# Patient Record
Sex: FEMALE | Race: WHITE
[De-identification: names, ages, dates, MRNs, and addresses within clinical notes are randomized per-mention and may not be internally consistent; named-entity substitution may affect disease eponyms.]

---

## 2021-11-08 ENCOUNTER — HOSPITAL ENCOUNTER (INPATIENT)
Dept: HOSPITAL 97 - ER | Age: 66
LOS: 2 days | Discharge: HOME | DRG: 419 | End: 2021-11-10
Attending: SURGERY | Admitting: SURGERY
Payer: COMMERCIAL

## 2021-11-08 VITALS — BODY MASS INDEX: 33 KG/M2

## 2021-11-08 DIAGNOSIS — M06.9: ICD-10-CM

## 2021-11-08 DIAGNOSIS — Z20.822: ICD-10-CM

## 2021-11-08 DIAGNOSIS — K80.12: Primary | ICD-10-CM

## 2021-11-08 DIAGNOSIS — E11.9: ICD-10-CM

## 2021-11-08 LAB
ALBUMIN SERPL BCP-MCNC: 4.2 G/DL (ref 3.4–5)
ALP SERPL-CCNC: 93 U/L (ref 45–117)
ALT SERPL W P-5'-P-CCNC: 27 U/L (ref 12–78)
AST SERPL W P-5'-P-CCNC: 20 U/L (ref 15–37)
BUN BLD-MCNC: 13 MG/DL (ref 7–18)
GLUCOSE SERPLBLD-MCNC: 168 MG/DL (ref 74–106)
HCT VFR BLD CALC: 36.4 % (ref 36–45)
LIPASE SERPL-CCNC: 159 U/L (ref 73–393)
LYMPHOCYTES # SPEC AUTO: 1.4 K/UL (ref 0.7–4.9)
PMV BLD: 7.6 FL (ref 7.6–11.3)
POTASSIUM SERPL-SCNC: 3.9 MMOL/L (ref 3.5–5.1)
RBC # BLD: 3.93 M/UL (ref 3.86–4.86)

## 2021-11-08 PROCEDURE — 96375 TX/PRO/DX INJ NEW DRUG ADDON: CPT

## 2021-11-08 PROCEDURE — 96365 THER/PROPH/DIAG IV INF INIT: CPT

## 2021-11-08 PROCEDURE — 99285 EMERGENCY DEPT VISIT HI MDM: CPT

## 2021-11-08 PROCEDURE — 74300 X-RAY BILE DUCTS/PANCREAS: CPT

## 2021-11-08 PROCEDURE — 76705 ECHO EXAM OF ABDOMEN: CPT

## 2021-11-08 PROCEDURE — 80076 HEPATIC FUNCTION PANEL: CPT

## 2021-11-08 PROCEDURE — 74177 CT ABD & PELVIS W/CONTRAST: CPT

## 2021-11-08 PROCEDURE — 36415 COLL VENOUS BLD VENIPUNCTURE: CPT

## 2021-11-08 PROCEDURE — 88304 TISSUE EXAM BY PATHOLOGIST: CPT

## 2021-11-08 PROCEDURE — 83690 ASSAY OF LIPASE: CPT

## 2021-11-08 PROCEDURE — 85025 COMPLETE CBC W/AUTO DIFF WBC: CPT

## 2021-11-08 PROCEDURE — 80048 BASIC METABOLIC PNL TOTAL CA: CPT

## 2021-11-08 PROCEDURE — 82947 ASSAY GLUCOSE BLOOD QUANT: CPT

## 2021-11-08 RX ADMIN — CEFOXITIN SODIUM SCH MLS: 1 INJECTION, SOLUTION INTRAVENOUS at 23:56

## 2021-11-08 RX ADMIN — DEXTROSE AND SODIUM CHLORIDE SCH MLS: 5; .45 INJECTION, SOLUTION INTRAVENOUS at 23:00

## 2021-11-08 RX ADMIN — HYDROMORPHONE HYDROCHLORIDE PRN MG: 1 INJECTION, SOLUTION INTRAMUSCULAR; INTRAVENOUS; SUBCUTANEOUS at 23:47

## 2021-11-08 NOTE — RAD REPORT
EXAM DESCRIPTION:  US - Abdomen Exam Limited - 11/8/2021 4:45 pm

 

CLINICAL HISTORY:  ABD PAIN

 

COMPARISON:  No comparisons

 

FINDINGS:  The gallbladder demonstrates distention with sludge and stones present. Trace pericholecys
tic fluid is seen. Gallbladder wall thickening is not noted. The common bile duct is mildly prominent
 measuring 7 mm.

 

The liver demonstrates no findings of intrahepatic biliary dilatation.

 

IMPRESSION:  Gallbladder distention with sludge and stones noted.

 

Trace pericholecystic fluid could indicate early acute cholecystitis in the proper clinical setting.

## 2021-11-08 NOTE — ER
Nurse's Notes                                                                                     

 [facility]                                                                 

Name: Samara Hawley                                                                               

Age: 66 yrs                                                                                       

Sex: Female                                                                                       

: 1955                                                                                   

MRN: [id number]                                                                                   

Arrival Date: 2021                                                                          

Time: 15:14                                                                                       

Account#: [id number]                                                                            

Bed 15                                                                                            

Private MD:                                                                                       

Diagnosis: Acute cholecystitis                                                                    

                                                                                                  

Presentation:                                                                                     

                                                                                             

15:18 Chief complaint: Patient states: pain to RUQ that began today around 1430. Pt c/o       aa5 

      nausea and vomiting, denies diarrhea. Coronavirus screen: nausea. Ebola Screen: No          

      symptoms or risks identified at this time. Initial Sepsis Screen: Does the patient meet     

      any 2 criteria? HR > 90 bpm. Does the patient have a suspected source of infection? No.     

      Patient's initial sepsis screen is negative. Risk Assessment: Do you want to hurt           

      yourself or someone else? Patient reports no desire to harm self or others. Onset of        

      symptoms was 2021.                                                             

15:18 Method Of Arrival: Wheelchair                                                           aa5 

15:18 Acuity: LANDON 3                                                                           aa5 

                                                                                                  

Historical:                                                                                       

- Allergies:                                                                                      

15:19 No Known Allergies;                                                                     aa5 

- PMHx:                                                                                           

15:19 Diabetes mellitus; Back problem; RA;                                                    aa5 

- PSHx:                                                                                           

15:19 Back Stimulator;                                                                        aa5 

                                                                                                  

- Immunization history:: Client reports receiving the 2nd dose of the Covid vaccine.              

- Social history:: Smoking status: Patient denies any tobacco usage or history of.                

                                                                                                  

                                                                                                  

Screening:                                                                                        

15:43 Abuse screen: Denies threats or abuse. Denies injuries from another. Nutritional        jt3 

      screening: No deficits noted. Tuberculosis screening: No symptoms or risk factors           

      identified. Fall Risk None identified.                                                      

                                                                                                  

Assessment:                                                                                       

15:43 General: Appears distressed, Behavior is restless. Pain: Complains of pain in abdomen   jt3 

      Pain radiates to abdomen Pain currently is 10 out of 10 on a pain scale. Quality of         

      pain is described as throbbing. GI: Abdomen is tender to palpation in right upper           

      quadrant Abdomen has rebound tenderness Reports upper abdominal pain, nausea, Pt.           

      reports sudden, sharp, right sided abdominal pain today after eating. Pain started an       

      hour before arriving to the ER.                                                             

20:24 Reassessment: MARGARETTE Castañeda to room several times to discuss admission. At this time, pt and dc2 

       agrees that she will stay the night for sx in the am. Informed pt and        

      NPO after midnight, informed that we do not know what time and that I will inform them      

      when I get any information regarding room placement and or surgery time. Call lght          

      within reach, continue to monitor.                                                          

                                                                                                  

Vital Signs:                                                                                      

15:18  / 94; Pulse 94; Resp 18 S; Temp 98.0(TE); Pulse Ox 100% on R/A; Weight 98.88 kg  aa5 

      (R); Height 5 ft. 8 in. (172.72 cm) (R);                                                    

16:04  / 86; Pulse 79; Resp 17; Pulse Ox 98% ;                                          jt3 

18:50  / 63; Pulse 76; Resp 16; Pulse Ox 95% on R/A;                                    jt3 

19:27  / 78; Pulse 78; Resp 18; Temp 98.0; Pulse Ox 98% on R/A; Pain 4/10;              dc2 

22:00  / 69; Pulse 72; Resp 17; Pulse Ox 99% on R/A; Pain 3/10;                         dc2 

15:18 Body Mass Index 33.15 (98.88 kg, 172.72 cm)                                             aa 

                                                                                                  

ED Course:                                                                                        

15:14 Patient arrived in ED.                                                                  am2 

15:18 Arm band placed on.                                                                     aa5 

15:19 Triage completed.                                                                       aa5 

15:22 Bentley Sandoval, RN is Primary Nurse.                                                   jt3 

15:26 Garry Welch PA is PHCP.                                                              [facility] 

15:26 Diana Galeano MD is Attending Physician.                                                [facility] 

15:43 Patient has correct armband on for positive identification. Bed in low position. Call   jt3 

      light in reach. Side rails up X2.                                                           

15:43 No provider procedures requiring assistance completed. Inserted saline lock: 20 gauge   jt3 

      in right antecubital area, using aseptic technique. Blood collected.                        

16:32 CT Abd/Pelvis - IV Contrast Only In Process Unspecified.                                EDMS

16:45 US Abdomen Limited In Process Unspecified.                                              EDMS

19:52 Nurse Practitioner and/or Physician Assistant to see patient.                           dc2 

19:53 Jerrod Flores MD is Hospitalizing Provider.                                           jmm 

20:08 Nurse Practitioner and/or Physician Assistant to see patient.                           dc2 

20:26 IV Flushed right antecubital with 5 ml normal saline                                    dc2 

20:30 Cardiac monitor on. Pulse ox on. NIBP on. Door closed. Lights dimmed.                   dc2 

21:15 No apparent distress. Resting quietly. Awaiting bed assignment.                         dc2 

                                                                                                  

Administered Medications:                                                                         

15:42 Drug: morphine 4 mg Route: IVP; Site: right antecubital;                                jt3 

17:47 Follow up: Response: No adverse reaction; Pain is decreased                             jt3 

15:42 Drug: Zofran (Ondansetron) 4 mg Route: IVP; Site: right antecubital;                    jt3 

17:47 Follow up: Response: No adverse reaction; Pain is decreased                             jt3 

17:10 Drug: Dilaudid (HYDROmorphone) 0.5 mg Route: IVP; Site: right antecubital;              jt3 

17:48 Follow up: Response: No adverse reaction; Pain is decreased                             jt3 

17:10 Drug: Pepcid (famotidine) 10 mg Route: IVP; Site: right antecubital;                    jt3 

17:48 Follow up: Response: No adverse reaction; Pain is decreased                             jt3 

19:50 Drug: cefOXitin 2 grams Route: IVPB; Rate: 100 ml/hr; Infused Over: 30 mins; Site:      dc2 

      right antecubital; Delivery: Primary tubing;                                                

20:30 Follow up: IV Status: Completed infusion; IV Intake: 100ml                              dc2 

20:05 Drug: Dilaudid (HYDROmorphone) 0.5 mg Route: IVP; Site: right antecubital;              dc2 

20:45 Follow up: Response: Pain is decreased                                                  dc2 

                                                                                                  

                                                                                                  

Intake:                                                                                           

20:30 IV: 100ml; Total: 100ml.                                                                dc2 

                                                                                                  

Outcome:                                                                                          

19:54 Decision to Hospitalize by Provider.                                                    anthony 

22:09 Admitted to Tele accompanied by tech, via stretcher, room 230, with chart, Report       dc2 

      called to  PANCHO Peacock                                                                       

22:09 Condition: stable                                                                           

22:17 Patient left the ED.                                                                    dc2 

                                                                                                  

Signatures:                                                                                       

Dispatcher [employer]                           EDMS                                                 

Garry Welch PA PA jmm Calderon, Audri, RN                     RN   [email or web address]                                                  

Peggy Flores am2                                                  

Maida Yeager RN RN   dc2                                                  

Bentley Sandoval RN                     RN   jt3                                                  

                                                                                                  

**************************************************************************************************

## 2021-11-08 NOTE — RAD REPORT
EXAM DESCRIPTION:  CTAbdomen   Pelvis W Contrast - 11/8/2021 4:32 pm

 

CLINICAL HISTORY:  Abdominal pain.

abdominal pain

 

COMPARISON:  No comparisons

 

TECHNIQUE:  Biphasic CT imaging of the abdomen and pelvis was performed with 100 ml non-ionic IV cont
rast.

 

All CT scans are performed using dose optimization technique as appropriate and may include automated
 exposure control or mA/KV adjustment according to patient size.

 

FINDINGS:  The lung bases are clear.The gallbladder is quite distended. Postsurgical changes of a gas
tric sleeve.

 

The liver, spleen, pancreas, adrenal glands and kidneys are within normal limits.

 

No bowel obstruction, free air, free fluid or abscess.  The appendix is not identified as a discrete 
structure, however, no secondary findings of appendicitis are identified.   No evidence of significan
t lymphadenopathy.

 

Lumbosacral hardware is present.

 

IMPRESSION:  Prominent gallbladder distension noted.

## 2021-11-09 VITALS — OXYGEN SATURATION: 96 %

## 2021-11-09 PROCEDURE — 0FT44ZZ RESECTION OF GALLBLADDER, PERCUTANEOUS ENDOSCOPIC APPROACH: ICD-10-PCS

## 2021-11-09 PROCEDURE — BF121ZZ FLUOROSCOPY OF GALLBLADDER USING LOW OSMOLAR CONTRAST: ICD-10-PCS

## 2021-11-09 RX ADMIN — HYDROMORPHONE HYDROCHLORIDE PRN MG: 1 INJECTION, SOLUTION INTRAMUSCULAR; INTRAVENOUS; SUBCUTANEOUS at 08:02

## 2021-11-09 RX ADMIN — CEFOXITIN SODIUM SCH MLS: 1 INJECTION, SOLUTION INTRAVENOUS at 06:20

## 2021-11-09 RX ADMIN — FENTANYL CITRATE ONE MCG: 50 INJECTION, SOLUTION INTRAMUSCULAR; INTRAVENOUS at 19:57

## 2021-11-09 RX ADMIN — FENTANYL CITRATE ONE MCG: 50 INJECTION, SOLUTION INTRAMUSCULAR; INTRAVENOUS at 19:47

## 2021-11-09 RX ADMIN — HYDROMORPHONE HYDROCHLORIDE PRN MG: 1 INJECTION, SOLUTION INTRAMUSCULAR; INTRAVENOUS; SUBCUTANEOUS at 03:04

## 2021-11-09 RX ADMIN — CEFOXITIN SODIUM SCH: 1 INJECTION, SOLUTION INTRAVENOUS at 18:00

## 2021-11-09 RX ADMIN — DEXTROSE AND SODIUM CHLORIDE SCH MLS: 5; .45 INJECTION, SOLUTION INTRAVENOUS at 06:20

## 2021-11-09 RX ADMIN — DEXTROSE AND SODIUM CHLORIDE SCH: 5; .45 INJECTION, SOLUTION INTRAVENOUS at 13:00

## 2021-11-09 RX ADMIN — HYDROMORPHONE HYDROCHLORIDE PRN MG: 1 INJECTION, SOLUTION INTRAMUSCULAR; INTRAVENOUS; SUBCUTANEOUS at 11:29

## 2021-11-09 RX ADMIN — CEFOXITIN SODIUM SCH MLS: 1 INJECTION, SOLUTION INTRAVENOUS at 12:16

## 2021-11-09 NOTE — P.HP
Date of Service: 11/09/21





PC: This 66-year-old female presented to the emergency room with severe right 

upper quadrant abdominal pain radiating to her back for diagnosis and treatment.





HPC: Patient states is the first time she has had this pain.  Devises describes 

it as very severe.  Radiating into her back.  Seem to come on after eating some 

food.





PSHx: Back surgery [has numerous rods] also gastric bypass surgery in the past





PMHx: Rheumatoid arthritis





Social Hx: No known allergies, see nurses notes for her rheumatoid meds





Sys R: No cough, wheeze, shortness of breath.  No chest pain or palpitations.  

Denies any urinary complaints











O/E: Awake alert uncomfortable





HEENT: Not jaundiced





Chest: Air entry equal bilaterally





Abd: Tender in the right upper quadrant





Loco: Intact





Data: Has acute on chronic cholecystitis





Impression: Acute on chronic cholecystitis with cholelithiasis





Plan: I will taken the operating for laparoscopic possible open cholecystectomy.

 We will also do a cholangiogram.  The risks of this procedure have been 

discussed.  The possibility of bleeding, infection, injury to bile ducts blood 

vessels and intestines were described.  The possible need for an open and/or 

further surgeries and procedures was discussed.  She understands and wants to 

proceed.

## 2021-11-09 NOTE — P.OP
Preoperative diagnosis: Acute on chronic cholecystitis with cholelithiasis


Postoperative diagnosis: The same


Primary procedure: Laparoscopic cholecystectomy


Secondary procedure: Cholangiogram


Anesthesia: General


Estimated blood loss: Less than 10 cc


Specimen: Gallbladder and contents


Operative Technique: 





She was brought to the operating room and placed supine on the table.  After the

induction of adequate general endotracheal anesthesia, the area of the abdomen 

was prepped with a DuraPrep solution, and she was draped in usual aseptic 

manner.





Attention was turned towards the umbilicus.  After injecting 1.25% Marcaine a 

skin incision was made.  The Visiport was then used to enter the peritoneal 

cavity and created pneumoperitoneum to approximately 12 mmHg.  Under direct 

vision a 5 mm trocar was placed in the upper midline, and 2 other 5 mm trochars 

on the right lateral side of the abdomen.  The patient was now placed in marked 

reverse Trendelenburg.  The table was turned towards the left.  We were now able

to visualize the right upper quadrant.  We could see quite a distended and 

acutely inflamed gallbladder.  It was necessary to aspirate the contents so as 

we could place the grasper on the fundus of the gallbladder.  This having been 

done another grasper was now able to be applied to the Lo's pouch.  

Applying lateral traction we were able to dissect out and expose both the cystic

duct and artery.  It should be noted the cystic duct was quite thickened.  There

was a large amount of dense inflammatory chronic tissue in that area.  Having 

obtained the critical view, a clip was placed between the gallbladder and the 

cystic duct.  An opening made is made into the cystic duct through which we 

attempted a cholangiogram.  Due to the amount of hardware that the patient had 

that we did not get good validation of the extrahepatic biliary tree.  We could 

however see that the there was contrast in the common bile duct, and there was 

flow into the duodenum.  I did not see any filling defects.  The films are 

obviously suboptimal but I wanted to make sure it was at least grossly clear to 

avoid the possibility of having to do an MRCP which would not be possible due to

the patient hardware in her back.  The catheter was now removed.  Clips were 

placed on the distal portion of the cystic duct.  The cystic duct was fully 

transected.  The gallbladder was now dissected free from the liver bed, placed 

into an Endo Catch, and brought out through the umbilical trocar site.  At this 

point the abdomen is inspected to ensure adequate hemostasis.  Tube absorbable 

sutures were placed at the umbilicus to approximate the umbilical fascia.  The 

pneumoperitoneum was now collapsed, the suture tied, and the trochars removed.





At the end of the procedure the patient was in a stable condition was sent to 

the recovery room.  Needle sponge and instrument count were correct.  No drains 

were placed.  Staples were applied to the skin.

## 2021-11-10 VITALS — DIASTOLIC BLOOD PRESSURE: 56 MMHG | TEMPERATURE: 98.2 F | SYSTOLIC BLOOD PRESSURE: 116 MMHG

## 2021-11-10 RX ADMIN — CEFOXITIN SODIUM SCH MLS: 1 INJECTION, SOLUTION INTRAVENOUS at 00:00

## 2021-11-10 RX ADMIN — CEFOXITIN SODIUM SCH MLS: 1 INJECTION, SOLUTION INTRAVENOUS at 06:00

## 2021-11-10 NOTE — RAD REPORT
EXAM DESCRIPTION:  RAD - Cholangiogram Oper-Xray Or - 11/10/2021 7:52 am

 

CLINICAL HISTORY:  ORTEGA WITH IOC

 

COMPARISON:  Abdomen   Pelvis W Contrast dated 11/8/2021

 

FINDINGS:  Six intraoperative fluoroscopic images were submitted showing opacification of the common 
bile duct and gallbladder. Much of the duct is obscured by the patient's spinal hardware. There is a 
filling defect distally which could be an air bubble or stone.

 

IMPRESSION:  Intraoperative fluoroscopic images from intraoperative cholangiogram. Though limited due
 to the overlying spinal hardware, there is a filling defect in the distal common bile duct that coul
d represent a stone.

## 2023-08-13 ENCOUNTER — HOSPITAL ENCOUNTER (EMERGENCY)
Dept: HOSPITAL 97 - ER | Age: 68
Discharge: HOME | End: 2023-08-13
Payer: COMMERCIAL

## 2023-08-13 VITALS — SYSTOLIC BLOOD PRESSURE: 132 MMHG | OXYGEN SATURATION: 99 % | DIASTOLIC BLOOD PRESSURE: 82 MMHG

## 2023-08-13 DIAGNOSIS — R11.2: ICD-10-CM

## 2023-08-13 DIAGNOSIS — Z20.822: ICD-10-CM

## 2023-08-13 DIAGNOSIS — J01.90: Primary | ICD-10-CM

## 2023-08-13 DIAGNOSIS — E11.9: ICD-10-CM

## 2023-08-13 LAB
BUN BLD-MCNC: 12 MG/DL (ref 7–18)
GLUCOSE SERPLBLD-MCNC: 316 MG/DL (ref 74–106)
HCT VFR BLD CALC: 38.4 % (ref 36–45)
LYMPHOCYTES # SPEC AUTO: 1.2 K/UL (ref 0.7–4.9)
MCV RBC: 93.6 FL (ref 80–100)
PMV BLD: 6.6 FL (ref 7.6–11.3)
POTASSIUM SERPL-SCNC: 4.2 MEQ/L (ref 3.5–5.1)
RBC # BLD: 4.1 M/UL (ref 3.86–4.86)
WBC # BLD AUTO: 9.6 THOU/UL (ref 4.3–10.9)

## 2023-08-13 PROCEDURE — 36415 COLL VENOUS BLD VENIPUNCTURE: CPT

## 2023-08-13 PROCEDURE — 96374 THER/PROPH/DIAG INJ IV PUSH: CPT

## 2023-08-13 PROCEDURE — 80048 BASIC METABOLIC PNL TOTAL CA: CPT

## 2023-08-13 PROCEDURE — 87081 CULTURE SCREEN ONLY: CPT

## 2023-08-13 PROCEDURE — 99284 EMERGENCY DEPT VISIT MOD MDM: CPT

## 2023-08-13 PROCEDURE — 87804 INFLUENZA ASSAY W/OPTIC: CPT

## 2023-08-13 PROCEDURE — 87635 SARS-COV-2 COVID-19 AMP PRB: CPT

## 2023-08-13 PROCEDURE — 96375 TX/PRO/DX INJ NEW DRUG ADDON: CPT

## 2023-08-13 PROCEDURE — 85025 COMPLETE CBC W/AUTO DIFF WBC: CPT

## 2023-08-13 PROCEDURE — 96361 HYDRATE IV INFUSION ADD-ON: CPT

## 2023-08-13 PROCEDURE — 87070 CULTURE OTHR SPECIMN AEROBIC: CPT

## 2023-08-13 NOTE — XMS REPORT
Continuity of Care Document

                           Created on:2023



Patient:ESSENCE MERCHANT

Sex:Female

:1955

External Reference #:[id number]





Demographics







                          Address                   [address] [place] [address]

 

                          Home Phone                [phone] CE[place]

 

                          Work Phone                [phone]

 

                          Mobile Phone              [phone]

 

                          Email Address             [email or web address]

 

                          Preferred Language        English

 

                          Marital Status            Unknown

 

                          Hindu Affiliation     Unknown

 

                          Race                      Unknown

 

                          Additional Race(s)        Unavailable



                                                    Other Race



                                                    White

 

                          Ethnic Group              Unknown









Author







                          Organization              [facility]

t

 

                          Address                   [address] [place] [address]

 

                          Phone                     [phone]









Support







                Name            Relationship    Address         Phone

 

                MANUEL MERCHANT              [address]  [phone]



                                                [place] [address] 

 

                MANUEL MERCHANT              [address]  [phone]



                                                [place] [address] 

 

                Mayur Essence  Unavailable     [address] [facility] [phone]



                                                [place] [address] 

 

                Manuel Merchant     Unavailable     [address] [facility] [phone]



                                                [place] [address] 

 

                MAYURHERBER BOONE  Spouse          [address] [facility] Unavailable



                                                [place] [phone] 

 

                Herber Merchant  Spouse          [address] [facility] [phone]

[phone]



                                                [place] [id number] 

 

                MANUEL MERCHANT     Spouse          [address] [facility] Unavailable



                                                [place] [address]

66 

 

                Mayur, Manuel     Spouse          [address] [facility] [phone]

[phone]



                                                [place] [address]

66 









Care Team Providers







                    Name                Role                Phone

 

                    POLINA GUADALUPE            Primary Care Physician Unavailable

 

                    Polina Guadalupe          Attending Clinician Unavailable

 

                    MICHAEL GOMEZ     Attending Clinician Unavailable

 

                    JOVANA GOODE Attending Clinician Unavailable

 

                    GWENDOLYN CRISTINA       Attending Clinician Unavailable

 

                    JOVANA BARNES     Attending Clinician Unavailable

 

                    Aleksander Canseco    Attending Clinician [phone]

 

                    Jovana Goode MD Attending Clinician [phone]

 

                    JOVANA BARNES Attending Clinician Unavailable

 

                    EDUARDO GARSIA     Attending Clinician Unavailable

 

                    TARAS MENG        Attending Clinician Unavailable

 

                    Jerrica Ardon  Attending Clinician [phone]

 

                    WILLIAMS THORNTON  Attending Clinician Unavailable

 

                    Williams Thornton MD    Attending Clinician Unavailable

 

                    Pam Boone MD   Attending Clinician [phone]

 

                    KATEY CALDERA Attending Clinician Unavailable

 

                    CHELLY MCARTHUR Attending Clinician Unavailable

 

                    Armando Fair MD Attending Clinician [phone]

 

                    Ashley Cai MD   Attending Clinician [phone]

 

                    ASHLEY CAI      Attending Clinician Unavailable

 

                    ARMANDO FAIR Attending Clinician Unavailable

 

                    ARMANDO FAIR Attending Clinician Unavailable

 

                    Doctor Unassigned, No Name Attending Clinician Unavailable









Payers







           Payer Name Policy Type Policy Number Effective Date Expiration Date S

mode

 

           [place] P[place] AND            [id number] 2009 



           OUT OF STATE                       00:00:00   00:00:00   

 

           [facility]            [id number] 2023            



           HEALTH [facility]                       00:00:00              



           PPO                                                    

 

           [facility] MEDICARE            [id number]  2022            



           ADVANTAGE                        00:00:00              

 

           [facility] HealthSelect 1          [id number]  2022            Common



           TRS/ERS MCR PPO                       00:00:00              [facility]

 

           [employer] Blue 6          [id number] 2021 Common



           [place]                       00:00:00   00:00:00   [facility]

 

           SRC AN [employer]ET[employer]            [id number] 2011            



           COMPANY                          00:00:00              







Problems







       Condition Condition Condition Status Onset  Resolution Last   Treating Co

mments 

Source



       Name   Details Category        Date   Date   Treatment Clinician        



                                                 Date                 

 

       Leg    Leg    Disease Active                              [facility]



       erythema erythema               5-10                               Health



                                   00:00:                             



                                   00                                 

 

       Status Status Disease Active                              [facility]



       post total post total               3-14                               He

alth



       knee   knee                 00:00:                             



       replacemen replacemen               00                                 



       t, left t, left                                                  

 

       Ganglion Ganglion Disease Active                              [facility]



       cyst of cyst of               5-02                               Health



       volar  volar                00:00:                             



       aspect of aspect of               00                                 



       left wrist left wrist                                                  

 

       Cervical Cervical Disease Active                              [facility]



       radiculopa radiculopa               4-18                               He

alth



       thy    thy                  00:00:                             



                                   00                                 

 

       Carpal Carpal Disease Active                              [facility]



       tunnel tunnel               4-12                               Health



       syndrome, syndrome,               00:00:                             



       right  right                00                                 

 

       Primary Primary Disease Active 2022-0                             [facility]



       osteoarthr osteoarthr               4-12                               He

alth



       itis of itis of               00:00:                             



       left wrist left wrist               00                                 

 

       TIA    TIA    Disease Active                              [facility]



       (transient (transient               3-09                               He

alth



       ischemic ischemic               00:00:                             



       attack) attack)               00                                 

 

       Sacroiliac Sacroiliac Disease Active -                             U

T



       joint pain joint pain               3-30                               He

alth



                                   00:00:                             



                                   00                                 

 

       Hypothyroi Hypothyroi Disease Active 2015                             U

T



       dism due dism due                                              Health



       to     to                   00:00:                             



       acquired acquired               00                                 



       atrophy of atrophy of                                                  



       thyroid thyroid                                                  

 

       [id number] Status Problem                                           Common



              post                                                    [facility]



              laparoscop                                                  - [facility]



              ic                                                      [facility]

 

       [id number] Leukopenia Problem                                           Com

mon



              ,                                                       [facility]



              unspecifie                                                  - [facility]



              d type                                                  [facility]

 

       [id number] Primary Problem                                           Comm

on



         osteoarthr                                                  [facility]



              itis of                                                  - [facility]



              left knee                                                  [facility]

 

       [id number] Controlled Problem                                           Co

mmon



              type 2                                                  Spirit



              diabetes                                                  - [facility]



              mellitus                                                  [facility]



              complicati                                                  Medica

l



              on,                                                     Center



              without                                                  



              long-term                                                  



              current                                                  



              use of                                                  



              insulin                                                  

 

       [id number] Primary Problem                                           Common



              insomnia                                                  Spirit



                                                                      - [facility]



                                                                      [facility]

 

       [id number] Neuropathy Problem                                           C

ommon



         due to                                                  Spirit



              type 2                                                  - [facility]



              diabetes                                                  [facility]

 

       Diabetes Diabetes Problem                                           Commo

n



       mellitus DMII                                                    Spirit



       without without                                                  - [facility]



       complicati complicati                                                  St



       on     [facility]

 

       [id number] Malabsorpt Problem                                           Com

mon



              ion due to                                                  Spirit



              intoleranc                                                  - [facility]



              e, not                                                  St



              elsewhere                                                  [facility]



              classified                                                  Medica

l



                                                                      Center

 

       [id number] Iron   Problem                                           Common



              deficiency                                                  Spirit



              anemia,                                                  - [facility]



              unspecifie                                                  St



              d iron                                                  [facility]



              deficiency                                                  Medica

l



              anemia                                                  Center



              type                                                    

 

       Shingles Shingles Problem                                           Commo

n



                                                                      Spirit



                                                                      - [facility]



                                                                      [facility]

 

       Rheumatoid Rheumatoid Problem                                           C

ommon



       arthritis arthritis                                                  Spir

it



              involving                                                  - [facility]



              multiple                                                  St



              sites with                                                  [facility]



              positive                                                  Medical



              rheumatoid                                                  Center



              factor                                                  

 

       [id number] Itching Problem                                           Commo

n



                                                                      Spirit



                                                                      - [facility]



                                                                      [facility]

 

       [id number] Acquired Problem                                           Comm

on



              hypothyroi                                                  Spirit



              dism                                                    - [facility]



                                                                      [facility]

 

       [id number] Encounter Problem                                           Com

mon



              for                                                     Spirit



              screening                                                  - [facility]



              colonoscop                                                  [facility]

 

       [id number] Pure   Problem                                           Common



              hyperchole                                                  Spirit



              sterolemia                                                  - [facility]



                                                                      [facility]

 

       [id number] Fever, Problem                                           Common



              unspecifie                                                  Spirit



              d fever                                                  - [facility]



              cause                                                   [facility]

 

       [id number] Nausea Problem                                           Common



              alone                                                   Spirit



                                                                      - [facility]

 

       [id number] Axillary Problem                                           Comm

on



              adenopathy                                                  [facility]



                                                                      - [facility]

 

       [id number] DDD    Problem                                           Common



              (degenerat                                                  Spirit



              young disc                                                  - [facility]



              disease),                                                  [facility]







Allergies, Adverse Reactions, Alerts







       Allergy Allergy Status Severity Reaction(s) Onset  Inactive Treating Comm

ents 

Source



       Name   Type                        Date   Date   Clinician        

 

       NO KNOWN Drug   Active                                           Univers



       ALLERGIE Class                                                   ity of



       S                                                              [place]



                                                                      [facility]



                                                                      Branch







Social History







           Social Habit Start Date Stop Date  Quantity   Comments   Source

 

           History of Tobacco                                             Common

 Spirit -



           Use                                                    [facility]

 

           Sex Assigned At                                             Common Sp

claude -



           Birth                                                  [facility]

 

           History SDOH Social                                             Unive

rsity of



           Connections Phone                                             [facility]

edical



                                                                  Branch

 

           History SDOH Social                                             Unive

rsity of



           Connections Get                                             [place] [facility]

ical



           Together                                               Branch

 

           History SDOH Social                                             Unive

rsity of



           Connections [facility]



                                                                  Branch

 

           History SDOH                                             University o

f



           Housing Places                                             [facility]

 

           Gender identity                                             Universit

y of



                                                                  [facility]

 

           Sexual orientation                                             Univer

sity of



                                                                  [place] [facility]



                                                                  Branch

 

           History of Social 2023                       Univers

ity of



           function   00:00:00   00:00:00                         [place] [facility]



                                                                  Branch

 

           Exposure to 2023 Not sure              University of



           SARS-CoV-2 (event) 00:00:00   13:40:00                         [place] 

[facility]



                                                                  Branch

 

           History SDOH 2023 1                     University o

f



           Alcohol Frequency 00:00:00   00:00:00                         [facility]

edical



                                                                  Branch

 

           History SDOH 2023 0                     University o

f



           Alcohol Std Drinks 00:00:00   00:00:00                         [place] 

[facility]



                                                                  Branch

 

           History SDOH 2023 1                     University o

f



           Alcohol Binge 00:00:00   00:00:00                         [place] [facility]

al



                                                                  Branch

 

           History SDOH Social 2023 2                     Unive

rsity of



           Connections 00:00:00   00:00:00                         [facility]



           Membership                                             Branch

 

           History SDOH Social 2023 1                     Unive

rsity of



           Connections 00:00:00   00:00:00                         [place] [facility]



           Meetings                                               Branch

 

           History SDOH Social 2023 3                     Unive

rsity of



           Connections Living 00:00:00   00:00:00                         [place] 

[facility]



                                                                  Branch

 

           History SDOH 2023 0                     University o

f



           Physical Activity 00:00:00   00:00:00                         [place] [facility]

edical



           DPW                                                    Branch

 

           History SDOH 2023 0                     University o

f



           Physical Activity 00:00:00   00:00:00                         [place] [facility]

edical



           MPS                                                    Branch

 

           History SDOH Stress 2023 5                     Unive

rsity of



                      00:00:00   00:00:00                         [place] [facility]



                                                                  Branch

 

           History SDOH Food 2023 1                     Univers

ity of



           Worry      00:00:00   00:00:00                         [place] [facility]



                                                                  Branch

 

           History SDOH Food 2023 1                     Univers

ity of



           Scarcity   00:00:00   00:00:00                         [place] [facility]



                                                                  Branch

 

           History SDOH 2023 2                     University o

f



           Transport Med 00:00:00   00:00:00                         [place] [facility]

al



                                                                  Branch

 

           History SDOH 2023 2                     University o

f



           Transport Non-Med 00:00:00   00:00:00                         [place] [facility]

edical



                                                                  Branch

 

           History SDOH 2023 2                     University o

f



           Housing Unable to 00:00:00   00:00:00                         [place] [facility]

edical



           Pay                                                    Branch

 

           History SDOH 2023 2                     University o

f



           Housing Homeless 00:00:00   00:00:00                         [place] [facility]

dical



           Last Year                                              Branch

 

           Alcohol intake 2023 Lifetime              [facility]



                      00:00:00   00:00:00   non-drinker            



                                            (finding)             

 

           Tobacco use and 2022 Smokeless             [facility]



           exposure   00:00:00   00:00:00   tobacco non-user            

 

           Cigarette  2022                       [facility]



           pack-years 00:00:00   00:00:00                         

 

           Tobacco Comment 2022 Smoking History            [facility]

[facility]



                      00:00:00   00:00:00   Packs/day: N.            



                                            Recorded:            



                                            021                   









                Smoking Status  Start Date      Stop Date       Source

 

                Never smoked tobacco                                 [facility]







Medications







       Ordered Filled Start  Stop   Current Ordering Indication Dosage Frequency

 Signature

                    Comments            Components          Source



     Medication Medication Date Date Medication? Clinician                (SIG) 

          



     Name Name                                                   

 

     HYDROcodone      2023- Yes       [id number] 1{tbl}      Take 1     

      [facility]



     -acetaminop      8-04 08-12           [id number]           tablet by           

Health



     hen ([facility])      00:00: 04:59                          mouth           



      MG      00   :00                           every 4           



     tablet                                         (four)           



                                                  hours if           



                                                  needed for           



                                                  severe           



                                                  pain for           



                                                  up to 7           



                                                  days.           

 

     temazepam      -      Yes       [id number]           1 capsule          

 [facility]



     15 mg      7-24                               at bedtime           [facility] of



     capsule      00:00:                               as needed           [place]



               00                                 Orally           Medical



                                                  Once a day           [facility]



                                                  for 30           



                                                  days           

 

     predniSONE      -0      Yes       [id number]           Take one       

    [facility]



     (Deltasone)      7-16                [id number]           tab PO BID           

Health



     5 MG tablet      00:00:                               x 10 days           



               00                                 and one           



                                                  tab PO           



                                                  daily           



                                                  therafeter           

 

     predniSONE      -      Yes       [id number]           Take one       

    [facility]



     (Deltasone)      7-16                [id number]           tab PO BID           

Health



     5 MG tablet      00:00:                               x 10 days           



               00                                 and one           



                                                  tab PO           



                                                  daily           



                                                  therafeter           

 

     hydrOXYzine      -      Yes       [id number] 10mg Q6H  Take 1          

 [facility]



     HCl       7-16                               tablet (10           Health



     (Atarax) 10      00:00:                               mg total)           



     MG tablet      00                                 by mouth           



                                                  every 6           



                                                  (six)           



                                                  hours if           



                                                  needed for           



                                                  itching           



                                                  for up to           



                                                  10 days.           

 

     hydrOXYzine      -2023- Yes       [id number] 10mg Q6H  Take 1         

  [facility]



     HCl       7-16 07-27                          tablet (10           Health



     (Atarax) 10      00:00: 04:59                          mg total)           



     MG tablet      00   :00                           by mouth           



                                                  every 6           



                                                  (six)           



                                                  hours if           



                                                  needed for           



                                                  itching           



                                                  for up to           



                                                  10 days.           

 

     levothyroxi            Yes       [id number]           TAKE 1          

 [facility]



     ne 175 mcg      7-13                               TABLET BY           ity 

of



     tablet      00:00:                               MOUTH           [place]



               00                                 EVERY DAY           Medical



                                                  IN THE           [facility]anch



                                                  MORNING ON           



                                                  EMPTY           



                                                  STOMACH           

 

     levothyroxi      -      Yes       [id number]           TAKE 1          

 [facility]



     ne 175 mcg      7-13                               TABLET BY           ity 

of



     tablet      00:00:                               MOUTH           [place]



               00                                 EVERY DAY           Medical



                                                  IN THE           Branch



                                                  MORNING ON           



                                                  EMPTY           



                                                  STOMACH           

 

     predniSONE      -0      Yes       [id number]           Take two       

    [facility]



     (Deltasone)      6-30                05             tablets           Healt

h



     5 MG tablet      00:00:                               together           



               00                                 PO BID           

 

     predniSONE      -0      Yes       [id number]           Take two       

    [facility]



     (Deltasone)      6-30                05             tablets           [facility]

h



     5 MG tablet      00:00:                               together           



               00                                 PO BID           

 

     predniSONE      0      Yes       [id number]           Take two       

    [facility]



     (Deltasone)      6-30                05             tablets           Healt

h



     5 MG tablet      00:00:                               together           



               00                                 PO BID           

 

     MULTIVITAMI            Yes                      Take by           Uni

vers



     NS WITH      6-26                               mouth.           ity of



     FLUORIDE      13:10:                                              [place]



     (MULTI-RAJESH      53                                                Medical



     MIN ORAL)                                                        [facility]

 

     azaTHIOprin            Yes                      See            Univer

s



     e 50 mg      6-26                               administra           ity of



     tablet      13:10:                               tion           [address]            Branch

 

     metFORMIN            Yes                                     [facility]



     1,000 mg      6-26                                              ity of



     tablet      13:10:                                              [address]

 

     gabapentin            Yes                                     [facility]



     800 mg      6-26                                              ity of



     tablet      13:10:                                              [address]

 

     sulfaSALAzi            Yes                                     Un[facility]

s



     ne 500 mg      6-26                                              ity of



     tablet      13:10:                                              [address]

 

     MULTIVITAMI            Yes                      Take by           Uni

vers



     NS WITH      6-26                               mouth.           ity of



     FLUORIDE      13:10:                                              [place]



     (MULTI-RAJESH      53                                                Medical



     MIN ORAL)                                                        [facility]

 

     azaTHIOprin            Yes                      See            Univer

s



     e 50 mg      6-26                               administra           ity of



     tablet      13:10:                               tion           [address]            [facility]anch

 

     metFORMIN            Yes                                     [facility]



     1,000 mg      6-26                                              ity of



     tablet      13:10:                                              [address]

 

     gabapentin            Yes                                     [facility]



     800 mg      6-26                                              ity of



     tablet      13:10:                                              [address]

 

     sulfaSALAzi            Yes                                     Un[facility]

s



     ne 500 mg      6-26                                              ity of



     tablet      13:10:                                              [address]

 

     MULTIVITAMI            Yes                      Take by           Uni

vers



     NS WITH      6-26                               mouth.           ity of



     FLUORIDE      13:10:                                              [place]



     (MULTI-RAJESH      53                                                Medical



     MIN ORAL)                                                        [facility]

 

     azaTHIOprin            Yes                      See            Univer

s



     e 50 mg      6-26                               administra           ity of



     tablet      13:10:                               tion           [address]            Branch

 

     metFORMIN            Yes                                     [facility]



     1,000 mg      6-26                                              ity of



     tablet      13:10:                                              [address]

 

     gabapentin            Yes                                     [facility]



     800 mg      6-26                                              ity of



     tablet      13:10:                                              [address]

 

     sulfaSALAzi            Yes                                     Un[facility]

s



     ne 500 mg      -26                                              ity of



     tablet      13:10:                                              [address]

 

     MULTIVITAMI            Yes                      Take by           Uni

vers



     NS WITH      6-                               mouth.           ity of



     FLUORIDE      13:10:                                              [place]



     (MULTI-RAJESH      53                                                Medical



     MIN ORAL)                                                        [facility]anch

 

     azaTHIOprin            Yes                      See            Univer

s



     e 50 mg                                     administra           ity of



     tablet      13:10:                               tion           [place]



               53                                 instructio           Medical



                                                  ns.            Branch

 

     metFORMIN            Yes                                     [facility]



     1,000 mg      6-                                              ity of



     tablet      13:10:                                              [address]

 

     gabapentin            Yes                                     [facility]



     800 mg      6-26                                              ity of



     tablet      13:10:                                              [address]

 

     sulfaSALAzi            Yes                                     Un[facility]

s



     ne 500 mg      6-26                                              ity of



     tablet      13:10:                                              [address]

 

     inFLIXimab      0 - No                       Inject           Univ

ers



     (REMICADE)                                intravenou           it

y of



     100 mg      13:10: 00:00                          sly once           [place]



     injection      26   :00                           now. Every           Medi

maryan



                                                  6 weeks           [facility]anch

 

     inFLIXimab      0 - No                       Inject           Univ

ers



     (REMICADE)      -                          intravenou           it

y of



     100 mg      13:10: 00:00                          sly once           [place]



     injection      26   :00                           now. Every           Medi

maryan



                                                  6 weeks           Branch

 

     aspirin 81      -0 - No             81mg      Take 81 mg           

[facility]



     mg EC                                by mouth           [facility] of



     tablet      13:10: 00:00                          daily.           [place]



               04   :00                                          [facility]

 

     aspirin 81      -0 - No             81mg      Take 81 mg           

[facility]



     mg EC                                by mouth           ity of



     tablet      13:10: 00:00                          daily.           [place]



               04   :00                                          Medical



                                                                 [facility]

 

     Colestipol      -0      Yes        1g        Take 1           

[facility]



     HCl 1 gram      6-26                               tablet by           ity 

of



     tablet      00:00:                               mouth in           [place]



               00                                 the            Medical



                                                  morning           Branch



                                                  and 1           



                                                  tablet in           



                                                  the            



                                                  evening.           

 

     Colestipol      -0      Yes        1g        Take 1           

[facility]



     HCl 1 gram      6-26                               tablet by           ity 

of



     tablet      00:00:                               mouth in           [place]



               00                                 the            Medical



                                                  morning           [facility]anch



                                                  and 1           



                                                  tablet in           



                                                  the            



                                                  evening.           

 

     Colestipol      -0      Yes        1g        Take 1           

[facility]



     HCl 1 gram      6-26                               tablet by           ity 

of



     tablet      00:00:                               mouth in           [place]



               00                                 the            Medical



                                                  morning           [facility]



                                                  and 1           



                                                  tablet in           



                                                  the            



                                                  evening.           

 

     Colestipol      0      Yes       [id number] 1g        Take 1           

[facility]



     HCl 1 gram      6-26                               tablet by           ity 

of



     tablet      00:00:                               mouth in           [place]



               00                                 the            Medical



                                                  morning           [facility]



                                                  and 1           



                                                  tablet in           



                                                  the            



                                                  evening.           

 

     methylPREDN      0      Yes                      TAKE 6           Univ

ers



     ISolone 4      6-09                               TABLETS ON           ity 

of



     mg tablets      00:00:                               DAY 1 AS           [facility]

as



               00                                 DIRECTED           Medical



                                                  ON PACKAGE           [facility]anch



                                                  AND            



                                                  DECREASE           



                                                  BY 1 TAB           



                                                  EACH DAY           



                                                  FOR A           



                                                  TOTAL OF 6           



                                                  DAYS           

 

     methylPREDN      -0      Yes                      TAKE 6           Univ

ers



     ISolone 4      6-09                               TABLETS ON           ity 

of



     mg tablets      00:00:                               DAY 1 AS           [facility]

as



               00                                 DIRECTED           Medical



                                                  ON PACKAGE           [facility]anch



                                                  AND            



                                                  DECREASE           



                                                  BY 1 TAB           



                                                  EACH DAY           



                                                  FOR A           



                                                  TOTAL OF 6           



                                                  DAYS           

 

     methylPREDN      -0      Yes                      TAKE 6           Univ

ers



     ISolone 4      6-09                               TABLETS ON           ity 

of



     mg tablets      00:00:                               DAY 1 AS           [facility]

as



               00                                 DIRECTED           Medical



                                                  ON PACKAGE           [facility]anch



                                                  AND            



                                                  DECREASE           



                                                  BY 1 TAB           



                                                  EACH DAY           



                                                  FOR A           



                                                  TOTAL OF 6           



                                                  DAYS           

 

     methylPREDN      -0      Yes                      TAKE 6           Univ

ers



     ISolone 4      6-09                               TABLETS ON           ity 

of



     mg tablets      00:00:                               DAY 1 AS           [facility]

as



               00                                 DIRECTED           Medical



                                                  ON PACKAGE           Branch



                                                  AND            



                                                  DECREASE           



                                                  BY 1 TAB           



                                                  EACH DAY           



                                                  FOR A           



                                                  TOTAL OF 6           



                                                  DAYS           

 

     predniSONE      -0 - Yes       [id number] 5mg  Q.5D Take 1        

   [facility]



     (Deltasone)      -10           [id number]           tablet (5           

Health



     5 MG tablet      00:00: 04:59                          mg total)           



               00   :00                           by mouth           



                                                  in the           



                                                  morning           



                                                  and 1           



                                                  tablet (5           



                                                  mg total)           



                                                  in the           



                                                  evening.           



                                                  Start this           



                                                  prescripti           



                                                  on after           



                                                  completing           



                                                  Medrol.           

 

     predniSONE      -0 - Yes       [id number] 5mg  Q.5D Take 1        

   [facility]



     (Deltasone)      -10           [id number]           tablet (5           

Health



     5 MG tablet      00:00: 04:59                          mg total)           



               00   :00                           by mouth           



                                                  in the           



                                                  morning           



                                                  and 1           



                                                  tablet (5           



                                                  mg total)           



                                                  in the           



                                                  evening.           



                                                  Start this           



                                                  prescripti           



                                                  on after           



                                                  completing           



                                                  Medrol.           

 

     HYDROcodone      2023- No        [id number] 1{tbl}      Take 1     

      [facility]



     -acetaminop                 05             tablet by           He

alth



     hen ([facility])      00:00: 04:59                          mouth           



      MG      00   :00                           every 4           



     tablet                                         (four)           



                                                  hours if           



                                                  needed for           



                                                  severe           



                                                  pain for           



                                                  up to 7           



                                                  days.           

 

     methylPREDN      -0 - No        [id number] 4mg       Take 1       

    [facility]



     ISolone      6-09 06-10           [id number]           tablet (4           [facility]

[facility]



     (Medrol      00:00: 04:59                          mg total)           



     Dospak) 4      00   :00                           by mouth 1           



     MG tablets                                         (one) time           



                                                  for 1           



                                                  dose.           



                                                  Follow           



                                                  schedule           



                                                  on package           



                                                  [place]             

 

     celecoxib      -0      Yes       [id number]           TAKE 1          

 [facility]



     (CeleBREX)                      9105           CAPSULE           Health



     200 MG      00:00:                               (200 MG           



     capsule      00                                 TOTAL) BY           



                                                  MOUTH IN           



                                                  THE            



                                                  MORNING           



                                                  AND IN THE           



                                                  EVENING           

 

     celecoxib      -0      Yes       [id number]           TAKE 1          

 [facility]



     (CeleBREX)                      9105           CAPSULE           Health



     200 MG      00:00:                               (200 MG           



     capsule      00                                 TOTAL) BY           



                                                  MOUTH IN           



                                                  THE            



                                                  MORNING           



                                                  AND IN THE           



                                                  EVENING           

 

     celecoxib      -0      Yes       [id number]           TAKE 1          

 [facility]



     (CeleBREX)                      9105           CAPSULE           Health



     200 MG      00:00:                               (200 MG           



     capsule      00                                 TOTAL) BY           



                                                  MOUTH IN           



                                                  THE            



                                                  MORNING           



                                                  AND IN THE           



                                                  EVENING           

 

     celecoxib      -0      Yes       [id number]           TAKE 1          

 [facility]



     (CeleBREX)                      9105           CAPSULE           Health



     200 MG      00:00:                               (200 MG           



     capsule      00                                 TOTAL) BY           



                                                  MOUTH IN           



                                                  THE            



                                                  MORNING           



                                                  AND IN THE           



                                                  EVENING           

 

     rosuvastati      2023-0      Yes            5mg       Take 1           [facility]

[facility]



     n 5 mg      6-05                               tablet by           [facility] of



     tablet      00:00:                               mouth in           [place]



               00                                 the            Medical



                                                  morning.           [facility]

 

     rosuvastati      2023-0      Yes            5mg       Take 1           [facility]

ers



     n 5 mg      6-05                               tablet by           [facility] of



     tablet      00:00:                               mouth in           [place]



                                                the            Medical



                                                  morning.           [facility]anch

 

     rosuvastati      2023-0      Yes            5mg       Take 1           [facility]

ers



     n 5 mg      6-05                               tablet by           ity of



     tablet      00:00:                               mouth in           [place]



               00                                 the            Medical



                                                  morning.           [facility]anch

 

     rosuvastati      2023-0      Yes            5mg       Take 1           [facility]

ers



     n 5 mg      6-05                               tablet by           [facility] of



     tablet      00:00:                               mouth in           [place]



               00                                 the            Medical



                                                  morning.           Branch

 

     temazepam      3-0      Yes                      1 capsule           [facility]

[facility]



     15 mg      5-24                               at bedtime           [facility] of



     capsule      00:00:                               as needed           [place]



               00                                 Orally           Medical



                                                  Once a day           [facility]



                                                  for 30           



                                                  days           

 

     temazepam      2023-0      Yes                      1 capsule           [facility]

[facility]



     15 mg      5-24                               at bedtime           [facility] of



     capsule      00:00:                               as needed           [place]



               00                                 Orally           Medical



                                                  Once a day           [facility]



                                                  for 30           



                                                  days           

 

     temazepam      -0      Yes                      1 capsule           [facility]

[facility]



     15 mg      5-24                               at bedtime           ity of



     capsule      00:00:                               as needed           [place]



               00                                 Orally           Medical



                                                  Once a day           [facility]



                                                  for 30           



                                                  days           

 

     Temazepam Temazepam -0      No             1{capsu QD   Temazepam      

     



     15 MG 15 MG 5-24                     le_at_b      15 MG           



               00:00:                     edtime_                     



               00                       as_need                     



                                        ed}                      

 

     temazepam      -0 - No                       1 capsule           [facility]

[facility]



     15 mg      5-24 07-24                          at bedtime           [facility]



     capsule      00:00: 00:00                          as needed           [place]

[facility]



               00   :00                           Orally           Medical



                                                  Once a day           [facility]



                                                  for 30           



                                                  days           

 

     predniSONE      -0 - Yes       [id number] 5mg  QD   Take 1        

   [facility]



     (Deltasone)      --           05             tablet (5           [facility]

alth



     5 MG tablet      00:00: 04:59                          mg total)           



               00   :00                           by mouth 1           



                                                  (one) time           



                                                  each day.           

 

     hydrOXYchlo      -0      Yes                      TAKE 1           [facility]

ers



     roQUINE 200      5-23                               TABLET BY           ity

 of



     mg tablet      00:00:                               MOUTH           [place]



               00                                 TWICE A           Medical



                                                  DAY WITH           Branch



                                                  FOOD OR           



                                                  MILK           

 

     hydrOXYchlo      -0      Yes                      TAKE 1           [facility]

ers



     roQUINE 200      5-23                               TABLET BY           ity

 of



     mg tablet      00:00:                               MOUTH           [place]



               00                                 TWICE A           Medical



                                                  DAY WITH           [facility]anch



                                                  FOOD OR           



                                                  MILK           

 

     hydrOXYchlo      -0      Yes                      TAKE 1           [facility]

ers



     roQUINE 200      5-23                               TABLET BY           ity

 of



     mg tablet      00:00:                               MOUTH           [place]



               00                                 TWICE A           Medical



                                                  DAY WITH           [facility]anch



                                                  FOOD OR           



                                                  MILK           

 

     hydrOXYchlo      -0      Yes                      TAKE 1           [facility]

ers



     roQUINE 200      5-23                               TABLET BY           ity

 of



     mg tablet      00:00:                               MOUTH           [place]



               00                                 TWICE A           Medical



                                                  DAY WITH           [facility]anch



                                                  FOOD OR           



                                                  MILK           

 

     Colestipol      -0 - No             1g        Take 1           [facility]

ers



     HCl 1 gram      -26                          tablet by           ity

 of



     tablet      00:00: 00:00                          mouth in           [place]



               00   :00                           the            Medical



                                                  morning.           [facility]anch

 

     Colestipol      -0 - No             1g        Take 1           [facility]

ers



     HCl 1 gram      --26                          tablet by           [facility]

 of



     tablet      00:00: 00:00                          mouth in           [place]



               00   :00                           the            Medical



                                                  morning.           [facility]anch

 

     HYDROcodone      -0 - No        [id number] 1{tbl}      Take 1     

      [facility]



     -acetaminop      5-09 05-17           05             tablet by           [facility]

alth



     hen ([facility])      00:00: 04:59                          mouth           



      MG      00   :00                           every 4           



     tablet                                         (four)           



                                                  hours if           



                                                  needed for           



                                                  severe           



                                                  pain for           



                                                  up to 7           



                                                  days.           

 

     meloxicam      3-0      Yes       [id number]           TAKE 1          

 [facility]



     (Mobic) 15      4-30                9109           TABLET (15           [facility]

l[facility]



     MG tablet      00:00:                               MG TOTAL)           



               00                                 BY MOUTH           



                                                  ONE TIME           



                                                  EACH DAY.           

 

     meloxicam      2023-0      Yes       [id number]           TAKE 1          

 [facility]



     (Mobic) 15      4-30                9109           TABLET (15           [facility]

[facility]



     MG tablet      00:00:                               MG TOTAL)           



               00                                 BY MOUTH           



                                                  ONE TIME           



                                                  EACH DAY.           

 

     meloxicam      2023-0      Yes       [id number]           TAKE 1          

 [facility]



     (Mobic) 15      4-30                9109           TABLET (15           [facility]

l[facility]



     MG tablet      00:00:                               MG TOTAL)           



               00                                 BY MOUTH           



                                                  ONE TIME           



                                                  EACH DAY.           

 

     meloxicam      2023-0      Yes       [id number]           TAKE 1          

 [facility]



     (Mobic) 15      4-30                9109           TABLET (15           [facility]

l[facility]



     MG tablet      00:00:                               MG TOTAL)           



               00                                 BY MOUTH           



                                                  ONE TIME           



                                                  EACH DAY.           

 

     meloxicam      2023-0      Yes       [id number]           TAKE 1          

 [facility]



     (Mobic) 15      4-30                9109           TABLET (15           [facility]

l[facility]



     MG tablet      00:00:                               MG TOTAL)           



               00                                 BY MOUTH           



                                                  ONE TIME           



                                                  EACH DAY.           

 

     foLIC acid      -0      Yes                      TAKE 2           [facility]

rs



     1 mg tablet      4-27                               TABLETS BY           it

y of



               00:00:                               MOUTH           [place]



               00                                 EVERY DAY           Medical



                                                  FOR 90           Branch



                                                  DAYS           

 

     foLIC acid      -0      Yes                      TAKE 2           [facility]

rs



     1 mg tablet      4-27                               TABLETS BY           it

y of



               00:00:                               MOUTH           [place]



               00                                 EVERY DAY           Medical



                                                  FOR 90           Branch



                                                  DAYS           

 

     foLIC acid      -0      Yes                      TAKE 2           [facility]

rs



     1 mg tablet      4-27                               TABLETS BY           it

y of



               00:00:                               MOUTH           [place]



               00                                 EVERY DAY           Medical



                                                  FOR 90           Branch



                                                  DAYS           

 

     foLIC acid      -0      Yes                      TAKE 2           [facility]

rs



     1 mg tablet      4-27                               TABLETS BY           it

y of



               00:00:                               MOUTH           [place]



               00                                 EVERY DAY           Medical



                                                  FOR 90           Branch



                                                  DAYS           

 

     gabapentin      3-0      Yes       [id number] 100mg Q.[id number] Take 1  

         [facility]



     (Neurontin)      4-18                9109      [id number] capsule          

 Health



     100 MG      00:00:                          3D   (100 mg           



     capsule      00                                 total) by           



                                                  mouth in           



                                                  the            



                                                  morning           



                                                  and 1           



                                                  capsule           



                                                  (100 mg           



                                                  total) at           



                                                  noon and 1           



                                                  capsule           



                                                  (100 mg           



                                                  total) in           



                                                  the            



                                                  evening.           



                                                  To be           



                                                  added to           



                                                  current           



                                                  dose of           



                                                  600mg TID.           

 

     gabapentin      -0      Yes       [id number] 100mg Q.[id number] Take 1  

         [facility]



     (Neurontin)      4-18                9109      [id number] capsule          

 Health



     100 MG      00:00:                          3D   (100 mg           



     capsule      00                                 total) by           



                                                  mouth in           



                                                  the            



                                                  morning           



                                                  and 1           



                                                  capsule           



                                                  (100 mg           



                                                  total) at           



                                                  noon and 1           



                                                  capsule           



                                                  (100 mg           



                                                  total) in           



                                                  the            



                                                  evening.           



                                                  To be           



                                                  added to           



                                                  current           



                                                  dose of           



                                                  600mg TID.           

 

     gabapentin      -0      Yes       [id number] 100mg Q.[id number] Take 1  

         [facility]



     (Neurontin)      4-18                9109      [id number] capsule          

 Health



     100 MG      00:00:                          3D   (100 mg           



     capsule      00                                 total) by           



                                                  mouth in           



                                                  the            



                                                  morning           



                                                  and 1           



                                                  capsule           



                                                  (100 mg           



                                                  total) at           



                                                  noon and 1           



                                                  capsule           



                                                  (100 mg           



                                                  total) in           



                                                  the            



                                                  evening.           



                                                  To be           



                                                  added to           



                                                  current           



                                                  dose of           



                                                  600mg TID.           

 

     gabapentin      -0      Yes       [id number] 100mg Q.[id number] Take 1  

         [facility]



     (Neurontin)      418                9109      [id number] capsule          

 Health



     100 MG      00:00:                          3D   (100 mg           



     capsule      00                                 total) by           



                                                  mouth in           



                                                  the            



                                                  morning           



                                                  and 1           



                                                  capsule           



                                                  (100 mg           



                                                  total) at           



                                                  noon and 1           



                                                  capsule           



                                                  (100 mg           



                                                  total) in           



                                                  the            



                                                  evening.           



                                                  To be           



                                                  added to           



                                                  current           



                                                  dose of           



                                                  600mg TID.           

 

     gabapentin      -0 - No        [id number] 100mg Q.[id number] Take 1 

          [facility]



     (Neurontin)                 9109      [id number] capsule         

  Health



     100 MG      00:00: 04:59                     3D   (100 mg           



     capsule      00   :00                           total) by           



                                                  mouth in           



                                                  the            



                                                  morning           



                                                  and 1           



                                                  capsule           



                                                  (100 mg           



                                                  total) at           



                                                  noon and 1           



                                                  capsule           



                                                  (100 mg           



                                                  total) in           



                                                  the            



                                                  evening.           



                                                  To be           



                                                  added to           



                                                  current           



                                                  dose of           



                                                  600mg TID.           

 

     gabapentin      -0 - No        [id number] 100mg Q.[id number] Take 1 

          [facility]



     (Neurontin)      -           9109      [id number] capsule         

  Health



     100 MG      00:00: 04:59                     3D   (100 mg           



     capsule      00   :00                           total) by           



                                                  mouth in           



                                                  the            



                                                  morning           



                                                  and 1           



                                                  capsule           



                                                  (100 mg           



                                                  total) at           



                                                  noon and 1           



                                                  capsule           



                                                  (100 mg           



                                                  total) in           



                                                  the            



                                                  evening.           



                                                  To be           



                                                  added to           



                                                  current           



                                                  dose of           



                                                  600mg TID.           

 

     DULoxetine      3-0      Yes            30mg      Take 1           [facility]

[facility]



     30 mg      4-04                               capsule by           ity of



     capsule      00:00:                               mouth in           [place]



               00                                 the            Medical



                                                  morning.           [facility]

 

     DULoxetine      3-0      Yes            30mg      Take 1           [facility]

rs



     30 mg      4-04                               capsule by           ity of



     capsule      00:00:                               mouth in           [place]



               00                                 the            Medical



                                                  morning.           Branch

 

     DULoxetine      3-0      Yes            30mg      Take 1           [facility]

rs



     30 mg      4-04                               capsule by           ity of



     capsule      00:00:                               mouth in           [place]



               00                                 the            Medical



                                                  morning.           Branch

 

     DULoxetine      3-0      Yes            30mg      Take 1           [facility]

rs



     30 mg      4-04                               capsule by           ity of



     capsule      00:00:                               mouth in           [place]



                                                the            Medical



                                                  morning.           Branch

 

     DULoxetine      -0      Yes       [id number]           TAKE 1           U

T



     (Cymbalta)      4-04                               CAPSULE BY           [facility]

[facility]



     30 MG DR      00:00:                               MOUTH           



     capsule      00                                 EVERY DAY           

 

     DULoxetine      -0      Yes       [id number]           TAKE 1           U

T



     (Cymbalta)      4-04                               CAPSULE BY           [facility]

[facility]



     30 MG DR      00:00:                               MOUTH           



     capsule      00                                 EVERY DAY           

 

     DULoxetine      -0      Yes       [id number]           TAKE 1           U

T



     (Cymbalta)      4-04                               CAPSULE BY           [facility]

[facility]



     30 MG DR      00:00:                               MOUTH           



     capsule      00                                 EVERY DAY           

 

     DULoxetine      -0      Yes       [id number]           TAKE 1           U

T



     (Cymbalta)      4-04                               CAPSULE BY           [facility]

[facility]



     30 MG DR      00:00:                               MOUTH           



     capsule      00                                 EVERY DAY           

 

     DULoxetine      -0      Yes       [id number]           TAKE 1           U

T



     (Cymbalta)      4-04                               CAPSULE BY           [facility]

[facility]



     30 MG DR      00:00:                               MOUTH           



     capsule      00                                 EVERY DAY           

 

     DULoxetine      3-0      Yes       [id number]           TAKE 1           U

T



     (Cymbalta)      4-04                               CAPSULE BY           [facility]

[facility]



     30 MG DR      00:00:                               MOUTH           



     capsule      00                                 EVERY DAY           

 

     meloxicam      -0 - No        1068 15mg QD   Take 1           [facility]



     (Mobic) 15      3- 05-                          tablet (15           [facility]

[facility]



     MG tablet      00:00: 04:59                          mg total)           



               00   :00                           by mouth 1           



                                                  (one) time           



                                                  each day.           

 

     acetaminoph      -0 - No        1617 1{tbl} Q6H  Take 1           U

T



     en-codeine      3-28 04-08                          tablet by           a

[facility]



     (Tylenol      00:00: 04:59                          mouth           



     #3) 300-30      00   :00                           every 6           



     MG tablet                                         (six)           



                                                  hours if           



                                                  needed for           



                                                  moderate           



                                                  pain or           



                                                  severe           



                                                  pain for           



                                                  up to 10           



                                                  days.           

 

     acetaminoph      2023- No        1617 1{tbl} Q6H  Take 1           U

T



     en-codeine      3-28 04-08                          tablet by           a

[facility]



     (Tylenol      00:00: 04:59                          mouth           



     #3) 300-30      00   :00                           every 6           



     MG tablet                                         (six)           



                                                  hours if           



                                                  needed for           



                                                  moderate           



                                                  pain or           



                                                  severe           



                                                  pain for           



                                                  up to 10           



                                                  days.           

 

     gabapentin      2023- No        [id number] 100mg Q.[id number] Take 1 

          [facility]



     (Neurontin)      3-21 04-21           9109      [id number] capsule         

  Health



     100 MG      00:00: 04:59                     3D   (100 mg           



     capsule      00   :00                           total) by           



                                                  mouth in           



                                                  the            



                                                  morning           



                                                  and 1           



                                                  capsule           



                                                  (100 mg           



                                                  total) at           



                                                  noon and 1           



                                                  capsule           



                                                  (100 mg           



                                                  total) in           



                                                  the            



                                                  evening.           



                                                  To be           



                                                  added to           



                                                  current           



                                                  dose of           



                                                  600mg TID.           

 

     gabapentin      2023- No        [id number] 100mg Q.[id number] Take 1 

          [facility]



     (Neurontin)      3-21 04-21           9109      [id number] capsule         

  Health



     100 MG      00:00: 04:59                     3D   (100 mg           



     capsule      00   :00                           total) by           



                                                  mouth in           



                                                  the            



                                                  morning           



                                                  and 1           



                                                  capsule           



                                                  (100 mg           



                                                  total) at           



                                                  noon and 1           



                                                  capsule           



                                                  (100 mg           



                                                  total) in           



                                                  the            



                                                  evening.           



                                                  To be           



                                                  added to           



                                                  current           



                                                  dose of           



                                                  600mg TID.           

 

     gabapentin      2023- No        [id number] 100mg Q.[id number] Take 1 

          [facility]



     (Neurontin)      3-21 04-21           9109      [id number] capsule         

  Health



     100 MG      00:00: 04:59                     3D   (100 mg           



     capsule      00   :00                           total) by           



                                                  mouth in           



                                                  the            



                                                  morning           



                                                  and 1           



                                                  capsule           



                                                  (100 mg           



                                                  total) at           



                                                  noon and 1           



                                                  capsule           



                                                  (100 mg           



                                                  total) in           



                                                  the            



                                                  evening.           



                                                  To be           



                                                  added to           



                                                  current           



                                                  dose of           



                                                  600mg TID.           

 

     gabapentin      2023- No        [id number] 100mg Q.[id number] Take 1 

          [facility]



     (Neurontin)      3-21 04-18           9109      [id number] capsule         

  Health



     100 MG      00:00: 00:00                     3D   (100 mg           



     capsule      00   :00                           total) by           



                                                  mouth in           



                                                  the            



                                                  morning           



                                                  and 1           



                                                  capsule           



                                                  (100 mg           



                                                  total) at           



                                                  noon and 1           



                                                  capsule           



                                                  (100 mg           



                                                  total) in           



                                                  the            



                                                  evening.           



                                                  To be           



                                                  added to           



                                                  current           



                                                  dose of           



                                                  600mg TID.           

 

     acetaminoph      0 - No        1617 1{tbl} Q6H  Take 1           U

T



     en-codeine      3--                          tablet by           [facility]



     (Tylenol      00:00: 04:59                          mouth           



     #3) 300-30      00   :00                           every 6           



     MG tablet                                         (six)           



                                                  hours if           



                                                  needed for           



                                                  moderate           



                                                  pain or           



                                                  severe           



                                                  pain for           



                                                  up to 10           



                                                  days.           

 

     acetaminoph      2023- No        1617 1{tbl} Q6H  Take 1           U

T



     en-codeine      3--                          tablet by           [facility]



     (Tylenol      00:00: 04:59                          mouth           



     #3) 300-30      00   :00                           every 6           



     MG tablet                                         (six)           



                                                  hours if           



                                                  needed for           



                                                  moderate           



                                                  pain or           



                                                  severe           



                                                  pain for           



                                                  up to 10           



                                                  days.           

 

     acetaminoph       No        1617 1{tbl} Q6H  Take 1           U

T



     en-codeine      3--                          tablet by           [facility]



     (Tylenol      00:00: 04:59                          mouth           



     #3) 300-30      00   :00                           every 6           



     MG tablet                                         (six)           



                                                  hours if           



                                                  needed for           



                                                  moderate           



                                                  pain or           



                                                  severe           



                                                  pain for           



                                                  up to 10           



                                                  days.           

 

     acetaminoph      2023- No        1617 1{tbl} Q6H  Take 1           U

T



     en-codeine      3--                          tablet by           [facility]



     (Tylenol      00:00: 04:59                          mouth           



     #3) 300-30      00   :00                           every 6           



     MG tablet                                         (six)           



                                                  hours if           



                                                  needed for           



                                                  moderate           



                                                  pain or           



                                                  severe           



                                                  pain for           



                                                  up to 10           



                                                  days.           

 

     acetaminoph      0 - No        1617 1{tbl} Q6H  Take 1           U

T



     en-codeine      3-21 -                          tablet by           [facility]



     (Tylenol      00:00: 04:59                          mouth           



     #3) 300-30      00   :00                           every 6           



     MG tablet                                         (six)           



                                                  hours if           



                                                  needed for           



                                                  moderate           



                                                  pain or           



                                                  severe           



                                                  pain for           



                                                  up to 10           



                                                  days.           

 

     hydrOXYzine      3-0      Yes       [id number] 25mg Q6H  Take 1        

   [facility]



     HCl       3-14                9109           tablet (25           Health



     (Atarax) 25      00:00:                               mg total)           



     MG tablet      00                                 by mouth           



                                                  every 6           



                                                  (six)           



                                                  hours if           



                                                  needed for           



                                                  itching           



                                                  for up to           



                                                  7 days.           

 

     hydrOXYzine      2023-0      Yes       [id number] 25mg Q6H  Take 1        

   [facility]



     HCl       3-14                9109           tablet (25           Health



     (Atarax) 25      00:00:                               mg total)           



     MG tablet      00                                 by mouth           



                                                  every 6           



                                                  (six)           



                                                  hours if           



                                                  needed for           



                                                  itching           



                                                  for up to           



                                                  7 days.           

 

     hydrOXYzine      2023-0      Yes       [id number] 25mg Q6H  Take 1        

   [facility]



     HCl       3-14                9109           tablet (25           Health



     (Atarax) 25      00:00:                               mg total)           



     MG tablet      00                                 by mouth           



                                                  every 6           



                                                  (six)           



                                                  hours if           



                                                  needed for           



                                                  itching           



                                                  for up to           



                                                  7 days.           

 

     hydrOXYzine      2023-0      Yes       [id number] 25mg Q6H  Take 1        

   [facility]



     HCl       3-14                9109           tablet (25           Health



     (Atarax) 25      00:00:                               mg total)           



     MG tablet      00                                 by mouth           



                                                  every 6           



                                                  (six)           



                                                  hours if           



                                                  needed for           



                                                  itching           



                                                  for up to           



                                                  7 days.           

 

     hydrOXYzine      2023-0      Yes       [id number] 25mg Q6H  Take 1        

   [facility]



     HCl       3-14                9109           tablet (25           Health



     (Atarax) 25      00:00:                               mg total)           



     MG tablet      00                                 by mouth           



                                                  every 6           



                                                  (six)           



                                                  hours if           



                                                  needed for           



                                                  itching           



                                                  for up to           



                                                  7 days.           

 

     hydrOXYzine      2023-0      Yes       [id number] 25mg Q6H  Take 1        

   [facility]



     HCl       3-14                9109           tablet (25           Health



     (Atarax) 25      00:00:                               mg total)           



     MG tablet      00                                 by mouth           



                                                  every 6           



                                                  (six)           



                                                  hours if           



                                                  needed for           



                                                  itching           



                                                  for up to           



                                                  7 days.           

 

     hydrOXYzine      2023-0      Yes       [id number] 25mg Q6H  Take 1        

   [facility]



     HCl       3-14                9109           tablet (25           Health



     (Atarax) 25      00:00:                               mg total)           



     MG tablet      00                                 by mouth           



                                                  every 6           



                                                  (six)           



                                                  hours if           



                                                  needed for           



                                                  itching           



                                                  for up to           



                                                  7 days.           

 

     hydrOXYzine      2023-0      Yes       [id number] 25mg Q6H  Take 1        

   [facility]



     HCl       3-14                9109           tablet (25           [facility]



     (Atarax) 25      00:00:                               mg total)           



     MG tablet      00                                 by mouth           



                                                  every 6           



                                                  (six)           



                                                  hours if           



                                                  needed for           



                                                  itching           



                                                  for up to           



                                                  7 days.           

 

     hydrOXYzine      2023-0      Yes       [id number] 25mg Q6H  Take 1        

   [facility]



     HCl       3-14                9109           tablet (25           [facility]



     (Atarax) 25      00:00:                               mg total)           



     MG tablet      00                                 by mouth           



                                                  every 6           



                                                  (six)           



                                                  hours if           



                                                  needed for           



                                                  itching           



                                                  for up to           



                                                  7 days.           

 

     hydrOXYzine      2023- No        [id number] 25mg Q6H  Take 1       

    [facility]



     HCl       3-14 -           9109           tablet (25           [facility]



     (Atarax) 25      00:00: 04:59                          mg total)           



     MG tablet      00   :00                           by mouth           



                                                  every 6           



                                                  (six)           



                                                  hours if           



                                                  needed for           



                                                  itching           



                                                  for up to           



                                                  7 days.           

 

     HYDROcodone      2023- No        [id number] 1{tbl}      Take 1     

      [facility]



     -acetaminop      3-           05             tablet by           [facility]

[facility]



     hen ([facility])      00:00: 04:59                          mouth           



      MG      00   :00                           every 4           



     tablet                                         (four)           



                                                  hours if           



                                                  needed for           



                                                  severe           



                                                  pain for           



                                                  up to 7           



                                                  days.           

 

     predniSONE            Yes       [id number]           Take one         

  [facility]



     (Deltasone)      3-12                               tab PO BID           [facility]

[facility]



     5 MG tablet      00:00:                               x 5 days           



               00                                 and then           



                                                  one tab PO           



                                                  Q day           



                                                  thereafter           

 

     predniSONE      0      Yes       [id number]           Take one         

  [facility]



     (Deltasone)      3-12                               tab PO BID           [facility]

[facility]



     5 MG tablet      00:00:                               x 5 days           



               00                                 and then           



                                                  one tab PO           



                                                  Q day           



                                                  thereafter           

 

     predniSONE      0      Yes       [id number]           Take one         

  [facility]



     (Deltasone)      3-12                               tab PO BID           [facility]

[facility]



     5 MG tablet      00:00:                               x 5 days           



               00                                 and then           



                                                  one tab PO           



                                                  Q day           



                                                  thereafter           

 

     predniSONE      -0      Yes       [id number]           Take one         

  [facility]



     (Deltasone)      3-12                               tab PO BID           [facility]

[facility]



     5 MG tablet      00:00:                               x 5 days           



               00                                 and then           



                                                  one tab PO           



                                                  Q day           



                                                  thereafter           

 

     predniSONE      0      Yes       [id number]           Take one         

  [facility]



     (Deltasone)      3-12                               tab PO BID           [facility]

[facility]



     5 MG tablet      00:00:                               x 5 days           



               00                                 and then           



                                                  one tab PO           



                                                  Q day           



                                                  thereafter           

 

     predniSONE      -0      Yes       [id number]           Take one         

  [facility]



     (Deltasone)      3-12                               tab PO BID           [facility]

[facility]



     5 MG tablet      00:00:                               x 5 days           



               00                                 and then           



                                                  one tab PO           



                                                  Q day           



                                                  thereafter           

 

     predniSONE      -0      Yes       [id number]           Take one         

  [facility]



     (Deltasone)      3-12                               tab PO BID           [facility]

[facility]



     5 MG tablet      00:00:                               x 5 days           



               00                                 and then           



                                                  one tab PO           



                                                  Q day           



                                                  thereafter           

 

     predniSONE      -0      Yes       [id number]           Take one         

  [facility]



     (Deltasone)      3-12                               tab PO BID           [facility]

[facility]



     5 MG tablet      00:00:                               x 5 days           



               00                                 and then           



                                                  one tab PO           



                                                  Q day           



                                                  thereafter           

 

     predniSONE      -0      Yes       [id number]           Take one         

  [facility]



     (Deltasone)      3-12                               tab PO BID           [facility]

[facility]



     5 MG tablet      00:00:                               x 5 days           



               00                                 and then           



                                                  one tab PO           



                                                  Q day           



                                                  thereafter           

 

     predniSONE      0      Yes       [id number]           Take one         

  [facility]



     (Deltasone)      3-12                               tab PO BID           [facility]

[facility]



     5 MG tablet      00:00:                               x 5 days           



               00                                 and then           



                                                  one tab PO           



                                                  Q day           



                                                  thereafter           

 

     rivaroxaban            Yes       [id number] 10mg QD   Take 1        

   [facility]



     (Xarelto)      2-17                05             tablet (10           [facility]

[facility]



     10 MG      00:00:                               mg total)           



     tablet      00                                 by mouth 1           



                                                  (one) time           



                                                  each day.           



                                                  DVT            



                                                  prophylaxi           



                                                  s s/p           



                                                  joint           



                                                  replacemen           



                                                  t              

 

     rivaroxaban            Yes       [id number] 10mg QD   Take 1        

   [facility]



     (Xarelto)      2-17                05             tablet (10           [facility]

[facility]



     10 MG      00:00:                               mg total)           



     tablet      00                                 by mouth 1           



                                                  (one) time           



                                                  each day.           



                                                  DVT            



                                                  prophylaxi           



                                                  s s/p           



                                                  joint           



                                                  replacemen           



                                                  t              

 

     rivaroxaban            Yes       [id number] 10mg QD   Take 1        

   [facility]



     (Xarelto)      2-17                05             tablet (10           [facility]

[facility]



     10 MG      00:00:                               mg total)           



     tablet      00                                 by mouth 1           



                                                  (one) time           



                                                  each day.           



                                                  DVT            



                                                  prophylaxi           



                                                  s s/p           



                                                  joint           



                                                  replacemen           



                                                  t              

 

     rivaroxaban            Yes       [id number] 10mg QD   Take 1        

   [facility]



     (Xarelto)      2-17                05             tablet (10           [facility]

[facility]



     10 MG      00:00:                               mg total)           



     tablet      00                                 by mouth 1           



                                                  (one) time           



                                                  each day.           



                                                  DVT            



                                                  prophylaxi           



                                                  s s/p           



                                                  joint           



                                                  replacemen           



                                                  t              

 

     rivaroxaban            Yes       [id number] 10mg QD   Take 1        

   [facility]



     (Xarelto)      2-17                05             tablet (10           [facility]

[facility]



     10 MG      00:00:                               mg total)           



     tablet      00                                 by mouth 1           



                                                  (one) time           



                                                  each day.           



                                                  DVT            



                                                  prophylaxi           



                                                  s s/p           



                                                  joint           



                                                  replacemen           



                                                  t              

 

     rivaroxaban            Yes       [id number] 10mg QD   Take 1        

   [facility]



     (Xarelto)      2-17                05             tablet (10           [facility]

[facility]



     10 MG      00:00:                               mg total)           



     tablet      00                                 by mouth 1           



                                                  (one) time           



                                                  each day.           



                                                  DVT            



                                                  prophylaxi           



                                                  s s/p           



                                                  joint           



                                                  replacemen           



                                                  t              

 

     rivaroxaban            Yes       [id number] 10mg QD   Take 1        

   [facility]



     (Xarelto)      2-17                05             tablet (10           [facility]

[facility]



     10 MG      00:00:                               mg total)           



     tablet      00                                 by mouth 1           



                                                  (one) time           



                                                  each day.           



                                                  DVT            



                                                  prophylaxi           



                                                  s s/p           



                                                  joint           



                                                  replacemen           



                                                  t              

 

     rivaroxaban            Yes       [id number] 10mg QD   Take 1        

   [facility]



     (Xarelto)      2-17                05             tablet (10           [facility]

[facility]



     10 MG      00:00:                               mg total)           



     tablet      00                                 by mouth 1           



                                                  (one) time           



                                                  each day.           



                                                  DVT            



                                                  prophylaxi           



                                                  s s/p           



                                                  joint           



                                                  replacemen           



                                                  t              

 

     rivaroxaban            Yes       [id number] 10mg QD   Take 1        

   [facility]



     (Xarelto)      2-17                05             tablet (10           [facility]

[facility]



     10 MG      00:00:                               mg total)           



     tablet      00                                 by mouth 1           



                                                  (one) time           



                                                  each day.           



                                                  DVT            



                                                  prophylaxi           



                                                  s s/p           



                                                  joint           



                                                  replacemen           



                                                  t              

 

     rivaroxaban      2023- No        [id number] 10mg QD   Take 1       

    [facility]



     (Xarelto)      2-17 03-20           05             tablet (10           [facility]

[facility]



     10 MG      00:00: 04:59                          mg total)           



     tablet      00   :00                           by mouth 1           



                                                  (one) time           



                                                  each day.           



                                                  DVT            



                                                  prophylaxi           



                                                  s s/p           



                                                  joint           



                                                  replacemen           



                                                  t              

 

     rivaroxaban      2023- No        [id number] 10mg QD   Take 1       

    [facility]



     (Xarelto)      2-17 03-20           05             tablet (10           [facility]

[facility]



     10 MG      00:00: 04:59                          mg total)           



     tablet      00   :00                           by mouth 1           



                                                  (one) time           



                                                  each day.           



                                                  DVT            



                                                  prophylaxi           



                                                  s s/p           



                                                  joint           



                                                  replacemen           



                                                  t              

 

     aspirin 81      2023- No             81mg      Take 81 mg           

[facility]



     MG EC                                by mouth.           [facility]



     tablet      10:33: 00:00                                         



               40   :00                                          

 

     Golimumab      -0      Yes                                     [facility]



     (SIMPONI      2-08                                              Health



     ARIA IV)      10:07:                                              



               28                                                

 

     Golimumab      -0      Yes                                     [facility]



     (SIMPONI      2-08                                              Health



     ARIA IV)      10:07:                                              



               28                                                

 

     Golimumab      -0      Yes                                     [facility]



     (SIMPONI      2-08                                              Health



     ARIA IV)      10:07:                                              



               28                                                

 

     Golimumab      2023-0      Yes                                     [facility]



     (SIMPONI      2-08                                              Health



     ARIA IV)      10:07:                                              



               28                                                

 

     Golimumab      2023-0      Yes                                     [facility]



     (SIMPONI      2-08                                              Health



     ARIA IV)      10:07:                                              



               28                                                

 

     Golimumab      2023-0      Yes                                     [facility]



     (SIMPONI      2-08                                              Health



     ARIA IV)      10:07:                                              



               28                                                

 

     Golimumab      2023-0      Yes                                     [facility]



     (SIMPONI      2-08                                              Health



     ARIA IV)      10:07:                                              



                                                               

 

     Golimumab      2023-0      Yes                                     [facility]



     (SIMPONI      2-08                                              Health



     ARIA IV)      10:07:                                              



                                                               

 

     Golimumab      2023-0      Yes                                     [facility]



     (SIMPONI      2-08                                              Health



     ARIA IV)      10:07:                                              



                                                               

 

     Golimumab      2023-0      Yes                                     [facility]



     (SIMPONI      2-08                                              Health



     ARIA IV)      10:07:                                              



                                                               

 

     Golimumab      2023-0      Yes                                     [facility]



     (SIMPONI      2-08                                              Health



     ARIA IV)      10:07:                                              



               28                                                

 

     Golimumab      2023-0      Yes                                     [facility]



     (SIMPONI      2-08                                              Health



     ARIA IV)      10:07:                                              



               28                                                

 

     acetaminoph      -0      Yes       [id number] 1{tbl}      Take 1        

   [facility]



     en-codeine      2-08                               tablet by           [facility]



     (TYLENOL/CO      00:00:                               mouth 1           



     DEINE #3)      00                                 (one) time           



     300-30 MG                                         each day           



     tablet                                         if needed           



                                                  for severe           



                                                  pain for           



                                                  up to 15           



                                                  doses.           

 

     acetaminoph            Yes       [id number] 1{tbl}      Take 1        

   [facility]



     en-codeine      2-08                               tablet by           [facility]



     (TYLENOL/CO      00:00:                               mouth 1           



     DEINE #3)      00                                 (one) time           



     300-30 MG                                         each day           



     tablet                                         if needed           



                                                  for severe           



                                                  pain for           



                                                  up to 15           



                                                  doses.           

 

     acetaminoph            Yes       [id number] 1{tbl}      Take 1        

   [facility]



     en-codeine      2-08                               tablet by           [facility]



     (TYLENOL/CO      00:00:                               mouth 1           



     DEINE #3)      00                                 (one) time           



     300-30 MG                                         each day           



     tablet                                         if needed           



                                                  for severe           



                                                  pain for           



                                                  up to 15           



                                                  doses.           

 

     acetaminoph      2023- No        [id number] 1{tbl}      Take 1       

    [facility]



     en-codeine      2-08 03-21                          tablet by           [facility]



     (TYLENOL/CO      00:00: 00:00                          mouth 1           



     DEINE #3)      00   :00                           (one) time           



     300-30 MG                                         each day           



     tablet                                         if needed           



                                                  for severe           



                                                  pain for           



                                                  up to 15           



                                                  doses.           

 

     Diclofenac      -0 - No        [id number] 1{patch      Apply 1      

     [facility]



     Epolamine                      }         patch           Health



     (Licart)      00:00: 05:59                          topically           



     1.3 % patch      00   :00                           1 (one)           



     24 hour                                         time each           



                                                  day if           



                                                  needed           



                                                  (pain).           

 

     Diclofenac      -0 - No        [id number] 1{patch      Apply 1      

     [facility]



     Epolamine                      }         patch           Health



     (Licart)      00:00: 05:59                          topically           



     1.3 % patch      00   :00                           1 (one)           



     24 hour                                         time each           



                                                  day if           



                                                  needed           



                                                  (pain).           

 

     COLESTIPOL      -0      Yes                                     UT



     HCL PO                                                    Health



               00:00:                                              



               00                                                

 

     COLESTIPOL      -0      Yes                                     [facility]



     HCL PO                                                    Health



               00:00:                                              



               00                                                

 

     COLESTIPOL      -0      Yes                                     UT



     HCL PO                                                    Health



               00:00:                                              



               00                                                

 

     COLESTIPOL      -0      Yes                                     UT



     HCL PO                                                    Health



               00:00:                                              



               00                                                

 

     COLESTIPOL      3-0      Yes                                     UT



     HCL PO                                                    Health



               00:00:                                              



               00                                                

 

     COLESTIPOL      3-0      Yes                                     UT



     HCL PO                                                    Health



               00:00:                                              



               00                                                

 

     COLESTIPOL      3-0      Yes                                     UT



     HCL PO                                                    Health



               00:00:                                              



               00                                                

 

     COLESTIPOL      3-0      Yes                                     UT



     HCL PO                                                    Health



               00:00:                                              



               00                                                

 

     COLESTIPOL      3-0      Yes                                     UT



     HCL PO                                                    Health



               00:00:                                              



               00                                                

 

     COLESTIPOL      3-0      Yes                                     UT



     HCL PO                                                    Health



               00:00:                                              



               00                                                

 

     COLESTIPOL      3-0      Yes                                     UT



     HCL PO                                                    Health



               00:00:                                              



               00                                                

 

     COLESTIPOL      3-0      Yes                                     UT



     HCL PO                                                    Health



               00:00:                                              



               00                                                

 

     Temazepam Temazepam 2022      No                       Temazepam         

  



     15 MG 15 MG 1-14                               15 MG           



               00:00:                                              



               00                                                

 

     Temazepam Temazepam 2022      No                       Temazepam         

  



     15 MG 15 MG 1-14                               15 MG           



               00:00:                                              



               00                                                

 

     Temazepam Temazepam 2022      No                       Temazepam         

  



     15 MG 15 MG 1-14                               15 MG           



               00:00:                                              



               00                                                

 

     Temazepam Temazepam 2022      No                       Temazepam         

  



     15 MG 15 MG 1-14                               15 MG           



               00:00:                                              



               00                                                

 

     Temazepam Temazepam -1      No                       Temazepam         

  



     15 MG 15 MG 1-14                               15 MG           



               00:00:                                              



               00                                                

 

     Temazepam Temazepam -1      No                       Temazepam         

  



     15 MG 15 MG 1-14                               15 MG           



               00:00:                                              



               00                                                

 

     Temazepam Temazepam -0      No                       Temazepam         

  



     15 MG 15 MG 8-17                               15 MG           



               00:00:                                              



               00                                                

 

     levothyroxi      2022-0      Yes                                     [facility]



     ne        7-26                                              Health



     (Synthroid,      00:00:                                              



     Levoxyl)      00                                                



     175 MCG                                                        



     tablet                                                        

 

     levothyroxi      -0      Yes                                     [facility]



     ne        7-26                                              Health



     (Synthroid,      00:00:                                              



     Levoxyl)      00                                                



     175 MCG                                                        



     tablet                                                        

 

     levothyroxi      -0      Yes                                     [facility]



     ne        7-26                                              Health



     (Synthroid,      00:00:                                              



     Levoxyl)      00                                                



     175 MCG                                                        



     tablet                                                        

 

     levothyroxi      -0      Yes                                     [facility]



     ne        7-26                                              Health



     (Synthroid,      00:00:                                              



     Levoxyl)      00                                                



     175 MCG                                                        



     tablet                                                        

 

     levothyroxi      -0      Yes                                     [facility]



     ne        7-26                                              Health



     (Synthroid,      00:00:                                              



     Levoxyl)      00                                                



     175 MCG                                                        



     tablet                                                        

 

     levothyroxi      -0      Yes                                     [facility]



     ne        7-26                                              Health



     (Synthroid,      00:00:                                              



     Levoxyl)      00                                                



     175 MCG                                                        



     tablet                                                        

 

     levothyroxi      -0      Yes                                     [facility]



     ne        7-26                                              Health



     (Synthroid,      00:00:                                              



     Levoxyl)      00                                                



     175 MCG                                                        



     tablet                                                        

 

     levothyroxi      -0      Yes                                     [facility]



     ne        7-26                                              Health



     (Synthroid,      00:00:                                              



     Levoxyl)      00                                                



     175 MCG                                                        



     tablet                                                        

 

     levothyroxi      -0      Yes                                     [facility]



     ne        7-26                                              Health



     (Synthroid,      00:00:                                              



     Levoxyl)      00                                                



     175 MCG                                                        



     tablet                                                        

 

     levothyroxi      -0      Yes                                     [facility]



     ne        7-26                                              Health



     (Synthroid,      00:00:                                              



     Levoxyl)      00                                                



     175 MCG                                                        



     tablet                                                        

 

     levothyroxi      2-0      Yes                                     [facility]



     ne        7-26                                              Health



     (Synthroid,      00:00:                                              



     Levoxyl)      00                                                



     175 MCG                                                        



     tablet                                                        

 

     levothyroxi      2-0      Yes                                     [facility]



     ne        7-26                                              Health



     (Synthroid,      00:00:                                              



     Levoxyl)      00                                                



     175 MCG                                                        



     tablet                                                        

 

     Temazepam Temazepam -0      No                       Temazepam         

  



     15 MG 15 MG 5-11                               15 MG           



               00:00:                                              



               00                                                

 

     Temazepam Temazepam -0      No                       Temazepam         

  



     15 MG 15 MG 5-11                               15 MG           



               00:00:                                              



               00                                                

 

     Temazepam Temazepam 0      No                       Temazepam         

  



     15 MG 15 MG 5-11                               15 MG           



               00:00:                                              



               00                                                

 

     Temazepam Temazepam 0      No                       Temazepam         

  



     15 MG 15 MG 5-11                               15 MG           



               00:00:                                              



               00                                                

 

     Temazepam Temazepam -0      No                       Temazepam         

  



     15 MG 15 MG 5-11                               15 MG           



               00:00:                                              



               00                                                

 

     ergocalcife      2022- No             400U QD   Take 400           U

T



     rol        04-12                          Units by           Health



     (Vitamin      11:34: 00:00                          mouth 1           



     D-2) 10 MCG      31   :00                           (one) time           



     (400 UNIT)                                         each day.           



     tablet                                                        

 

     ergocalcife      2022- No             400U QD   Take 400           U

T



     rol       12 04-12                          Units by           Health



     (Vitamin      11:34: 00:00                          mouth 1           



     D-2) 10 MCG      31   :00                           (one) time           



     (400 UNIT)                                         each day.           



     tablet                                                        

 

     DULoxetine            Yes       [id number]           TAKE 1           U

T



     (Cymbalta)      4-12                               CAPSULE BY           [facility]

[facility]



     30 MG DR      00:00:                               MOUTH           



     capsule      00                                 EVERY DAY           

 

     DULoxetine      0      Yes       [id number]           TAKE 1           U

T



     (Cymbalta)      4-12                               CAPSULE BY           [facility]



     30 MG DR      00:00:                               MOUTH           



     capsule      00                                 EVERY DAY           

 

     DULoxetine      0      Yes       [id number]           TAKE 1           U

T



     (Cymbalta)      4-12                               CAPSULE BY           [facility]



     30 MG DR      00:00:                               MOUTH           



     capsule      00                                 EVERY DAY           

 

     DULoxetine      0      Yes       [id number]           TAKE 1           U

T



     (Cymbalta)      4-12                               CAPSULE BY           [facility]



     30 MG DR      00:00:                               MOUTH           



     capsule      00                                 EVERY DAY           

 

     DULoxetine      -0      Yes       [id number]           TAKE 1           U

T



     (Cymbalta)      4-12                               CAPSULE BY           [facility]



     30 MG DR      00:00:                               MOUTH           



     capsule      00                                 EVERY DAY           

 

     DULoxetine      -0      Yes       [id number]           TAKE 1           U

T



     (Cymbalta)      4-12                               CAPSULE BY           [facility]



     30 MG DR      00:00:                               MOUTH           



     capsule      00                                 EVERY DAY           

 

     DULoxetine      -0      Yes       [id number]           TAKE 1           U

T



     (Cymbalta)      4-12                               CAPSULE BY           [facility]



     30 MG DR      00:00:                               MOUTH           



     capsule      00                                 EVERY DAY           

 

     DULoxetine      2022-0      Yes       [id number]           TAKE 1           U

T



     (Cymbalta)      4-12                               CAPSULE BY           [facility]



     30 MG DR      00:00:                               MOUTH           



     capsule      00                                 EVERY DAY           

 

     DULoxetine      -0      Yes       [id number]           TAKE 1           U

T



     (Cymbalta)      4-12                               CAPSULE BY           [facility]



     30 MG DR      00:00:                               MOUTH           



     capsule      00                                 EVERY DAY           

 

     gabapentin      -0      Yes                      gabapentin           U

T



     (Neurontin)      3-09                               600 mg           Health



     600 MG      09:24:                               tablet           



     tablet      05                                                

 

     sulfaSALAzi            Yes                      sulfasalaz           

UT



     ne        3-09                               ine 500 mg           Health



     (Azulfidine      09:24:                               tablet           



     ) 500 MG      05                                 TAKE 4           



     tablet                                         TABLETS BY           



                                                  MOUTH           



                                                  EVERY DAY           

 

     azaTHIOprin      0      Yes                      See            UT



     e (Imuran)      3-09                               administra           [facility]

[facility]



     50 MG      09:24:                               tion           



     tablet      05                                 instructio           



                                                  ns.            

 

     Golimumab      0      Yes                                     [facility]



     (SIMPONI      3-09                                              Health



     ARIA IV)      09:24:                                              



               05                                                

 

     aspirin 81      -0      Yes            81mg      Take 81 mg           U

T



     MG EC      309                               by mouth.           Health



     tablet      09:24:                                              



               05                                                

 

     gabapentin      -0      Yes                      gabapentin           U

T



     (Neurontin)      3-                               600 mg           Health



     600 MG      09:24:                               tablet           



     tablet      05                                                

 

     sulfaSALAzi            Yes                      sulfasalaz           

UT



     ne        3-09                               ine 500 mg           Health



     (Azulfidine      09:24:                               tablet           



     ) 500 MG      05                                 TAKE 4           



     tablet                                         TABLETS BY           



                                                  MOUTH           



                                                  EVERY DAY           

 

     azaTHIOprin      0      Yes                      See            UT



     e (Imuran)      3-09                               administra           [facility]

[facility]



     50 MG      09:24:                               tion           



     tablet      05                                 instructio           



                                                  ns.            

 

     Golimumab      -0      Yes                                     [facility]



     (SIMPONI      3-09                                              Health



     ARIA IV)      09:24:                                              



               05                                                

 

     aspirin 81      -0      Yes            81mg      Take 81 mg           U

T



     MG EC      309                               by mouth.           Health



     tablet      09:24:                                              



               05                                                

 

     gabapentin      -0      Yes                      gabapentin           U

T



     (Neurontin)      3-                               600 mg           Health



     600 MG      09:24:                               tablet           



     tablet      05                                                

 

     sulfaSALAzi      0      Yes                      sulfasalaz           

UT



     ne        3-09                               ine 500 mg           Health



     (Azulfidine      09:24:                               tablet           



     ) 500 MG      05                                 TAKE 4           



     tablet                                         TABLETS BY           



                                                  MOUTH           



                                                  EVERY DAY           

 

     azaTHIOprin      0      Yes                      See            UT



     e (Imuran)      3-09                               administra           [facility]

[facility]



     50 MG      09:24:                               tion           



     tablet      05                                 instructio           



                                                  ns.            

 

     Golimumab      0      Yes                                     [facility]



     (SIMPONI      3-09                                              Health



     ARIA IV)      09:24:                                              



               05                                                

 

     aspirin 81      -0      Yes            81mg      Take 81 mg           U

T



     MG EC      3-09                               by mouth.           Health



     tablet      09:24:                                              



               05                                                

 

     gabapentin      -0      Yes                      gabapentin           U

T



     (Neurontin)      3-09                               600 mg           Health



     600 MG      09:24:                               tablet           



     tablet      05                                                

 

     sulfaSALAzi            Yes                      sulfasalaz           

[facility]



     ne        3-09                               ine 500 mg           [facility]



     (Azulfidine      09:24:                               tablet           



     ) 500 MG      05                                 TAKE 4           



     tablet                                         TABLETS BY           



                                                  MOUTH           



                                                  EVERY DAY           

 

     azaTHIOprin      0      Yes                      See            [facility]



     e (Imuran)      3-09                               administra           [facility]

[facility]



     50 MG      09:24:                               tion           



     tablet      05                                 instructio           



                                                  ns.            

 

     Golimumab      0      Yes                                     [facility]



     (SIMPONI      3-09                                              Health



     ARIA IV)      09:24:                                              



               05                                                

 

     aspirin 81      -0      Yes            81mg      Take 81 mg           U

T



     MG EC      3-09                               by mouth.           [facility]



     tablet      09:24:                                              



               05                                                

 

     gabapentin      -0      Yes                      gabapentin           U

T



     (Neurontin)      3-09                               600 mg           [facility]



     600 MG      09:24:                               tablet           



     tablet      05                                                

 

     sulfaSALAzi            Yes                      sulfasalaz           

[facility]



     ne        3-09                               ine 500 mg           [facility]



     (Azulfidine      09:24:                               tablet           



     ) 500 MG      05                                 TAKE 4           



     tablet                                         TABLETS BY           



                                                  MOUTH           



                                                  EVERY DAY           

 

     azaTHIOprin      0      Yes                      See            [facility]



     e (Imuran)      3-09                               administra           [facility]

[facility]



     50 MG      09:24:                               tion           



     tablet      05                                 instructio           



                                                  ns.            

 

     Golimumab      -0      Yes                                     [facility]



     (SIMPONI      3-09                                              [facility]



     ARIA IV)      09:24:                                              



               05                                                

 

     aspirin 81      -0      Yes            81mg      Take 81 mg           U

T



     MG EC      3-09                               by mouth.           [facility]



     tablet      09:24:                                              



               05                                                

 

     gabapentin      -0      Yes                      gabapentin           U

T



     (Neurontin)      3-09                               600 mg           [facility]



     600 MG      09:24:                               tablet           



     tablet      05                                                

 

     sulfaSALAzi      0      Yes                      sulfasalaz           

UT



     ne        3-09                               ine 500 mg           [facility]



     (Azulfidine      09:24:                               tablet           



     ) 500 MG      05                                 TAKE 4           



     tablet                                         TABLETS BY           



                                                  MOUTH           



                                                  EVERY DAY           

 

     azaTHIOprin      0      Yes                      See            [facility]



     e (Imuran)      3-09                               administra           [facility]

[facility]



     50 MG      09:24:                               tion           



     tablet      05                                 instructio           



                                                  ns.            

 

     aspirin 81      -0      Yes            81mg      Take 81 mg           U

T



     MG EC      3-09                               by mouth.           [facility]



     tablet      09:24:                                              



               05                                                

 

     gabapentin            Yes                      gabapentin           U

T



     (Neurontin)      3-09                               600 mg           [facility]



     600 MG      09:24:                               tablet           



     tablet      05                                                

 

     sulfaSALAzi            Yes                      sulfasalaz           

[facility]



     ne        3-09                               ine 500 mg           [facility]



     (Azulfidine      09:24:                               tablet           



     ) 500 MG      05                                 TAKE 4           



     tablet                                         TABLETS BY           



                                                  MOUTH           



                                                  EVERY DAY           

 

     azaTHIOprin            Yes                      See            [facility]



     e (Imuran)      3-09                               administra           [facility]

[facility]



     50 MG      09:24:                               tion           



     tablet      05                                 instructio           



                                                  ns.            

 

     gabapentin            Yes                      gabapentin           U

T



     (Neurontin)      3-09                               600 mg           [facility]



     600 MG      09:24:                               tablet           



     tablet      05                                                

 

     sulfaSALAzi            Yes                      sulfasalaz           

UT



     ne        3-09                               ine 500 mg           [facility]



     (Azulfidine      09:24:                               tablet           



     ) 500 MG      05                                 TAKE 4           



     tablet                                         TABLETS BY           



                                                  MOUTH           



                                                  EVERY DAY           

 

     azaTHIOprin            Yes                      See            UT



     e (Imuran)      3-09                               administra           [facility]

[facility]



     50 MG      09:24:                               tion           



     tablet      05                                 instructio           



                                                  ns.            

 

     gabapentin            Yes                      gabapentin           U

T



     (Neurontin)      3-09                               600 mg           [facility]



     600 MG      09:24:                               tablet           



     tablet      05                                                

 

     sulfaSALAzi            Yes                      sulfasalaz           

UT



     ne        3-09                               ine 500 mg           [facility]



     (Azulfidine      09:24:                               tablet           



     ) 500 MG      05                                 TAKE 4           



     tablet                                         TABLETS BY           



                                                  MOUTH           



                                                  EVERY DAY           

 

     azaTHIOprin            Yes                      See            [facility]



     e (Imuran)      3-09                               administra           [facility]

[facility]



     50 MG      09:24:                               tion           



     tablet      05                                 instructio           



                                                  ns.            

 

     gabapentin            Yes                      gabapentin           U

T



     (Neurontin)      3-09                               600 mg           [facility]



     600 MG      09:24:                               tablet           



     tablet      05                                                

 

     sulfaSALAzi            Yes                      sulfasalaz           

UT



     ne        3-09                               ine 500 mg           [facility]



     (Azulfidine      09:24:                               tablet           



     ) 500 MG      05                                 TAKE 4           



     tablet                                         TABLETS BY           



                                                  MOUTH           



                                                  EVERY DAY           

 

     azaTHIOprin            Yes                      See            [facility]



     e (Imuran)      3-09                               administra           [facility]

[facility]



     50 MG      09:24:                               tion           



     tablet      05                                 instructio           



                                                  ns.            

 

     gabapentin            Yes                      gabapentin           U

T



     (Neurontin)      3-09                               600 mg           [facility]



     600 MG      09:24:                               tablet           



     tablet      05                                                

 

     sulfaSALAzi            Yes                      sulfasalaz           

UT



     ne        3-09                               ine 500 mg           [facility]



     (Azulfidine      09:24:                               tablet           



     ) 500 MG      05                                 TAKE 4           



     tablet                                         TABLETS BY           



                                                  MOUTH           



                                                  EVERY DAY           

 

     azaTHIOprin            Yes                      See            UT



     e (Imuran)      3-09                               administra           [facility]

[facility]



     50 MG      09:24:                               tion           



     tablet      05                                 instructio           



                                                  ns.            

 

     gabapentin            Yes                      gabapentin           U

T



     (Neurontin)      3                               600 mg           [facility]



     600 MG      09:24:                               tablet           



     tablet      05                                                

 

     sulfaSALAzi            Yes                      sulfasalaz           

UT



     ne        3-09                               ine 500 mg           [facility]



     (Azulfidine      09:24:                               tablet           



     ) 500 MG      05                                 TAKE 4           



     tablet                                         TABLETS BY           



                                                  MOUTH           



                                                  EVERY DAY           

 

     azaTHIOprin            Yes                      See            UT



     e (Imuran)      3-09                               administra           [facility]

[facility]



     50 MG      09:24:                               tion           



     tablet      05                                 instructio           



                                                  ns.            

 

     gabapentin            Yes                      gabapentin           U

T



     (Neurontin)      3-09                               600 mg           [facility]



     600 MG      09:24:                               tablet           



     tablet      05                                                

 

     sulfaSALAzi            Yes                      sulfasalaz           

UT



     ne        3-09                               ine 500 mg           [facility]



     (Azulfidine      09:24:                               tablet           



     ) 500 MG      05                                 TAKE 4           



     tablet                                         TABLETS BY           



                                                  MOUTH           



                                                  EVERY DAY           

 

     azaTHIOprin            Yes                      See            UT



     e (Imuran)      3-09                               administra           [facility]

[facility]



     50 MG      09:24:                               tion           



     tablet      05                                 instructio           



                                                  ns.            

 

     gabapentin            Yes                      gabapentin           U

T



     (Neurontin)      3-09                               600 mg           [facility]



     600 MG      09:24:                               tablet           



     tablet      05                                                

 

     sulfaSALAzi            Yes                      sulfasalaz           

UT



     ne        3-09                               ine 500 mg           [facility]



     (Azulfidine      09:24:                               tablet           



     ) 500 MG      05                                 TAKE 4           



     tablet                                         TABLETS BY           



                                                  MOUTH           



                                                  EVERY DAY           

 

     azaTHIOprin            Yes                      See            UT



     e (Imuran)      3-09                               administra           [facility]

[facility]



     50 MG      09:24:                               tion           



     tablet      05                                 instructio           



                                                  ns.            

 

     gabapentin            Yes                      gabapentin           U

T



     (Neurontin)      3-09                               600 mg           [facility]



     600 MG      09:24:                               tablet           



     tablet      05                                                

 

     sulfaSALAzi            Yes                      sulfasalaz           

UT



     ne        3-09                               ine 500 mg           [facility]



     (Azulfidine      09:24:                               tablet           



     ) 500 MG      05                                 TAKE 4           



     tablet                                         TABLETS BY           



                                                  MOUTH           



                                                  EVERY DAY           

 

     azaTHIOprin            Yes                      See            UT



     e (Imuran)      3-09                               administra           [facility]

[facility]



     50 MG      09:24:                               tion           



     tablet      05                                 instructio           



                                                  ns.            

 

     gabapentin            Yes                      gabapentin           U

T



     (Neurontin)      3-09                               600 mg           [facility]



     600 MG      09:24:                               tablet           



     tablet      05                                                

 

     sulfaSALAzi            Yes                      sulfasalaz           

UT



     ne        3                               ine 500 mg           [facility]



     (Azulfidine      09:24:                               tablet           



     ) 500 MG      05                                 TAKE 4           



     tablet                                         TABLETS BY           



                                                  MOUTH           



                                                  EVERY DAY           

 

     azaTHIOprin            Yes                      See            UT



     e (Imuran)      3-09                               administra           [facility]

[facility]



     50 MG      09:24:                               tion           



     tablet      05                                 instructio           



                                                  ns.            

 

     gabapentin            Yes                      gabapentin           U

T



     (Neurontin)      3-09                               600 mg           [facility]



     600 MG      09:24:                               tablet           



     tablet      05                                                

 

     sulfaSALAzi            Yes                      sulfasalaz           

UT



     ne        3                               ine 500 mg           [facility]



     (Azulfidine      09:24:                               tablet           



     ) 500 MG      05                                 TAKE 4           



     tablet                                         TABLETS BY           



                                                  MOUTH           



                                                  EVERY DAY           

 

     azaTHIOprin            Yes                      See            UT



     e (Imuran)      3-09                               administra           [facility]

[facility]



     50 MG      09:24:                               tion           



     tablet      05                                 instructio           



                                                  ns.            

 

     acetaminoph            Yes       [id number] 1{tbl} Q.5D Take 1         

  [facility]



     en-codeine      3-09                               tablet by           [facility]

th



     (Tylenol w/      00:00:                               mouth 2           



     Codeine #3)      00                                 (two)           



     300-30 MG                                         times a           



     tablet                                         day if           



                                                  needed for           



                                                  moderate           



                                                  pain or           



                                                  severe           



                                                  pain.           

 

     acetaminoph      2022- No        [id number] 1{tbl} Q.5D Take 1        

   [facility]



     en-codeine      3-09 04-20                          tablet by           [facility]



     (Tylenol w/      00:00: 00:00                          mouth 2           



     Codeine #3)      00   :00                           (two)           



     300-30 MG                                         times a           



     tablet                                         day if           



                                                  needed for           



                                                  moderate           



                                                  pain or           



                                                  severe           



                                                  pain.           

 

     Restoril 15 Restoril 15       No             1{capsu QD   Restoril   

        



     MG   MG   1-25                     le_at_b      15 MG           



               00:00:                     edtime_                     



               00                       as_need                     



                                        ed}                      

 

     Restoril 15 Restoril 15       No             1{capsu QD   Restoril   

        



     MG   MG   1-25                     le_at_b      15 MG           



               00:00:                     edtime_                     



               00                       as_need                     



                                        ed}                      

 

     Macrobid Macrobid 2022- No             1{capsu BID  Macrobid        

   



     100  MG                 le_with      100 MG           



               00:00: 00:00                _food}                     



               00   :00                                          

 

     temazepam      2021      Yes                                     [facility]



     (Restoril)      2-06                                              [facility]



     15 MG      00:00:                                              



     capsule      00                                                

 

     temazepam      2021      Yes                                     [facility]



     (Restoril)      2-06                                              [facility]



     15 MG      00:00:                                              



     capsule      00                                                

 

     temazepam      2021      Yes                                     [facility]



     (Restoril)      2-06                                              [facility]



     15 MG      00:00:                                              



     capsule      00                                                

 

     temazepam      2021      Yes                                     [facility]



     (Restoril)      2-06                                              [facility]



     15 MG      00:00:                                              



     capsule      00                                                

 

     temazepam      2021      Yes                                     [facility]



     (Restoril)      2-06                                              [facility]



     15 MG      00:00:                                              



     capsule      00                                                

 

     temazepam      2021      Yes                                     [facility]



     (Restoril)      2-06                                              [facility]



     15 MG      00:00:                                              



     capsule      00                                                

 

     temazepam      2021      Yes                                     [facility]



     (Restoril)      2-06                                              [facility]



     15 MG      00:00:                                              



     capsule      00                                                

 

     temazepam      2021      Yes                                     [facility]



     (Restoril)      2-06                                              Health



     15 MG      00:00:                                              



     capsule      00                                                

 

     temazepam      2021      Yes                                     [facility]



     (Restoril)      2-06                                              Health



     15 MG      00:00:                                              



     capsule      00                                                

 

     temazepam      2021      Yes                                     [facility]



     ([facility]il)      2-06                                              [facility]



     15 MG      00:00:                                              



     capsule      00                                                

 

     temazepam      2021      Yes                                     [facility]



     ([facility]il)      2-06                                              [facility]



     15 MG      00:00:                                              



     capsule      00                                                

 

     temazepam      2021      Yes                                     [facility]



     ([facility]il)      2-06                                              [facility]



     15 MG      00:00:                                              



     capsule      00                                                

 

     temazepam      2021      Yes                                     [facility]



     ([facility]il)      2-06                                              [facility]



     15 MG      00:00:                                              



     capsule      00                                                

 

     temazepam      2021      Yes                                     [facility]



     ([facility]il)      2-06                                              [facility]



     15 MG      00:00:                                              



     capsule      00                                                

 

     temazepam      2021      Yes                                     [facility]



     ([facility]il)      2-06                                              [facility]



     15 MG      00:00:                                              



     capsule      00                                                

 

     temazepam      2021      Yes                                     [facility]



     ([facility]il)      2-06                                              [facility]



     15 MG      00:00:                                              



     capsule      00                                                

 

     temazepam      2021      Yes                                     [facility]



     ([facility]oril)      2-06                                              [facility]



     15 MG      00:00:                                              



     capsule      00                                                

 

     hydroxychlo      2021      Yes                                     [facility]



     roquine      2-03                                              [facility]



     (Plaquenil)      00:00:                                              



     200 MG      00                                                



     tablet                                                        

 

     hydroxychlo      2021      Yes                                     [facility]



     roquine      2-03                                              [facility]



     (Plaquenil)      00:00:                                              



     200 MG      00                                                



     tablet                                                        

 

     hydroxychlo      2021      Yes                                     [facility]



     roquine      2-03                                              [facility]



     (Plaquenil)      00:00:                                              



     200 MG      00                                                



     tablet                                                        

 

     hydroxychlo      2021      Yes                                     [facility]



     roquine      2-03                                              [facility]



     (Plaquenil)      00:00:                                              



     200 MG      00                                                



     tablet                                                        

 

     hydroxychlo      -1      Yes                                     [facility]



     roquine      2-03                                              [facility]



     (Plaquenil)      00:00:                                              



     200 MG      00                                                



     tablet                                                        

 

     hydroxychlo      -      Yes                                     [facility]



     roquine      2-03                                              [facility]



     (Plaquenil)      00:00:                                              



     200 MG      00                                                



     tablet                                                        

 

     hydroxychlo      -1      Yes                                     [facility]



     roquine      2-03                                              [facility]



     (Plaquenil)      00:00:                                              



     200 MG      00                                                



     tablet                                                        

 

     hydroxychlo      -1      Yes                                     [facility]



     roquine      2-03                                              [facility]



     (Plaquenil)      00:00:                                              



     200 MG      00                                                



     tablet                                                        

 

     hydroxychlo      -      Yes                                     [facility]



     roquine      2-03                                              [facility]



     (Plaquenil)      00:00:                                              



     200 MG      00                                                



     tablet                                                        

 

     hydroxychlo      -      Yes                                     [facility]



     roquine      2-03                                              [facility]



     (Plaquenil)      00:00:                                              



     200 MG      00                                                



     tablet                                                        

 

     hydroxychlo      -      Yes                                     [facility]



     roquine      2-03                                              [facility]



     (Plaquenil)      00:00:                                              



     200 MG      00                                                



     tablet                                                        

 

     hydroxychlo      -1      Yes                                     [facility]



     roquine      2-03                                              [facility]



     (Plaquenil)      00:00:                                              



     200 MG      00                                                



     tablet                                                        

 

     hydroxychlo      -      Yes                                     [facility]



     roquine      2-03                                              [facility]



     (Plaquenil)      00:00:                                              



     200 MG      00                                                



     tablet                                                        

 

     hydroxychlo      -1      Yes                                     [facility]



     roquine      2-03                                              [facility]



     (Plaquenil)      00:00:                                              



     200 MG      00                                                



     tablet                                                        

 

     hydroxychlo      -1      Yes                                     [facility]



     roquine      2-03                                              [facility]



     (Plaquenil)      00:00:                                              



     200 MG      00                                                



     tablet                                                        

 

     hydroxychlo      -1      Yes                                     [facility]



     roquine      2-03                                              [facility]



     (Plaquenil)      00:00:                                              



     200 MG      00                                                



     tablet                                                        

 

     hydroxychlo      -      Yes                                     [facility]



     roquine      2-03                                              [facility]



     (Plaquenil)      00:00:                                              



     200 MG      00                                                



     tablet                                                        

 

     rosuvastati      -      Yes                                     [facility]



     n (C[facility]or)      1-04                                              [facility]



     5 MG tablet      00:00:                                              



               00                                                

 

     rosuvastati      -      Yes                                     [facility]



     n ([facility])      1-04                                              [facility]



     5 MG tablet      00:00:                                              



               00                                                

 

     rosuvastati      -      Yes                                     [facility]



     n ([facility])      1-04                                              [facility]



     5 MG tablet      00:00:                                              



               00                                                

 

     rosuvastati      -      Yes                                     [facility]



     n ([facility])      1-04                                              [facility]



     5 MG tablet      00:00:                                              



               00                                                

 

     rosuvastati      -      Yes                                     [facility]



     n ([facility])      1-04                                              [facility]



     5 MG tablet      00:00:                                              



               00                                                

 

     rosuvastati      2021      Yes                                     [facility]



     n ([facility])      1-04                                              [facility]



     5 MG tablet      00:00:                                              



               00                                                

 

     rosuvastati      2021      Yes                                     [facility]



     n ([facility])      1-04                                              [facility]



     5 MG tablet      00:00:                                              



               00                                                

 

     rosuvastati      2021      Yes                                     [facility]



     n ([facility])      1-04                                              [facility]



     5 MG tablet      00:00:                                              



               00                                                

 

     rosuvastati      2021      Yes                                     [facility]



     n ([facility])      1-04                                              [facility]



     5 MG tablet      00:00:                                              



               00                                                

 

     rosuvastati      2021      Yes                                     [facility]



     n ([facility])      1-04                                              [facility]



     5 MG tablet      00:00:                                              



               00                                                

 

     rosuvastati      2021      Yes                                     [facility]



     n ([facility])      1-04                                              [facility]



     5 MG tablet      00:00:                                              



               00                                                

 

     rosuvastati      2021      Yes                                     [facility]



     n ([facility])      1-04                                              [facility]



     5 MG tablet      00:00:                                              



               00                                                

 

     rosuvastati      2021      Yes                                     [facility]



     n ([facility])      1-04                                              [facility]



     5 MG tablet      00:00:                                              



               00                                                

 

     rosuvastati      2021      Yes                                     [facility]



     n ([facility])      1-04                                              [facility]



     5 MG tablet      00:00:                                              



               00                                                

 

     rosuvastati      2021      Yes                                     [facility]



     n ([facility])      1-04                                              [facility]



     5 MG tablet      00:00:                                              



               00                                                

 

     rosuvastati      2021      Yes                                     [facility]



     n ([facility])      1-04                                              [facility]



     5 MG tablet      00:00:                                              



               00                                                

 

     rosuvastati      2021      Yes                                     [facility]



     n ([facility])      1-04                                              [facility]



     5 MG tablet      00:00:                                              



               00                                                

 

     levothyroxi      2021      Yes                                     [facility]



     ne        0-21                                              [facility]



     (Synthroid,      00:00:                                              



     Levoxyl)      00                                                



     150 MCG                                                        



     tablet                                                        

 

     levothyroxi      2021      Yes                                     [facility]



     ne        0-21                                              [facility]



     (Synthroid,      00:00:                                              



     Levoxyl)      00                                                



     150 MCG                                                        



     tablet                                                        

 

     levothyroxi      2021      Yes                                     [facility]



     ne        0-21                                              [facility]



     (Synthroid,      00:00:                                              



     Levoxyl)      00                                                



     150 MCG                                                        



     tablet                                                        

 

     levothyroxi      2021      Yes                                     [facility]



     ne        0-21                                              [facility]



     (Synthroid,      00:00:                                              



     Levoxyl)      00                                                



     150 MCG                                                        



     tablet                                                        

 

     levothyroxi      2021      Yes                                     [facility]



     ne        0-21                                              [facility]



     (Synthroid,      00:00:                                              



     Levoxyl)      00                                                



     150 MCG                                                        



     tablet                                                        

 

     levothyroxi      2021      Yes                                     [facility]



     ne        0-21                                              [facility]



     (Synthroid,      00:00:                                              



     Levoxyl)      00                                                



     150 MCG                                                        



     tablet                                                        

 

     levothyroxi      2021      Yes                                     [facility]



     ne        0-21                                              [facility]



     (Synthroid,      00:00:                                              



     Levoxyl)      00                                                



     150 MCG                                                        



     tablet                                                        

 

     levothyroxi      2021      Yes                                     [facility]



     ne        0-21                                              [facility]



     (Synthroid,      00:00:                                              



     Levoxyl)      00                                                



     150 MCG                                                        



     tablet                                                        

 

     levothyroxi      2021      Yes                                     [facility]



     ne        0-21                                              [facility]



     (Synthroid,      00:00:                                              



     Levoxyl)      00                                                



     150 MCG                                                        



     tablet                                                        

 

     levothyroxi      2021      Yes                                     [facility]



     ne        0-21                                              [facility]



     (Synthroid,      00:00:                                              



     Levoxyl)      00                                                



     150 MCG                                                        



     tablet                                                        

 

     levothyroxi      2021      Yes                                     [facility]



     ne        0-21                                              [facility]



     (Synthroid,      00:00:                                              



     Levoxyl)      00                                                



     150 MCG                                                        



     tablet                                                        

 

     levothyroxi      2021      Yes                                     [facility]



     ne        0-21                                              [facility]



     (Synthroid,      00:00:                                              



     Levoxyl)      00                                                



     150 MCG                                                        



     tablet                                                        

 

     levothyroxi      2021      Yes                                     [facility]



     ne        0-21                                              [facility]



     (Synthroid,      00:00:                                              



     Levoxyl)      00                                                



     150 MCG                                                        



     tablet                                                        

 

     levothyroxi      2021      Yes                                     [facility]



     ne        0-21                                              [facility]



     (Synthroid,      00:00:                                              



     Levoxyl)      00                                                



     150 MCG                                                        



     tablet                                                        

 

     levothyroxi      2021      Yes                                     [facility]



     ne        0-21                                              [facility]



     (Synthroid,      00:00:                                              



     Levoxyl)      00                                                



     150 MCG                                                        



     tablet                                                        

 

     levothyroxi      2021      Yes                                     [facility]



     ne        0-21                                              [facility]



     (Synthroid,      00:00:                                              



     Levoxyl)      00                                                



     150 MCG                                                        



     tablet                                                        

 

     levothyroxi      2021      Yes                                     [facility]



     ne        0-21                                              [facility]



     (Synthroid,      00:00:                                              



     Levoxyl)      00                                                



     150 MCG                                                        



     tablet                                                        

 

     folic acid      2021      Yes                                     [facility]



     (Folvite) 1      0-17                                              Health



     MG tablet      00:00:                                              



               00                                                

 

     folic acid      2021      Yes                                     [facility]



     (Folvite) 1      0-17                                              [facility]



     MG tablet      00:00:                                              



               00                                                

 

     folic acid      2021      Yes                                     [facility]



     (Folvite) 1      0-17                                              [facility]



     MG tablet      00:00:                                              



               00                                                

 

     folic acid      2021      Yes                                     [facility]



     (Folvite) 1      0-17                                              [facility]



     MG tablet      00:00:                                              



               00                                                

 

     folic acid      2021      Yes                                     [facility]



     (Folvite) 1      0-17                                              [facility]



     MG tablet      00:00:                                              



               00                                                

 

     folic acid      2021      Yes                                     [facility]



     (Folvite) 1      0-17                                              [facility]



     MG tablet      00:00:                                              



               00                                                

 

     folic acid      2021      Yes                                     [facility]



     (Folvite) 1      0-17                                              Health



     MG tablet      00:00:                                              



               00                                                

 

     folic acid      2021      Yes                                     [facility]



     (Folvite) 1      0-17                                              [facility]



     MG tablet      00:00:                                              



               00                                                

 

     folic acid      2021      Yes                                     [facility]



     (Folvite) 1      0-17                                              Health



     MG tablet      00:00:                                              



               00                                                

 

     folic acid      2021      Yes                                     [facility]



     (Folvite) 1      0-17                                              [facility]



     MG tablet      00:00:                                              



               00                                                

 

     folic acid      2021      Yes                                     [facility]



     (Folvite) 1      0-17                                              Health



     MG tablet      00:00:                                              



               00                                                

 

     folic acid      2021      Yes                                     [facility]



     (Folvite) 1      0-17                                              [facility]



     MG tablet      00:00:                                              



               00                                                

 

     folic acid      2021      Yes                                     [facility]



     (Folvite) 1      0-17                                              [facility]



     MG tablet      00:00:                                              



               00                                                

 

     folic acid      2021      Yes                                     [facility]



     (Folvite) 1      0-17                                              [facility]



     MG tablet      00:00:                                              



               00                                                

 

     folic acid      2021      Yes                                     [facility]



     (Folvite) 1      0-17                                              [facility]



     MG tablet      00:00:                                              



               00                                                

 

     folic acid      2021      Yes                                     [facility]



     (Folvite) 1      0-17                                              [facility]



     MG tablet      00:00:                                              



               00                                                

 

     folic acid      2021      Yes                                     [facility]



     (Folvite) 1      0-17                                              [facility]



     MG tablet      00:00:                                              



               00                                                

 

     DULoxetine      2021- No                                      [facility]



     (Cymbalta)      0-12 04-12                                         [facility]



     30 MG DR      00:00: 00:00                                         



     capsule      00   :00                                          

 

     DULoxetine      2021- No                                      [facility]



     (Cymbalta)      0-12 04-12                                         [facility]



     30 MG DR      00:00: 00:00                                         



     capsule      00   :00                                          

 

     methocarbam      1-0      Yes                                     [facility]



     ol        9-28                                              [facility]



     (Robaxin)      00:00:                                              



     750 MG      00                                                



     tablet                                                        

 

     methocarbam      -0      Yes                                     [facility]



     ol        9-28                                              [facility]



     (Robaxin)      00:00:                                              



     750 MG      00                                                



     tablet                                                        

 

     methocarbam      -0      Yes                                     [facility]



     ol        9-28                                              [facility]



     (Robaxin)      00:00:                                              



     750 MG      00                                                



     tablet                                                        

 

     methocarbam      -0      Yes                                     [facility]



     ol        9-28                                              [facility]



     (Robaxin)      00:00:                                              



     750 MG      00                                                



     tablet                                                        

 

     methocarbam      1-0      Yes                                     [facility]



     ol        9-28                                              [facility]



     (Robaxin)      00:00:                                              



     750 MG      00                                                



     tablet                                                        

 

     methocarbam      -0      Yes                                     [facility]



     ol        9-28                                              [facility]



     (Robaxin)      00:00:                                              



     750 MG      00                                                



     tablet                                                        

 

     methocarbam      1-0      Yes                                     [facility]



     ol        9-28                                              [facility]



     (Robaxin)      00:00:                                              



     750 MG      00                                                



     tablet                                                        

 

     methocarbam      1-0      Yes                                     [facility]



     ol        9-28                                              [facility]



     (Robaxin)      00:00:                                              



     750 MG      00                                                



     tablet                                                        

 

     methocarbam      2021-0      Yes                                     [facility]



     ol        9-28                                              [facility]



     (Robaxin)      00:00:                                              



     750 MG      00                                                



     tablet                                                        

 

     methocarbam      1-0      Yes                                     [facility]



     ol        9-28                                              [facility]



     (Robaxin)      00:00:                                              



     750 MG      00                                                



     tablet                                                        

 

     methocarbam      1-0      Yes                                     [facility]



     ol        9-28                                              [facility]



     (Robaxin)      00:00:                                              



     750 MG      00                                                



     tablet                                                        

 

     methocarbam      1-0      Yes                                     [facility]



     ol        9-28                                              [facility]



     (Robaxin)      00:00:                                              



     750 MG      00                                                



     tablet                                                        

 

     methocarbam      1-0      Yes                                     [facility]



     ol        9-28                                              [facility]



     (Robaxin)      00:00:                                              



     750 MG      00                                                



     tablet                                                        

 

     methocarbam      1-0      Yes                                     [facility]



     ol        9-28                                              [facility]



     (Robaxin)      00:00:                                              



     750 MG      00                                                



     tablet                                                        

 

     methocarbam      1-0      Yes                                     [facility]



     ol        9-28                                              [facility]



     (Robaxin)      00:00:                                              



     750 MG      00                                                



     tablet                                                        

 

     methocarbam      1-0      Yes                                     [facility]



     ol        9-28                                              [facility]



     (Robaxin)      00:00:                                              



     750 MG      00                                                



     tablet                                                        

 

     methocarbam      1-0      Yes                                     [facility]



     ol        9-28                                              [facility]



     (Robaxin)      00:00:                                              



     750 MG      00                                                



     tablet                                                        

 

     metFORMIN      1-0      Yes                                     [facility]



     (Glucophage      9-19                                              Health



     ) 1000 MG      00:00:                                              



     tablet      00                                                

 

     metFORMIN      1-0      Yes                                     [facility]



     (Glucophage      9-19                                              [facility]



     ) 1000 MG      00:00:                                              



     tablet      00                                                

 

     metFORMIN      1-0      Yes                                     [facility]



     (Glucophage      9-19                                              [facility]



     ) 1000 MG      00:00:                                              



     tablet      00                                                

 

     metFORMIN      1-0      Yes                                     [facility]



     (Glucophage      9-19                                              [facility]



     ) 1000 MG      00:00:                                              



     tablet      00                                                

 

     metFORMIN      1-0      Yes                                     [facility]



     (Glucophage      9-19                                              [facility]



     ) 1000 MG      00:00:                                              



     tablet      00                                                

 

     metFORMIN      1-0      Yes                                     [facility]



     (Glucophage      9-19                                              [facility]



     ) 1000 MG      00:00:                                              



     tablet      00                                                

 

     metFORMIN      1-0      Yes                                     [facility]



     (Glucophage      9-19                                              [facility]



     ) 1000 MG      00:00:                                              



     tablet      00                                                

 

     metFORMIN      1-0      Yes                                     [facility]



     (Glucophage      9-19                                              [facility]



     ) 1000 MG      00:00:                                              



     tablet      00                                                

 

     metFORMIN      1-0      Yes                                     [facility]



     (Glucophage      9-19                                              [facility]



     ) 1000 MG      00:00:                                              



     tablet      00                                                

 

     metFORMIN      0      Yes                                     [facility]



     (Glucophage      9-19                                              [facility]



     ) 1000 MG      00:00:                                              



     tablet      00                                                

 

     metFORMIN      -0      Yes                                     [facility]



     (Glucophage      9-19                                              [facility]



     ) 1000 MG      00:00:                                              



     tablet      00                                                

 

     metFORMIN      -0      Yes                                     [facility]



     (Glucophage      9-19                                              [facility]



     ) 1000 MG      00:00:                                              



     tablet      00                                                

 

     metFORMIN      -0      Yes                                     [facility]



     (Glucophage      9-19                                              [facility]



     ) 1000 MG      00:00:                                              



     tablet      00                                                

 

     metFORMIN      -0      Yes                                     [facility]



     (Glucophage      9-19                                              [facility]



     ) 1000 MG      00:00:                                              



     tablet      00                                                

 

     metFORMIN      -0      Yes                                     [facility]



     (Glucophage      9-19                                              [facility]



     ) 1000 MG      00:00:                                              



     tablet      00                                                

 

     metFORMIN      -0      Yes                                     [facility]



     (Glucophage      9-19                                              [facility]



     ) 1000 MG      00:00:                                              



     tablet      00                                                

 

     metFORMIN      -0      Yes                                     [facility]



     (Glucophage      9-19                                              [facility]



     ) 1000 MG      00:00:                                              



     tablet      00                                                

 

     glimepiride      0      Yes                      glimepirid           

[facility]



     (Amaryl) 2      1-20                               e 2 mg           [facility]



     MG tablet      00:00:                               tablet           



               00                                                

 

     glimepiride      0      Yes                      glimepirid           

[facility]



     (Amaryl) 2      1-20                               e 2 mg           [facility]



     MG tablet      00:00:                               tablet           



               00                                                

 

     glimepiride      0      Yes                      glimepirid           

[facility]



     (Amaryl) 2      1-20                               e 2 mg           [facility]



     MG tablet      00:00:                               tablet           



               00                                                

 

     glimepiride      0      Yes                      glimepirid           

[facility]



     (Amaryl) 2      1-20                               e 2 mg           [facility]



     MG tablet      00:00:                               tablet           



               00                                                

 

     glimepiride      0      Yes                      glimepirid           

[facility]



     (Amaryl) 2      1-20                               e 2 mg           [facility]



     MG tablet      00:00:                               tablet           



               00                                                

 

     glimepiride      0      Yes                      glimepirid           

[facility]



     (Amaryl) 2      1-20                               e 2 mg           [facility]



     MG tablet      00:00:                               tablet           



               00                                                

 

     glimepiride      0 - No                       glimepirid          

 [facility]



     (Amaryl) 2      1-20 02-16                          e 2 mg           [facility]



     MG tablet      00:00: 00:00                          tablet           



               00   :00                                          

 

     One Touch One Touch 2020- No                  QD   One Touch        

   



     Verio n/s Verio n/s 2-31 12-26                          Verio n/s          

 



               00:00: 00:00                                         



               00   :00                                          

 

     One Touch One Touch 2020- No                  QD                  



     Verio n/s Verio n/s                                          



               00:00: 00:00                                         



               00   :00                                          

 

     One Touch One Touch 2020- No                  QD   One Touch        

   



     Verio n/s Verio n/s                           Verio n/s          

 



               00:00: 00:00                                         



               00   :00                                          

 

     One Touch One Touch 2020- No                  QD   One Touch        

   



     Verio n/s Verio n/s                           Verio n/s          

 



               00:00: 00:00                                         



               00   :00                                          

 

     One Touch One Touch 2020- No                  QD   One Touch        

   



     Verio n/s Verio n/s                           Verio n/s          

 



               00:00: 00:00                                         



               00   :00                                          

 

     One Touch One Touch 2020- No                  QD                  



     Verio n/s Verio n/s                                          



               00:00: 00:00                                         



               00   :00                                          

 

     Celebrex Celebrex  2020- No   [place]                1 capsule       

    Common



                                         with food           [facility]



               00:00: 00:00                                         - [facility]



               00   :00                                          [facility]

 

     Alpha Alpha 2018      Yes  [place]                as             Common



     Lipoic Acid Lipoic Acid 2-14                               directed        

   [facility]



               00:00:                                              - [facility]



               00                                                [facility]

 

     Aspirin Aspirin 2018      Yes  [place]                1 tablet           

Common



     Adult Low Adult Low 2-14                                              [facility]

t



     Dose Dose 00:00:                                              - [facility]



               00                                                [facility]

 

     Zofran Zofran 2018      Yes  [place]                1 tablet           Co

mmon



               2-14                                              [facility]



               00:00:                                              - [facility]



               00                                                [facility]

 

     Aspirin Aspirin 2018      No             1{table QD   Aspirin           



     Adult Low Adult Low 2-14                     t}        Adult Low           



     Dose 81 MG Dose 81 MG 00:00:                               Dose 81 MG      

     



               00                                                

 

     Zofran 8 MG Zofran 8 MG 2018      No             1{table                 

    



               2-14                     t}                       



               00:00:                                              



               00                                                

 

     Aspirin Aspirin 2018      No             1{table QD                  



     Adult Low Adult Low 2-14                     t}                       



     Dose 81 MG Dose 81 MG 00:00:                                              



               00                                                

 

     Zofran 8 MG Zofran 8 MG 2018      No             1{table      Zofran 8   

        



               2-14                     t}        MG             



               00:00:                                              



               00                                                

 

     Aspirin Aspirin 2018      No             1{table QD   Aspirin           



     Adult Low Adult Low 2-14                     t}        Adult Low           



     Dose 81 MG Dose 81 MG 00:00:                               Dose 81 MG      

     



               00                                                

 

     Zofran 8 MG Zofran 8 MG 2018      No             1{table      Zofran 8   

        



               2-14                     t}        MG             



               00:00:                                              



               00                                                

 

     Aspirin Aspirin 2018      No             1{table QD   Aspirin           



     Adult Low Adult Low 2-14                     t}        Adult Low           



     Dose 81 MG Dose 81 MG 00:00:                               Dose 81 MG      

     



               00                                                

 

     Zofran 8 MG Zofran 8 MG 2018      No             1{table      Zofran 8   

        



               2-14                     t}        MG             



               00:00:                                              



               00                                                

 

     Aspirin Aspirin 2018      No             1{table QD   Aspirin           



     Adult Low Adult Low 2-14                     t}        Adult Low           



     Dose 81 MG Dose 81 MG 00:00:                               Dose 81 MG      

     



               00                                                

 

     Zofran 8 MG Zofran 8 MG 2018      No             1{table                 

    



               2-14                     t}                       



               00:00:                                              



               00                                                

 

     Aspirin Aspirin 2018      No             1{table QD                  



     Adult Low Adult Low 2-14                     t}                       



     Dose 81 MG Dose 81 MG 00:00:                                              



               00                                                

 

     Aspirin Aspirin 2018      No             1{table QD   Aspirin           



     Adult Low Adult Low 2-14                     t}        Adult Low           



     Dose 81 MG Dose 81 MG 00:00:                               Dose 81 MG      

     



               00                                                

 

     Zofran 8 MG Zofran 8 MG 2018      No             1{table      Zofran 8   

        



               2-14                     t}        MG             



               00:00:                                              



               00                                                

 

     Aspirin Aspirin 2018      No             1{table QD   Aspirin           



     Adult Low Adult Low 2-14                     t}        Adult Low           



     Dose 81 MG Dose 81 MG 00:00:                               Dose 81 MG      

     



               00                                                

 

     Zofran 8 MG Zofran 8 MG 2018      No             1{table      Zofran 8   

        



               2-14                     t}        MG             



               00:00:                                              



               00                                                

 

     Aspirin Aspirin 2018      No             1{table QD   Aspirin           



     Adult Low Adult Low 2-14                     t}        Adult Low           



     Dose 81 MG Dose 81 MG 00:00:                               Dose 81 MG      

     



               00                                                

 

     Zofran 8 MG Zofran 8 MG 2018      No             1{table      Zofran 8   

        



               2-14                     t}        MG             



               00:00:                                              



               00                                                

 

     Aspirin Aspirin 2018      No             1{table QD   Aspirin           



     Adult Low Adult Low 2-14                     t}        Adult Low           



     Dose 81 MG Dose 81 MG 00:00:                               Dose 81 MG      

     



               00                                                

 

     Zofran 8 MG Zofran 8 MG 2018      No             1{table      Zofran 8   

        



               2-14                     t}        MG             



               00:00:                                              



               00                                                

 

     Zofran 8 MG Zofran 8 MG 2018      No             1{table      Zofran 8   

        



               2-14                     t}        MG             



               00:00:                                              



               00                                                

 

     Aspirin Aspirin 2018      No             1{table QD   Aspirin           



     Adult Low Adult Low 2-14                     t}        Adult Low           



     Dose 81 MG Dose 81 MG 00:00:                               Dose 81 MG      

     



               00                                                

 

     Aspirin Aspirin 2018      No             1{table QD   Aspirin           



     Adult Low Adult Low 2-14                     t}        Adult Low           



     Dose 81 MG Dose 81 MG 00:00:                               Dose 81 MG      

     



               00                                                

 

     Zofran 8 MG Zofran 8 MG 2018      No             1{table      Zofran 8   

        



               2-14                     t}        MG             



               00:00:                                              



               00                                                

 

     Zofran 8 MG Zofran 8 MG 2018      No             1{table      Zofran 8   

        



               2-14                     t}        MG             



               00:00:                                              



               00                                                

 

     Aspirin Aspirin 2018      No             1{table QD   Aspirin           



     Adult Low Adult Low 2-14                     t}        Adult Low           



     Dose 81 MG Dose 81 MG 00:00:                               Dose 81 MG      

     



               00                                                

 

     Zofran 8 MG Zofran 8 MG 2018      No             1{table      Zofran 8   

        



               2-14                     t}        MG             



               00:00:                                              



               00                                                

 

     Aspirin Aspirin 2018      No             1{table QD   Aspirin           



     Adult Low Adult Low 2-14                     t}        Adult Low           



     Dose 81 MG Dose 81 MG 00:00:                               Dose 81 MG      

     



               00                                                

 

     Zofran 8 MG Zofran 8 MG 2018      No             1{table      Zofran 8   

        



               2-14                     t}        MG             



               00:00:                                              



               00                                                

 

     Aspirin Aspirin 2018      No             1{table QD   Aspirin           



     Adult Low Adult Low 2-14                     t}        Adult Low           



     Dose 81 MG Dose 81 MG 00:00:                               Dose 81 MG      

     



               00                                                

 

     Zofran 8 MG Zofran 8 MG 2018      No             1{table      Zofran 8   

        



               2-14                     t}        MG             



               00:00:                                              



               00                                                

 

     Aspirin Aspirin 2018      No             1{table QD   Aspirin           



     Adult Low Adult Low 2-14                     t}        Adult Low           



     Dose 81 MG Dose 81 MG 00:00:                               Dose 81 MG      

     



               00                                                

 

     Zofran 8 MG Zofran 8 MG 2018      No             1{table      Zofran 8   

        



               2-14                     t}        MG             



               00:00:                                              



               00                                                

 

     Aspirin Aspirin 2018      No             1{table QD   Aspirin           



     Adult Low Adult Low 2-14                     t}        Adult Low           



     Dose 81 MG Dose 81 MG 00:00:                               Dose 81 MG      

     



               00                                                

 

     Zofran 8 MG Zofran 8 MG 2018      No             1{table      Zofran 8   

        



               2-14                     t}        MG             



               00:00:                                              



               00                                                

 

     Aspirin Aspirin 2018      No             1{table QD   Aspirin           



     Adult Low Adult Low 2-14                     t}        Adult Low           



     Dose 81 MG Dose 81 MG 00:00:                               Dose 81 MG      

     



               00                                                

 

     Zofran 8 MG Zofran 8 MG 2018      No             1{table      Zofran 8   

        



               2-14                     t}        MG             



               00:00:                                              



               00                                                

 

     Aspirin Aspirin 2018      No             1{table QD   Aspirin           



     Adult Low Adult Low 2-14                     t}        Adult Low           



     Dose 81 MG Dose 81 MG 00:00:                               Dose 81 MG      

     



               00                                                

 

     Zofran 8 MG Zofran 8 MG 2018      No             1{table      Zofran 8   

        



               2-14                     t}        MG             



               00:00:                                              



               00                                                

 

     Aspirin Aspirin 2018      No             1{table QD   Aspirin           



     Adult Low Adult Low 2-14                     t}        Adult Low           



     Dose 81 MG Dose 81 MG 00:00:                               Dose 81 MG      

     



               00                                                

 

     Zofran 8 MG Zofran 8 MG 2018      No             1{table      Zofran 8   

        



               2-14                     t}        MG             



               00:00:                                              



               00                                                

 

     Aspirin Aspirin 2018      No             1{table QD   Aspirin           



     Adult Low Adult Low 2-14                     t}        Adult Low           



     Dose 81 MG Dose 81 MG 00:00:                               Dose 81 MG      

     



               00                                                

 

     Zofran 8 MG Zofran 8 MG 2018      No             1{table      Zofran 8   

        



               2-14                     t}        MG             



               00:00:                                              



               00                                                

 

     Aspirin Aspirin 2018      No             1{table QD   Aspirin           



     Adult Low Adult Low 2-14                     t}        Adult Low           



     Dose 81 MG Dose 81 MG 00:00:                               Dose 81 MG      

     



               00                                                

 

     Aspirin Aspirin 2018      No             1{table QD   Aspirin           



     Adult Low Adult Low 2-14                     t}        Adult Low           



     Dose 81 MG Dose 81 MG 00:00:                               Dose 81 MG      

     



               00                                                

 

     Zofran 8 MG Zofran 8 MG 2018      No             1{table      Zofran 8   

        



               2-14                     t}        MG             



               00:00:                                              



               00                                                

 

     Zofran 8 MG Zofran 8 MG 2018      No             1{table      Zofran 8   

        



               2-14                     t}        MG             



               00:00:                                              



               00                                                

 

     Vitamin B12 Vitamin B12 2018      No             1000ug                  

   Common



     (Cyanocobal (Cyanocobal 0-18                                              S

pirit



     amin) amin) 00:00:                                              - [facility]



               00                                                [facility]

 

     Vitamin B12 Vitamin B12 2018      No             1000ug                  

   Common



     (Cyanocobal (Cyanocobal 0-18                                              S

pirit



     amin) amin) 00:00:                                              - [facility]



               00                                                [facility]

 

     Vitamin B12 Vitamin B12 2018      No             1000ug                  

   Common



     (Cyanocobal (Cyanocobal 0-18                                              S

pirit



     amin) amin) 00:00:                                              - [facility]



               00                                                [facility]

 

     Vitamin B12 Vitamin B12 2018-      No             1000ug                  

   Common



     (Cyanocobal (Cyanocobal 0-18                                              S

pirit



     amin) amin) 00:00:                                              - [facility]



               00                                                [facility]

 

     Vitamin B12 Vitamin B12 2018-1      No             1000ug                  

   Common



     (Cyanocobal (Cyanocobal 0-18                                              S

pirit



     amin) amin) 00:00:                                              - [facility]



               00                                                [facility]

 

     Vitamin B12 Vitamin B12 2018-      No             1000ug                  

   Common



     (Cyanocobal (Cyanocobal 0-18                                              S

pirit



     amin) amin) 00:00:                                              - [facility]



               00                                                [facility]

 

     Vitamin B12 Vitamin B12 2018-      No             1000ug                  

   Common



     (Cyanocobal (Cyanocobal 0-18                                              S

pirit



     amin) amin) 00:00:                                              - [facility]



               00                                                [facility]

 

     Vitamin B12 Vitamin B12 2018-      No             1000ug                  

   Common



     (Cyanocobal (Cyanocobal 0-18                                              S

pirit



     amin) amin) 00:00:                                              - [facility]



               00                                                [facility]

 

     Vitamin B12 Vitamin B12 2018-      No             1000ug                  

   Common



     (Cyanocobal (Cyanocobal 0-18                                              S

pirit



     amin) amin) 00:00:                                              - [facility]



               00                                                [facility]

 

     Vitamin B12 Vitamin B12 2018-      No             1000ug                  

   Common



     (Cyanocobal (Cyanocobal 0-18                                              S

pirit



     amin) amin) 00:00:                                              - [facility]



               00                                                [facility]

 

     Vitamin B12 Vitamin B12 2018-      No             1000ug                  

   Common



     (Cyanocobal (Cyanocobal 0-18                                              S

pirit



     amin) amin) 00:00:                                              - [facility]



               00                                                [facility]

 

     Vitamin B12 Vitamin B12 2018-1      No             1000ug                  

   Common



     (Cyanocobal (Cyanocobal 0-18                                              S

pirit



     amin) amin) 00:00:                                              - [facility]



               00                                                [facility]

 

     Vitamin B12 Vitamin B12 2018-1      No             1000ug                  

   Common



     (Cyanocobal (Cyanocobal 0-18                                              S

pirit



     amin) amin) 00:00:                                              - [facility]



               00                                                [facility]

 

     Vitamin B12 Vitamin B12 2018-1      No             1000ug                  

   Common



     (Cyanocobal (Cyanocobal 0-18                                              S

pirit



     amin) amin) 00:00:                                              - [facility]



               00                                                [facility]

 

     Vitamin B12 Vitamin B12 2018-1      No             1000ug                  

   Common



     (Cyanocobal (Cyanocobal 0-18                                              S

pirit



     amin) amin) 00:00:                                              - [facility]



               00                                                [facility]

 

     JANUVIA 2017      Yes                      TAKE 1           Un[facility]

ers



     mg tablet      1-27                               TABLET           ity of



               00:00:                               DAILY           [place]



               00                                                [facility]

 

     LEVOTHYROXI      2017      Yes       [id number]           TAKE 1          

 [facility]



      mcg      1-27                               TABLET           ity of



     tablet      00:00:                               DAILY           [place]



                                                               [place] [address]      2017      Yes                      TAKE 1           Univ

ers



     mg tablet      1-27                               TABLET           ity of



               00:00:                               DAILY           [place]



                                                               [facility]

 

     LEVOTHYROXI      2017      Yes       [id number]           TAKE 1          

 [facility]



      mcg      1-27                               TABLET           ity of



     tablet      00:00:                               DAILY           [place]



                                                               [place] [address]      2017      Yes                      TAKE 1           Univ

ers



     mg tablet      1-27                               TABLET           ity of



               00:00:                               DAILY           [place]



                                                               [facility]

 

     LEVOTHYROXI      2017      Yes       [id number]           TAKE 1          

 [facility]



      mcg      1-27                               TABLET           ity of



     tablet      00:00:                               DAILY           [place]



                                                               [place] [address]      2017      Yes                      TAKE 1           Univ

ers



     mg tablet      1-27                               TABLET           ity of



               00:00:                               DAILY           [place]



                                                               [facility]

 

     LEVOTHYROXI      2017      Yes       [id number]           TAKE 1          

 [facility]



      mcg      1-27                               TABLET           ity of



     tablet      00:00:                               DAILY           [place]



                                                               [place] [address]      2017      Yes                      TAKE 1           Univ

ers



     mg tablet      1-27                               TABLET           ity of



               00:00:                               DAILY           [place]



                                                               [facility]

 

     LEVOTHYROXI      2017      Yes       [id number]           TAKE 1          

 [facility]



      mcg      1-27                               TABLET           ity of



     tablet      00:00:                               DAILY           [place]



                                                               [place] [address]      2017      Yes                      TAKE 1           Univ

ers



     mg tablet      1-27                               TABLET           ity of



               00:00:                               DAILY           [place]



                                                               [facility]

 

     LEVOTHYROXI      2017      Yes       [id number]           TAKE 1          

 [facility]



      mcg      1-27                               TABLET           ity of



     tablet      00:00:                               DAILY           [place]



                                                               [facility]

 

     LEVOTHYROXI      2017      Yes       [id number]           TAKE 1          

 [facility]



      mcg      1-27                               TABLET           ity of



     tablet      00:00:                               DAILY           [place]



                                                               [facility]

 

     LEVOTHYROXI      2017      Yes       [id number]           TAKE 1          

 [facility]



      mcg      1-27                               TABLET           ity of



     tablet      00:00:                               DAILY           [place]



                                                               [place] [address]      2017      Yes                      TAKE 1           Univ

ers



     mg tablet      1-27                               TABLET           ity of



               00:00:                               DAILY           [place]



                                                               [facility]

 

     LEVOTHYROXI      2017      Yes       [id number]           TAKE 1          

 [facility]



      mcg      1-27                               TABLET           ity of



     tablet      00:00:                               DAILY           [place]



               00                                                [place] [address]      2017      Yes                      TAKE 1           Univ

ers



     mg tablet      1-27                               TABLET           ity of



               00:00:                               DAILY           [place]



               00                                                [facility]

 

     LEVOTHYROXI      2017      Yes       [id number]           TAKE 1          

 [facility]



      mcg      1-27                               TABLET           ity of



     tablet      00:00:                               DAILY           [place]



               00                                                [place] [address]      2017      Yes                      TAKE 1           Univ

ers



     mg tablet      -27                               TABLET           ity of



               00:00:                               DAILY           [place]



               00                                                [facility]

 

     LEVOTHYROXI      2017      Yes       [id number]           TAKE 1          

 [facility]



      mcg      1-27                               TABLET           ity of



     tablet      00:00:                               DAILY           [place]



               00                                                [facility]

 

     LEVOTHYROXI      2017- No        [id number]           TAKE 1         

  [facility]



      mcg      1-27 07-13                          TABLET           ity of



     tablet      00:00: 00:00                          DAILY           [place]



               00   :00                                          [place] [address]      2017- No                       TAKE 1           Uni

vers



     mg tablet      -                          TABLET           ity of



               00:00: 00:00                          DAILY           [place]



               00   :00                                          [place] [address]      2017- No                       TAKE 1           Uni

vers



     mg tablet      -26                          TABLET           ity of



               00:00: 00:00                          DAILY           [place]



               00   :00                                          [facility]

 

     acetaminoph      2016      Yes            1{tbl}      Take 1           Un

[facility]



     en-codeine      0-19                               tablet by           ity 

of



     (TYLENOL-CO      00:00:                               mouth           [place]



     DEINE #3)      00                                 every 4           Medical



     300-30 mg                                         (four)           [facility]



     tablet                                         hours as           



                                                  needed for           



                                                  Pain           



                                                  (scale           



                                                  4-6) or           



                                                  Pain           



                                                  (scale           



                                                  7-10).           

 

     acetaminoph      2016      Yes            1{tbl}      Take 1           Un

[facility]



     en-codeine      0-19                               tablet by           ity 

of



     (TYLENOL-CO      00:00:                               mouth           [place]



     DEINE #3)      00                                 every 4           Medical



     300-30 mg                                         (four)           [facility]



     tablet                                         hours as           



                                                  needed for           



                                                  Pain           



                                                  (scale           



                                                  4-6) or           



                                                  Pain           



                                                  (scale           



                                                  7-10).           

 

     acetaminoph      2016      Yes            1{tbl}      Take 1           Un

[facility]



     en-codeine      0-19                               tablet by           ity 

of



     (TYLENOL-CO      00:00:                               mouth           [place]



     DEINE #3)      00                                 every 4           Medical



     300-30 mg                                         (four)           [facility]



     tablet                                         hours as           



                                                  needed for           



                                                  Pain           



                                                  (scale           



                                                  4-6) or           



                                                  Pain           



                                                  (scale           



                                                  7-10).           

 

     acetaminoph      2016      Yes            1{tbl}      Take 1           Un

[facility]



     en-codeine      0-19                               tablet by           ity 

of



     (TYLENOL-CO      00:00:                               mouth           [place]



     DEINE #3)      00                                 every 4           Medical



     300-30 mg                                         (four)           [facility]



     tablet                                         hours as           



                                                  needed for           



                                                  Pain           



                                                  (scale           



                                                  4-6) or           



                                                  Pain           



                                                  (scale           



                                                  7-10).           

 

     acetaminoph      2016      Yes            1{tbl}      Take 1           Un

[facility]



     en-codeine      0-19                               tablet by           [facility] 

of



     (TYLENOL-CO      00:00:                               mouth           [place]



     DEINE #3)      00                                 every 4           Medical



     300-30 mg                                         (four)           [facility]



     tablet                                         hours as           



                                                  needed for           



                                                  Pain           



                                                  (scale           



                                                  4-6) or           



                                                  Pain           



                                                  (scale           



                                                  7-10).           

 

     acetaminoph      2016      Yes            1{tbl}      Take 1           Un

[facility]



     en-codeine      0-19                               tablet by           ity 

of



     (TYLENOL-CO      00:00:                               mouth           [place]



     DEINE #3)      00                                 every 4           Medical



     300-30 mg                                         (four)           [facility]



     tablet                                         hours as           



                                                  needed for           



                                                  Pain           



                                                  (scale           



                                                  4-6) or           



                                                  Pain           



                                                  (scale           



                                                  7-10).           

 

     acetaminoph      2016      Yes            1{tbl}      Take 1           Un

[facility]



     en-codeine      0-19                               tablet by           ity 

of



     (TYLENOL-CO      00:00:                               mouth           [place]



     DEINE #3)      00                                 every 4           Medical



     300-30 mg                                         (four)           [facility]



     tablet                                         hours as           



                                                  needed for           



                                                  Pain           



                                                  (scale           



                                                  4-6) or           



                                                  Pain           



                                                  (scale           



                                                  7-10).           

 

     acetaminoph      2016      Yes            1{tbl}      Take 1           Un

[facility]



     en-codeine      0-19                               tablet by           ity 

of



     (TYLENOL-CO      00:00:                               mouth           [place]



     DEINE #3)      00                                 every 4           Medical



     300-30 mg                                         (four)           [facility]



     tablet                                         hours as           



                                                  needed for           



                                                  Pain           



                                                  (scale           



                                                  4-6) or           



                                                  Pain           



                                                  (scale           



                                                  7-10).           

 

     acetaminoph      2016      Yes            1{tbl}      Take 1           Un

[facility]



     en-codeine      0-19                               tablet by           ity 

of



     (TYLENOL-CO      00:00:                               mouth           [place]



     DEINE #3)      00                                 every 4           Medical



     300-30 mg                                         (four)           [facility]



     tablet                                         hours as           



                                                  needed for           



                                                  Pain           



                                                  (scale           



                                                  4-6) or           



                                                  Pain           



                                                  (scale           



                                                  7-10).           

 

     acetaminoph      2016-1 2023- No             1{tbl}      Take 1           U

nivers



     en-codeine      0--                          tablet by           [facility]

 of



     (TYLENOL-CO      00:00: 00:00                          mouth           [place]

[facility]



     DEINE #3)      00   :00                           every 4           Medical



     300-30 mg                                         (four)           [facility]



     tablet                                         hours as           



                                                  needed for           



                                                  Pain           



                                                  (scale           



                                                  4-6) or           



                                                  Pain           



                                                  (scale           



                                                  7-10).           

 

     acetaminoph      2016- No             1{tbl}      Take 1           U

n[facility]



     en-codeine      0--                          tablet by           [facility]

 of



     (TYLENOL-CO      00:00: 00:00                          mouth           [place]

[facility]



     DEINE #3)      00   :00                           every 4           Medical



     300-30 mg                                         (four)           [facility]



     tablet                                         hours as           



                                                  needed for           



                                                  Pain           



                                                  (scale           



                                                  4-6) or           



                                                  Pain           



                                                  (scale           



                                                  7-10).           

 

     blood sugar            Yes                      Test once           [facility]

[facility]



     diagnostic                                     daily           ity of



     (ONETOUCH      00:00:                                              [place]



     VERIO)      00                                                Medical



     strip                                                        Branch

 

     blood sugar            Yes                      Test once           [facility]

n[facility]



     diagnostic                                     daily           ity of



     (ONETOUCH      00:00:                                              [place]



     VERIO)                                                      Medical



     strip                                                        Branch

 

     blood sugar            Yes                      Test once           U

n[facility]



     diagnostic                                     daily           ity of



     (ONETOUCH      00:00:                                              [place]



     VERIO)      00                                                Medical



     strip                                                        Branch

 

     blood sugar      -      Yes                      Test once           U

nivers



     diagnostic                                     daily           ity of



     (ONETOUCH      00:00:                                              [place]



     VERIO)                                                      Medical



     strip                                                        Branch

 

     blood sugar            Yes                      Test once           U

n[facility]



     diagnostic                                     daily           ity of



     (ONETOUCH      00:00:                                              [place]



     VERIO)                                                      Medical



     strip                                                        Branch

 

     blood sugar      -      Yes                      Test once           U

nivers



     diagnostic                                     daily           ity of



     (ONETOUCH      00:00:                                              [place]



     VERIO)                                                      Medical



     strip                                                        Branch

 

     blood sugar      -      Yes                      Test once           U

nivers



     diagnostic                                     daily           ity of



     (ONETOUCH      00:00:                                              [place]



     VERIO)                                                      Medical



     strip                                                        Branch

 

     blood sugar      -      Yes                      Test once           U

nivers



     diagnostic                                     daily           ity of



     (ONETOUCH      00:00:                                              [place]



     VERIO)                                                      Medical



     strip                                                        Branch

 

     blood sugar            Yes                      Test once           U

n[facility]



     diagnostic                                     daily           ity of



     (ONETOUCH      00:00:                                              [place]



     VERIO)                                                      Medical



     strip                                                        Branch

 

     blood sugar      -2023- No                       Test once           

[facility]



     diagnostic                                daily           [facility] of



     (ONETOUCH      00:00: 00:00                                         [place]



     VERIO)      00   :00                                          Medical



     strip                                                        Branch

 

     blood sugar      2023- No                       Test once           

[facility]



     diagnostic                                daily           [facility] of



     (ONETOUCH      00:00: 00:00                                         [place]



     VERIO)      00   :00                                          Medical



     strip                                                        Branch

 

     inFLIXimab            Yes                      Inject           Unive

rs



     (REMICADE)      7-26                               intravenou           ity

 of



     100 mg      20:05:                               sly once           [place]



     injection      27                                 now. Every           Medi

[facility]an



                                                  6 weeks           Branch

 

     aspirin 81            Yes            81mg      Take 81 mg           U

nivers



     mg EC      7-26                               by mouth           ity of



     tablet      20:05:                               daily.           [address]

 

     MULTIVITAMI            Yes                      Take by           Uni

vers



     NS WITH      7-26                               mouth.           ity of



     FLUORIDE      20:05:                                              [place]



     (MULTI-RAJESH      27                                                Medical



     MIN ORAL)                                                        Branch

 

     inFLIXimab            Yes                      Inject           Unive

rs



     (REMICADE)      7-26                               intravenou           ity

 of



     100 mg      20:05:                               sly once           [place]



     injection      27                                 now. Every           Medi

maryan



                                                  6 weeks           Branch

 

     aspirin 81      2016      Yes            81mg      Take 81 mg           U

nivers



     mg EC      7-26                               by mouth           ity of



     tablet      20:05:                               daily.           [address]

 

     MULTIVITAMI            Yes                      Take by           Uni

vers



     NS WITH      7-26                               mouth.           ity of



     FLUORIDE      20:05:                                              [place]



     (MULTI-RAJESH      27                                                Medical



     MIN ORAL)                                                        Branch

 

     inFLIXimab            Yes                      Inject           Unive

rs



     (REMICADE)      7-26                               intravenou           ity

 of



     100 mg      20:05:                               sly once           [place]



     injection      27                                 now. Every           Medi

maryan



                                                  6 weeks           Branch

 

     aspirin 81      2016      Yes            81mg      Take 81 mg           U

nivers



     mg EC      7-26                               by mouth           ity of



     tablet      20:05:                               daily.           [address]

 

     MULTIVITAMI            Yes                      Take by           Uni

vers



     NS WITH      7-26                               mouth.           ity of



     FLUORIDE      20:05:                                              [place]



     (MULTI-RAJESH      27                                                Medical



     MIN ORAL)                                                        Branch

 

     inFLIXimab      2016      Yes                      Inject           Unive

rs



     (REMICADE)      7-26                               intravenou           ity

 of



     100 mg      20:05:                               sly once           [place]



     injection      27                                 now. Every           Medi

[facility]an



                                                  6 weeks           Branch

 

     aspirin 81      2016      Yes            81mg      Take 81 mg           U

nivers



     mg EC      7-26                               by mouth           ity of



     tablet      20:05:                               daily.           [address]

 

     MULTIVITAMI            Yes                      Take by           Uni

vers



     NS WITH      7-26                               mouth.           ity of



     FLUORIDE      20:05:                                              [place]



     (MULTI-RAJESH      27                                                Medical



     MIN ORAL)                                                        Branch

 

     inFLIXimab      2016-0      Yes                      Inject           Unive

rs



     (REMICADE)      7-26                               intravenou           ity

 of



     100 mg      20:05:                               sly once           [place]



     injection      27                                 now. Every           Medi

maryan



                                                  6 weeks           Branch

 

     aspirin 81      2016-      Yes            81mg      Take 81 mg           U

nivers



     mg EC      7-26                               by mouth           ity of



     tablet      20:05:                               daily.           [address]



                                                                 Branch

 

     MULTIVITAMI            Yes                      Take by           Uni

vers



     NS WITH      7-26                               mouth.           ity of



     FLUORIDE      20:05:                                              [place]



     (MULTI-RAJESH      27                                                Medical



     MIN ORAL)                                                        Branch

 

     inFLIXimab      2016-      Yes                      Inject           Unive

rs



     (REMICADE)      7-26                               intravenou           ity

 of



     100 mg      20:05:                               sly once           [place]



     injection      27                                 now. Every           Medi

[facility]an



                                                  6 weeks           Branch

 

     aspirin 81      2016-      Yes            81mg      Take 81 mg           U

nivers



     mg EC      7-26                               by mouth           ity of



     tablet      20:05:                               daily.           [address]

 

     MULTIVITAMI      2016      Yes                      Take by           Uni

vers



     NS WITH      7-26                               mouth.           ity of



     FLUORIDE      20:05:                                              [place]



     (MULTI-RAJESH      27                                                Medical



     MIN ORAL)                                                        Branch

 

     inFLIXimab      2016-      Yes                      Inject           Unive

rs



     (REMICADE)      7-26                               intravenou           ity

 of



     100 mg      20:05:                               sly once           [place]



     injection      27                                 now. Every           Medi

maryan



                                                  6 weeks           Branch

 

     aspirin 81      2016-0      Yes            81mg      Take 81 mg           U

nivers



     mg EC      7-26                               by mouth           ity of



     tablet      20:05:                               daily.           [address]

 

     MULTIVITAMI      2016      Yes                      Take by           Uni

vers



     NS WITH      7-26                               mouth.           ity of



     FLUORIDE      20:05:                                              [place]



     (MULTI-RAJESH      27                                                Medical



     MIN ORAL)                                                        Branch

 

     inFLIXimab      2016-0      Yes                      Inject           Unive

rs



     (REMICADE)      7-26                               intravenou           ity

 of



     100 mg      15:05:                               sly once           [place]



     injection      27                                 now. Every           Medi

[facility]an



                                                  6 weeks           Branch

 

     aspirin 81      2016-0      Yes            81mg      Take 81 mg           U

nivers



     mg EC      7-26                               by mouth           ity of



     tablet      15:05:                               daily.           [address]

 

     MULTIVITAMI      2016-      Yes                      Take by           Uni

vers



     NS WITH      7-26                               mouth.           ity of



     FLUORIDE      15:05:                                              [place]



     (MULTI-RAJESH      27                                                Medical



     MIN ORAL)                                                        Branch

 

     inFLIXimab      2016-0      Yes                      Inject           Unive

rs



     (REMICADE)                                     intravenou           ity

 of



     100 mg      15:05:                               sly once           [place]



     injection      27                                 now. Every           Medi

[facility]an



                                                  6 weeks           Branch

 

     aspirin 81            Yes            81mg      Take 81 mg           U

nivers



     mg EC                                     by mouth           ity of



     tablet      15:05:                               daily.           [place]



               27                                                Medical



                                                                 Branch

 

     MULTIVITAMI            Yes                      Take by           Uni

vers



     NS WITH                                     mouth.           ity of



     FLUORIDE      15:05:                                              [place]



     (MULTI-RAJESH      27                                                Medical



     MIN ORAL)                                                        Branch

 

     meloxicam            Yes                                     [facility]



     (MOBIC) 15      7-21                                              ity of



     mg tablet      00:00:                                              [place]



               00                                                [facility]

 

     meloxicam            Yes                                     [facility]



     (MOBIC) 15      7-21                                              ity of



     mg tablet      00:00:                                              [place]



                                                               [facility]

 

     meloxicam            Yes                                     [facility]



     (MOBIC) 15      7-21                                              ity of



     mg tablet      00:00:                                              [place]



               00                                                [facility]

 

     meloxicam            Yes                                     [facility]



     (MOBIC) 15      7-21                                              ity of



     mg tablet      00:00:                                              [place]



               00                                                [facility]

 

     meloxicam            Yes                                     [facility]



     (MOBIC) 15      7-21                                              ity of



     mg tablet      00:00:                                              [place]



               00                                                [facility]

 

     meloxicam            Yes                                     [facility]



     (MOBIC) 15      7-21                                              ity of



     mg tablet      00:00:                                              [place]



               00                                                [facility]

 

     meloxicam            Yes                                     [facility]



     (MOBIC) 15      7-21                                              ity of



     mg tablet      00:00:                                              [place]



               00                                                [facility]

 

     meloxicam            Yes                                     [facility]



     (MOBIC) 15      7-21                                              ity of



     mg tablet      00:00:                                              [place]



               00                                                [facility]

 

     meloxicam            Yes                                     [facility]



     (MOBIC) 15      7-21                                              ity of



     mg tablet      00:00:                                              [place]



               00                                                [facility]

 

     meloxicam      2023- No                                      [facility]



     (MOBIC) 15      --                                         ity of



     mg tablet      00:00: 00:00                                         [place]



               00   :00                                          [facility]

 

     meloxicam      2023- No                                      [facility]



     (MOBIC) 15      --                                         ity of



     mg tablet      00:00: 00:00                                         [place]



               00   :00                                          [facility]

 

     acetaminoph      0      Yes                      TK 1 TO 2           U

nivers



     en-codeine      7-13                               T PO EVERY           ity

 of



     (TYLENOL      00:00:                               4 T 6           [place]



     #3) 300-30      00                                 HOURS PRN           Medi

[facility]



     mg tablet                                         P              [facility]

 

     acetaminoph            Yes                      TK 1 TO 2           U

nivers



     en-codeine      7-13                               T PO EVERY           ity

 of



     (TYLENOL      00:00:                               4 T 6           [place]



     #3) 300-30      00                                 HOURS PRN           Medi

[facility]



     mg tablet                                         P              Branch

 

     acetaminoph            Yes                      TK 1 TO 2           U

nivers



     en-codeine      7-13                               T PO EVERY           ity

 of



     (TYLENOL      00:00:                               4 T 6           [place]



     #3) 300-30      00                                 HOURS PRN           Medi

[facility]



     mg tablet                                         P              Branch

 

     acetaminoph            Yes                      TK 1 TO 2           U

nivers



     en-codeine      7-13                               T PO EVERY           ity

 of



     (TYLENOL      00:00:                               4 T 6           [place]



     #3) 300-30      00                                 HOURS PRN           Medi

[facility]



     mg tablet                                         P              Branch

 

     acetaminoph            Yes                      TK 1 TO 2           U

nivers



     en-codeine      7-13                               T PO EVERY           ity

 of



     (TYLENOL      00:00:                               4 T 6           [place]



     #3) 300-30      00                                 HOURS PRN           Medi

[facility]



     mg tablet                                         P              Branch

 

     acetaminoph            Yes                      TK 1 TO 2           U

nivers



     en-codeine      7-13                               T PO EVERY           ity

 of



     (TYLENOL      00:00:                               4 T 6           [place]



     #3) 300-30      00                                 HOURS PRN           Medi

[facility]



     mg tablet                                         P              Branch

 

     acetaminoph            Yes                      TK 1 TO 2           U

nivers



     en-codeine      7-13                               T PO EVERY           ity

 of



     (TYLENOL      00:00:                               4 T 6           [place]



     #3) 300-30      00                                 HOURS PRN           Medi

[facility]



     mg tablet                                         P              Br[facility]

 

     acetaminoph            Yes                      TK 1 TO 2           U

nivers



     en-codeine      7-13                               T PO EVERY           ity

 of



     (TYLENOL      00:00:                               4 T 6           [place]



     #3) 300-30      00                                 HOURS PRN           Medi

[facility]



     mg tablet                                         P              Br[facility]

 

     acetaminoph            Yes                      TK 1 TO 2           U

nivers



     en-codeine      7-13                               T PO EVERY           ity

 of



     (TYLENOL      00:00:                               4 T 6           [place]



     #3) 300-30      00                                 HOURS PRN           Medi

[facility]



     mg tablet                                         P              [facility]

 

     acetaminoph      2023- No                       TK 1 TO 2           

[facility]



     en-codeine      7-13 06-26                          T PO EVERY           it

y of



     (TYLENOL      00:00: 00:00                          4 T 6           [place]



     #3) 300-30      00   :00                           HOURS PRN           Medi

[facility]



     mg tablet                                         P              [facility]anch

 

     acetaminoph      2023- No                       TK 1 TO 2           

[facility]



     en-codeine      7-13 06-26                          T PO EVERY           it

y of



     (TYLENOL      00:00: 00:00                          4 T 6           [place]



     #3) 300-30      00   :00                           HOURS PRN           Medi

[facility]



     mg tablet                                         P              Branch

 

     sulfaSALAzi      2015      Yes                                     Univer

s



     ne        0-29                                              ity of



     (AZULFIDINE      00:00:                                              [place]



     ) 500 mg      00                                                Medical



     tablet                                                        Branch

 

     sulfaSALAzi      2015      Yes                                     Univer

s



     ne        0-29                                              ity of



     (AZULFIDINE      00:00:                                              [place]



     ) 500 mg      00                                                Medical



     tablet                                                        Branch

 

     sulfaSALAzi      2015      Yes                                     Univer

s



     ne        0-29                                              ity of



     (AZULFIDINE      00:00:                                              [place]



     ) 500 mg      00                                                Medical



     tablet                                                        Branch

 

     sulfaSALAzi      2015      Yes                                     Univer

s



     ne        0-29                                              ity of



     (AZULFIDINE      00:00:                                              [place]



     ) 500 mg      00                                                Medical



     tablet                                                        Branch

 

     sulfaSALAzi      2015      Yes                                     Univer

s



     ne        0-29                                              ity of



     (AZULFIDINE      00:00:                                              [place]



     ) 500 mg      00                                                Medical



     tablet                                                        Branch

 

     sulfaSALAzi      2015      Yes                                     Univer

s



     ne        0-29                                              ity of



     (AZULFIDINE      00:00:                                              [place]



     ) 500 mg      00                                                Medical



     tablet                                                        Branch

 

     sulfaSALAzi      2015      Yes                                     Univer

s



     ne        0-29                                              ity of



     (AZULFIDINE      00:00:                                              [place]



     ) 500 mg      00                                                Medical



     tablet                                                        Branch

 

     sulfaSALAzi      2015      Yes                                     Univer

s



     ne        0-29                                              ity of



     (AZULFIDINE      00:00:                                              [place]



     ) 500 mg      00                                                Medical



     tablet                                                        Branch

 

     sulfaSALAzi      2015      Yes                                     Univer

s



     ne        0-29                                              ity of



     (AZULFIDINE      00:00:                                              [place]



     ) 500 mg      00                                                Medical



     tablet                                                        Branch

 

     sulfaSALAzi      2015- No                                      Unive

rs



     ne        0- 06-                                         ity of



     (AZULFIDINE      00:00: 00:00                                         [place]



     ) 500 mg      00   :00                                          Medical



     tablet                                                        Branch

 

     sulfaSALAzi      2015- No                                      Unive

rs



     ne        0--                                         ity of



     (AZULFIDINE      00:00: 00:00                                         [place]



     ) 500 mg      00   :00                                          Medical



     tablet                                                        Branch

 

     gabapentin      2015      Yes            300mg      300 mg 3           Un

[facility]



     (NEURONTIN)      0-18                               (three)           ity o

f



     300 mg      00:00:                               times           [place]



     capsule      00                                 daily.           Medical



                                                                 Branch

 

     gabapentin      2015      Yes            300mg      300 mg 3           Un

[facility]



     (NEURONTIN)      0-18                               (three)           ity o

f



     300 mg      00:00:                               times           [place]



     capsule      00                                 daily.           Medical



                                                                 Branch

 

     gabapentin      2015      Yes            300mg      300 mg 3           Un

[facility]



     (NEURONTIN)      0-18                               (three)           ity o

f



     300 mg      00:00:                               times           [place]



     capsule      00                                 daily.           Medical



                                                                 Branch

 

     gabapentin      2015      Yes            300mg      300 mg 3           Un

[facility]



     (NEURONTIN)      0-18                               (three)           ity o

f



     300 mg      00:00:                               times           [place]



     capsule      00                                 daily.           Medical



                                                                 Branch

 

     gabapentin      2015      Yes            300mg      300 mg 3           Un

[facility]



     (NEURONTIN)      0-18                               (three)           ity o

f



     300 mg      00:00:                               times           [place]



     capsule      00                                 daily.           Medical



                                                                 Branch

 

     gabapentin      2015      Yes            300mg      300 mg 3           Un

[facility]



     (NEURONTIN)      0-18                               (three)           ity o

f



     300 mg      00:00:                               times           [place]



     capsule      00                                 daily.           Medical



                                                                 Branch

 

     gabapentin      2015      Yes            300mg      300 mg 3           Un

[facility]



     (NEURONTIN)      0-18                               (three)           ity o

f



     300 mg      00:00:                               times           [place]



     capsule      00                                 daily.           Medical



                                                                 Branch

 

     gabapentin      2015      Yes            300mg      300 mg 3           Un

[facility]



     (NEURONTIN)      0-18                               (three)           ity o

f



     300 mg      00:00:                               times           [place]



     capsule      00                                 daily.           Medical



                                                                 Branch

 

     gabapentin      2015      Yes            300mg      300 mg 3           Un

[facility]



     (NEURONTIN)      0-18                               (three)           ity o

f



     300 mg      00:00:                               times           [place]



     capsule      00                                 daily.           Medical



                                                                 Branch

 

     gabapentin      2015- No             300mg      300 mg 3           U

nivers



     (NEURONTIN)      0-                          (three)           ity 

of



     300 mg      00:00: 00:00                          times           [place]



     capsule      00   :00                           daily.           Medical



                                                                 Branch

 

     gabapentin      2015- No             300mg      300 mg 3           U

nivers



     (NEURONTIN)      0--                          (three)           ity 

of



     300 mg      00:00: 00:00                          times           [place]



     capsule      00   :00                           daily.           Medical



                                                                 Branch

 

     Glimepiride Glimepiride           No             1{table      Glimepirid   

        



     1 MG 1 MG                          t}        e 1 MG           

 

     metFORMIN metFORMIN           No             1{table BID  metFORMIN        

   



     HCl 1000 MG HCl 1000 MG                          t_with_      HCl 1000     

      



                                        a_meal}      MG             

 

     Turmeric Turmeric           No                                      

 

     DULoxetine DULoxetine           No             1{capsu QD                  



     HCl 30 MG HCl 30 MG                          le}                      

 

     Temazepam Temazepam           No             1{capsu QD                  



     15 MG 15 MG                          le_at_b                     



                                        edtime_                     



                                        as_need                     



                                        ed}                      

 

     Hydroxychlo Hydroxychlo           No             1{table BID               

  



     roquine roquine                          t}                       



     Sulfate 200 Sulfate 200                                                   



     MG   MG                                                     

 

     Restoril 15 Restoril 15           No             1{capsu QD                

  



     MG   MG                            le_at_b                     



                                        edtime_                     



                                        as_need                     



                                        ed}                      

 

     Rosuvastati Rosuvastati           No                                      



     n Calcium 5 n Calcium 5                                                   



     MG   MG                                                     

 

     Levothyroxi Levothyroxi           No                                      



     ne Sodium ne Sodium                                                   



     150  MCG                                                   

 

     Folic Acid Folic Acid           No             2{table QD                  



     1 MG 1 MG                          t}                       

 

     Methocarbam Methocarbam           No             1{table TID               

  



     ol 750 MG ol 750 MG                          t}                       

 

     sulfaSALAzi sulfaSALAzi           No             2{table BID               

  



     ne 500 MG ne 500 MG                          t}                       

 

     Synthroid Synthroid           No                  QD                  



     150  MCG                                                   

 

     Simponi Simponi           No                                      



     Aria 50 Aria 50                                                   



     MG/4ML MG/4ML                                                   

 

     Gabapentin Gabapentin           No             1{capsu TID                 



     600  MG                          le_befo                     



                                        re_bedt                     



                                        ivette}                     

 

     metFORMIN metFORMIN           No             1{table BID                 



     HCl 1000 MG HCl 1000 MG                          t_with_                   

  



                                        a_meal}                     

 

     azaTHIOprin azaTHIOprin           No             1{table QD                

  



     e 50 MG e 50 MG                          t}                       

 

     Iron 65 MG Iron 65 MG           No             1{table QD                  



                                        t}                       

 

     Diclofenac Diclofenac           No             1{appli BID                 



     Sodium 3 % Sodium 3 %                          cation}                     

 

     Glimepiride Glimepiride           No                                      



     2 MG 2 MG                                                   

 

     sulfaSALAzi sulfaSALAzi           No             2{table BID  sulfaSALAz   

        



     ne 500 MG ne 500 MG                          t}        ine 500 MG          

 

 

     Glimepiride Glimepiride           No                       Glimepirid      

     



     2 MG 2 MG                                    e 2 MG           

 

     Folic Acid Folic Acid           No             2{table QD   Folic Acid     

      



     1 MG 1 MG                          t}        1 MG           

 

     Methocarbam Methocarbam           No             1{table TID  Methocarba   

        



     ol 750 MG ol 750 MG                          t}        mol 750 MG          

 

 

     Rosuvastati Rosuvastati           No                       Rosuvastat      

     



     n Calcium 5 n Calcium 5                                    in Calcium      

     



     MG   MG                                      5 MG           

 

     Temazepam Temazepam           No             1{capsu QD   Temazepam        

   



     15 MG 15 MG                          le_at_b      15 MG           



                                        edtime_                     



                                        as_need                     



                                        ed}                      

 

     Restoril 15 Restoril 15           No             1{capsu QD   Restoril     

      



     MG   MG                            le_at_b      15 MG           



                                        edtime_                     



                                        as_need                     



                                        ed}                      

 

     Diclofenac Diclofenac           No             1{appli BID  Diclofenac     

      



     Sodium 3 % Sodium 3 %                          cation}      Sodium 3 %     

      

 

     Simponi Simponi           No                       Simponi           



     Aria 50 Aria 50                                    Aria 50           



     MG/4ML MG/4ML                                    MG/4ML           

 

     Levothyroxi Levothyroxi           No                       Levothyrox      

     



     ne Sodium ne Sodium                                    ine Sodium          

 



     150  MCG                                    150 MCG           

 

     DULoxetine DULoxetine           No             1{capsu QD   DULoxetine     

      



     HCl 30 MG HCl 30 MG                          le}       HCl 30 MG           

 

     Hydroxychlo Hydroxychlo           No             1{table BID  Hydroxychl   

        



     roquine roquine                          t}        oroquine           



     Sulfate 200 Sulfate 200                                    Sulfate         

  



     MG   MG                                      200 MG           

 

     Synthroid Synthroid           No                  QD   Synthroid           



     150  MCG                                    150 MCG           

 

     metFORMIN metFORMIN           No             1{table BID  metFORMIN        

   



     HCl 1000 MG HCl 1000 MG                          t_with_      HCl 1000     

      



                                        a_meal}      MG             

 

     Iron 65 MG Iron 65 MG           No             1{table QD   Iron 65 MG     

      



                                        t}                       

 

     Turmeric Turmeric           No                       Turmeric           

 

     azaTHIOprin azaTHIOprin           No             1{table QD   azaTHIOpri   

        



     e 50 MG e 50 MG                          t}        ne 50 MG           

 

     Gabapentin Gabapentin           No             1{capsu TID  Gabapentin     

      



     600  MG                          le_befo      600 MG           



                                        re_bedt                     



                                        ivette}                     

 

     sulfaSALAzi sulfaSALAzi           No             2{table BID  sulfaSALAz   

        



     ne 500 MG ne 500 MG                          t}        ine 500 MG          

 

 

     Glimepiride Glimepiride           No                       Glimepirid      

     



     2 MG 2 MG                                    e 2 MG           

 

     Folic Acid Folic Acid           No             2{table QD   Folic Acid     

      



     1 MG 1 MG                          t}        1 MG           

 

     Methocarbam Methocarbam           No             1{table TID  Methocarba   

        



     ol 750 MG ol 750 MG                          t}        mol 750 MG          

 

 

     Rosuvastati Rosuvastati           No                       Rosuvastat      

     



     n Calcium 5 n Calcium 5                                    in Calcium      

     



     MG   MG                                      5 MG           

 

     Temazepam Temazepam           No             1{capsu QD   Temazepam        

   



     15 MG 15 MG                          le_at_b      15 MG           



                                        edtime_                     



                                        as_need                     



                                        ed}                      

 

     Restoril 15 Restoril 15           No             1{capsu QD   Restoril     

      



     MG   MG                            le_at_b      15 MG           



                                        edtime_                     



                                        as_need                     



                                        ed}                      

 

     Diclofenac Diclofenac           No             1{appli BID  Diclofenac     

      



     Sodium 3 % Sodium 3 %                          cation}      Sodium 3 %     

      

 

     Simponi Simponi           No                       Simponi           



     Aria 50 Aria 50                                    Aria 50           



     MG/4ML MG/4ML                                    MG/4ML           

 

     Levothyroxi Levothyroxi           No                       Levothyrox      

     



     ne Sodium ne Sodium                                    ine Sodium          

 



     150  MCG                                    150 MCG           

 

     DULoxetine DULoxetine           No             1{capsu QD   DULoxetine     

      



     HCl 30 MG HCl 30 MG                          le}       HCl 30 MG           

 

     Hydroxychlo Hydroxychlo           No             1{table BID  Hydroxychl   

        



     roquine roquine                          t}        oroquine           



     Sulfate 200 Sulfate 200                                    Sulfate         

  



     MG   MG                                      200 MG           

 

     Synthroid Synthroid           No                  QD   Synthroid           



     150  MCG                                    150 MCG           

 

     metFORMIN metFORMIN           No             1{table BID  metFORMIN        

   



     HCl 1000 MG HCl 1000 MG                          t_with_      HCl 1000     

      



                                        a_meal}      MG             

 

     Iron 65 MG Iron 65 MG           No             1{table QD   Iron 65 MG     

      



                                        t}                       

 

     Turmeric Turmeric           No                       Turmeric           

 

     azaTHIOprin azaTHIOprin           No             1{table QD   azaTHIOpri   

        



     e 50 MG e 50 MG                          t}        ne 50 MG           

 

     Gabapentin Gabapentin           No             1{capsu TID  Gabapentin     

      



     600  MG                          le_befo      600 MG           



                                        re_bedt                     



                                        ivette}                     

 

     Iron 65 MG Iron 65 MG           No             1{table QD   Iron 65 MG     

      



                                        t}                       

 

     Multivitami Multivitami           No                       Multivitam      

     



     n    n                                       in             

 

     Turmeric Turmeric           No                       Turmeric           

 

     Cholestyram Cholestyram           No             1{packe BID  Cholestyra   

        



     ine 4 GM ine 4 GM                          t_mixed      mine 4 GM          

 



                                        _with_w                     



                                        ater_or                     



                                        _non-ca                     



                                        rbonate                     



                                        d_drink                     



                                        }                        

 

     Rosuvastati Rosuvastati           No                       Rosuvastat      

     



     n Calcium 5 n Calcium 5                                    in Calcium      

     



     MG   MG                                      5 MG           

 

     Hydroxychlo Hydroxychlo           No             1{table BID  Hydroxychl   

        



     roquine roquine                          t}        oroquine           



     Sulfate 200 Sulfate 200                                    Sulfate         

  



     MG   MG                                      200 MG           

 

     Restoril 15 Restoril 15           No             1{capsu QD   Restoril     

      



     MG   MG                            le_at_b      15 MG           



                                        edtime_                     



                                        as_need                     



                                        ed}                      

 

     sulfaSALAzi sulfaSALAzi           No             2{table BID  sulfaSALAz   

        



     ne 500 MG ne 500 MG                          t}        ine 500 MG          

 

 

     Folic Acid Folic Acid           No             2{table QD   Folic Acid     

      



     1 MG 1 MG                          t}        1 MG           

 

     DULoxetine DULoxetine           No             1{capsu QD   DULoxetine     

      



     HCl 30 MG HCl 30 MG                          le}       HCl 30 MG           

 

     Diclofenac Diclofenac           No             1{appli BID  Diclofenac     

      



     Sodium 3 % Sodium 3 %                          cation}      Sodium 3 %     

      

 

     Gabapentin Gabapentin           No             1{capsu TID  Gabapentin     

      



     600  MG                          le_befo      600 MG           



                                        re_bedt                     



                                        ivette}                     

 

     Simponi Simponi           No                       Simponi           



     Aria 50 Aria 50                                    Aria 50           



     MG/4ML MG/4ML                                    MG/4ML           

 

     azaTHIOprin azaTHIOprin           No             1{table QD   azaTHIOpri   

        



     e 50 MG e 50 MG                          t}        ne 50 MG           

 

     Methocarbam Methocarbam           No             1{table TID  Methocarba   

        



     ol 750 MG ol 750 MG                          t}        mol 750 MG          

 

 

     Temazepam Temazepam           No             1{capsu QD   Temazepam        

   



     15 MG 15 MG                          le_at_b      15 MG           



                                        edtime_                     



                                        as_need                     



                                        ed}                      

 

     metFORMIN metFORMIN           No             1{table BID  metFORMIN        

   



     HCl 1000 MG HCl 1000 MG                          t_with_      HCl 1000     

      



                                        a_meal}      MG             

 

     Levothyroxi Levothyroxi           No                       Levothyrox      

     



     ne Sodium ne Sodium                                    ine Sodium          

 



     150  MCG                                    150 MCG           

 

     Glimepiride Glimepiride           No                       Glimepirid      

     



     2 MG 2 MG                                    e 2 MG           

 

     Iron 65 MG Iron 65 MG           No             1{table QD                  



                                        t}                       

 

     Multivitami Multivitami           No                                      



     n    n                                                      

 

     Turmeric Turmeric           No                                      

 

     Cholestyram Cholestyram           No             1{packe BID               

  



     ine 4 GM ine 4 GM                          t_mixed                     



                                        _with_w                     



                                        ater_or                     



                                        _non-ca                     



                                        rbonate                     



                                        d_drink                     



                                        }                        

 

     Rosuvastati Rosuvastati           No                                      



     n Calcium 5 n Calcium 5                                                   



     MG   MG                                                     

 

     Hydroxychlo Hydroxychlo           No             1{table BID               

  



     roquine roquine                          t}                       



     Sulfate 200 Sulfate 200                                                   



     MG   MG                                                     

 

     Restoril 15 Restoril 15           No             1{capsu QD                

  



     MG   MG                            le_at_b                     



                                        edtime_                     



                                        as_need                     



                                        ed}                      

 

     sulfaSALAzi sulfaSALAzi           No             2{table BID               

  



     ne 500 MG ne 500 MG                          t}                       

 

     Folic Acid Folic Acid           No             2{table QD                  



     1 MG 1 MG                          t}                       

 

     DULoxetine DULoxetine           No             1{capsu QD                  



     HCl 30 MG HCl 30 MG                          le}                      

 

     Diclofenac Diclofenac           No             1{appli BID                 



     Sodium 3 % Sodium 3 %                          cation}                     

 

     Gabapentin Gabapentin           No             1{capsu TID                 



     600  MG                          le_befo                     



                                        re_bedt                     



                                        ivette}                     

 

     Simponi Simponi           No                                      



     Aria 50 Aria 50                                                   



     MG/4ML MG/4ML                                                   

 

     azaTHIOprin azaTHIOprin           No             1{table QD                

  



     e 50 MG e 50 MG                          t}                       

 

     Methocarbam Methocarbam           No             1{table TID               

  



     ol 750 MG ol 750 MG                          t}                       

 

     Temazepam Temazepam           No             1{capsu QD                  



     15 MG 15 MG                          le_at_b                     



                                        edtime_                     



                                        as_need                     



                                        ed}                      

 

     metFORMIN metFORMIN           No             1{table BID                 



     HCl 1000 MG HCl 1000 MG                          t_with_                   

  



                                        a_meal}                     

 

     Levothyroxi Levothyroxi           No                                      



     ne Sodium ne Sodium                                                   



     150  MCG                                                   

 

     Glimepiride Glimepiride           No                                      



     2 MG 2 MG                                                   

 

     sulfaSALAzi sulfaSALAzi           No             2{table BID  sulfaSALAz   

        



     ne 500 MG ne 500 MG                          t}        ine 500 MG          

 

 

     Hydroxychlo Hydroxychlo           No             1{table BID  Hydroxychl   

        



     roquine roquine                          t}        oroquine           



     Sulfate 200 Sulfate 200                                    Sulfate         

  



     MG   MG                                      200 MG           

 

     Gabapentin Gabapentin           No             1{capsu TID  Gabapentin     

      



     600  MG                          le_befo      600 MG           



                                        re_bedt                     



                                        ivette}                     

 

     Multivitami Multivitami           No                       Multivitam      

     



     n    n                                       in             

 

     Iron 65 MG Iron 65 MG           No             1{table QD   Iron 65 MG     

      



                                        t}                       

 

     Diclofenac Diclofenac           No             1{appli BID  Diclofenac     

      



     Sodium 3 % Sodium 3 %                          cation}      Sodium 3 %     

      

 

     Methocarbam Methocarbam           No             1{table TID  Methocarba   

        



     ol 750 MG ol 750 MG                          t}        mol 750 MG          

 

 

     Restoril 15 Restoril 15           No             1{capsu QD   Restoril     

      



     MG   MG                            le_at_b      15 MG           



                                        edtime_                     



                                        as_need                     



                                        ed}                      

 

     metFORMIN metFORMIN           No             1{table BID  metFORMIN        

   



     HCl 1000 MG HCl 1000 MG                          t_with_      HCl 1000     

      



                                        a_meal}      MG             

 

     Temazepam Temazepam           No             1{capsu QD   Temazepam        

   



     15 MG 15 MG                          le_at_b      15 MG           



                                        edtime_                     



                                        as_need                     



                                        ed}                      

 

     Simponi Simponi           No                       Simponi           



     Aria 50 Aria 50                                    Aria 50           



     MG/4ML MG/4ML                                    MG/4ML           

 

     Turmeric Turmeric           No                       Turmeric           

 

     Cholestyram Cholestyram           No                       Cholestyra      

     



     ine 4 GM ine 4 GM                                    mine 4 GM           

 

     Folic Acid Folic Acid           No             2{table QD   Folic Acid     

      



     1 MG 1 MG                          t}        1 MG           

 

     Levothyroxi Levothyroxi           No                       Levothyrox      

     



     ne Sodium ne Sodium                                    ine Sodium          

 



     150  MCG                                    150 MCG           

 

     Rosuvastati Rosuvastati           No                       Rosuvastat      

     



     n Calcium 5 n Calcium 5                                    in Calcium      

     



     MG   MG                                      5 MG           

 

     Glimepiride Glimepiride           No                       Glimepirid      

     



     2 MG 2 MG                                    e 2 MG           

 

     DULoxetine DULoxetine           No             1{capsu QD   DULoxetine     

      



     HCl 30 MG HCl 30 MG                          le}       HCl 30 MG           

 

     azaTHIOprin azaTHIOprin           No             1{table QD   azaTHIOpri   

        



     e 50 MG e 50 MG                          t}        ne 50 MG           

 

     Simponi Simponi           No                       Simponi           



     Aria 50 Aria 50                                    Aria 50           



     MG/4ML MG/4ML                                    MG/4ML           

 

     Cholestyram Cholestyram           No                       Cholestyra      

     



     ine 4 GM ine 4 GM                                    mine 4 GM           

 

     Glimepiride Glimepiride           No             1{table      Glimepirid   

        



     1 MG 1 MG                          t}        e 1 MG           

 

     sulfaSALAzi sulfaSALAzi           No             2{table BID  sulfaSALAz   

        



     ne 500 MG ne 500 MG                          t}        ine 500 MG          

 

 

     Hydroxychlo Hydroxychlo           No             1{table BID  Hydroxychl   

        



     roquine roquine                          t}        oroquine           



     Sulfate 200 Sulfate 200                                    Sulfate         

  



     MG   MG                                      200 MG           

 

     Diclofenac Diclofenac           No             1{appli BID  Diclofenac     

      



     Sodium 3 % Sodium 3 %                          cation}      Sodium 3 %     

      

 

     metFORMIN metFORMIN           No             1{table BID  metFORMIN        

   



     HCl 1000 MG HCl 1000 MG                          t_with_      HCl 1000     

      



                                        a_meal}      MG             

 

     Methocarbam Methocarbam           No             1{table TID  Methocarba   

        



     ol 750 MG ol 750 MG                          t}        mol 750 MG          

 

 

     Synthroid Synthroid           No                  QD   Synthroid           



     150  MCG                                    150 MCG           

 

     Glimepiride Glimepiride           No                       Glimepirid      

     



     2 MG 2 MG                                    e 2 MG           

 

     Iron 65 MG Iron 65 MG           No             1{table QD   Iron 65 MG     

      



                                        t}                       

 

     Multivitami Multivitami           No                       Multivitam      

     



     n    n                                       in             

 

     Temazepam Temazepam           No             1{capsu QD   Temazepam        

   



     15 MG 15 MG                          le_at_b      15 MG           



                                        edtime_                     



                                        as_need                     



                                        ed}                      

 

     Rosuvastati Rosuvastati           No                       Rosuvastat      

     



     n Calcium 5 n Calcium 5                                    in Calcium      

     



     MG   MG                                      5 MG           

 

     azaTHIOprin azaTHIOprin           No             1{table QD   azaTHIOpri   

        



     e 50 MG e 50 MG                          t}        ne 50 MG           

 

     Turmeric Turmeric           No                       Turmeric           

 

     Gabapentin Gabapentin           No             1{capsu TID  Gabapentin     

      



     600  MG                          le_befo      600 MG           



                                        re_bedt                     



                                        ivette}                     

 

     Rosuvastati Rosuvastati           No             1{table QD   Rosuvastat   

        



     n Calcium 5 n Calcium 5                          t}        in Calcium      

     



     MG   MG                                      5 MG           

 

     Folic Acid Folic Acid           No             2{table QD   Folic Acid     

      



     1 MG 1 MG                          t}        1 MG           

 

     DULoxetine DULoxetine           No             1{capsu QD   DULoxetine     

      



     HCl 30 MG HCl 30 MG                          le}       HCl 30 MG           

 

     Levothyroxi Levothyroxi           No                       Levothyrox      

     



     ne Sodium ne Sodium                                    ine Sodium          

 



     150  MCG                                    150 MCG           

 

     Simponi Simponi           No                       Simponi           



     Aria 50 Aria 50                                    Aria 50           



     MG/4ML MG/4ML                                    MG/4ML           

 

     Cholestyram Cholestyram           No                       Cholestyra      

     



     ine 4 GM ine 4 GM                                    mine 4 GM           

 

     Glimepiride Glimepiride           No             1{table      Glimepirid   

        



     1 MG 1 MG                          t}        e 1 MG           

 

     sulfaSALAzi sulfaSALAzi           No             2{table BID  sulfaSALAz   

        



     ne 500 MG ne 500 MG                          t}        ine 500 MG          

 

 

     Synthroid Synthroid           No                  QD   Synthroid           



     150  MCG                                    150 MCG           

 

     Diclofenac Diclofenac           No             1{appli BID  Diclofenac     

      



     Sodium 3 % Sodium 3 %                          cation}      Sodium 3 %     

      

 

     Hydroxychlo Hydroxychlo           No             1{table BID  Hydroxychl   

        



     roquine roquine                          t}        oroquine           



     Sulfate 200 Sulfate 200                                    Sulfate         

  



     MG   MG                                      200 MG           

 

     Methocarbam Methocarbam           No             1{table TID  Methocarba   

        



     ol 750 MG ol 750 MG                          t}        mol 750 MG          

 

 

     metFORMIN metFORMIN           No                       metFORMIN           



     HCl 1000 MG HCl 1000 MG                                    HCl 1000        

   



                                                  MG             

 

     Glimepiride Glimepiride           No                       Glimepirid      

     



     2 MG 2 MG                                    e 2 MG           

 

     Iron 65 MG Iron 65 MG           No             1{table QD   Iron 65 MG     

      



                                        t}                       

 

     Multivitami Multivitami           No                       Multivitam      

     



     n    n                                       in             

 

     Temazepam Temazepam           No             1{capsu QD   Temazepam        

   



     15 MG 15 MG                          le_at_b      15 MG           



                                        edtime_                     



                                        as_need                     



                                        ed}                      

 

     Rosuvastati Rosuvastati           No                       Rosuvastat      

     



     n Calcium 5 n Calcium 5                                    in Calcium      

     



     MG   MG                                      5 MG           

 

     azaTHIOprin azaTHIOprin           No             1{table QD   azaTHIOpri   

        



     e 50 MG e 50 MG                          t}        ne 50 MG           

 

     Turmeric Turmeric           No                       Turmeric           

 

     Gabapentin Gabapentin           No             1{capsu TID  Gabapentin     

      



     600  MG                          le_befo      600 MG           



                                        re_bedt                     



                                        ivette}                     

 

     Rosuvastati Rosuvastati           No             1{table QD   Rosuvastat   

        



     n Calcium 5 n Calcium 5                          t}        in Calcium      

     



     MG   MG                                      5 MG           

 

     Folic Acid Folic Acid           No             2{table QD   Folic Acid     

      



     1 MG 1 MG                          t}        1 MG           

 

     DULoxetine DULoxetine           No             1{capsu QD   DULoxetine     

      



     HCl 30 MG HCl 30 MG                          le}       HCl 30 MG           

 

     Levothyroxi Levothyroxi           No                       Levothyrox      

     



     ne Sodium ne Sodium                                    ine Sodium          

 



     150  MCG                                    150 MCG           

 

     metFORMIN metFORMIN           No             1{table QD   metFORMIN        

   



     HCl 1000 MG HCl 1000 MG                          t_with_      HCl 1000     

      



                                        a_meal}      MG             

 

     Rosuvastati Rosuvastati           No                       Rosuvastat      

     



     n Calcium 5 n Calcium 5                                    in Calcium      

     



     MG   MG                                      5 MG           

 

     Methocarbam Methocarbam           No             1{table TID  Methocarba   

        



     ol 750 MG ol 750 MG                          t}        mol 750 MG          

 

 

     Diclofenac Diclofenac           No             1{appli BID  Diclofenac     

      



     Sodium 3 % Sodium 3 %                          cation}      Sodium 3 %     

      

 

     azaTHIOprin azaTHIOprin           No             1{table QD   azaTHIOpri   

        



     e 50 MG e 50 MG                          t}        ne 50 MG           

 

     Gabapentin Gabapentin           No             1{capsu TID  Gabapentin     

      



     600  MG                          le_befo      600 MG           



                                        re_bedt                     



                                        ivette}                     

 

     Restoril 15 Restoril 15           No             1{capsu QD   Restoril     

      



     MG   MG                            le_at_b      15 MG           



                                        edtime_                     



                                        as_need                     



                                        ed}                      

 

     Synthroid Synthroid           No                  QD   Synthroid           



     150  MCG                                    150 MCG           

 

     Turmeric Turmeric           No                       Turmeric           

 

     Folic Acid Folic Acid           No             2{table QD   Folic Acid     

      



     1 MG 1 MG                          t}        1 MG           

 

     Glimepiride Glimepiride           No                       Glimepirid      

     



     2 MG 2 MG                                    e 2 MG           

 

     Temazepam Temazepam           No             1{capsu QD   Temazepam        

   



     15 MG 15 MG                          le_at_b      15 MG           



                                        edtime_                     



                                        as_need                     



                                        ed}                      

 

     Cholestyram Cholestyram           No                       Cholestyra      

     



     ine 4 GM ine 4 GM                                    mine 4 GM           

 

     Simponi Simponi           No                       Simponi           



     Aria 50 Aria 50                                    Aria 50           



     MG/4ML MG/4ML                                    MG/4ML           

 

     Levothyroxi Levothyroxi           No                       Levothyrox      

     



     ne Sodium ne Sodium                                    ine Sodium          

 



     150  MCG                                    150 MCG           

 

     metFORMIN metFORMIN           No                       metFORMIN           



     HCl 1000 MG HCl 1000 MG                                    HCl 1000        

   



                                                  MG             

 

     Multivitami Multivitami           No                       Multivitam      

     



     n    n                                       in             

 

     sulfaSALAzi sulfaSALAzi           No             2{table BID  sulfaSALAz   

        



     ne 500 MG ne 500 MG                          t}        ine 500 MG          

 

 

     Rosuvastati Rosuvastati           No             1{table QD   Rosuvastat   

        



     n Calcium 5 n Calcium 5                          t}        in Calcium      

     



     MG   MG                                      5 MG           

 

     Iron 65 MG Iron 65 MG           No             1{table QD   Iron 65 MG     

      



                                        t}                       

 

     DULoxetine DULoxetine           No             1{capsu QD   DULoxetine     

      



     HCl 30 MG HCl 30 MG                          le}       HCl 30 MG           

 

     Hydroxychlo Hydroxychlo           No             1{table BID  Hydroxychl   

        



     roquine roquine                          t}        oroquine           



     Sulfate 200 Sulfate 200                                    Sulfate         

  



     MG   MG                                      200 MG           

 

     Gabapentin Gabapentin           No             1{capsu TID  Gabapentin     

      



     600  MG                          le_befo      600 MG           



                                        re_bedt                     



                                        ivette}                     

 

     metFORMIN metFORMIN           No             1{table QD   metFORMIN        

   



     HCl 1000 MG HCl 1000 MG                          t_with_      HCl 1000     

      



                                        a_meal}      MG             

 

     Rosuvastati Rosuvastati           No                       Rosuvastat      

     



     n Calcium 5 n Calcium 5                                    in Calcium      

     



     MG   MG                                      5 MG           

 

     Synthroid Synthroid           No                  QD   Synthroid           



     150  MCG                                    150 MCG           

 

     metFORMIN metFORMIN           No                       metFORMIN           



     HCl 1000 MG HCl 1000 MG                                    HCl 1000        

   



                                                  MG             

 

     Diclofenac Diclofenac           No             1{appli BID  Diclofenac     

      



     Sodium 3 % Sodium 3 %                          cation}      Sodium 3 %     

      

 

     Turmeric Turmeric           No                       Turmeric           

 

     sulfaSALAzi sulfaSALAzi           No             2{table BID  sulfaSALAz   

        



     ne 500 MG ne 500 MG                          t}        ine 500 MG          

 

 

     Cholestyram Cholestyram           No                       Cholestyra      

     



     ine 4 GM ine 4 GM                                    mine 4 GM           

 

     Glimepiride Glimepiride           No                       Glimepirid      

     



     2 MG 2 MG                                    e 2 MG           

 

     Iron 65 MG Iron 65 MG           No             1{table QD   Iron 65 MG     

      



                                        t}                       

 

     Multivitami Multivitami           No                       Multivitam      

     



     n    n                                       in             

 

     azaTHIOprin azaTHIOprin           No             1{table QD   azaTHIOpri   

        



     e 50 MG e 50 MG                          t}        ne 50 MG           

 

     Folic Acid Folic Acid           No             2{table QD   Folic Acid     

      



     1 MG 1 MG                          t}        1 MG           

 

     Hydroxychlo Hydroxychlo           No             1{table BID  Hydroxychl   

        



     roquine roquine                          t}        oroquine           



     Sulfate 200 Sulfate 200                                    Sulfate         

  



     MG   MG                                      200 MG           

 

     Rosuvastati Rosuvastati           No             1{table QD   Rosuvastat   

        



     n Calcium 5 n Calcium 5                          t}        in Calcium      

     



     MG   MG                                      5 MG           

 

     DULoxetine DULoxetine           No             1{capsu QD   DULoxetine     

      



     HCl 30 MG HCl 30 MG                          le}       HCl 30 MG           

 

     Levothyroxi Levothyroxi           No                       Levothyrox      

     



     ne Sodium ne Sodium                                    ine Sodium          

 



     150  MCG                                    150 MCG           

 

     Methocarbam Methocarbam           No             1{table TID  Methocarba   

        



     ol 750 MG ol 750 MG                          t}        mol 750 MG          

 

 

     Simponi Simponi           No                       Simponi           



     Aria 50 Aria 50                                    Aria 50           



     MG/4ML MG/4ML                                    MG/4ML           

 

     metFORMIN metFORMIN           No             1{table QD   metFORMIN        

   



     HCl 1000 MG HCl 1000 MG                          t_with_      HCl 1000     

      



                                        a_meal}      MG             

 

     Rosuvastati Rosuvastati           No                       Rosuvastat      

     



     n Calcium 5 n Calcium 5                                    in Calcium      

     



     MG   MG                                      5 MG           

 

     Diclofenac Diclofenac           No             1{appli BID  Diclofenac     

      



     Sodium 3 % Sodium 3 %                          cation}      Sodium 3 %     

      

 

     DULoxetine DULoxetine           No             1{capsu QD   DULoxetine     

      



     HCl 30 MG HCl 30 MG                          le}       HCl 30 MG           

 

     Gabapentin Gabapentin           No             1{capsu TID  Gabapentin     

      



     600  MG                          le_befo      600 MG           



                                        re_bedt                     



                                        ivette}                     

 

     Glimepiride Glimepiride           No                       Glimepirid      

     



     2 MG 2 MG                                    e 2 MG           

 

     Synthroid Synthroid           No                  QD   Synthroid           



     150  MCG                                    150 MCG           

 

     Iron 65 MG Iron 65 MG           No             1{table QD   Iron 65 MG     

      



                                        t}                       

 

     Cholestyram Cholestyram           No                       Cholestyra      

     



     ine 4 GM ine 4 GM                                    mine 4 GM           

 

     Levothyroxi Levothyroxi           No                       Levothyrox      

     



     ne Sodium ne Sodium                                    ine Sodium          

 



     150  MCG                                    150 MCG           

 

     azaTHIOprin azaTHIOprin           No             1{table QD   azaTHIOpri   

        



     e 50 MG e 50 MG                          t}        ne 50 MG           

 

     metFORMIN metFORMIN           No                       metFORMIN           



     HCl 1000 MG HCl 1000 MG                                    HCl 1000        

   



                                                  MG             

 

     Turmeric Turmeric           No                       Turmeric           

 

     Rosuvastati Rosuvastati           No             1{table QD   Rosuvastat   

        



     n Calcium 5 n Calcium 5                          t}        in Calcium      

     



     MG   MG                                      5 MG           

 

     Simponi Simponi           No                       Simponi           



     Aria 50 Aria 50                                    Aria 50           



     MG/4ML MG/4ML                                    MG/4ML           

 

     Multivitami Multivitami           No                       Multivitam      

     



     n    n                                       in             

 

     sulfaSALAzi sulfaSALAzi           No             2{table BID  sulfaSALAz   

        



     ne 500 MG ne 500 MG                          t}        ine 500 MG          

 

 

     Methocarbam Methocarbam           No             1{table TID  Methocarba   

        



     ol 750 MG ol 750 MG                          t}        mol 750 MG          

 

 

     Folic Acid Folic Acid           No             2{table QD   Folic Acid     

      



     1 MG 1 MG                          t}        1 MG           

 

     Hydroxychlo Hydroxychlo           No             1{table BID  Hydroxychl   

        



     roquine roquine                          t}        oroquine           



     Sulfate 200 Sulfate 200                                    Sulfate         

  



     MG   MG                                      200 MG           

 

     DULoxetine DULoxetine           No             1{capsu QD   DULoxetine     

      



     HCl 30 MG HCl 30 MG                          le}       HCl 30 MG           

 

     OneTouch OneTouch           No                  QD   OneTouch           



     Verio - Verio -                                    Verio -           

 

     Hydroxychlo Hydroxychlo           No             1{table BID  Hydroxychl   

        



     roquine roquine                          t}        oroquine           



     Sulfate 200 Sulfate 200                                    Sulfate         

  



     MG   MG                                      200 MG           

 

     Diclofenac Diclofenac           No             1{appli BID  Diclofenac     

      



     Sodium 3 % Sodium 3 %                          cation}      Sodium 3 %     

      

 

     Turmeric Turmeric           No                       Turmeric           

 

     Simponi Simponi           No                       Simponi           



     Aria 50 Aria 50                                    Aria 50           



     MG/4ML MG/4ML                                    MG/4ML           

 

     Synthroid Synthroid           No                  QD   Synthroid           



     175  MCG                                    175 MCG           

 

     Glimepiride Glimepiride           No                       Glimepirid      

     



     2 MG 2 MG                                    e 2 MG           

 

     azaTHIOprin azaTHIOprin           No             1.5{tab QD   azaTHIOpri   

        



     e 50 MG e 50 MG                          let}      ne 50 MG           

 

     Iron 65 MG Iron 65 MG           No             1{table QD   Iron 65 MG     

      



                                        t}                       

 

     metFORMIN metFORMIN           No             1{table BID  metFORMIN        

   



     HCl 1000 MG HCl 1000 MG                          t_with_      HCl 1000     

      



                                        a_meal}      MG             

 

     Restoril 15 Restoril 15           No             1{capsu QD   Restoril     

      



     MG   MG                            le_at_b      15 MG           



                                        edtime_                     



                                        as_need                     



                                        ed}                      

 

     Folic Acid Folic Acid           No             2{table QD   Folic Acid     

      



     1 MG 1 MG                          t}        1 MG           

 

     Cholestyram Cholestyram           No                       Cholestyra      

     



     ine 4 GM ine 4 GM                                    mine 4 GM           

 

     Gabapentin Gabapentin           No             1{capsu TID  Gabapentin     

      



     600  MG                          le_befo      600 MG           



                                        re_bedt                     



                                        ivette}                     

 

     Rosuvastati Rosuvastati           No                       Rosuvastat      

     



     n Calcium 5 n Calcium 5                                    in Calcium      

     



     MG   MG                                      5 MG           

 

     Methocarbam Methocarbam           No             1{table TID  Methocarba   

        



     ol 750 MG ol 750 MG                          t}        mol 750 MG          

 

 

     Levothyroxi Levothyroxi           No                       Levothyrox      

     



     ne Sodium ne Sodium                                    ine Sodium          

 



     150  MCG                                    150 MCG           

 

     Multivitami Multivitami           No                       Multivitam      

     



     n    n                                       in             

 

     Rosuvastati Rosuvastati           No             1{table QD   Rosuvastat   

        



     n Calcium 5 n Calcium 5                          t}        in Calcium      

     



     MG   MG                                      5 MG           

 

     sulfaSALAzi sulfaSALAzi           No             2{table BID  sulfaSALAz   

        



     ne 500 MG ne 500 MG                          t}        ine 500 MG          

 

 

     DULoxetine DULoxetine           No             1{capsu QD   DULoxetine     

      



     HCl 30 MG HCl 30 MG                          le}       HCl 30 MG           

 

     OneTouch OneTouch           No                  QD   OneTouch           



     Verio - Verio -                                    Verio -           

 

     Hydroxychlo Hydroxychlo           No             1{table BID  Hydroxychl   

        



     roquine roquine                          t}        oroquine           



     Sulfate 200 Sulfate 200                                    Sulfate         

  



     MG   MG                                      200 MG           

 

     Diclofenac Diclofenac           No             1{appli BID  Diclofenac     

      



     Sodium 3 % Sodium 3 %                          cation}      Sodium 3 %     

      

 

     Turmeric Turmeric           No                       Turmeric           

 

     Simponi Simponi           No                       Simponi           



     Aria 50 Aria 50                                    Aria 50           



     MG/4ML MG/4ML                                    MG/4ML           

 

     Synthroid Synthroid           No                  QD   Synthroid           



     175  MCG                                    175 MCG           

 

     Glimepiride Glimepiride           No                       Glimepirid      

     



     2 MG 2 MG                                    e 2 MG           

 

     azaTHIOprin azaTHIOprin           No             1.5{tab QD   azaTHIOpri   

        



     e 50 MG e 50 MG                          let}      ne 50 MG           

 

     Iron 65 MG Iron 65 MG           No             1{table QD   Iron 65 MG     

      



                                        t}                       

 

     metFORMIN metFORMIN           No             1{table BID  metFORMIN        

   



     HCl 1000 MG HCl 1000 MG                          t_with_      HCl 1000     

      



                                        a_meal}      MG             

 

     Restoril 15 Restoril 15           No             1{capsu QD   Restoril     

      



     MG   MG                            le_at_b      15 MG           



                                        edtime_                     



                                        as_need                     



                                        ed}                      

 

     Folic Acid Folic Acid           No             2{table QD   Folic Acid     

      



     1 MG 1 MG                          t}        1 MG           

 

     Cholestyram Cholestyram           No                       Cholestyra      

     



     ine 4 GM ine 4 GM                                    mine 4 GM           

 

     Gabapentin Gabapentin           No             1{capsu TID  Gabapentin     

      



     600  MG                          le_befo      600 MG           



                                        re_bedt                     



                                        ivette}                     

 

     Rosuvastati Rosuvastati           No                       Rosuvastat      

     



     n Calcium 5 n Calcium 5                                    in Calcium      

     



     MG   MG                                      5 MG           

 

     Methocarbam Methocarbam           No             1{table TID  Methocarba   

        



     ol 750 MG ol 750 MG                          t}        mol 750 MG          

 

 

     Levothyroxi Levothyroxi           No                       Levothyrox      

     



     ne Sodium ne Sodium                                    ine Sodium          

 



     150  MCG                                    150 MCG           

 

     Multivitami Multivitami           No                       Multivitam      

     



     n    n                                       in             

 

     Rosuvastati Rosuvastati           No             1{table QD   Rosuvastat   

        



     n Calcium 5 n Calcium 5                          t}        in Calcium      

     



     MG   MG                                      5 MG           

 

     sulfaSALAzi sulfaSALAzi           No             2{table BID  sulfaSALAz   

        



     ne 500 MG ne 500 MG                          t}        ine 500 MG          

 

 

     DULoxetine DULoxetine           No             1{capsu QD   DULoxetine     

      



     HCl 30 MG HCl 30 MG                          le}       HCl 30 MG           

 

     OneTouch OneTouch           No                  QD   OneTouch           



     Verio - Verio -                                    Verio -           

 

     Hydroxychlo Hydroxychlo           No             1{table BID  Hydroxychl   

        



     roquine roquine                          t}        oroquine           



     Sulfate 200 Sulfate 200                                    Sulfate         

  



     MG   MG                                      200 MG           

 

     Diclofenac Diclofenac           No             1{appli BID  Diclofenac     

      



     Sodium 3 % Sodium 3 %                          cation}      Sodium 3 %     

      

 

     Turmeric Turmeric           No                       Turmeric           

 

     Simponi Simponi           No                       Simponi           



     Aria 50 Aria 50                                    Aria 50           



     MG/4ML MG/4ML                                    MG/4ML           

 

     Synthroid Synthroid           No                  QD   Synthroid           



     175  MCG                                    175 MCG           

 

     Glimepiride Glimepiride           No                       Glimepirid      

     



     2 MG 2 MG                                    e 2 MG           

 

     azaTHIOprin azaTHIOprin           No             1.5{tab QD   azaTHIOpri   

        



     e 50 MG e 50 MG                          let}      ne 50 MG           

 

     Iron 65 MG Iron 65 MG           No             1{table QD   Iron 65 MG     

      



                                        t}                       

 

     metFORMIN metFORMIN           No             1{table BID  metFORMIN        

   



     HCl 1000 MG HCl 1000 MG                          t_with_      HCl 1000     

      



                                        a_meal}      MG             

 

     Restoril 15 Restoril 15           No             1{capsu QD   Restoril     

      



     MG   MG                            le_at_b      15 MG           



                                        edtime_                     



                                        as_need                     



                                        ed}                      

 

     Folic Acid Folic Acid           No             2{table QD   Folic Acid     

      



     1 MG 1 MG                          t}        1 MG           

 

     Cholestyram Cholestyram           No                       Cholestyra      

     



     ine 4 GM ine 4 GM                                    mine 4 GM           

 

     Gabapentin Gabapentin           No             1{capsu TID  Gabapentin     

      



     600  MG                          le_befo      600 MG           



                                        re_bedt                     



                                        ivette}                     

 

     Rosuvastati Rosuvastati           No                       Rosuvastat      

     



     n Calcium 5 n Calcium 5                                    in Calcium      

     



     MG   MG                                      5 MG           

 

     Methocarbam Methocarbam           No             1{table TID  Methocarba   

        



     ol 750 MG ol 750 MG                          t}        mol 750 MG          

 

 

     Levothyroxi Levothyroxi           No                       Levothyrox      

     



     ne Sodium ne Sodium                                    ine Sodium          

 



     150  MCG                                    150 MCG           

 

     Multivitami Multivitami           No                       Multivitam      

     



     n    n                                       in             

 

     Rosuvastati Rosuvastati           No             1{table QD   Rosuvastat   

        



     n Calcium 5 n Calcium 5                          t}        in Calcium      

     



     MG   MG                                      5 MG           

 

     sulfaSALAzi sulfaSALAzi           No             2{table BID  sulfaSALAz   

        



     ne 500 MG ne 500 MG                          t}        ine 500 MG          

 

 

     Levothyroxi Levothyroxi           No                       Levothyrox      

     



     ne Sodium ne Sodium                                    ine Sodium          

 



     150  MCG                                    150 MCG           

 

     OneTouch OneTouch           No                  QD   OneTouch           



     Verio - Verio -                                    Verio -           

 

     Glimepiride Glimepiride           No                       Glimepirid      

     



     2 MG 2 MG                                    e 2 MG           

 

     Cholestyram Cholestyram           No                       Cholestyra      

     



     ine 4 GM ine 4 GM                                    mine 4 GM           

 

     Multivitami Multivitami           No                       Multivitam      

     



     n    n                                       in             

 

     Rosuvastati Rosuvastati           No             1{table QD   Rosuvastat   

        



     n Calcium 5 n Calcium 5                          t}        in Calcium      

     



     MG   MG                                      5 MG           

 

     DULoxetine DULoxetine           No             1{capsu QD   DULoxetine     

      



     HCl 30 MG HCl 30 MG                          le}       HCl 30 MG           

 

     Diclofenac Diclofenac           No             1{appli BID  Diclofenac     

      



     Sodium 3 % Sodium 3 %                          cation}      Sodium 3 %     

      

 

     Gabapentin Gabapentin           No             1{capsu TID  Gabapentin     

      



     600  MG                          le_befo      600 MG           



                                        re_bedt                     



                                        ivette}                     

 

     Iron 65 MG Iron 65 MG           No             1{table QD   Iron 65 MG     

      



                                        t}                       

 

     Synthroid Synthroid           No                  QD   Synthroid           



     175  MCG                                    175 MCG           

 

     Hydroxychlo Hydroxychlo           No             1{table BID  Hydroxychl   

        



     roquine roquine                          t}        oroquine           



     Sulfate 200 Sulfate 200                                    Sulfate         

  



     MG   MG                                      200 MG           

 

     Folic Acid Folic Acid           No             2{table QD   Folic Acid     

      



     1 MG 1 MG                          t}        1 MG           

 

     Simponi Simponi           No                       Simponi           



     Aria 50 Aria 50                                    Aria 50           



     MG/4ML MG/4ML                                    MG/4ML           

 

     azaTHIOprin azaTHIOprin           No             1.5{tab QD   azaTHIOpri   

        



     e 50 MG e 50 MG                          let}      ne 50 MG           

 

     Turmeric Turmeric           No                       Turmeric           

 

     metFORMIN metFORMIN           No             1{table BID  metFORMIN        

   



     HCl 1000 MG HCl 1000 MG                          t_with_      HCl 1000     

      



                                        a_meal}      MG             

 

     Restoril 15 Restoril 15           No             1{capsu QD   Restoril     

      



     MG   MG                            le_at_b      15 MG           



                                        edtime_                     



                                        as_need                     



                                        ed}                      

 

     sulfaSALAzi sulfaSALAzi           No             2{table BID  sulfaSALAz   

        



     ne 500 MG ne 500 MG                          t}        ine 500 MG          

 

 

     Methocarbam Methocarbam           No             1{table TID  Methocarba   

        



     ol 750 MG ol 750 MG                          t}        mol 750 MG          

 

 

     Rosuvastati Rosuvastati           No                       Rosuvastat      

     



     n Calcium 5 n Calcium 5                                    in Calcium      

     



     MG   MG                                      5 MG           

 

     Levothyroxi Levothyroxi           No                       Levothyrox      

     



     ne Sodium ne Sodium                                    ine Sodium          

 



     150  MCG                                    150 MCG           

 

     OneTouch OneTouch           No                  QD   OneTouch           



     Verio - Verio -                                    Verio -           

 

     Glimepiride Glimepiride           No                       Glimepirid      

     



     2 MG 2 MG                                    e 2 MG           

 

     Cholestyram Cholestyram           No                       Cholestyra      

     



     ine 4 GM ine 4 GM                                    mine 4 GM           

 

     Multivitami Multivitami           No                       Multivitam      

     



     n    n                                       in             

 

     Rosuvastati Rosuvastati           No             1{table QD   Rosuvastat   

        



     n Calcium 5 n Calcium 5                          t}        in Calcium      

     



     MG   MG                                      5 MG           

 

     DULoxetine DULoxetine           No             1{capsu QD   DULoxetine     

      



     HCl 30 MG HCl 30 MG                          le}       HCl 30 MG           

 

     Diclofenac Diclofenac           No             1{appli BID  Diclofenac     

      



     Sodium 3 % Sodium 3 %                          cation}      Sodium 3 %     

      

 

     Gabapentin Gabapentin           No             1{capsu TID  Gabapentin     

      



     600  MG                          le_befo      600 MG           



                                        re_bedt                     



                                        ivette}                     

 

     Iron 65 MG Iron 65 MG           No             1{table QD   Iron 65 MG     

      



                                        t}                       

 

     Synthroid Synthroid           No                  QD   Synthroid           



     175  MCG                                    175 MCG           

 

     Hydroxychlo Hydroxychlo           No             1{table BID  Hydroxychl   

        



     roquine roquine                          t}        oroquine           



     Sulfate 200 Sulfate 200                                    Sulfate         

  



     MG   MG                                      200 MG           

 

     Folic Acid Folic Acid           No             2{table QD   Folic Acid     

      



     1 MG 1 MG                          t}        1 MG           

 

     Simponi Simponi           No                       Simponi           



     Aria 50 Aria 50                                    Aria 50           



     MG/4ML MG/4ML                                    MG/4ML           

 

     azaTHIOprin azaTHIOprin           No             1.5{tab QD   azaTHIOpri   

        



     e 50 MG e 50 MG                          let}      ne 50 MG           

 

     Turmeric Turmeric           No                       Turmeric           

 

     metFORMIN metFORMIN           No             1{table BID  metFORMIN        

   



     HCl 1000 MG HCl 1000 MG                          t_with_      HCl 1000     

      



                                        a_meal}      MG             

 

     Restoril 15 Restoril 15           No             1{capsu QD   Restoril     

      



     MG   MG                            le_at_b      15 MG           



                                        edtime_                     



                                        as_need                     



                                        ed}                      

 

     sulfaSALAzi sulfaSALAzi           No             2{table BID  sulfaSALAz   

        



     ne 500 MG ne 500 MG                          t}        ine 500 MG          

 

 

     Methocarbam Methocarbam           No             1{table TID  Methocarba   

        



     ol 750 MG ol 750 MG                          t}        mol 750 MG          

 

 

     Rosuvastati Rosuvastati           No                       Rosuvastat      

     



     n Calcium 5 n Calcium 5                                    in Calcium      

     



     MG   MG                                      5 MG           

 

     Levothyroxi Levothyroxi           No                       Levothyrox      

     



     ne Sodium ne Sodium                                    ine Sodium          

 



     150  MCG                                    150 MCG           

 

     OneTouch OneTouch           No                  QD   OneTouch           



     Verio - Verio -                                    Verio -           

 

     Glimepiride Glimepiride           No                       Glimepirid      

     



     2 MG 2 MG                                    e 2 MG           

 

     Cholestyram Cholestyram           No                       Cholestyra      

     



     ine 4 GM ine 4 GM                                    mine 4 GM           

 

     Multivitami Multivitami           No                       Multivitam      

     



     n    n                                       in             

 

     Rosuvastati Rosuvastati           No             1{table QD   Rosuvastat   

        



     n Calcium 5 n Calcium 5                          t}        in Calcium      

     



     MG   MG                                      5 MG           

 

     DULoxetine DULoxetine           No             1{capsu QD   DULoxetine     

      



     HCl 30 MG HCl 30 MG                          le}       HCl 30 MG           

 

     Diclofenac Diclofenac           No             1{appli BID  Diclofenac     

      



     Sodium 3 % Sodium 3 %                          cation}      Sodium 3 %     

      

 

     Gabapentin Gabapentin           No             1{capsu TID  Gabapentin     

      



     600  MG                          le_befo      600 MG           



                                        re_bedt                     



                                        ivette}                     

 

     Iron 65 MG Iron 65 MG           No             1{table QD   Iron 65 MG     

      



                                        t}                       

 

     Synthroid Synthroid           No                  QD   Synthroid           



     175  MCG                                    175 MCG           

 

     Hydroxychlo Hydroxychlo           No             1{table BID  Hydroxychl   

        



     roquine roquine                          t}        oroquine           



     Sulfate 200 Sulfate 200                                    Sulfate         

  



     MG   MG                                      200 MG           

 

     Folic Acid Folic Acid           No             2{table QD   Folic Acid     

      



     1 MG 1 MG                          t}        1 MG           

 

     Simponi Simponi           No                       Simponi           



     Aria 50 Aria 50                                    Aria 50           



     MG/4ML MG/4ML                                    MG/4ML           

 

     azaTHIOprin azaTHIOprin           No             1.5{tab QD   azaTHIOpri   

        



     e 50 MG e 50 MG                          let}      ne 50 MG           

 

     Turmeric Turmeric           No                       Turmeric           

 

     metFORMIN metFORMIN           No             1{table BID  metFORMIN        

   



     HCl 1000 MG HCl 1000 MG                          t_with_      HCl 1000     

      



                                        a_meal}      MG             

 

     Restoril 15 Restoril 15           No             1{capsu QD   Restoril     

      



     MG   MG                            le_at_b      15 MG           



                                        edtime_                     



                                        as_need                     



                                        ed}                      

 

     sulfaSALAzi sulfaSALAzi           No             2{table BID  sulfaSALAz   

        



     ne 500 MG ne 500 MG                          t}        ine 500 MG          

 

 

     Methocarbam Methocarbam           No             1{table TID  Methocarba   

        



     ol 750 MG ol 750 MG                          t}        mol 750 MG          

 

 

     Rosuvastati Rosuvastati           No                       Rosuvastat      

     



     n Calcium 5 n Calcium 5                                    in Calcium      

     



     MG   MG                                      5 MG           

 

     Levothyroxi Levothyroxi           No                       Levothyrox      

     



     ne Sodium ne Sodium                                    ine Sodium          

 



     150  MCG                                    150 MCG           

 

     OneTouch OneTouch           No                  QD   OneTouch           



     Verio - Verio -                                    Verio -           

 

     Glimepiride Glimepiride           No                       Glimepirid      

     



     2 MG 2 MG                                    e 2 MG           

 

     Cholestyram Cholestyram           No                       Cholestyra      

     



     ine 4 GM ine 4 GM                                    mine 4 GM           

 

     Multivitami Multivitami           No                       Multivitam      

     



     n    n                                       in             

 

     Rosuvastati Rosuvastati           No             1{table QD   Rosuvastat   

        



     n Calcium 5 n Calcium 5                          t}        in Calcium      

     



     MG   MG                                      5 MG           

 

     DULoxetine DULoxetine           No             1{capsu QD   DULoxetine     

      



     HCl 30 MG HCl 30 MG                          le}       HCl 30 MG           

 

     Diclofenac Diclofenac           No             1{appli BID  Diclofenac     

      



     Sodium 3 % Sodium 3 %                          cation}      Sodium 3 %     

      

 

     Gabapentin Gabapentin           No             1{capsu TID  Gabapentin     

      



     600  MG                          le_befo      600 MG           



                                        re_bedt                     



                                        ivette}                     

 

     Iron 65 MG Iron 65 MG           No             1{table QD   Iron 65 MG     

      



                                        t}                       

 

     Synthroid Synthroid           No                  QD   Synthroid           



     175  MCG                                    175 MCG           

 

     Hydroxychlo Hydroxychlo           No             1{table BID  Hydroxychl   

        



     roquine roquine                          t}        oroquine           



     Sulfate 200 Sulfate 200                                    Sulfate         

  



     MG   MG                                      200 MG           

 

     Folic Acid Folic Acid           No             2{table QD   Folic Acid     

      



     1 MG 1 MG                          t}        1 MG           

 

     Simponi Simponi           No                       Simponi           



     Aria 50 Aria 50                                    Aria 50           



     MG/4ML MG/4ML                                    MG/4ML           

 

     azaTHIOprin azaTHIOprin           No             1.5{tab QD   azaTHIOpri   

        



     e 50 MG e 50 MG                          let}      ne 50 MG           

 

     Turmeric Turmeric           No                       Turmeric           

 

     metFORMIN metFORMIN           No             1{table BID  metFORMIN        

   



     HCl 1000 MG HCl 1000 MG                          t_with_      HCl 1000     

      



                                        a_meal}      MG             

 

     Restoril 15 Restoril 15           No             1{capsu QD   Restoril     

      



     MG   MG                            le_at_b      15 MG           



                                        edtime_                     



                                        as_need                     



                                        ed}                      

 

     sulfaSALAzi sulfaSALAzi           No             2{table BID  sulfaSALAz   

        



     ne 500 MG ne 500 MG                          t}        ine 500 MG          

 

 

     Methocarbam Methocarbam           No             1{table TID  Methocarba   

        



     ol 750 MG ol 750 MG                          t}        mol 750 MG          

 

 

     Rosuvastati Rosuvastati           No                       Rosuvastat      

     



     n Calcium 5 n Calcium 5                                    in Calcium      

     



     MG   MG                                      5 MG           

 

     Levothyroxi Levothyroxi           No                       Levothyrox      

     



     ne Sodium ne Sodium                                    ine Sodium          

 



     150  MCG                                    150 MCG           

 

     OneTouch OneTouch           No                  QD   OneTouch           



     Verio - Verio -                                    Verio -           

 

     Glimepiride Glimepiride           No                       Glimepirid      

     



     2 MG 2 MG                                    e 2 MG           

 

     Cholestyram Cholestyram           No                       Cholestyra      

     



     ine 4 GM ine 4 GM                                    mine 4 GM           

 

     Multivitami Multivitami           No                       Multivitam      

     



     n    n                                       in             

 

     Rosuvastati Rosuvastati           No             1{table QD   Rosuvastat   

        



     n Calcium 5 n Calcium 5                          t}        in Calcium      

     



     MG   MG                                      5 MG           

 

     DULoxetine DULoxetine           No             1{capsu QD   DULoxetine     

      



     HCl 30 MG HCl 30 MG                          le}       HCl 30 MG           

 

     Diclofenac Diclofenac           No             1{appli BID  Diclofenac     

      



     Sodium 3 % Sodium 3 %                          cation}      Sodium 3 %     

      

 

     Gabapentin Gabapentin           No             1{capsu TID  Gabapentin     

      



     600  MG                          le_befo      600 MG           



                                        re_bedt                     



                                        ivette}                     

 

     Iron 65 MG Iron 65 MG           No             1{table QD   Iron 65 MG     

      



                                        t}                       

 

     Synthroid Synthroid           No                  QD   Synthroid           



     175  MCG                                    175 MCG           

 

     Hydroxychlo Hydroxychlo           No             1{table BID  Hydroxychl   

        



     roquine roquine                          t}        oroquine           



     Sulfate 200 Sulfate 200                                    Sulfate         

  



     MG   MG                                      200 MG           

 

     Folic Acid Folic Acid           No             2{table QD   Folic Acid     

      



     1 MG 1 MG                          t}        1 MG           

 

     Simponi Simponi           No                       Simponi           



     Aria 50 Aria 50                                    Aria 50           



     MG/4ML MG/4ML                                    MG/4ML           

 

     azaTHIOprin azaTHIOprin           No             1.5{tab QD   azaTHIOpri   

        



     e 50 MG e 50 MG                          let}      ne 50 MG           

 

     Turmeric Turmeric           No                       Turmeric           

 

     metFORMIN metFORMIN           No             1{table BID  metFORMIN        

   



     HCl 1000 MG HCl 1000 MG                          t_with_      HCl 1000     

      



                                        a_meal}      MG             

 

     Restoril 15 Restoril 15           No             1{capsu QD   Restoril     

      



     MG   MG                            le_at_b      15 MG           



                                        edtime_                     



                                        as_need                     



                                        ed}                      

 

     sulfaSALAzi sulfaSALAzi           No             2{table BID  sulfaSALAz   

        



     ne 500 MG ne 500 MG                          t}        ine 500 MG          

 

 

     Methocarbam Methocarbam           No             1{table TID  Methocarba   

        



     ol 750 MG ol 750 MG                          t}        mol 750 MG          

 

 

     Rosuvastati Rosuvastati           No                       Rosuvastat      

     



     n Calcium 5 n Calcium 5                                    in Calcium      

     



     MG   MG                                      5 MG           

 

     Glimepiride Glimepiride           No                       Glimepirid      

     



     2 MG 2 MG                                    e 2 MG           

 

     OneTouch OneTouch           No                  QD   OneTouch           



     Verio - Verio -                                    Verio -           

 

     Diclofenac Diclofenac           No             1{appli BID  Diclofenac     

      



     Sodium 3 % Sodium 3 %                          cation}      Sodium 3 %     

      

 

     Turmeric Turmeric           No                       Turmeric           

 

     Multivitami Multivitami           No                       Multivitam      

     



     n    n                                       in             

 

     DULoxetine DULoxetine           No             1{capsu QD   DULoxetine     

      



     HCl 30 MG HCl 30 MG                          le}       HCl 30 MG           

 

     Restoril 15 Restoril 15           No             1{capsu QD   Restoril     

      



     MG   MG                            le_at_b      15 MG           



                                        edtime_                     



                                        as_need                     



                                        ed}                      

 

     Gabapentin Gabapentin           No             1{capsu TID  Gabapentin     

      



     600  MG                          le_befo      600 MG           



                                        re_bedt                     



                                        ivette}                     

 

     Iron 65 MG Iron 65 MG           No             1{table QD   Iron 65 MG     

      



                                        t}                       

 

     Hydroxychlo Hydroxychlo           No             1{table BID  Hydroxychl   

        



     roquine roquine                          t}        oroquine           



     Sulfate 200 Sulfate 200                                    Sulfate         

  



     MG   MG                                      200 MG           

 

     Rosuvastati Rosuvastati           No             1{table QD   Rosuvastat   

        



     n Calcium 5 n Calcium 5                          t}        in Calcium      

     



     MG   MG                                      5 MG           

 

     Folic Acid Folic Acid           No             2{table QD   Folic Acid     

      



     1 MG 1 MG                          t}        1 MG           

 

     Simponi Simponi           No                       Simponi           



     Aria 50 Aria 50                                    Aria 50           



     MG/4ML MG/4ML                                    MG/4ML           

 

     Methocarbam Methocarbam           No             1{table TID  Methocarba   

        



     ol 750 MG ol 750 MG                          t}        mol 750 MG          

 

 

     metFORMIN metFORMIN           No             1{table BID  metFORMIN        

   



     HCl 1000 MG HCl 1000 MG                          t_with_      HCl 1000     

      



                                        a_meal}      MG             

 

     azaTHIOprin azaTHIOprin           No             1.5{tab QD   azaTHIOpri   

        



     e 50 MG e 50 MG                          let}      ne 50 MG           

 

     Cholestyram Cholestyram           No                       Cholestyra      

     



     ine 4 GM ine 4 GM                                    mine 4 GM           

 

     sulfaSALAzi sulfaSALAzi           No             2{table BID  sulfaSALAz   

        



     ne 500 MG ne 500 MG                          t}        ine 500 MG          

 

 

     Levothyroxi Levothyroxi           No                  QD   Levothyrox      

     



     ne Sodium ne Sodium                                    ine Sodium          

 



     175  MCG                                    175 MCG           

 

     Rosuvastati Rosuvastati           No                       Rosuvastat      

     



     n Calcium 5 n Calcium 5                                    in Calcium      

     



     MG   MG                                      5 MG           

 

     Glimepiride Glimepiride           No                       Glimepirid      

     



     2 MG 2 MG                                    e 2 MG           

 

     Restoril 15 Restoril 15           No             1{capsu QD   Restoril     

      



     MG   MG                            le_at_b      15 MG           



                                        edtime_                     



                                        as_need                     



                                        ed}                      

 

     Gabapentin Gabapentin           No             1{capsu TID  Gabapentin     

      



     600  MG                          le_befo      600 MG           



                                        re_bedt                     



                                        ivette}                     

 

     Diclofenac Diclofenac           No             1{appli BID  Diclofenac     

      



     Sodium 3 % Sodium 3 %                          cation}      Sodium 3 %     

      

 

     OneTouch OneTouch           No                  QD   OneTouch           



     Verio - Verio -                                    Verio -           

 

     Hydroxychlo Hydroxychlo           No             1{table BID  Hydroxychl   

        



     roquine roquine                          t}        oroquine           



     Sulfate 200 Sulfate 200                                    Sulfate         

  



     MG   MG                                      200 MG           

 

     Simponi Simponi           No                       Simponi           



     Aria 50 Aria 50                                    Aria 50           



     MG/4ML MG/4ML                                    MG/4ML           

 

     Multivitami Multivitami           No                       Multivitam      

     



     n    n                                       in             

 

     Rosuvastati Rosuvastati           No             1{table QD   Rosuvastat   

        



     n Calcium 5 n Calcium 5                          t}        in Calcium      

     



     MG   MG                                      5 MG           

 

     Iron 65 MG Iron 65 MG           No             1{table QD   Iron 65 MG     

      



                                        t}                       

 

     Methocarbam Methocarbam           No             1{table TID  Methocarba   

        



     ol 750 MG ol 750 MG                          t}        mol 750 MG          

 

 

     Turmeric Turmeric           No                       Turmeric           

 

     Cholestyram Cholestyram           No                       Cholestyra      

     



     ine 4 GM ine 4 GM                                    mine 4 GM           

 

     metFORMIN metFORMIN           No             1{table BID  metFORMIN        

   



     HCl 1000 MG HCl 1000 MG                          t_with_      HCl 1000     

      



                                        a_meal}      MG             

 

     sulfaSALAzi sulfaSALAzi           No             2{table BID  sulfaSALAz   

        



     ne 500 MG ne 500 MG                          t}        ine 500 MG          

 

 

     Vitamin D3 Vitamin D3           No                       Vitamin D3        

   



     125  MCG                                    125 MCG           



     (5000 UT) (5000 UT)                                    (5000 UT)           

 

     azaTHIOprin azaTHIOprin           No             1.5{tab QD   azaTHIOpri   

        



     e 50 MG e 50 MG                          let}      ne 50 MG           

 

     Levothyroxi Levothyroxi           No                  QD   Levothyrox      

     



     ne Sodium ne Sodium                                    ine Sodium          

 



     175  MCG                                    175 MCG           

 

     Cyanocobala Cyanocobala           No                       Cyanocobal      

     



     min 1000 min 1000                                    alonzo 1000           



     MCG/ML MCG/ML                                    MCG/ML           

 

     Folic Acid Folic Acid           No             2{table QD   Folic Acid     

      



     1 MG 1 MG                          t}        1 MG           

 

     DULoxetine DULoxetine           No             1{capsu QD   DULoxetine     

      



     HCl 30 MG HCl 30 MG                          le}       HCl 30 MG           

 

     metFORMIN metFORMIN           No             1{table BID  metFORMIN        

   



     HCl 1000 MG HCl 1000 MG                          t_with_      HCl 1000     

      



                                        a_meal}      MG             

 

     Restoril 15 Restoril 15           No             1{capsu QD   Restoril     

      



     MG   MG                            le_at_b      15 MG           



                                        edtime_                     



                                        as_need                     



                                        ed}                      

 

     Methocarbam Methocarbam           No             1{table TID  Methocarba   

        



     ol 750 MG ol 750 MG                          t}        mol 750 MG          

 

 

     azaTHIOprin azaTHIOprin           No             1.5{tab QD   azaTHIOpri   

        



     e 50 MG e 50 MG                          let}      ne 50 MG           

 

     Folic Acid Folic Acid           No             2{table QD   Folic Acid     

      



     1 MG 1 MG                          t}        1 MG           

 

     sulfaSALAzi sulfaSALAzi           No             2{table BID  sulfaSALAz   

        



     ne 500 MG ne 500 MG                          t}        ine 500 MG          

 

 

     Simponi Simponi           No                       Simponi           



     Aria 50 Aria 50                                    Aria 50           



     MG/4ML MG/4ML                                    MG/4ML           

 

     Rosuvastati Rosuvastati           No                       Rosuvastat      

     



     n Calcium 5 n Calcium 5                                    in Calcium      

     



     MG   MG                                      5 MG           

 

     Cyanocobala Cyanocobala           No                       Cyanocobal      

     



     min 1000 min 1000                                    alonzo 1000           



     MCG/ML MCG/ML                                    MCG/ML           

 

     Turmeric Turmeric           No                       Turmeric           

 

     Iron 65 MG Iron 65 MG           No             1{table QD   Iron 65 MG     

      



                                        t}                       

 

     Gabapentin Gabapentin           No             1{capsu TID  Gabapentin     

      



     600  MG                          le_befo      600 MG           



                                        re_bedt                     



                                        ivette}                     

 

     Diclofenac Diclofenac           No             1{appli BID  Diclofenac     

      



     Sodium 3 % Sodium 3 %                          cation}      Sodium 3 %     

      

 

     Glimepiride Glimepiride           No                       Glimepirid      

     



     2 MG 2 MG                                    e 2 MG           

 

     Levothyroxi Levothyroxi           No                  QD   Levothyrox      

     



     ne Sodium ne Sodium                                    ine Sodium          

 



     175  MCG                                    175 MCG           

 

     Cholestyram Cholestyram           No                       Cholestyra      

     



     ine 4 GM ine 4 GM                                    mine 4 GM           

 

     Hydroxychlo Hydroxychlo           No             1{table BID  Hydroxychl   

        



     roquine roquine                          t}        oroquine           



     Sulfate 200 Sulfate 200                                    Sulfate         

  



     MG   MG                                      200 MG           

 

     DULoxetine DULoxetine           No             1{capsu QD   DULoxetine     

      



     HCl 30 MG HCl 30 MG                          le}       HCl 30 MG           

 

     Multivitami Multivitami           No                       Multivitam      

     



     n    n                                       in             

 

     OneTouch OneTouch           No                  QD   OneTouch           



     Verio - Verio -                                    Verio -           

 

     Vitamin D3 Vitamin D3           No                       Vitamin D3        

   



     125  MCG                                    125 MCG           



     (5000 UT) (5000 UT)                                    (5000 UT)           

 

     Turmeric Turmeric           No                       Turmeric           

 

     Synthroid Synthroid           No                  QD   Synthroid           



     150  MCG                                    150 MCG           

 

     Hydroxychlo Hydroxychlo           No             1{table BID  Hydroxychl   

        



     roquine roquine                          t}        oroquine           



     Sulfate 200 Sulfate 200                                    Sulfate         

  



     MG   MG                                      200 MG           

 

     Gabapentin Gabapentin           No             1{capsu TID  Gabapentin     

      



     600  MG                          le_befo      600 MG           



                                        re_bedt                     



                                        ivette}                     

 

     Folic Acid Folic Acid           No             2{table QD   Folic Acid     

      



     1 MG 1 MG                          t}        1 MG           

 

     DULoxetine DULoxetine           No             1{capsu QD   DULoxetine     

      



     HCl 30 MG HCl 30 MG                          le}       HCl 30 MG           

 

     Methocarbam Methocarbam           No             1{table TID  Methocarba   

        



     ol 750 MG ol 750 MG                          t}        mol 750 MG          

 

 

     Restoril Restoril           Yes  [facility] [place]                1 capsule          

 Common



                                                  at bedtime           [facility]



                                                  as needed           - [facility]

 

     Azathioprin Azathioprin           Yes  [place]                as           

  Common



     e    e                                       directed           [facility]

 

     Iron Iron           Yes  [place]                1 tablet           Common



                                                                 [facility]

 

     Gabapentin Gabapentin           Yes  [place]                1 capsule      

     Common



                                                  before           [facility]



                                                  bedtime           - [facility]

 

     Sulfasalazi Sulfasalazi           Yes  [facility] [place]                3 tablet     

      Common



     ne   ne                                                     [facility]

 

     Nateglinide Nateglinide           Yes  [place]                1 tablet     

      Common



                                                  before           [facility]



                                                  meals           [facility]

 

     Glucosamine Glucosamine           Yes  [place]                not          

  Common



     Chondro Chondro                                    defined           [facility]



     Complex Complex                                                   [facility]

 

     Hydroxychlo Hydroxychlo           Yes  [place]                1 tablet     

      Common



     roquine roquine                                                   [facility]



     Sulfate Sulfate                                                   - [facility]

 

     Etodolac Etodolac           Yes  [place]                1 tablet           

Common



                                                  with food           [facility]



                                                                 [facility]

 

     Synthroid Synthroid           Yes  [place]                1 tablet         

  Common



                                                  on an           [facility]



                                                  empty           - [facility]



                                                  stomach in           [facility]

 

     Rosuvastati Rosuvastati           Yes  [place]                1 tablet     

      Common



     n Calcium n Calcium                                                   [facility]

t



                                                                 - [facility]

 

     Humira Pen Humira Pen           Yes  [place]                as             

Common



                                                  directed           [facility]

 

     Duloxetine Duloxetine           Yes  [facility] [place]                1 capsule      

     Common



     HCl  HCl                                                    [facility]

 

     Methocarbam Methocarbam           Yes  [place]                1 tablet     

      Common



     ol   ol                                                     [facility]



                                                                 [facility]

 

     Turmeric Turmeric           Yes  [place]                not            Comm

on



                                                  defined           [facility]

 

     Remicade Remicade           Yes  [place]                as             Comm

on



                                                  directed           [facility]

 

     Folic Acid Folic Acid           Yes  [place]                2 tablet       

    Common



                                                                 [facility]

 

     Plaquenil Plaquenil           Yes  [place]                1 tablet         

  Common



                                                  with food           [facility]



                                                  or milk            [facility]



                                                                 [facility]

 

     Levothyroxi Levothyroxi           No                       Levothyrox      

     



     ne Sodium ne Sodium                                    ine Sodium          

 



     150  MCG                                    150 MCG           

 

     Iron 65 MG Iron 65 MG           No             1{table QD   Iron 65 MG     

      



                                        t}                       

 

     sulfaSALAzi sulfaSALAzi           No             2{table BID  sulfaSALAz   

        



     ne 500 MG ne 500 MG                          t}        ine 500 MG          

 

 

     Temazepam Temazepam           No             1{capsu QD   Temazepam        

   



     15 MG 15 MG                          le_at_b      15 MG           



                                        edtime_                     



                                        as_need                     



                                        ed}                      

 

     Restoril 15 Restoril 15           No             1{capsu QD   Restoril     

      



     MG   MG                            le_at_b      15 MG           



                                        edtime_                     



                                        as_need                     



                                        ed}                      

 

     Rosuvastati Rosuvastati           No             1{table QD   Rosuvastat   

        



     n Calcium 5 n Calcium 5                          t}        in Calcium      

     



     MG   MG                                      5 MG           

 

     Simponi Simponi           No                       Simponi           



     Aria 50 Aria 50                                    Aria 50           



     MG/4ML MG/4ML                                    MG/4ML           

 

     Diclofenac Diclofenac           No             1{appli BID  Diclofenac     

      



     Sodium 3 % Sodium 3 %                          cation}      Sodium 3 %     

      

 

     azaTHIOprin azaTHIOprin           No             1{table QD   azaTHIOpri   

        



     e 50 MG e 50 MG                          t}        ne 50 MG           







Immunizations







           Ordered    Filled Immunization Date       Status     Comments   Sourc

e



           Immunization Name Name                                        

 

           COVID-Ritu Pfizer 12            2021-04-10 Completed             [facility]

[facility]



           & Over Vaccination            00:00:00                         

 

           COVID-19 Pfizer 12            2021-04-10 Completed             [facility]



           & Over Vaccination            00:00:00                         



           (PUR[facility]-DILUTE)                                             

 

           COVID-19 Pfizer 12            2021-04-10 Completed             [facility]



           & Over Vaccination            00:00:00                         



           (PURPLE-DILUTE)                                             

 

           COVID-19 Pfizer 12            2021-04-10 Completed             [facility]



           & Over Vaccination            00:00:00                         



           (PURPLE-DILUTE)                                             

 

           COVID-19 Pfizer 12            2021-04-10 Completed             [facility]



           & Over Vaccination            00:00:00                         



           (PURPLE-DILUTE)                                             

 

           COVID-19 Pfizer 12            2021-04-10 Completed             [facility]

[facility]



           & Over Vaccination            00:00:00                         



           (PUR[facility]-DILUTE)                                             

 

           COVID-19 Pfizer 12            2021-04-10 Completed             [facility]

[facility]



           & Over Vaccination            00:00:00                         



           ([facility]IL[facility])                                             

 

           COVID-19 Pfizer 12            2021-04-10 Completed             [facility]

[facility]



           & Over Vaccination            00:00:00                         



           ([facility]IL[facility])                                             

 

           COVID-19 Pfizer 12            2021-04-10 Completed             [facility]



           & Over Vaccination            00:00:00                         



           ([facility]IL[facility])                                             

 

           COVID-19 Pfizer 12            2021-04-10 Completed             [facility]



           & Over Vaccination            00:00:00                         



           (P[facility]IL[facility])                                             

 

           COVID-19 Pfizer 12            2021-04-10 Completed             [facility]



           & Over Vaccination            00:00:00                         



           ([facility]IL[facility])                                             

 

           COVID-19 Pfizer 12            2021-04-10 Completed             [facility]



           & Over Vaccination            00:00:00                         



           (P[facility]-DILUTE)                                             

 

           COVID-19 Pfizer 12            2021-04-10 Completed             [facility]



           & Over Vaccination            00:00:00                         



           (PUR[facility]ILUTE)                                             

 

           COVID-19 Pfizer 12            2021-04-10 Completed             [facility]



           & Over Vaccination            00:00:00                         



           ([facility]IL[facility])                                             

 

           COVID-19 Pfizer 12            2021-04-10 Completed             [facility]



           & Over Vaccination            00:00:00                         



           ([facility]IL[facility])                                             

 

           COVID-19 Pfizer 12            2021-04-10 Completed             [facility]



           & Over Vaccination            00:00:00                         



           ([facility]IL[facility])                                             

 

           COVID-19 Pfizer 12            2021-04-10 Completed             [facility]



           & Over Vaccination            00:00:00                         



           ([facility]IL[facility])                                             

 

           SARS-COV-2 COVID-19            2021-04-10 Completed             [facility]



           PFIZER VACCINE            00:00:00                         [facility]

 

           SARS-COV-2 COVID-19            2021-04-10 Completed             [facility]



           PFIZER VACCINE            00:00:00                         [facility]

 

           SARS-COV-2 COVID-19            2021-04-10 Completed             [facility]



           PFIZER VACCINE            00:00:00                         [facility]

 

           SARS-COV-2 COVID-19            2021-04-10 Completed             [facility]



           PFIZER VACCINE            00:00:00                         [facility]

 

           SARS-COV-2 COVID-19            2021-04-10 Completed             [facility]



           PFIZER VACCINE            00:00:00                         [facility]

 

           SARS-COV-2 COVID-19            2021-04-10 Completed             [facility]



           PFIZER VACCINE            00:00:00                         [facility]

 

           SARS-COV-2 COVID-19            2021-04-10 Completed             [facility]



           PFIZER VACCINE            00:00:00                         [facility]

 

           SARS-COV-2 COVID-19            2021-04-10 Completed             [facility]



           PFIZER VACCINE            00:00:00                         [facility]

 

           SARS-COV-2 COVID-19            2021-04-10 Completed             [facility]



           PFIZER VACCINE            00:00:00                         [facility]

 

           SARS-COV-2 COVID-19            2021-04-10 Completed             [facility]



           PFIZER VACCINE            00:00:00                         [facility]

 

           SARS-COV-2 COVID-19            2021-04-10 Completed             [facility]



           PFIZER VACCINE            00:00:00                         [facility]

 

           SARS-COV-2 COVID-19            2021-04-10 Completed             [facility]



           PFIZER VACCINE            00:00:00                         [facility]

 

           SARS-COV-2 COVID-19            2021-04-10 Completed             [facility]



           PFIZER VACCINE            00:00:00                         [facility]

 

           COVID-19 Pfizer 2021 Completed             [facility]



           & Over Vaccination            00:00:00                         

 

           COVID-19 Pfizer 2021 Completed             [facility]



           & Over Vaccination            00:00:00                         



           ([facility]IL[facility])                                             

 

           COVID-19 Pfizer 2021 Completed             [facility]



           & Over Vaccination            00:00:00                         



           ([facility]IL[facility])                                             

 

           COVID-19 Pfizer 2021 Completed             [facility]



           & Over Vaccination            00:00:00                         



           ([facility]IL[facility])                                             

 

           COVID-19 Pfizer 2021 Completed             [facility]



           & Over Vaccination            00:00:00                         



           ([facility]ILUTE)                                             

 

           COVID-19 Pfizer 2021 Completed             [facility]



           & Over Vaccination            00:00:00                         



           ([facility]IL[facility])                                             

 

           COVID-19 Pfizer 2021 Completed             [facility]



           & Over Vaccination            00:00:00                         



           ([facility]IL[facility])                                             

 

           COVID-19 Pfizer 2021 Completed             [facility]



           & Over Vaccination            00:00:00                         



           ([facility]-DILUTE)                                             

 

           COVID-19 Pfizer 12            2021 Completed             [facility]



           & Over Vaccination            00:00:00                         



           (P[facility]-DILUTE)                                             

 

           COVID-19 Pfizer 2021 Completed             [facility]



           & Over Vaccination            00:00:00                         



           (PUR[facility]-DILUTE)                                             

 

           COVID-19 Pfizer 2021 Completed             [facility]



           & Over Vaccination            00:00:00                         



           (P[facility]-DILUTE)                                             

 

           COVID-19 Pfizer 2021 Completed             [facility]



           & Over Vaccination            00:00:00                         



           ([facility]IL[facility])                                             

 

           COVID-19 Pfizer 12            2021 Completed             [facility]



           & Over Vaccination            00:00:00                         



           (PURPLE-DILUTE)                                             

 

           COVID-19 Pfizer 2021 Completed             [facility]



           & Over Vaccination            00:00:00                         



           (PUR[facility]-DILUTE)                                             

 

           COVID-19 Pfizer 2021 Completed             [facility]



           & Over Vaccination            00:00:00                         



           (PURPLE-DILUTE)                                             

 

           COVID-19 Pfizer 2021 Completed             [facility]



           & Over Vaccination            00:00:00                         



           (PURPLE-DILUTE)                                             

 

           COVID-19 Pfizer 2021 Completed             [facility]



           & Over Vaccination            00:00:00                         



           (P[facility]-DILUTE)                                             

 

           SARS-COV-2 COVID-19            2021 Completed             [facility]



           PFIZER VACCINE            00:00:00                         [facility]

 

           SARS-COV-2 COVID-19            2021 Completed             [facility]



           PFIZER VACCINE            00:00:00                         [facility]

 

           SARS-COV-2 COVID-19            2021 Completed             [facility]



           PFIZER VACCINE            00:00:00                         [facility]

 

           SARS-COV-2 COVID-19            2021 Completed             [facility]



           PFIZER VACCINE            00:00:00                         [facility]

 

           SARS-COV-2 COVID-19            2021 Completed             [facility]



           PFIZER VACCINE            00:00:00                         [facility]

 

           SARS-COV-2 COVID-19            2021 Completed             [facility]



           PFIZER VACCINE            00:00:00                         [facility]

 

           SARS-COV-2 COVID-19            2021 Completed             [facility]



           PFIZER VACCINE            00:00:00                         [facility]

 

           SARS-COV-2 COVID-19            2021 Completed             [facility]



           PFIZER VACCINE            00:00:00                         [facility]

 

           SARS-COV-2 COVID-19            2021 Completed             [facility]



           PFIZER VACCINE            00:00:00                         [facility]

 

           SARS-COV-2 COVID-19            2021 Completed             [facility]



           PFIZER VACCINE            00:00:00                         [facility]

 

           SARS-COV-2 COVID-19            2021 Completed             [facility]



           PFIZER VACCINE            00:00:00                         [facility]

 

           SARS-COV-2 COVID-19            2021 Completed             [facility]



           PFIZER VACCINE            00:00:00                         [facility]

 

           SARS-COV-2 COVID-19            2021 Completed             [facility]



           PFIZER VACCINE            00:00:00                         [facility]

 

           Influenza,            2020 Completed             [facility]



           injectable,            00:00:00                         



           quadrivalent                                             



           (afluria, fluzone)                                             

 

           Influenza,            2020 Completed             [facility]



           injectable,            00:00:00                         



           quadrivalent                                             



           (afluria, fluzone)                                             

 

           Influenza,            2020 Completed             [facility]



           injectable,            00:00:00                         



           quadrivalent                                             



           (afluria, fluzone)                                             

 

           Influenza,            2020 Completed             [facility]



           injectable,            00:00:00                         



           quadrivalent                                             



           (afluria, fluzone)                                             

 

           Influenza,            2020 Completed             [facility]



           injectable,            00:00:00                         



           quadrivalent                                             



           (afluria, fluzone)                                             

 

           Influenza,            2020 Completed             [facility]



           injectable,            00:00:00                         



           quadrivalent                                             



           (afluria, fluzone)                                             

 

           Influenza,            2020 Completed             [facility]



           injectable,            00:00:00                         



           quadrivalent                                             



           (afluria, fluzone)                                             

 

           Influenza,            2020 Completed             [facility]



           injectable,            00:00:00                         



           quadrivalent                                             



           (afluria, fluzone)                                             

 

           Influenza,            2020 Completed             [facility]



           injectable,            00:00:00                         



           quadrivalent                                             



           (afluria, fluzone)                                             

 

           Influenza,            2020 Completed             [facility]



           injectable,            00:00:00                         



           quadrivalent                                             



           (afluria, fluzone)                                             

 

           Influenza,            2020 Completed             [facility]



           injectable,            00:00:00                         



           quadrivalent                                             



           (afluria, fluzone)                                             

 

           Influenza,            2020 Completed             [facility]



           injectable,            00:00:00                         



           quadrivalent                                             



           (afluria, fluzone)                                             

 

           Influenza,            2020 Completed             [facility]



           injectable,            00:00:00                         



           quadrivalent                                             



           (afluria, fluzone)                                             

 

           Influenza,            2020 Completed             [facility]



           injectable,            00:00:00                         



           quadrivalent                                             



           (afluria, fluzone)                                             

 

           Influenza,            2020 Completed             [facility]



           injectable,            00:00:00                         



           quadrivalent                                             



           (afluria, fluzone)                                             

 

           Influenza,            2020 Completed             [facility]



           injectable,            00:00:00                         



           quadrivalent                                             



           (afluria, fluzone)                                             

 

           Influenza,            2020 Completed             [facility]



           injectable,            00:00:00                         



           quadrivalent                                             



           (afluria, fluzone)                                             

 

           Influenza,            2019-10-01 Completed             [facility]



           injectable,            00:00:00                         



           quadrivalent                                             



           (afluria, fluzone)                                             

 

           Influenza,            2019-10-01 Completed             [facility]



           injectable,            00:00:00                         



           quadrivalent                                             



           (afluria, fluzone)                                             

 

           Influenza,            2019-10-01 Completed             [facility]



           injectable,            00:00:00                         



           quadrivalent                                             



           (afluria, fluzone)                                             

 

           Influenza,            2019-10-01 Completed             [facility]



           injectable,            00:00:00                         



           quadrivalent                                             



           (afluria, fluzone)                                             

 

           Influenza,            2019-10-01 Completed             [facility]



           injectable,            00:00:00                         



           quadrivalent                                             



           (afluria, fluzone)                                             

 

           Influenza,            2019-10-01 Completed             [facility]



           injectable,            00:00:00                         



           quadrivalent                                             



           (afluria, fluzone)                                             

 

           Influenza,            2019-10-01 Completed             [facility]



           injectable,            00:00:00                         



           quadrivalent                                             



           (afluria, fluzone)                                             

 

           Influenza,            2019-10-01 Completed             [facility]



           injectable,            00:00:00                         



           quadrivalent                                             



           (afluria, fluzone)                                             

 

           Influenza,            2019-10-01 Completed             [facility]



           injectable,            00:00:00                         



           quadrivalent                                             



           (afluria, fluzone)                                             

 

           Influenza,            2019-10-01 Completed             [facility]



           injectable,            00:00:00                         



           quadrivalent                                             



           (afluria, fluzone)                                             

 

           Influenza,            2019-10-01 Completed             [facility]



           injectable,            00:00:00                         



           quadrivalent                                             



           (afluria, fluzone)                                             

 

           Influenza,            2019-10-01 Completed             [facility]



           injectable,            00:00:00                         



           quadrivalent                                             



           (afluria, fluzone)                                             

 

           Influenza,            2019-10-01 Completed             [facility]



           injectable,            00:00:00                         



           quadrivalent                                             



           (afluria, fluzone)                                             

 

           Influenza,            2019-10-01 Completed             [facility]



           injectable,            00:00:00                         



           quadrivalent                                             



           (afluria, fluzone)                                             

 

           Influenza,            2019-10-01 Completed             [facility]



           injectable,            00:00:00                         



           quadrivalent                                             



           (afluria, fluzone)                                             

 

           Influenza,            2019-10-01 Completed             [facility]



           injectable,            00:00:00                         



           quadrivalent                                             



           (afluria, fluzone)                                             

 

           Influenza,            2019-10-01 Completed             [facility]



           injectable,            00:00:00                         



           quadrivalent                                             



           (afluria, fluzone)                                             

 

           Vitamin B12 Vitamin B12 2018-10-18 Completed             [facility]

t -



           (Cyanocobalamin) (Cyanocobalamin) 14:52:00                         

I [facility]







Vital Signs







             Vital Name   Observation Time Observation Value Comments     Source

 

             Systolic blood 2023 18:01:00 104 mm[Hg]                [facility]

[facility]

 

             Diastolic blood 2023 18:01:00 68 mm[Hg]                 [facility]

[facility]

 

             Heart rate   2023 18:01:00 106 /min                  [facility]

 

             Body temperature 2023 18:01:00 36.56 Arlene                 [facility]

 

             Body height  2023 18:01:00 172.7 cm                  [facility]

 

             Systolic blood 2023 16:05:00 105 mm[Hg]                [facility]

[facility]



             pressure                                            

 

             Diastolic blood 2023 16:05:00 71 mm[Hg]                 [facility]



             pressure                                            

 

             Heart rate   2023 16:05:00 93 /min                   [facility]

h

 

             Body height  2023 16:05:00 172.7 cm                  [facility]

h

 

             Body weight  2023 16:05:00 85.412 kg                 [facility]

h

 

             BMI          2023 16:05:00 28.63 kg/m2               [facility]

 

             height       2022-10-27 13:40:00 67.5 [in_i]               [facility]

 

             weight       2022-10-27 13:40:00 195 [lb_av]               [facility]

 

             bmi          2022-10-27 13:40:00 30.09 kg/m2               [facility]

 

             Systolic blood 2022 16:16:00 112 mm[Hg]                [facility]

[facility]



             pressure                                            

 

             Diastolic blood 2022 16:16:00 73 mm[Hg]                 [facility]



             pressure                                            

 

             Heart rate   2022 16:16:00 97 /min                   [facility]

h

 

             Body height  2022 16:16:00 172.7 cm                  [facility]

h

 

             Body weight  2022 16:16:00 88.95 kg                  [facility]

[facility]

 

             BMI          2022 16:16:00 29.82 kg/m2               [facility]

 

             height       2022 11:00:00 68.50 [in_i]              [facility]

 

             weight       2022 11:00:00 197.0 [lb_av]              [facility]

 

             temperature  2022 11:00:00 96.6 [degF]               [facility]

 

             bmi          2022 11:00:00 29.51 kg/m2               [facility]

 

             oximetry     2022 11:00:00 95 %                      [facility]

 

             respiratory rate 2022 11:00:00 15 /min                   Comm

on [facility]

 

             blood pressure 2022 11:00:00 119 mm[Hg]                [facility] -



             systolic                                            [facility]

 

             blood pressure 2022 11:00:00 75 mm[Hg]                 [facility] -



             diastolic                                           [facility]

 

             height       2022 11:40:00 67.50 [in_i]              [facility]

 

             weight       2022 11:40:00 197.0 [lb_av]              [facility]

 

             temperature  2022 11:40:00 96.6 [degF]               [facility]

 

             bmi          2022 11:40:00 30.40 kg/m2               [facility]

 

             oximetry     2022 11:40:00 95 %                      [facility]

 

             respiratory rate 2022 11:40:00 15 /min                   Comm

on [facility]

 

             blood pressure 2022 11:40:00 119 mm[Hg]                Common

 [facility] -



             systolic                                            [facility]

 

             blood pressure 2022 11:40:00 75 mm[Hg]                 [facility]

 [facility] -



             diastolic                                           [facility]

 

             Body height  2022 15:29:00 172.7 cm                  [facility]

[facility]

 

             Body weight  2022 15:29:00 94.802 kg                 [facility]

h

 

             BMI          2022 15:29:00 31.78 kg/m2               [facility]

h

 

             height       2022 08:20:00 68.50 [in_i]              [facility]

 

             weight       2022 08:20:00 204.2 [lb_av]              Common 

[facility]

 

             temperature  2022 08:20:00 97.3 [degF]               [facility]

 

             bmi          2022 08:20:00 30.59 kg/m2               [facility]

[facility]

 

             oximetry     2022 08:20:00 99 %                      [facility]

 

             respiratory rate 2022 08:20:00 16 /min                   Comm

on [facility]

 

             blood pressure 2022 08:20:00 125 mm[Hg]                Common

 [facility] -



             systolic                                            [facility]

 

             blood pressure 2022 08:20:00 82 mm[Hg]                 Common

 [facility] -



             diastolic                                           [facility]

 

             Body height  2022 16:14:00 172.7 cm                  [facility]

h

 

             Body weight  2022 16:14:00 94.802 kg                 [facility]

h

 

             BMI          2022 16:14:00 31.78 kg/m2               [facility]

h

 

             Body height  2022 16:14:00 172.7 cm                  [facility]

h

 

             Body weight  2022 16:14:00 94.802 kg                 [facility]

h

 

             BMI          2022 16:14:00 31.78 kg/m2               [facility]

 

             height       2022 08:00:00 68.50 [in_i]              [facility]

[facility]

 

             weight       2022 08:00:00 210.0 [lb_av]              Common 

[facility]

 

             temperature  2022 08:00:00 97.5 [degF]               [facility]

[facility]irit [facility]

 

             bmi          2022 08:00:00 31.46 kg/m2               Common S

[facility]

 

             oximetry     2022 08:00:00 95 %                      [facility]

 

             respiratory rate 2022 08:00:00 15 /min                   Comm

[facility]

 

             blood pressure 2022 08:00:00 112 mm[Hg]                [facility]

 [facility] -



             systolic                                            [facility]

 

             blood pressure 2022 08:00:00 67 mm[Hg]                 [facility]

 [facility] -



             diastolic                                           [facility]

 

             height       2021 09:00:00 68.50 [in_i]              [facility]

[facility]

 

             weight       2021 09:00:00 207 [lb_av]               [facility]

 

             temperature  2021 09:00:00 97.7 [degF]               [facility]

 

             bmi          2021 09:00:00 31.01 kg/m2               [facility]

[facility]

 

             oximetry     2021 09:00:00 97 %                      Common S

[facility]

 

             blood pressure 2021 09:00:00 119 mm[Hg]                [facility]

 [facility] -



             systolic                                            [facility]

 

             blood pressure 2021 09:00:00 73 mm[Hg]                 [facility]

 [facility] -



             diastolic                                           [facility]

 

             height       2021-10-25 10:00:00 68.50 [in_i]              [facility] S

pirit [facility]

 

             weight       2021-10-25 10:00:00 217 [lb_av]               [facility]

 

             temperature  2021-10-25 10:00:00 97.3 [degF]               [facility]

 

             bmi          2021-10-25 10:00:00 32.51 kg/m2               [facility]

 

             oximetry     2021-10-25 10:00:00 96 %                      [facility]

 

             respiratory rate 2021-10-25 10:00:00 17 /min                   Comm

on [facility] -



                                                                 [facility]

 

             blood pressure 2021-10-25 10:00:00 120 mm[Hg]                [facility]

 [facility] -



             systolic                                            [facility]

 

             blood pressure 2021-10-25 10:00:00 67 mm[Hg]                 Common

 [facility] -



             diastolic                                           [facility]

 

             Systolic blood 2021 18:28:00 111 mm[Hg]                Univer

sity of



             [facility]

 

             Diastolic blood 2021 18:28:00 72 mm[Hg]                 Unive

rsity of



             [facility]

 

             Heart rate   2021 18:28:00 87 /min                   [facility]

 

             Body height  2021 18:28:00 175.3 cm                  [facility]

 

             Body weight  2021 18:28:00 95.709 kg                 [facility]

 

             BMI          2021 18:28:00 31.16 kg/m2               [facility]

 

             Oxygen saturation in 2021 18:28:00 97 /min                   

[facility]



             Arterial blood by                                        [place] [facility]

maryan



             Pulse oximetry                                        Branch







Procedures







                Procedure       Date / Time Performed Performing Clinician John

e

 

                EMG             2022 15:12:31 Chelly Mcarthur [facility]

 

                MR BRAIN WO CONTRAST 2021 16:56:21 [facility]

 

                XR ABDOMEN 2 VW 2021 15:50:22 Ashley Cai  [facility]

 

                XR PELVIS <3 VW 2021 15:50:22 Ashley Cai  [facility]

 

                REFERRAL-       2021 05:01:00 Doctor Unassigned, No [facility]

[facility] [place]



                REQUEST/RESPONSE                 Name            [facility]

 

                REFERRAL-       2021 05:01:00 Doctor Unassigned, No [facility]

[facility] [place]



                REQUEST/RESPONSE                 Name            [facility]







Encounters







        Start   End     Encounter Admission Attending Care    Care    Encounter 

Source



        Date/Time Date/Time Type    Type    Clinicians Facility Department ID   

   

 

        2023         Outpatient                 [facility]     [id number]

 [place]



        12:58:35                                                 [id number]  [facility]

 

        2023         Outpatient                 [facility]     [id number]

 [place]



        10:44:50                                                 [id number]  [facility]

 

        2023         Outpatient                 [facility]     [id number]

 [place]



        09:47:16                                                 [id number]  [facility]

 

        2023         Outpatient                 [facility]     [id number]

 [place]



        15:28:12                                                 [id number]  [facility]

 

        2023-07-10         Outpatient                 [facility]     [id number]

 [place]



        09:23:05                                                 [id number]  [facility]

 

        2023         Outpatient                 [facility]     [id number]

 [place]



        12:38:38                                                 [id number]  [facility]

 

        2023         Outpatient                 [facility]     [id number]

 [place]



        08:40:50                                                 [id number]  [facility]

 

        2023         Outpatient                 [facility]     [id number]

 [place]



        08:28:12                                                 [id number]  [facility]

 

        2023         Outpatient                 [facility]     [id number]

 [place]



        11:35:26                                                 [id number]  [facility]

 

        2023-05-15         Outpatient                 [facility]     [id number]

 [place]



        08:10:23                                                 [id number]  [facility]

 

        2023-05-10         Outpatient                 [facility]     [id number]

 [place]



        10:24:56                                                 [id number]  [facility]

 

        2023         Outpatient                 [facility]     [id number]

 [place]



        10:58:35                                                 [id number]  [facility]

 

        2023         Outpatient                 [facility]  [facility]  [id number]

 Common



        15:08:00                                                 [id number]   [facility]

 

        2023         Outpatient                 [facility]     [id number]

 [place]



        12:06:02                                                 [id number]  [facility]

 

        2023         Outpatient                 [facility]     [id number]

 [place]



        11:42:22                                                 [id number]  [facility]

 

        2023         Outpatient                 [facility]     [id number]

 [place]



        10:00:38                                                 [id number]  [facility]

 

        2023         Outpatient                 [facility]     [id number]

 [place]



        16:18:32                                                 [id number]  [facility]

 

        2023         Outpatient                 [facility]     [id number]

 [place]



        12:02:12                                                 [id number]  [facility]

 

        2023         Outpatient                 [facility]     [id number]

 [place]



        11:55:08                                                 [id number]  [facility]

 

        2023         Outpatient                 [facility]     [id number]

 [place]



        09:31:51                                                 [id number]  [facility]

 

        2023         Outpatient                 [facility]     [id number]

 [place]



        14:39:25                                                 [id number]  [facility]

 

        2023         Outpatient                 [facility]     [id number]

 [place]



        08:25:16                                                 [id number]  [facility]

 

        2023         Outpatient                 [facility]     [id number]

 [place]



        08:26:44                                                 [id number]  [facility]

 

        2023         Outpatient                 [facility]     [id number]

 [place]



        14:16:36                                                 [id number]  [facility]

 

        2022         Outpatient                 [facility]     [id number]

 [place]



        14:32:35                                                 [id number]  [facility]

 

        2022-10-25         Outpatient         Guadalupe, Na [facility]  [facility]  [id number]

2 Common



        15:27:00                                                    [facility]

 

        2022         Outpatient         Guadalupe, Na [facility]  [facility]  [id number]

2 [facility]



        13:18:00                                                    [facility]

 

        2022         Outpatient         Guadalupe, Na [facility]  [facility]  [id number]

2 Common



        10:35:01                                                    [facility]

 

        2022         Outpatient         Guadalupe, Na [facility]  [facility]  [id number]

2 Common



        08:51:01                                                    [facility]

 

        2022         Outpatient         Guadalupe, Na [facility]  [facility]  [id number]

2 Common



        11:58:00                                                    [facility]

 

        2022         Outpatient         Guadlaupe, Na [facility]  [facility]  [id number]

2 Common



        14:39:41                                                    [facility]

 

        2022         Outpatient         Guadalupe, Na [facility]  [facility]  [id number]

2 Common



        14:39:15                                                    [facility]

 

        2022         Outpatient         Guadalupe, Na [facility]  [facility]  [id number]

2 Common



        14:38:51                                                    [facility]

 

        2022         Outpatient         Guadalupe, Na [facility]  [facility]  [id number]

2 Common



        14:32:05                                                    [facility]

 

        2022         Outpatient         Guadalupe, Na [facility]  [facility]  [id number]

2 Common



        14:25:05                                                 [id number]   [facility]

 

        2022         Outpatient         Gu[place]upe, Na [facility]  [facility]  [id number]

2 Common



        14:24:00                                                 [id number]   [facility]

 

        2022         Outpatient         Guadalupe, Na [facility]  [facility]  [id number]

2 Common



        14:15:29                                                 [id number]   [facility]

 

        2022         Outpatient         Guadalupe, Na [facility]  [facility]  [id number]

2 Common



        13:18:56                                                 [id number]   [facility]

 

        2022         Outpatient         Gu[place]upe, Na [facility]  [facility]  [id number]

2 Common



        12:45:24                                                 [id number]   [facility]

 

        2022         Outpatient         Gu[place]upe, Na [facility]  [facility]  [id number]

2 Common



        12:44:51                                                 [id number]   [facility]

 

        2022         Outpatient         Gu[place]upe, Na [facility]  [facility]  [id number]

2 Common



        12:16:18                                                 [id number]   [facility]

 

        2022         Outpatient         Gu[place]upe, Na [facility]  [facility]  [id number]

2 Common



        12:14:31                                                 [id number]   [facility]

 

        2022         Outpatient         Gu[place]upe, Na [facility]  [facility]  [id number]

2 Common



        11:48:13                                                 [id number]   [facility]

 

        2022         Outpatient         Gu[place], Na [facility]  [facility]  [id number]

2 Common



        11:15:39                                                 [id number]   [facility]

 

        2022         Outpatient         Guadalupe, Na [facility]  [facility]  [id number]

2 Common



        11:15:32                                                 [id number]   [facility]

 

        2022         Outpatient         Polina Guadalupe ST[facility]  [facility]  [id number]

2 Common



        11:09:46                                                 [id number]   [facility]

 

        2021-12-10         Outpatient                 [facility]     [id number] 

[place]



        08:54:56                                                         Health

 

        2021         Outpatient                 [facility]     [id number] 

[place]



        14:23:40                                                         [facility]

 

        2021         Outpatient         LYNDSEYAGA, [facility]     [id number]

 [place]



        13:07:59                         [facility]

 

        [facility]    [id number]

[id number] [facility]



        09:00:00 09:00:00                 JOVANA bautista [facility]

 

        2023 Outpatient         IBETH, [facility]     [id number]

[id number] [place]



        09:30:00 09:30:00                 [facility]

 

        2023 Outpatient         KEITH, [facility]     [id number]

[id number] [place]



        11:00:00 11:00:00                 [facility]

 

        2023 Office          CATIE Barnes     [id number]

[address] [place]



        09:30:00 09:30:00 Visit           Jovana BAIRES [id number]         

Health



                                                T CARE  [id number]         



                                                CENTER .[id number]         



                                                [address]                 

 

        2023 Outpatient         KEITH, [facility]     [id number]

7086 [place]



        13:00:00 13:00:00                 [facility]

 

        2023 Clinical         CATIE Hdz     [id number]

0806 [place]



        08:30:00 08:58:57 Support         Aleksander BAIRES [id number]         

Health



                                                T CARE  [id number]         



                                                CENTER .[id number]         



                                                [address]                 

 

        [facility]    [id number]

[id number] [facility]



        00:00:00 00:00:00                 [facility]  [id number]         it

y of



                                        Jonathan VILLAGRAN [id number]         [place]orte[place]

as



                                                ROJELIO?BLEA [phone]         [address]                 



                                                OFFICE                  



                                                [facility]                 

 

        2023 Outpatient         KEITH PIYUSH    NW    [id number] 

   RAS



        07:15:00 13:25:00                 JOVANA                           

 

        [facility]    [id number]

[id number] [facility]



        00:00:00 00:00:00                 Jovana   [facility]  [id number]         it

y of



                                        Edward  ANGLETON [id number]         [place]

as



                                                ROJELIO?BLEA [phone]         [address]



                                                MEDICAL                 



                                                OFFICE                  



                                                [facility]                 

 

        2023 Office          CATIE Barnes     [id number]

[id number] [place]



        09:15:00 09:49:14 Visit           Jovana BAIRES [id number]         

Health



                                                T CARE  [id number]         



                                                CENTER .[id number]         



                                                [address]                 

 

        2023 Office          [facility]    [id number]

4316 [facility]



        13:00:00 13:30:00 Visit           Jovana   [facility]  [id number]         it

y of



                                        Edward  ANGLETON [id number]         [place]

as



                                                ROJELIO?BLEA [phone]         [address]                 



                                                OFFICE                  



                                                [facility]                 

 

        [facility]    [id number]

5525 [facility]



        13:00:00 13:29:11                 JOVANA bautista [facility]

 

        2023 Office          CATIE Barnes     [id number]

[address] [place]



        10:00:00 11:03:15 Visit           Jovana BAIRES [id number]         

Health



                                                T CARE  [id number]         



                                                CENTER .[id number]         



                                                [address]                 

 

        2023 Telephone         [facility]    [id number]

[id number] [facility]



        00:00:00 00:00:00                 Jovana   [facility]  [id number]         it

y of



                                        Edward  ANGLETON [id number]         [place]

as



                                                ROJELIO?BLEA [phone]         Me

andrew



                                                [address]



                                                MEDICAL                 



                                                OFFICE                  



                                                [facility]                 

 

        2023 Office          CATIE Barnes     1.2.191.244 [id number]

[address] [place]



        14:00:00 14:13:21 Visit           Jovana BAIRES 350.1.13.58         

Health



                                                T CARE  [id number]         



                                                CENTER .[id number]         



                                                [facility] 1               



                                                [facility]                 

 

        2023 Clinical         CATIE Hdz     1.2.840.114 [id number]

7423 [place]



        13:30:00 14:08:59 Support         Aleksander BAIRES 350.1.13.58         

Health



                                                T CARE  [id number]         



                                                CENTER .[id number]         



                                                [facility] 1               



                                                [facility]                 

 

        2023 Clinical         CATIE Hdz     1.2.840.114 [id number]

8799 [place]



        13:30:00 14:08:05 Support         Aleksander BAIRES 350.1.13.58         

Health



                                                T CARE  [id number]         



                                                CENTER .[id number]         



                                                [facility] 1               



                                                [facility]                 

 

        2023 Office          CATIE CRISTINA     1.2.840.114 [id number]

871 [place]



        10:30:00 13:04:45 Visit           GWENDOLYN BAIRES 350.1.13.58         

Health



                                                T CARE  9.2.7.2.686         



                                                CENTER .[id number]         



                                                [facility] 1               



                                                [facility]                 

 

        2023 Clinical         CATIE Hdz     1.2.840.114 [id number]

3515 [place]



        10:30:00 12:03:01 Support         Aleksander BAIRES 350.1.13.58         

Health



                                                T CARE  9.2.7.2.686         



                                                CENTER .[id number]         



                                                [facility] 1               



                                                [facility]                 

 

        2023 Outpatient         FARZANA BARNES    [facility]    7508 

   PIYUSH



        12:26:00 13:25:00                 JOVANA                           

 

        2023 Outpatient         KEITH [facility]     [id number]

5196 [place]



        08:45:00 08:45:00                 [facility]

 

        2023 Outpatient         IBETH [facility]     [id number]

41 [place]



        14:15:00 14:15:00                 [facility]

 

        2023 Emergency E       ENDACOTT, [facility]    [facility]    7509  

  [facility]



        08:35:00 11:55:00                 EDUARDO                           

 

        2023 Telemedici         CATIE Cristina [facility] 1.2.840.114 [id number]

[id number] [place]



        10:30:00 10:45:00 ne              [facility] 350.1.13.58         H

ealt



                                                IRONMAN 9.2.7.2.686         



                                                SMI     [phone]         



                                                        1               

 

        2023 Office          YUSRA,   [facility] 1.2.840.114 [id number]

408 [place]



        09:50:00 09:50:00 Visit           TARAS FITCH    350.1.13.58         He

alth



                                                MEDICAL 9.2.7.2.686         



                                                [facility] 2 [phone]         



                                                        5               

 

        2023 (TEL)                   [facility]LMLC  STLMLC  [id number] Co

mmon



        00:00:00 00:00:00                                                 [facility]

 

        2023 (TEL)                   STLMLC  STLMLC  [id number] Co

mmon



        00:00:00 00:00:00                                                 [facility]

 

        2022-12-15 2022-12-15 (WEB)                   STLMLC  STLMLC  [id number] Co

mmon



        00:00:00 00:00:00                                                 [facility]

 

        2022-12-15 2022-12-15 (WEB)                   STLMLC  STLMLC  [id number] Co

mmon



        00:00:00 00:00:00                                                 [facility]

 

        2022 (WEB)                   STLMLC  STLMLC  [id number] Co

mmon



        00:00:00 00:00:00                                                 [facility]

 

        2022 (TEL)                   STLMLC  STLMLC  [id number] Co

mmon



        00:00:00 00:00:00                                                 [facility]

 

        2022-10-27 2022-10-27 OFFICE                  STLMLC  STLMLC  [id number] Co

mmon



        00:00:00 00:00:00 VISIT                                           Spirit



                        ESTAB PT                                         - [facility]



                        LEVEL 4                                         [facility]

 

        2022-10-14 2022-10-14 Office          CATIE Barnes     1.2.821.345 [id number]

[address] [place]



        12:30:00 13:29:28 Visit           Jovana BAIRES 350.1.13.58         

Health



                                                T CARE  9.2.7.2.686         



                                                CENTER .[id number]         



                                                [address]                 

 

        2022 Outpatient         YUSRA,   [facility]     [id number]

34 [place]



        04:15:00 04:15:00                 TARAS Goodwin

 

        2022 Office          YUSRA   [facility] 1.2.840.114 [id number]

104 [place]



        11:00:00 11:00:00 Visit           TARAS FITCH    350.1.13.58         He

alth



                                                MEDICAL 9.2.7.2.686         



                                                [place] 2 [phone]         



                                                        5               

 

        2022-08-10 2022-08-10 (TEL)                   [facility]  STLC  [id number] Co

mmon



        00:00:00 00:00:00                                                 [facility]

 

        20[facility]  [id number]

 Common



        00:00:00 00:00:00 MEDICARE                                         Spiri

t



                        PREV PHY                                         - [facility]



                        EXAM                                            [facility]

 

        2022 OFFICE                  STLMLC  STLC  [id number] Co

mmon



        00:00:00 00:00:00 VISIT EST                                         Spir

it



                        PT LEVEL 3                                         - [facility]



                                                                        [facility]

 

        2022 (TEL)                   STLMLC  STLMLC  [id number] Co

mmon



        00:00:00 00:00:00                                                 [facility]



                                                                        - [facility]



                                                                        [facility]

 

        2022 (TEL)                   STLMLC  STLMLC  [id number] Co

mmon



        00:00:00 00:00:00                                                 [facility]

 

        2022 Office          CATIE Fuentes [facility] 1.2.840.114 [id number]

424 [place]



        10:30:00 11:17:20 Visit           Jerrica HENDRIX .1.13.58         

Health



                                                SPINE   9.2.7.2.686         



                                                MEDICAL [phone]         



                                                [facility]   2               

 

        2022 (TEL)                   STLMLC  STLMLC  [id number] Co

mmon



        00:00:00 00:00:00                                                 [facility]

 

        2022 OFFICE                  STLMLC  STLMLC  [id number] Co

mmon



        00:00:00 00:00:00 VISIT                                           Spirit



                        ESTAB PT                                         - [facility]



                        LEVEL 4                                         [facility]

 

        2022 Outpatient         HILLARY WINTER   [facility]   7507  

  



        09:37:00 23:59:00                 [facility]                            Orthop

e



                                                                        dic and



                                                                        Spine



                                                                        Hospita



                                                                        l

 

        2022 Office          CATIE Thornton 6400 1.2.024.136 [id number]

[address] [place]



        11:00:00 12:09:41 Visit           Williams MEDINAN .1.13.58         

Health



                                                        9.2.7.2.686         



                                                        [phone]         



                                                        5               

 

        2022 Office          CATIE Thornton 6400 1.2.171.861 [id number]

[address] [place]



        11:00:00 12:09:41 Visit           Williams MEDINAN .1.13.58         

Health



                                                        9.2.7.2.686         



                                                        [phone]         



                                                        5               

 

        2022 (TEL)                   STLMLC  STLMLC  [id number] Co

mmon



        00:00:00 00:00:00                                                 [facility]

 

        2022 CATIE Cuba 6410 1.2.840.114 [id number]

603056 [place]



        13:00:00 14:11:14 Procedure         Pam MEDINAN .1.13.58       

  Health



                                                        9.2.7.2.686         



                                                        [phone]         



                                                        8               

 

        2022 Office          KATEY CALDERA 6400 1.2.840.114 [id number]

[id number] [place]



        09:00:00 09:15:00 Visit                   JENNIFER .1.13.58         

Health



                                                        9.2.7.2.686         



                                                        [phone]         



                                                        7               

 

        2022 Outpatient         BUYS,   [facility]   MED     [id number]   

 



        13:46:00 23:59:00                 CHELLY                         Ortho

pe



                                                                        dic and



                                                                        Spine



                                                                        Hospita



                                                                        l

 

        2022 Outpatient         BUYS,   [facility]   MED     [id number]   

 



        12:28:00 23:59:00                 CHELLY                         Ortho

pe



                                                                        dic and



                                                                        Spine



                                                                        Hospita



                                                                        l

 

        2022 Telephone         Katey Caldera CATIE 6400 1.2.840.114

 [id number] [place]



        00:00:00 00:00:00                 Guy RODRIGUEZ .1.13.58     

    Health



                                                        9.2.7.2.686         



                                                        [phone]         



                                                        7               

 

        2022 OFFICE                  STLMLC  STLMLC  [id number] Co

mmon



        00:00:00 00:00:00 VISIT                                           Spirit



                        ESTAB PT                                         - [facility]



                        LEVEL 4                                         [facility]

 

        2022 (TEL)                   STLMLC  STLMLC  [id number] Co

mmon



        00:00:00 00:00:00                                                 [facility]

 

        2021 (TEL)                   STLMLC  STLMLC  [id number] Co

mmon



        00:00:00 00:00:00                                                 [facility]

 

        2021 PREV VISIT                 STLMLC  STLMLC  [id number]

 Common



        00:00:00 00:00:00 EST AGE                                         Spirit



                        40-64                                           - [facility]

 

        2021-12-10 2021-12-10 Office          CATIE Gomez 6400 1.2.705.408 [id number]

[address] [place]



        09:00:00 09:42:21 Visit           Michael RODRIGUEZ .1.13.58         

Health



                                                        9.2.7.2.686         



                                                        [phone]         



                                                        5               

 

        2021 (TEL)                   STLMLC  STLMLC  [id number] Co

mmon



        00:00:00 00:00:00                                                 [facility]

 

        2021 OFFICE                  STLMLC  STLMLC  [id number] Co

mmon



        00:00:00 00:00:00 VISIT EST                                         Spir

it



                        PT LEVEL 3                                         - [facility]

 

        2021 (TEL)                   STLMLC  STLMLC  [id number] Co

mmon



        00:00:00 00:00:00                                                 [facility]

 

        2021-10-25 2021-10-25 OFFICE                  STLMLC  STLMLC  [id number] Co

mmon



        00:00:00 00:00:00 VISIT                                           [facility]irit



                        ESTAB PT                                         - [facility]



                        LEVEL 4                                         [facility]

 

        2021 Outpatient                 STLMLC  STLMLC  [id number]

 Common



        00:00:00 00:00:00                                                 [facility]



                                                                        - [facility]



                                                                        [facility]

 

        2021 Outpatient                 STLMLC  STLMLC  [id number]

 Common



        00:00:00 00:00:00                                                 [facility]

 

        2021 [facility]    1.2.840.114 [id number]

[id number] Univers



        00:00:00 00:00:00                 Armando Rivera [place] 350.1.13.10      

   ity of



                                                [place] 4.2.7.2.686         [place]

[place]



                                                [place] [phone]         [place] [id number]             [facility]                 

 

        2021 [facility]    1.2.303.629 [id number]

5165 Univers



        10:36:40 23:59:00 Encounter         Armando Goodwin  350.1.13.10     

    ity of



                                                [place]   4.2.7.2.686         [place]

[place]



                                                [place]    [phone]         [place] [id number]             [place]



                                                ([facility])                   

 

        2021 [facility]    1.2.840.114 [id number]

178 Univers



        10:30:00 10:35:00 Encounter         Ashley Goodwin  350.1.13.10         

ity of



                                                [place]   4.2.7.2.686         [place]

[place]



                                                [place]    [phone]         [place] [id number]             [place]



                                                ([facility])                   

 

        2021 Outpatient [facility]    [id number]

480 Univers



        10:30:00 10:30:00                 ASHLEY                            ity [facility]

 

        2021 Outpatient                 STLMLC  STLMLC  [id number]

 Common



        00:00:00 00:00:00                                                 [facility]

 

        2021 Office          Manjula [facility]    1.2.840.114 [id number]

713 Univers



        13:04:06 14:04:34 Visit           Armando Villagran 350.1.13.10      

   ity of



                                                [place] 4.2.7.2.686         [place]

[place]



                                                [place]fess[place] [phone]         Me

dical



                                                [facility]             [facility]                 

 

        2021 Outpatient R       ARMANDO FAIR [facility]   

 [id number] Univers



        13:00:00 13:00:00                 ARMANDO FAIR                       

  ity [facility]

 

        2021 Orders          Doctor  KATEY    1.2.840.114 [id number]

27 Univers



        00:00:00 00:00:00 Only            Unassigned, BASILIO   350.1.13.10       

  ity of



                                        [facility] 4.2.7.2.686         [place]

as



                                                        [phone]         [address]

 

        2021 Outpatient                 STLMLC  STLMLC  [id number]

 Common



        00:00:00 00:00:00                                                 [facility]

 

        2021 Orders          Doctor DEL TORO    1.2.840.114 [id number]

84 Univers



        00:00:00 00:00:00 Only            Unassigned, BASILIO   350.1.13.10       

  ity of



                                        [facility] 4.2.7.2.686         [place]

as



                                                        [phone]         [address]

 

        2021 Outpatient                 STLMLC  STLMLC  [id number]

 Common



        00:00:00 00:00:00                                                 [facility]

 

        2021 Outpatient                 STLMLC  STLMLC  [id number]

 Common



        00:00:00 00:00:00                                                 [facility]

 

        2021-04-10 2021-04-10 Outpatient                 [facility]    [id number]

[id number] Univers



        09:15:00 09:15:00                                                 ity of



                                                                        [facility]

 

        2021 Outpatient                 STLMLC  STLMLC  [id number]

 Common



        00:00:00 00:00:00                                                 [facility]

 

        2021 Outpatient                 [facility]    [id number]

371 Univers



        09:10:00 09:10:00                                                 [facility]

 

        2021 Outpatient                 STLMLC  STLMLC  [id number]

 Common



        00:00:00 00:00:00                                                 [facility]

 

        2021 Outpatient                 STLMLC  STLMLC  [id number]

 Common



        00:00:00 00:00:00                                                 [facility]

 

        2020 Outpatient                 STLMLC  STLMLC  [id number]

 Common



        00:00:00 00:00:00                                                 [facility]

 

        2020 Outpatient                 STLMLC  STLMLC  [id number]

 Common



        00:00:00 00:00:00                                                 [facility]

 

        2020 Outpatient                 STLMLC  STLMLC  [id number]

 Common



        00:00:00 00:00:00                                                 [facility]

 

        2020 Outpatient                 STLMLC  STLMLC  [id number]

 Common



        00:00:00 00:00:00                                                 [facility]

 

        2020 Outpatient                 STLMLC  STLMLC  [id number]

 Common



        00:00:00 00:00:00                                                 [facility]

 

        2020 Outpatient                 [facility]

[address] Common



        10:00:00 10:00:00                         [place]   [address]         Spir

it



                                                Drive   [facility]

 

        2020 Outpatient                 [facility] [address]

[id number] Common



        15:42:00 15:42:00                         [place]   [address]         Spir

it



                                                Drive   [facility]

 

        2020 Outpatient                 [facility]

[id number] Common



        10:20:00 10:20:00                         [place]   [address]         Spir

it



                                                Drive   [facility]

 

        2020 Outpatient                 [facility] [address]

[id number] Common



        11:37:00 11:37:00                         t [place]   [address]         Spir

it



                                                Drive   [facility]

 

        2020 Outpatient                 [facility] [address]

[id number] Common



        11:00:00 11:00:00                         [place]   [address]         Spir

it



                                                Drive   [facility]

 

        2019 Outpatient                 [facility]

[id number] Common



        08:40:00 08:40:00                         t [place]   [address]         Spir

it



                                                Drive   [facility]

 

        2019 Outpatient                 [facility]

[address] Common



        11:04:00 11:04:00                         t [place]   [address]         Spir

it



                                                Drive   [facility]

 

        2019 Outpatient                 [facility]

[address] Common



        17:00:00 17:00:00                         [facility]         [facility]



                                                [facility]    [facility]

 

        2019 Outpatient                 [facility] [address]

[id number] Common



        09:15:00 09:15:00                         t [place]   [address]         Spir

it



                                                Drive   [facility]

 

        2018 Outpatient                 STLMLC  STLMLC  [id number]

 Common



        00:00:00 00:00:00                                                 [facility]







Results







           Test       Test       Test       Results    Result     Source



           Description Time       Comments              Comments   

 

           MR BRAIN WO 2021-               No acute intracranial            U

[employer]iversity of



           CONTRAST   19                    abnormality. Preliminary            

[facility]



                      19:18:14              Report Dictated by            Branch



                                            Resident: Kurt Patnio MD., have reviewed this            



                                            study and agree [facility]            



                                            [facility]            



                                            WO CONTRAST COMPARISON:            



                                            None. HISTORY: see above            



                                            history RA, had sxs of            



                                            ?TIA, left arm/face            



                                            paresthesia. TECHNIQUE:            



                                            1.5 Drea multiplanar            



                                            multiweighted MRI of brain          

  



                                            withoutintravenous            



                                            contrast administration.            



                                            FINDINGS: The ventricles            



                                            and cerebral sulci are            



                                            normal in caliber and            



                                            configuration.No midline            



                                            shift, hydrocephalus or            



                                            pathological extra-axial            



                                            fluidcollection is            



                                            present. The basal            



                                            cisterns are unremarkable.          

  



                                            No restricted diffusion is          

  



                                            present to suggest acute            



                                            infarct. A fewscattered            



                                            nonspecific T2/FLAIR            



                                            hyperintensities are            



                                            demonstrated in thedeep            



                                            and subcortical            



                                            frontoparietal white            



                                            matter, likely            



                                            reflectingmicrovascular            



                                            ischemic changes. Focus of          

  



                                            SWI hypointensity            



                                            demonstrated inthe right            



                                            parietal subcortical white          

  



                                            matter, likely reflecting           

 



                                            amicrohemorrhagic deposit           

 



                                            or mineralization. The T2           

 



                                            flow voids for the major            



                                            intracranial vessels are            



                                            unremarkable. Noabnormal            



                                            fluid signal is present in          

  



                                            the mastoid air cells or            



                                            paranasal airsinuses.            



                                            [facility], Radiant Results Inft          

  



                                            User - 2021 2:19 PM           

 



                                            CDTFormatting of this note          

  



                                            might be different from            



                                            the original.MR BRAIN WO            



                                            CONTRASTCOMPARISON:            



                                            None.HISTORY: see above            



                                            history RA, had sxs of            



                                            ?TIA, left arm/face            



                                            paresthesia. TECHNIQUE:            



                                            1.5 Drea multiplanar            



                                            multiweighted MRI of brain          

  



                                            withoutintravenous            



                                            contrast              



                                            administration.FINDINGS:Th          

  



                                            e ventricles and cerebral           

 



                                            sulci are normal in            



                                            caliber and            



                                            configuration.No midline            



                                            shift, hydrocephalus or            



                                            pathological extra-axial            



                                            fluidcollection is            



                                            present. The basal            



                                            cisterns are            



                                            unremarkable.No restricted          

  



                                            diffusion is present to            



                                            suggest acute infarct. A            



                                            fewscattered nonspecific            



                                            T2/FLAIR hyperintensities           

 



                                            are demonstrated in            



                                            thedeep and subcortical            



                                            frontoparietal white            



                                            matter, likely            



                                            reflectingmicrovascular            



                                            ischemic changes. Focus of          

  



                                            SWI hypointensity            



                                            demonstrated inthe right            



                                            parietal subcortical white          

  



                                            matter, likely reflecting           

 



                                            amicrohemorrhagic deposit           

 



                                            or mineralization.The T2            



                                            flow voids for the major            



                                            intracranial vessels are            



                                            unremarkable. Noabnormal            



                                            fluid signal is present in          

  



                                            the mastoid air cells or            



                                            paranasal             



                                            airsinuses.IMPRESSIONNo            



                                            acute intracranial            



                                            abnormality.Preliminary            



                                            Report Dictated by            



                                            Resident: Kurt Bolton MD., have            



                                            reviewed this study and            



                                            agree with theabove            



                                            report.               

 

           XR PELVIS <3 VW 2021. Spinal stimulator           

 [place] [id number]                    device superimposed over            

[facility]



                      15:59:21              the right side of the            Bra

[facility]



                                            pelvis2. Spinal fixation            



                                            hardware superimposed over          

  



                                            the lower lumbar spine            



                                            andpelvis EXAM: Pelvis 1            



                                            view HISTORY: mri            



                                            clearance Encounter for            



                                            imaging to screen for            



                                            metal prior toMRI            



                                            TECHNIQUE:An AP view of            



                                            the pelvis is obtained.            



                                            FINDINGS:Spinal fixation            



                                            hardware is seen extending          

  



                                            to the level of theiliac            



                                            bones. Spinal stimulator            



                                            device is seen            



                                            superimposed over the            



                                            rightiliac            



                                            bone/acetabulum.            



                                            Degenerative changes are            



                                            seen in the hip joints.            



                                            [facility], Radiant Results Inft          

  



                                            User - 2021 11:00 AM          

  



                                            CDTFormatting of this note          

  



                                            might be different from            



                                            the original.EXAM: Pelvis           

 



                                            1 viewHISTORY: mri            



                                            clearance Encounter for            



                                            imaging to screen for            



                                            metal prior            



                                            toMRITECHNIQUE:An AP view           

 



                                            of the pelvis is            



                                            obtained.FINDINGS:Spinal            



                                            fixation hardware is seen           

 



                                            extending to the level of           

 



                                            theiliac bones. Spinal            



                                            stimulator device is seen           

 



                                            superimposed over the            



                                            rightiliac            



                                            bone/acetabulum.Degenerati          

  



                                            ve changes are seen in the          

  



                                            hip joints.IMPRESSION1.            



                                            Spinal stimulator device            



                                            superimposed over the            



                                            right side of the pelvis2.          

  



                                            Spinal fixation hardware            



                                            superimposed over the            



                                            lower lumbar spine            



                                            andpelvis             

 

           XR ABDOMEN 2 VW 2021. Spinal stimulator           

 [employer]



                      19                    superimposed over the            [facility]

[employer] [facility]



                      15:52:36              right iliac bone with            Bra

nch



                                            leads mostlikely above            



                                            T92. Spinal fixation            



                                            hardware EXAM: Abdomen 2            



                                            views HISTORY: mri            



                                            clearance Encounter for            



                                            imaging to screen for            



                                            metal prior toMRI            



                                            TECHNIQUE:2 images of the           

 



                                            abdomen are obtained.            



                                            FINDINGS:Heart rate            



                                            fixation of the lumbar            



                                            spine as well as SI joints          

  



                                            isnoted. Disc prostheses            



                                            are noted at L3-L4 and            



                                            L4-L5. A spinal stimulator          

  



                                            device is seen            



                                            superimposed over the            



                                            right iliac bonewith leads          

  



                                            terminating most likely            



                                            above T9 . Moderate amount          

  



                                            of stool is seen in the            



                                            colon. [facility], Radiant            



                                            Results Inft User -            



                                            2021 10:53 AM            



                                            CDTFormatting of this note          

  



                                            might be different from            



                                            the original.EXAM: Abdomen          

  



                                            2 viewsHISTORY: mri            



                                            clearance Encounter for            



                                            imaging to screen for            



                                            metal prior            



                                            toMRITECHNIQUE:2 images of          

  



                                            the abdomen are            



                                            obtained.FINDINGS:Heart            



                                            rate fixation of the            



                                            lumbar spine as well as SI          

  



                                            joints isnoted. Disc            



                                            prostheses are noted at            



                                            L3-L4 and L4-L5.A spinal            



                                            stimulator device is seen           

 



                                            superimposed over the            



                                            right iliac bonewith leads          

  



                                            terminating most likely            



                                            above T9 .Moderate amount           

 



                                            of stool is seen in the            



                                            colon.IMPRESSION1. Spinal           

 



                                            stimulator superimposed            



                                            over the right iliac bone           

 



                                            with leads mostlikely            



                                            above T92. Spinal fixation          

  



                                            hardware              







Notes







                Date/Time       Note            Provider        Source

 

                    2023          Formatting of this note is different fro

m the original. Nancy Valentine LVN                              [facility]



                15:47:26-00:00  Images from the original note were not included.

                 



                                Requested Renewals                 



                                                                



                                 temazepam 15 mg capsule                 



                                                                



                                 Sig: N/A                       



                                 Disp: Not specified Refills:                 



                                 Start: 2023                 



                                 Class: eRX                     



                                 Non-formulary                  



                                 Last ordered: 3 weeks ago (2023) by No Nam

e Doctor Unassigned                 



                                 Provider Review Required Failed 2023 03:2

6 PM                 



                                Protocol Details This refill cannot be delegated

                 



                                 Valid encounter within last 12 months          

       



                                                                



                                To be filled at: [facility] [id number] [place] [facility] [place], [place] - [address]                 



                                                                



                                                                



                                Recent Visits                   



                                Date Type Provider Dept                 



                                23 Office Visit Jovana Goode MD 

[facility]                 



                                                    Showing recent visits within

 past 540 days with a meds authorizing provider and

 meeting all other requirements                           



                                Future Appointments                 



                                No visits were found meeting these conditions.  

               



                                                    Showing future appointments 

within next 150 days with a meds authorizing 

provider and meeting all other requirements                           



                                                                



                                                                



                                Electronically signed by Nancy Valentine LVN 

at 2023 3:47 PM CDT

## 2023-08-13 NOTE — EDPHYS
Physician Documentation                                                                           

 [facility]                                                                 

Name: Samara Hawley                                                                               

Age: 68 yrs                                                                                       

Sex: Female                                                                                       

: 1955                                                                                   

MRN: [id number]                                                                                   

Arrival Date: 2023                                                                          

Time: 11:11                                                                                       

Account#: [id number]                                                                            

Bed 13                                                                                            

Private MD: Jerrod Null                                                                        

ED Physician Oral Strickland                                                                       

HPI:                                                                                              

                                                                                             

17:49 This 68 yrs old Female presents to ER via Ambulatory with complaints of Facial Pain,    kb  

      Nausea.                                                                                     

17:49 The patient complains of pain to the forehead. The patient describes the headache as    kb  

      constant. Onset: The symptoms/episode began/occurred 3 day(s) ago. Associated signs and     

      symptoms: Pertinent positives: malaise, nausea, sinus congestion, sinus tenderness,         

      vomiting. Severity of symptoms: At its worst the pain was moderate, in the emergency        

      department the pain is unchanged. Headache History: Denies prior headaches. The             

      symptoms are alleviated by nothing. the symptoms are aggravated by nothing. The patient     

      has not experienced similar symptoms in the past. The patient has been recently seen by     

      a physician: a dentist, 4 day(s) ago. Pt reports toothache that started last week, was      

      seen by dentist and put on amoxicillin. Since then pt has developed headache, nausea,       

      vomiting, sinus congestion and pain, malaise and fatigue.                                   

                                                                                                  

Historical:                                                                                       

- Allergies:                                                                                      

11:20 No Known Allergies;                                                                     ss  

- PMHx:                                                                                           

11:20 back problem; diabetes mellitus; RA;                                                    ss  

- PSHx:                                                                                           

11:20 Back Stimulator;                                                                        ss  

                                                                                                  

- Immunization history:: Client reports receiving the 2nd dose of the Covid vaccine.              

- Social history:: Smoking status: Patient denies any tobacco usage or history of.                

                                                                                                  

                                                                                                  

ROS:                                                                                              

17:47 Constitutional: Negative for fever, chills, and weight loss.                            kb  

17:47 ENT: Positive for dental pain, rhinorrhea, sinus congestion, sinus pain, sore throat.       

17:47 Neuro: Positive for headache.                                                               

17:47 All other systems are negative.                                                             

                                                                                                  

Exam:                                                                                             

17:47 Constitutional:  This is a well developed, well nourished patient who is awake, alert,  kb  

      and in no acute distress. ENT:  Moist Mucous membranes Cardiovascular:  Regular rate        

      and rhythm with a normal S1 and S2.  No gallops, murmurs, or rubs.  No pulse deficits.      

      Respiratory:  Respirations even and unlabored. No increased work of breathing. Talking      

      in full sentences Abdomen/GI:  Soft, non-tender. No distention Skin:  Warm, dry with        

      normal turgor.  Normal color. MS/ Extremity:  Pulses equal, no cyanosis.  Neurovascular     

      intact.  Full, normal range of motion. Neuro:  Awake and alert, GCS 15, oriented to         

      person, place, time, and situation. Moves all extremities. Normal gait.                     

17:47 Head/face: Sinus tenderness, that is moderate, is located over the  right frontal           

      sinus, left frontal sinus, right ethmoid sinus, left ethmoid sinus, right maxillary         

      sinus and left maxillary sinus.                                                             

17:47 ENT: External ear(s): are unremarkable, Ear canal(s): are normal, TM's: are normal,         

      Nose: is normal, Posterior pharynx: is normal.                                              

                                                                                                  

Vital Signs:                                                                                      

11:19  / 98; Pulse 91; Resp 16; Pulse Ox 97% on R/A; Weight 79.38 kg; Height 5 ft. 7    ss  

      in. ; Pain 8/10;                                                                            

13:30  / 82; Pulse 78; Resp 16; Pulse Ox 99% on R/A;                                    hb  

11:19 Body Mass Index 27.41 (79.38 kg, 170.18 cm)                                             ss  

11:19 Pain Scale: Adult                                                                       ss  

                                                                                                  

[place] Coma Score:                                                                               

17:48 Eye Response: spontaneous(4). Motor Response: obeys commands(6). Verbal Response:       kb  

      oriented(5). Total: 15.                                                                     

                                                                                                  

MDM:                                                                                              

11:37 Patient medically screened.                                                             kb  

17:47 Data reviewed: vital signs, nurses notes.                                               kb  

17:48 Differential diagnosis: migraine, flu, covid, strep, dental infection, sinusitis. Test  kb  

      considered but Not performed: CT: CT head considered, but pt has no neurological            

      deficits. Historians other than the Patient: Spouse/Significant Other: .             

      Counseling: I had a detailed discussion with the patient and/or guardian regarding: the     

      historical points, exam findings, and any diagnostic results supporting the                 

      discharge/admit diagnosis, lab results, the need for outpatient follow up, a family         

      practitioner, to return to the emergency department if symptoms worsen or persist or if     

      there are any questions or concerns that arise at home. Response to treatment: the          

      patient's symptoms have resolved after treatment.                                           

                                                                                                  

                                                                                             

11:48 Order name: CBC with Diff; Complete Time: 12:26                                         kb  

                                                                                             

11:48 Order name: Basic Metabolic Panel; Complete Time: 12:47                                 kb  

                                                                                             

11:48 Order name: COVID-19 SARS RT PCR; Complete Time: 12:55                                  kb  

                                                                                             

11:48 Order name: Flu; Complete Time: 12:47                                                   kb  

                                                                                             

11:48 Order name: Strep                                                                       kb  

                                                                                             

12:44 Order name: Throat Culture                                                              [facility]

                                                                                             

11:48 Order name: IV Start; Complete Time: 12:09                                              kb  

                                                                                                  

Administered Medications:                                                                         

12:08 Drug: NS 0.9% IV 1000 ml Route: IV; Rate: 1000 ml; Site: right antecubital;             hb  

13:00 Follow up: Response: No adverse reaction; IV Status: Completed infusion; IV Intake:     hb  

      1000ml                                                                                      

12:08 Drug: Ondansetron IVP 4 mg Route: IVP; Site: right antecubital;                         hb  

13:00 Follow up: Response: No adverse reaction                                                hb  

12:08 Drug: Ketorolac IVP 15 mg Route: IVP; Site: right antecubital;                          hb  

13:00 Follow up: Response: No adverse reaction                                                hb  

                                                                                                  

                                                                                                  

Disposition Summary:                                                                              

23 13:51                                                                                    

Discharge Ordered                                                                                 

      Location: Home                                                                          kb  

      Condition: Stable                                                                       kb  

      Diagnosis                                                                                   

        - Acute sinusitis, unspecified                                                        kb  

      Followup:                                                                               kb  

        - With: Emergency Department                                                               

        - When: As needed                                                                          

        - Reason: Worsening of condition                                                           

      Followup:                                                                               kb  

        - With: Private Physician                                                                  

        - When: 2 - 3 days                                                                         

        - Reason: Recheck today's complaints, Continuance of care, Re-evaluation by your           

      physician                                                                                   

      Discharge Instructions:                                                                     

        - Discharge Summary Sheet                                                             kb  

        - Sinusitis, Adult, Easy-to-Read                                                      kb  

      Forms:                                                                                      

        - Medication Reconciliation Form                                                      kb  

        - Thank You Letter                                                                    kb  

        - Antibiotic Education                                                                kb  

        - Prescription Opioid Use                                                             kb  

        - Patient Portal Instructions                                                         kb  

        - Leadership Thank You Letter                                                         kb  

      Prescriptions:                                                                              

        - ondansetron 4 mg Oral Tablet,disintegrating                                              

            - take 1 tablet by ORAL route every 6 hours As needed; 12 tablet; Refills: 0,     kb  

      Product Selection Permitted                                                                 

        - Augmentin 875-125 mg Oral Tablet                                                         

            - take 1 tablet by ORAL route every 12 hours for 10 days; 20 tablet; Refills: 0,  kb  

      Product Selection Permitted                                                                 

        - Diclofenac Sodium 75 mg Oral tablet,delayed release (DR/EC)                              

            - take 1 tablet by ORAL route 2 times per day As needed; 30 tablet; Refills: 0,   kb  

      Product Selection Permitted                                                                 

Signatures:                                                                                       

Dispatcher [employer]                           EDPam Bradford FNP-C                 FNP-Ckb                                                   

Katty Bell, RN                   RN   ss                                                   

Cheryl Bejarano, RN                     RN   hb                                                   

                                                                                                  

**************************************************************************************************

## 2023-08-13 NOTE — ER
Nurse's Notes                                                                                     

 [facility]                                                                 

Name: Samara Hawley                                                                               

Age: 68 yrs                                                                                       

Sex: Female                                                                                       

: 1955                                                                                   

MRN: [id number]                                                                                   

Arrival Date: 2023                                                                          

Time: 11:11                                                                                       

Account#: [id number]                                                                            

Bed 13                                                                                            

Private MD: Jerrod Null                                                                        

Diagnosis: Acute sinusitis, unspecified                                                           

                                                                                                  

Presentation:                                                                                     

                                                                                             

11:19 Chief complaint: Patient states: Seen at dentist 4 days ago and prescribed Amoxicillin. ss  

      Pt reports that pain has gotten much worse and is unable eat or drink anything.             

      Coronavirus screen: Client denies travel out of the [place] in the last 14 days. Ebola         

      Screen: Patient denies exposure to infectious person. Patient denies travel to an           

      Ebola-affected area in the 21 days before illness onset. Initial Sepsis Screen: Does        

      the patient meet any 2 criteria? No. Patient's initial sepsis screen is negative. Does      

      the patient have a suspected source of infection? Yes: Other: possible dental. Risk         

      Assessment: Do you want to hurt yourself or someone else? Patient reports no desire to      

      harm self or others. Onset of symptoms was 2023.                                 

11:19 Method Of Arrival: Ambulatory                                                           ss  

11:19 Acuity: LANDON 3                                                                           ss  

                                                                                                  

Historical:                                                                                       

- Allergies:                                                                                      

11:20 No Known Allergies;                                                                     ss  

- PMHx:                                                                                           

11:20 back problem; diabetes mellitus; RA;                                                    ss  

- PSHx:                                                                                           

11:20 Back Stimulator;                                                                        ss  

                                                                                                  

- Immunization history:: Client reports receiving the 2nd dose of the Covid vaccine.              

- Social history:: Smoking status: Patient denies any tobacco usage or history of.                

                                                                                                  

                                                                                                  

Screenin:12 [facility] ED Fall Risk Assessment (Adult) Score/Fall Risk Level 0 - 2 = Low Risk         hb  

      Oriented to surroundings, Maintained a safe environment. Abuse screen: Denies threats       

      or abuse. Denies injuries from another. Nutritional screening: No deficits noted.           

      Tuberculosis screening: No symptoms or risk factors identified.                             

                                                                                                  

Assessment:                                                                                       

12:09 General: Appears in no apparent distress. Behavior is calm, cooperative. Pain: Pain     hb  

      currently is 8 out of 10 on a pain scale. Neuro: Level of Consciousness is awake,           

      alert, obeys commands, Oriented to person, place, time, situation. Cardiovascular:          

      Patient's skin is warm and dry. Respiratory: Respiratory effort is even, unlabored,         

      Respiratory pattern is regular, symmetrical. GI: Reports nausea. : No signs and/or        

      symptoms were reported regarding the genitourinary system. EENT: No signs and/or            

      symptoms were reported regarding the EENT system. Derm: Skin is pink, warm \T\ dry.         

      Musculoskeletal: Reports body aches.                                                        

13:19 Reassessment: Patient appears in no apparent distress at this time. Patient and/or      hb  

      family updated on plan of care and expected duration. Pain level reassessed. Patient is     

      alert, oriented x 3, equal unlabored respirations, skin warm/dry/pink.                      

14:00 Reassessment: Patient appears in no apparent distress at this time. Patient and/or      hb  

      family updated on plan of care and expected duration. Pain level reassessed. Patient is     

      alert, oriented x 3, equal unlabored respirations, skin warm/dry/pink.                      

                                                                                                  

Vital Signs:                                                                                      

11:19  / 98; Pulse 91; Resp 16; Pulse Ox 97% on R/A; Weight 79.38 kg; Height 5 ft. 7    ss  

      in. ; Pain 8/10;                                                                            

13:30  / 82; Pulse 78; Resp 16; Pulse Ox 99% on R/A;                                    hb  

11:19 Body Mass Index 27.41 (79.38 kg, 170.18 cm)                                             ss  

11:19 Pain Scale: Adult                                                                       ss  

                                                                                                  

[place] Coma Score:                                                                               

17:48 Eye Response: spontaneous(4). Motor Response: obeys commands(6). Verbal Response:       kb  

      oriented(5). Total: 15.                                                                     

                                                                                                  

ED Course:                                                                                        

11:13 Patient arrived in ED.                                                                  rg4 

11:13 Jerrod Null MD is Private Physician.                                                rg4 

11:20 Triage completed.                                                                       ss  

11:20 Arm band placed on right wrist.                                                         ss  

11:37 [facility]                                                        kb  

11:37 Oral Strickland MD is Attending Physician.                                              kb  

11:50 Cheryl Bejarano, RN is Primary Nurse.                                                   hb  

12:07 Inserted saline lock: 20 gauge in right antecubital area, using aseptic technique.      hb  

      Blood collected.                                                                            

12:08 Flu Sent.                                                                               hb  

12:08 Strep Sent.                                                                             hb  

12:09 COVID-19 SARS RT PCR Sent.                                                              hb  

12:09 Basic Metabolic Panel Sent.                                                             hb  

12:09 CBC with Diff Sent.                                                                     hb  

12:12 Patient has correct armband on for positive identification.                             hb  

13:30 Provided Education on: Follow up, medications.                                          hb  

14:17 No provider procedures requiring assistance completed. IV discontinued, intact,         hb  

      bleeding controlled, No redness/swelling at site.                                           

                                                                                                  

Administered Medications:                                                                         

12:08 Drug: NS 0.9% IV 1000 ml Route: IV; Rate: 1000 ml; Site: right antecubital;             hb  

13:00 Follow up: Response: No adverse reaction; IV Status: Completed infusion; IV Intake:     hb  

      1000ml                                                                                      

12:08 Drug: Ondansetron IVP 4 mg Route: IVP; Site: right antecubital;                         hb  

13:00 Follow up: Response: No adverse reaction                                                hb  

12:08 Drug: Ketorolac IVP 15 mg Route: IVP; Site: right antecubital;                          hb  

13:00 Follow up: Response: No adverse reaction                                                hb  

                                                                                                  

                                                                                                  

Medication:                                                                                       

12:09 VIS not applicable for this client.                                                     hb  

                                                                                                  

Intake:                                                                                           

13:00 IV: 1000ml; Total: 1000ml.                                                              hb  

                                                                                                  

Outcome:                                                                                          

13:51 Discharge ordered by MD.                                                                kb  

14:17 Discharged to home ambulatory, with significant other.                                  hb  

14:17 Condition: stable                                                                           

14:17 Discharge instructions given to patient, significant other, Instructed on discharge         

      instructions, follow up and referral plans. medication usage, Demonstrated                  

      understanding of instructions, follow-up care, medications, Prescriptions given X 3.        

14:17 Patient left the ED.                                                                    hb  

                                                                                                  

Signatures:                                                                                       

Pam Webb, ELLEN-C                 KEIP-Katty Cedeno, RN                   RN   Cheryl Harkins, PANCHO                     RN   Margarita Britt                                 rg4                                                  

                                                                                                  

**************************************************************************************************

## 2023-08-15 ENCOUNTER — HOSPITAL ENCOUNTER (EMERGENCY)
Dept: HOSPITAL 97 - ER | Age: 68
Discharge: HOME | End: 2023-08-15
Payer: COMMERCIAL

## 2023-08-15 VITALS — SYSTOLIC BLOOD PRESSURE: 133 MMHG | DIASTOLIC BLOOD PRESSURE: 78 MMHG | TEMPERATURE: 98.2 F | OXYGEN SATURATION: 100 %

## 2023-08-15 DIAGNOSIS — G50.0: ICD-10-CM

## 2023-08-15 DIAGNOSIS — E11.9: ICD-10-CM

## 2023-08-15 DIAGNOSIS — J01.90: Primary | ICD-10-CM

## 2023-08-15 PROCEDURE — 99283 EMERGENCY DEPT VISIT LOW MDM: CPT

## 2023-08-15 PROCEDURE — 70486 CT MAXILLOFACIAL W/O DYE: CPT

## 2023-08-15 PROCEDURE — 76377 3D RENDER W/INTRP POSTPROCES: CPT

## 2023-08-15 NOTE — EDPHYS
Physician Documentation                                                                           

 [facility]                                                                 

Name: Samara Hawley                                                                               

Age: 68 yrs                                                                                       

Sex: Female                                                                                       

: 1955                                                                                   

MRN: [id number]                                                                                   

Arrival Date: 08/15/2023                                                                          

Time: 06:14                                                                                       

Account#: [id number]                                                                            

Bed 14                                                                                            

Private MD:                                                                                       

ED Physician Eladio Hdz                                                                         

HPI:                                                                                              

08/15                                                                                             

08:18 This 68 yrs old Female presents to ER via Ambulatory with complaints of FACIAL PAIN.    rn  

08:18 The patient complains of pain to the forehead, right eye, right cheek and right jaw.    rn  

      Onset: The symptoms/episode began/occurred 1 week(s) ago. Associated signs and              

      symptoms: Pertinent positives: nausea, Pertinent negatives: altered mental status,          

      rash, vision changes, vision loss, weakness, vertigo. Severity of symptoms: At its          

      worst the pain was moderate, in the emergency department the pain is unchanged. The         

      patient has not experienced similar symptoms in the past. The patient has been recently     

      seen by a physician:. Pt states this is 4th or 5th visit for this, has been to ER           

      twice, urgent care, and PCP, on second round of abx, currently augmentin, steroids,         

      does not feel like it is getting better. NO trauma. No focal neuro deficit. No              

      difficulty swallowing. Reports pain and sensitivity over left face including maxilla        

      and forehead. .                                                                             

                                                                                                  

Historical:                                                                                       

- Allergies:                                                                                      

06:49 No Known Allergies;                                                                     lg3 

- PMHx:                                                                                           

06:49 back problem; diabetes mellitus; RA; IBS;                                               lg3 

- PSHx:                                                                                           

06:49 Back Stimulator; neck; Cholecystectomy; L knee; partial hysterectomy;                   lg3 

                                                                                                  

- Immunization history:: Adult Immunizations up to date, Client reports receiving the             

  2nd dose of the Covid vaccine.                                                                  

- Social history:: Smoking status: Patient denies any tobacco usage or history of.                

  Patient/guardian denies using alcohol, street drugs.                                            

- Family history:: not pertinent.                                                                 

- Hospitalizations: : No recent hospitalization is reported.                                      

                                                                                                  

                                                                                                  

ROS:                                                                                              

08:18 Constitutional: Negative for fever, chills, and weight loss, Eyes: Negative for injury, rn  

      pain, redness, and discharge, ENT: + right sided facial/sinus pain Neck: Negative for       

      injury, pain, and swelling, Cardiovascular: Negative for chest pain, palpitations, and      

      edema, Respiratory: Negative for shortness of breath, cough, wheezing, and pleuritic        

      chest pain, Abdomen/GI: Negative for abdominal pain, nausea, vomiting, diarrhea, and        

      constipation, MS/Extremity: Negative for injury and deformity, Skin: Negative for           

      injury, rash, and discoloration, Neuro: + headache                                          

                                                                                                  

Exam:                                                                                             

08:18 Constitutional:  This is a well developed, well nourished patient who is awake, alert,  rn  

      and in no acute distress. Head/Face:  Normocephalic, atraumatic. Eyes:  Pupils equal        

      round and reactive to light, extra-ocular motions intact.   Neck:  Trachea midline, no      

      masses palpated, and no cervical lymphadenopathy.  Supple, full range of motion without     

      nuchal rigidity, or vertebral point tenderness.  No Meningismus. Cardiovascular:            

      Regular rate and rhythm.  No pulse deficits. Respiratory:  No increased work of             

      breathing, no retractions or nasal flaring. Abdomen/GI:  Soft, non-tender Skin:  Warm,      

      dry with normal turgor.  Normal color with no rashes, no lesions, and no evidence of        

      cellulitis. MS/ Extremity:  Pulses equal, no cyanosis.  Neurovascular intact.  Full,        

      normal range of motion.  Equal circumference. Neuro:  Awake and alert, GCS 15, oriented     

      to person, place, time, and situation.  Cranial nerves II-XII grossly intact.  Motor        

      strength 5/5 in all extremities.  Sensory grossly intact.  Cerebellar exam normal.          

                                                                                                  

Vital Signs:                                                                                      

06:38  / 78; Pulse 76; Resp 16 S; Temp 98.2(O); Pulse Ox 100% on R/A; Weight 79.38 kg   lg3 

      (R); Height 5 ft. 7 in. (R); Pain 10/10;                                                    

08:35  / 72; Pulse 70; Resp 16 S; Pulse Ox 98% on R/A;                                  aa5 

06:38 Body Mass Index 27.41 (79.38 kg, 170.18 cm)                                             lg3 

06:38 Pain Scale: Adult                                                                       lg3 

                                                                                                  

[place] Coma Score:                                                                               

08:18 Eye Response: spontaneous(4). Motor Response: obeys commands(6). Verbal Response:       rn  

      oriented(5). Total: 15.                                                                     

                                                                                                  

MDM:                                                                                              

06:58 Patient medically screened.                                                             rn  

08:18 Differential diagnosis: cluster headache, hypertensive headache, migraine, sinusitis,   rn  

      tension headache, trigeminal neuralgia, vasomotor headache. Data reviewed: vital signs,     

      nurses notes, radiologic studies, CT scan, and as a result, I will discharge patient.       

      Counseling: I had a detailed discussion with the patient and/or guardian regarding: the     

      historical points, exam findings, and any diagnostic results supporting the                 

      discharge/admit diagnosis, radiology results, the need for outpatient follow up, to         

      return to the emergency department if symptoms worsen or persist or if there are any        

      questions or concerns that arise at home. Response to treatment: the patient's symptoms     

      have mildly improved after treatment, and as a result, I will discharge patient.            

      Special discussion: I discussed with the patient/guardian in detail that at this point      

      there is no indication for admission to the hospital. It is understood, however, that       

      if the symptoms persist or worsen the patient needs to return immediately for               

      re-evaluation.                                                                              

                                                                                                  

08/15                                                                                             

07:11 Order name: CT Facial Bones W/O Con; Complete Time: 07:50                               rn  

                                                                                                  

Administered Medications:                                                                         

07:22 Drug: Norco PO 10 mg-325 mg 1 tabs Route: PO;                                           aa5 

08:13 Follow up: Response: No adverse reaction                                                ss  

08:13 Drug: carBAMazepine  mg Route: PO;                                                ss  

08:40 Follow up: Response: No adverse reaction                                                aa5 

                                                                                                  

                                                                                                  

Disposition Summary:                                                                              

08/15/23 08:22                                                                                    

Discharge Ordered                                                                                 

      Location: Home                                                                          rn  

      Problem: new                                                                            rn  

      Symptoms: have improved                                                                 rn  

      Condition: Stable                                                                       rn  

      Diagnosis                                                                                   

        - Headache                                                                            rn  

        - Acute sinusitis, unspecified                                                        rn  

        - Trigeminal neuralgia                                                                rn  

      Followup:                                                                               rn  

        - With: Private Physician                                                                  

        - When: As needed                                                                          

        - Reason: Recheck today's complaints, Re-evaluation by your physician                      

      Discharge Instructions:                                                                     

        - Discharge Summary Sheet                                                             rn  

        - Neuropathic Pain                                                                    rn  

        - Sinusitis, Adult                                                                    rn  

        - Trigeminal Neuralgia                                                                rn  

      Forms:                                                                                      

        - Medication Reconciliation Form                                                      rn  

        - Thank You Letter                                                                    rn  

        - Antibiotic Education                                                                rn  

        - Prescription Opioid Use                                                             rn  

        - Patient Portal Instructions                                                         rn  

        - Leadership Thank You Letter                                                         rn  

      Prescriptions:                                                                              

        - Carbamazepine 200 mg Oral Tablet                                                         

            - take 1 tablet by ORAL route every 12 hours; 20 tablet; Refills: 0, Product      rn  

      Selection Permitted                                                                         

        - Tramadol 50 mg Oral Tablet                                                               

            - take 1 tablet by ORAL route every 8 hours as needed; 12 tablet; Refills: 0,     rn  

      Product Selection Permitted                                                                 

Signatures:                                                                                       

Dispatcher [employer]                           Eladio Matson MD MD rn Calderon, Audri RN                     RN   aa5                                                  

Katty Bell RN                   RN   Lorie Palmer RN                       RN   lg3                                                  

                                                                                                  

**************************************************************************************************

## 2023-08-15 NOTE — ER
Nurse's Notes                                                                                     

 [facility]                                                                 

Name: Samara Hawley                                                                               

Age: 68 yrs                                                                                       

Sex: Female                                                                                       

: 1955                                                                                   

MRN: [id number]                                                                                   

Arrival Date: 08/15/2023                                                                          

Time: 06:14                                                                                       

Account#: [id number]                                                                            

Bed 14                                                                                            

Private MD:                                                                                       

Diagnosis: Headache;Acute sinusitis, unspecified;Trigeminal neuralgia                             

                                                                                                  

Presentation:                                                                                     

08/15                                                                                             

06:38 Chief complaint: Patient states: tooth and jaw pain starting Thursday. dentist started  lg3 

      me on ABX Thurs. still painful on Saturday so i went to a clinic and they said i had a      

      viral infection. still painful on  so i came here and was put on ABX. still           

      painful Monday so i saw my PCP and he said to continue the ABX. the pain has been here      

      for 5 days and not getting any better. complaints of pain in sinus cavity, right side       

      of mouth and jaw. pain 10/10. took hydrocodone PTA with no relief. Coronavirus screen:      

      Client denies travel out of the [place] in the last 14 days. At this time, the client does     

      not indicate any symptoms associated with coronavirus-19. Ebola Screen: No symptoms or      

      risks identified at this time. Initial Sepsis Screen: Does the patient meet any 2           

      criteria? No. Patient's initial sepsis screen is negative. Does the patient have a          

      suspected source of infection? No. Patient's initial sepsis screen is negative. Risk        

      Assessment: Do you want to hurt yourself or someone else? Patient reports no desire to      

      harm self or others. Onset of symptoms was August 10, 2023.                                 

06:38 Method Of Arrival: Ambulatory                                                           lg3 

06:38 Acuity: LANDON 3                                                                           lg3 

                                                                                                  

Triage Assessment:                                                                                

06:49 General: Appears in no apparent distress. uncomfortable, Behavior is calm, cooperative. lg3 

      Pain: Complains of pain in face Pain currently is 10 out of 10 on a pain scale. Quality     

      of pain is described as pressure, Noted to be grimacing, guarding, resistant to             

      movement. EENT: No deficits noted. Neuro: No deficits noted. Melgar                       

      Agitation-Sedation Scale (RASS): 0 - Alert and Calm Level of Consciousness is awake,        

      alert, obeys commands, Oriented to person, place, time, situation. Cardiovascular: No       

      deficits noted. Denies chest pain, shortness of breath, Capillary refill < 3 seconds        

      Clubbing of nail beds is absent JVD is absent Patient's skin is warm and dry.               

      Respiratory: No deficits noted. Airway is patent Respiratory effort is even, unlabored,     

      Respiratory pattern is regular, symmetrical. GI: No deficits noted. No signs and/or         

      symptoms were reported involving the gastrointestinal system. : No deficits noted. No     

      signs and/or symptoms were reported regarding the genitourinary system. Derm: No            

      deficits noted. Skin is intact, is healthy with good turgor, Skin is dry, Skin is           

      normal, Skin temperature is warm. Musculoskeletal: No deficits noted. No signs and/or       

      symptoms reported regarding the musculoskeletal system. Circulation, motion, and            

      sensation intact. Range of motion: intact in all extremities.                               

                                                                                                  

Historical:                                                                                       

- Allergies:                                                                                      

06:49 No Known Allergies;                                                                     lg3 

- PMHx:                                                                                           

06:49 back problem; diabetes mellitus; RA; IBS;                                               lg3 

- PSHx:                                                                                           

06:49 Back Stimulator; neck; Cholecystectomy; L knee; partial hysterectomy;                   lg3 

                                                                                                  

- Immunization history:: Adult Immunizations up to date, Client reports receiving the             

  2nd dose of the Covid vaccine.                                                                  

- Social history:: Smoking status: Patient denies any tobacco usage or history of.                

  Patient/guardian denies using alcohol, street drugs.                                            

- Family history:: not pertinent.                                                                 

- Hospitalizations: : No recent hospitalization is reported.                                      

                                                                                                  

                                                                                                  

Screenin:52 [facility] ED Fall Risk Assessment (Adult) History of falling in the last 3 months,       lg3 

      including since admission No falls in past 3 months (0 pts). Abuse screen: Denies           

      threats or abuse. Denies injuries from another. Nutritional screening: No deficits          

      noted. Tuberculosis screening: No symptoms or risk factors identified.                      

                                                                                                  

Assessment:                                                                                       

06:52 General: see triage assessment.                                                         lg3 

07:22 Reassessment: Patient is alert, oriented x 3, equal unlabored respirations, skin        aa5 

      warm/dry/pink.                                                                              

08:40 Reassessment: Patient is alert, oriented x 3, equal unlabored respirations, skin        aa5 

      warm/dry/pink.                                                                              

                                                                                                  

Vital Signs:                                                                                      

06:38  / 78; Pulse 76; Resp 16 S; Temp 98.2(O); Pulse Ox 100% on R/A; Weight 79.38 kg   lg3 

      (R); Height 5 ft. 7 in. (R); Pain 10/10;                                                    

08:35  / 72; Pulse 70; Resp 16 S; Pulse Ox 98% on R/A;                                  aa5 

06:38 Body Mass Index 27.41 (79.38 kg, 170.18 cm)                                             lg3 

06:38 Pain Scale: Adult                                                                       lg3 

                                                                                                  

Balbina Coma Score:                                                                               

08:18 Eye Response: spontaneous(4). Motor Response: obeys commands(6). Verbal Response:       rn  

      oriented(5). Total: 15.                                                                     

                                                                                                  

ED Course:                                                                                        

06:15 Patient arrived in ED.                                                                  ag3 

06:49 Triage completed.                                                                       lg3 

06:49 Arm band placed on right wrist.                                                         lg3 

06:52 Patient has correct armband on for positive identification. Placed in gown. Bed in low  lg3 

      position. Call light in reach. Side rails up X 1. Client placed on continuous cardiac       

      and pulse oximetry monitoring. NIBP monitoring applied. Door closed. Noise minimized.       

      Warm blanket given. Family accompanied patient.                                             

06:58 Eladio Hdz MD is Attending Physician.                                                rn  

07:14 Kera Sutton, RN is Primary Nurse.                                                   aa5 

07:32 CT Facial Bones W/O Con In Process Unspecified.                                         EDMS

08:40 No provider procedures requiring assistance completed. Patient did not have IV access   aa5 

      during this emergency room visit.                                                           

                                                                                                  

Administered Medications:                                                                         

07:22 Drug: Norco PO 10 mg-325 mg 1 tabs Route: PO;                                           aa5 

08:13 Follow up: Response: No adverse reaction                                                ss  

08:13 Drug: carBAMazepine  mg Route: PO;                                                ss  

08:40 Follow up: Response: No adverse reaction                                                aa5 

                                                                                                  

                                                                                                  

Medication:                                                                                       

08:40 VIS not applicable for this client.                                                     aa5 

                                                                                                  

Outcome:                                                                                          

08:22 Discharge ordered by MD.                                                                rn  

08:40 Discharged to home ambulatory, with significant other.                                  aa5 

08:40 Condition: stable                                                                           

08:40 Discharge instructions given to patient, Instructed on discharge instructions, follow       

      up and referral plans. medication usage, Demonstrated understanding of instructions,        

      follow-up care, medications, Prescriptions given X 2.                                       

08:43 Patient left the ED.                                                                    aa5 

                                                                                                  

Signatures:                                                                                       

Dispatcher [employer]                           EDMS                                                 

Eladio Hdz MD MD rn Calderon, Audri, RN                     RN   aa5                                                  

Katty Bell RN                   RN                                                      

Bonita Alexandre                                 ag3                                                  

Lorie Paul RN RN   lg3                                                  

                                                                                                  

**************************************************************************************************

## 2023-08-15 NOTE — XMS REPORT
Continuity of Care Document

                           Created on:August 15, 2023



Patient:ESSENCE MERCHANT

Sex:Female

:1955

External Reference #:[id number]





Demographics







                          Address                   [address] [place] [address]

 

                          Home Phone                [phone] CELL

 

                          Work Phone                [phone]

 

                          Mobile Phone              [phone]

 

                          Email Address             [email or web address]

 

                          Preferred Language        English

 

                          Marital Status            Unknown

 

                          Episcopal Affiliation     Unknown

 

                          Race                      Unknown

 

                          Additional Race(s)        Unavailable



                                                    White



                                                    Other Race

 

                          Ethnic Group              Unknown









Author







                          Organization              [facility]

t

 

                          Address                   [address] [place] [address]

 

                          Phone                     [phone]









Support







                Name            Relationship    Address         Phone

 

                MANUEL MERCHANT              [address]  [phone]



                                                [place] [address] 

 

                MANUEL MERCHANT              [address]  [phone]



                                                [place] [address] 

 

                MayurEssence  Unavailable     [address] [facility] [address] [phone]



                                                [place] [address] 

 

                Manuel Merchant     Unavailable     [address] [facility] [address] [phone]



                                                [place] [address] 

 

                MAYURLUKAS BOONE  Spouse          [address] Unavailable



                                                [place] [address] 

 

                Lukas Merchant  Spouse          [address] [address] [phone]

[phone]



                                                [place] [id number] 

 

                MANUEL MERCHANT     Spouse          [address] Unavailable



                                                [place] [address]

66 

 

                Mayur, Manuel     Spouse          [address] [facility] [address] [phone]

[phone]



                                                [place], [place] [address]

66 









Care Team Providers







                    Name                Role                Phone

 

                    Nara Strickland            Primary Care Physician [phone]

 

                    Nara Strickland          Attending Clinician Unavailable

 

                    MICHAEL GOMEZ     Attending Clinician Unavailable

 

                    JOVANA GOODE Attending Clinician Unavailable

 

                    GWENDOLYN CRISTINA       Attending Clinician Unavailable

 

                    LUTHER GREENWOOD      Attending Clinician Unavailable

 

                    Luther Figueroa  Attending Clinician [phone]

 

                    JOVANA BARNES     Attending Clinician Unavailable

 

                    Aleksander Canseco    Attending Clinician [phone]

 

                    Jovana Goode MD Attending Clinician [phone]

 

                    JOVANA BARNES Attending Clinician Unavailable

 

                    EDUARDO GARSIA     Attending Clinician Unavailable

 

                    TARAS MENG        Attending Clinician Unavailable

 

                    Jerrica Ardon  Attending Clinician [phone]

 

                    WILLIAMS THORNTON  Attending Clinician Unavailable

 

                    Williams Thornton MD    Attending Clinician Unavailable

 

                    Pam Boone MD   Attending Clinician [phone]

 

                    KATEY CALDERA Attending Clinician Unavailable

 

                    PHILIP MCARTHUR Attending Clinician Unavailable

 

                    Armando Fair MD Attending Clinician [phone]

 

                    Ashley Cai MD   Attending Clinician [phone]

 

                    ASHLEY CAI      Attending Clinician Unavailable

 

                    ARMANDO FAIR Attending Clinician Unavailable

 

                    ARMANDO FAIR Attending Clinician Unavailable

 

                    Doctor Unassigned, No Name Attending Clinician Unavailable









Payers







           Payer Name Policy Type Policy Number Effective Date Expiration Date S

mode

 

           [place] P[place] AND            [id number] 



           OUT OF STATE                       00:00:00   00:00:00   

 

           [facility]            [id number] 2023            



           [facility]                       00:00:00              



           PPO                                                    

 

           [facility] MEDICARE            [id number]  2022            



           ADVANTAGE                        00:00:00              

 

           [facility] HealthSelect 1          [id number]  2022            Common



           TRS/ERS MCR PPO                       00:00:00              [facility]

 

           [employer] Blue 6          [id number] 2021 Common



           [place]                       00:00:00   00:00:00   [facility]

 

           SRC AN AETNA            [id number] 2011            



           COMPANY                          00:00:00              







Problems







       Condition Condition Condition Status Onset  Resolution Last   Treating Co

mments 

Source



       Name   Details Category        Date   Date   Treatment Clinician        



                                                 Date                 

 

       Acute  Acute  Disease Active                              Univers



       non-recurr non-recurr               8-14                               it

y of



       ent    ent                  00:00:                             [place]



       maxillary maxillary               00                                 Medi

maryan



       sinusitis sinusitis                                                  [facility]

ch

 

       Other  Other  Disease Active                              Univers



       irritable irritable               8-14                               ity 

of



       bowel  bowel                00:00:                             [place]



       syndrome syndrome               00                                 Medica

l



                                                                      Branch

 

       Weakness Weakness Disease Active                              Unive

rs



                                   8-14                               ity of



                                   00:00:                             [place]



                                   00                                 Medical



                                                                      Branch

 

       Dyslipidem Dyslipidem Disease Active                              U

nivers



       ia     ia                   8-14                               ity of



                                   00:00:                             [place]



                                   00                                 Medical



                                                                      Branch

 

       Leg    Leg    Disease Active                              UT



       erythema erythema               5-10                               Health



                                   00:00:                             



                                   00                                 

 

       Status Status Disease Active                              [facility]



       post total post total               3-14                               He

alth



       knee   knee                 00:00:                             



       replacemen replacemen               00                                 



       t, left t, left                                                  

 

       Ganglion Ganglion Disease Active                              [facility]



       cyst of cyst of               5-02                               Health



       volar  volar                00:00:                             



       aspect of aspect of               00                                 



       left wrist left wrist                                                  

 

       Cervical Cervical Disease Active                              UT



       radiculopa radiculopa               4-18                               He

alth



       thy    thy                  00:00:                             



                                   00                                 

 

       Carpal Carpal Disease Active                              UT



       tunnel tunnel               4-12                               Health



       syndrome, syndrome,               00:00:                             



       right  right                00                                 

 

       Primary Primary Disease Active                              UT



       osteoarthr osteoarthr               4-12                               He

alth



       itis of itis of               00:00:                             



       left wrist left wrist               00                                 

 

       TIA    TIA    Disease Active                              UT



       (transient (transient               3-09                               He

alth



       ischemic ischemic               00:00:                             



       attack) attack)               00                                 

 

       Sacroiliac Sacroiliac Disease Active                              U

T



       joint pain joint pain               3-30                               He

alth



                                   00:00:                             



                                   00                                 

 

       Hypothyroi Hypothyroi Disease Active 2015                             U

T



       dism due dism due                                              Health



       to     to                   00:00:                             



       acquired acquired               00                                 



       atrophy of atrophy of                                                  



       thyroid thyroid                                                  

 

       [id number] Status Problem                                           Common



              post                                                    Spirit



              laparoscop                                                  - [facility]



              ic                                                      [facility]

 

       [id number] Leukopenia Problem                                           Com

mon



              ,                                                       Spirit



              unspecifie                                                  - [facility]



              d type                                                  [facility]

 

       [id number] Primary Problem                                           Comm

on



         osteoarthr                                                  Spirit



              itis of                                                  - [facility]



              left knee                                                  [facility]

 

       [id number] Controlled Problem                                           Co

mmon



              type 2                                                  Spirit



              diabetes                                                  - [facility]



              mellitus                                                  



              without                                                  [facility]



              complicati                                                  Medica

[facility]



              without                                                  



              long-term                                                  



              current                                                  



              use of                                                  



              insulin                                                  

 

       [id number] Primary Problem                                           Common



              insomnia                                                  Spirit



                                                                      - [facility]I



                                                                      [facility]

 

       [id number] Neuropathy Problem                                           C

ommon



         due to                                                  Spirit



              type 2                                                  - [facility]



              diabetes                                                  



              mellitus                                                  [facility]

 

       Diabetes Diabetes Problem                                           Commo

n



       mellitus DMII                                                    Spirit



       without without                                                  - [facility]



       complicati complicati                                                  St



       on     [facility]

 

       [id number] Malabsorpt Problem                                           Com

mon



              ion due to                                                  Spirit



              intoleranc                                                  - [facility]



              e, not                                                  [facility]



              classified                                                  Medica

[facility]

 

       [id number] Iron   Problem                                           Common



              deficiency                                                  Spirit



              anemia,                                                  - [facility]



              unspecifie                                                  St



              d iron                                                  [facility]



              deficiency                                                  Medica

l



              anemia                                                  Center



              type                                                    

 

       Shingles Shingles Problem                                           Commo

n



                                                                      Spirit



                                                                      - [facility]I



                                                                      [facility]

 

       Rheumatoid Rheumatoid Problem                                           C

ommon



       arthritis arthritis                                                  Spir

it



              involving                                                  - [facility]



              multiple                                                  St



              sites with                                                  [facility]



              positive                                                  Medical



              rheumatoid                                                  Center



              factor                                                  

 

       [id number] Itching Problem                                           Commo

n



                                                                      Spirit



                                                                      - CHI



                                                                      [facility]

 

       [id number] Acquired Problem                                           Comm

on



              hypothyroi                                                  Spirit



              dism                                                    [facility]

 

       [id number] Encounter Problem                                           Com

mon



              for                                                     Spirit



              screening                                                  - [facility]



              colonoscop                                                  [facility]

 

       [id number] Pure   Problem                                           Common



              hyperchole                                                  Spirit



              sterolemia                                                  - [facility]

 

       [id number] Fever, Problem                                           Common



              unspecifie                                                  Spirit



              d fever                                                  - [facility]



              cause                                                   [facility]

 

       [id number] Nausea Problem                                           Common



              alone                                                   [facility]

 

       [id number] Axillary Problem                                           Comm

on



              adenopathy                                                  [facility]



                                                                      - [facility]

 

       [id number] DDD    Problem                                           Common



              (degenerat                                                  Spirit



              young disc                                                  - [facility]



              disease),                                                  [facility]







Allergies, Adverse Reactions, Alerts







       Allergy Allergy Status Severity Reaction(s) Onset  Inactive Treating Comm

ents 

Source



       Name   Type                        Date   Date   Clinician        

 

       NO KNOWN Drug   Active                                           Univers



       ALLERGIE Class                                                   ity of



       S                                                              [facility]







Social History







           Social Habit Start Date Stop Date  Quantity   Comments   Source

 

           History of Tobacco                                             Common

 Spirit -



           Use                                                    [facility]

 

           Sex Assigned At                                             Common Sp

claude -



           Birth                                                  [facility]

 

           History SDOH Social                                             Unive

rsity of



           Connections Phone                                             [facility]

 

           History SDOH Social                                             Unive

rsity of



           Connections [facility]

ical



           Together                                               Branch

 

           History SDOH Social                                             Unive

rsity of



           Connections [facility]

 

           History SDOH                                             University o

f



           Housing Places                                             [facility]

 

           Gender identity                                             Universit

y of



                                                                  [facility]

 

           Sexual orientation                                             Univer

[facility]

 

           History of Social 2023                       Univers

ity of



           function   00:00:00   00:00:00                         [facility]

 

           Exposure to 2023 Not sure              University of



           SARS-CoV-2 (event) 00:00:00   13:40:00                         [facility]

 

           History SDOH 2023 1                     University o

f



           Alcohol Frequency 00:00:00   00:00:00                         [facility]

 

           History SDOH 2023 0                     University o

f



           Alcohol Std Drinks 00:00:00   00:00:00                         [place] 

Medical



                                                                  Branch

 

           History SDOH 2023 1                     University o

f



           Alcohol Binge 00:00:00   00:00:00                         [place] [facility]

al



                                                                  Branch

 

           History SDOH Social 2023 2                     Unive

rsity of



           Connections 00:00:00   00:00:00                         [place] [facility]



           Membership                                             Branch

 

           History SDOH Social 2023 1                     Unive

rsity of



           Connections 00:00:00   00:00:00                         [place] [facility]



           Meetings                                               Branch

 

           History SDOH Social 2023 3                     Unive

rsity of



           Connections Living 00:00:00   00:00:00                         [place] 

Medical



                                                                  Branch

 

           History SDOH 2023 0                     University o

f



           Physical Activity 00:00:00   00:00:00                         [place] M

edical



           DPW                                                    Branch

 

           History SDOH 2023 0                     University o

f



           Physical Activity 00:00:00   00:00:00                         [place] M

edical



           MPS                                                    Branch

 

           History SDOH Stress 2023 5                     Unive

rsity of



                      00:00:00   00:00:00                         [place] [facility]



                                                                  Branch

 

           History SDOH Food 2023 1                     Univers

ity of



           Worry      00:00:00   00:00:00                         [place] [facility]



                                                                  Branch

 

           History SDOH Food 2023 1                     Univers

ity of



           Scarcity   00:00:00   00:00:00                         [place] [facility]



                                                                  Branch

 

           History SDOH 2023 2                     University o

f



           Transport Med 00:00:00   00:00:00                         [place] [facility]

al



                                                                  Branch

 

           History SDOH 2023 2                     University o

f



           Transport Non-Med 00:00:00   00:00:00                         [place] M

edical



                                                                  Branch

 

           History SDOH 2023 2                     [facility]

[facility]



           Housing Unable to 00:00:00   00:00:00                         [place] [facility]

[facility]



           Pay                                                    [facility]

 

           History SDOH 2023 2                     [facility]

[facility]



           Housing Homeless 00:00:00   00:00:00                         [place] [facility]

[facility]



           Last Year                                              [facility]

 

           Alcohol intake 2023 Lifetime              [facility]



                      00:00:00   00:00:00   non-drinker            



                                            (finding)             

 

           Tobacco use and 2022 Smokeless             [facility]



           exposure   00:00:00   00:00:00   tobacco non-user            

 

           Cigarette  2022                       [facility]



           pack-years 00:00:00   00:00:00                         

 

           Tobacco Comment 2022 Smoking History            [facility]

[facility]



                      00:00:00   00:00:00   Packs/day: N.            



                                            Recorded:            



                                            021                   









                Smoking Status  Start Date      Stop Date       Source

 

                Never smoked tobacco                                 [facility]







Medications







       Ordered Filled Start  Stop   Current Ordering Indication Dosage Frequency

 Signature

                    Comments            Components          Source



     Medication Medication Date Date Medication? Clinician                (SIG) 

          



     Name Name                                                   

 

     MULTIVITAMI            Yes            1{tbl}      Take 1           Un

[facility]lin



     NS WITH      8-14                               tablet by           ity of



     FLUORIDE      15:12:                               mouth in           [place]



     (MULTI-RAJESH      07                                 the            Medical



     MIN ORAL)                                         morning.           Branch

 

     metFORMIN            Yes            1000mg      Take 1           Univ

ers



     1,000 mg      8-14                               tablet by           ity of



     tablet      15:12:                               mouth           [place]



               07                                 daily with           Medical



                                                  breakfast.           Branch

 

     gabapentin      0      Yes            800mg      Take 1           Univ

ers



     800 mg      8-14                               tablet by           ity of



     tablet      15:12:                               mouth in           [place]



               07                                 the            Medical



                                                  morning           Branch



                                                  and 1           



                                                  tablet at           



                                                  noon and 1           



                                                  tablet in           



                                                  the            



                                                  evening.           

 

     MULTIVITAMI            Yes            1{tbl}      Take 1           Un

day[facility]



     NS WITH      8-14                               tablet by           ity of



     FLUORIDE      15:12:                               mouth in           [place]



     (MULTI-RAJESH      07                                 the            Medical



     MIN ORAL)                                         morning.           Branch

 

     metFORMIN      -0      Yes            1000mg      Take 1           Univ

ers



     1,000 mg      8-14                               tablet by           ity of



     tablet      15:12:                               mouth           [place]



               07                                 daily with           Medical



                                                  breakfast.           Branch

 

     gabapentin      -0      Yes            800mg      Take 1           Univ

ers



     800 mg      8-14                               tablet by           ity of



     tablet      15:12:                               mouth in           [place]



               07                                 the            Medical



                                                  morning           Branch



                                                  and 1           



                                                  tablet at           



                                                  noon and 1           



                                                  tablet in           



                                                  the            



                                                  evening.           

 

     ondansetron      -0      Yes            4mg       Take 1           Univ

ers



     4 mg      8-13                               tablet by           ity of



     disintegrat      00:00:                               mouth           [place]



     ing tablet      00                                 every 6           Medica

l



                                                  (six)           Branch



                                                  hours as           



                                                  needed for           



                                                  Nausea and           



                                                  Vomiting           



                                                  (N/V).           

 

     diclofenac      2023-0      Yes            75mg      Take 1           Unive

rs



     75 mg EC      8-13                               tablet by           ity of



     tablet      00:00:                               mouth 2           [place]



               00                                 (two)           Medical



                                                  times           Branch



                                                  daily as           



                                                  needed.           

 

     amoxicillin      2023-0      Yes            1{tbl}      Take 1           Un

daylin



     -clavulanat      8-13                               tablet by           ity

 of



     e 875-125      00:00:                               [place] in           [place]

[facility]



     mg per      00                                 the            Medical



     tablet                                         morning           [facility]anch



                                                  and 1           



                                                  tablet in           



                                                  the            



                                                  evening.           



                                                  x10 days           

 

     ondansetron      2023-0      Yes            4mg       Take 1           Univ

ers



     4 mg      8-13                               tablet by           ity of



     disintegrat      00:00:                               mouth           [place]



     ing tablet      00                                 every 6           Medica

l



                                                  (six)           Branch



                                                  hours as           



                                                  needed for           



                                                  Nausea and           



                                                  Vomiting           



                                                  (N/V).           

 

     diclofenac      2023-0      Yes            75mg      Take 1           Unive

rs



     75 mg EC      8-13                               tablet by           ity of



     tablet      00:00:                               mouth 2           [place]



               00                                 (two)           Medical



                                                  times           Branch



                                                  daily as           



                                                  needed.           

 

     amoxicillin      2023-0      Yes            1{tbl}      Take 1           Un

[facility]



     -clavulanat      8-13                               tablet by           ity

 of



     e 875-125      00:00:                               [place] in           [place]

[facility]



     mg per      00                                 the            Medical



     tablet                                         morning           Branch



                                                  and 1           



                                                  tablet in           



                                                  the            



                                                  evening.           



                                                  x10 days           

 

     chlorhexidi      2023-0      Yes                      [place] and           U

nivers



     ne 0.12 %      8-10                               spit out 2           ity 

of



     mouthwash      00:00:                               (two)           [place]



               00                                 times           Medical



                                                  daily.           Branch

 

     chlorhexidi      2023-0      Yes                      [place] and           U

nivers



     ne 0.12 %      8-10                               spit out 2           ity 

of



     mouthwash      00:00:                               (two)           [place]



               00                                 times           Medical



                                                  daily.           Branch

 

     HYDROcodone      2023-0      Yes                      TAKE 1           Univ

ers



     -acetaminop      8-04                               TABLET BY           ity

 of



     hen       00:00:                               MOUTH           [place]



     mg tablet      00                                 EVERY 4           Medical



                                                  HOURS AS           Branch



                                                  NEEDED FOR           



                                                  SEVERE           



                                                  PAIN FOR           



                                                  UP TO 7           



                                                  DAYS           

 

     HYDROcodone      2023-0      Yes                      TAKE 1           Univ

ers



     -acetaminop      8-04                               TABLET BY           ity

 of



     hen       00:00:                               MOUTH           [place]



     mg tablet      00                                 EVERY 4           Medical



                                                  HOURS AS           Branch



                                                  NEEDED FOR           



                                                  SEVERE           



                                                  PAIN FOR           



                                                  UP TO 7           



                                                  DAYS           

 

     HYDROcodone      2023-0 2023- Yes       [id number] 1{tbl}      Take 1     

      [facility]



     -acetaminop      8-04 08-12           [id number]           tablet by           

Health



     hen ([place])      00:00: 04:59                          mouth           



      MG      00   :00                           every 4           



     tablet                                         (four)           



                                                  hours if           



                                                  needed for           



                                                  severe           



                                                  pain for           



                                                  up to 7           



                                                  days.           

 

     cyanocobala      -0      Yes                      INJECT           Univ

ers



     min 1,000      8-01                               INTRAMUSCU           ity 

of



     mcg/mL      00:00:                               LARLY           [place]



     injection      00                                 1000MCG (1           Medi

maryan



                                                  ML ) ONCE           Branch



                                                  A MONTH           

 

     cyanocobala      -0      Yes                      INJECT           Univ

ers



     min 1,000      8-01                               INTRAMUSCU           ity 

of



     mcg/mL      00:00:                               LARLY           [place]



     injection      00                                 1000MCG (1           Medi

maryan



                                                  ML ) ONCE           Branch



                                                  A MONTH           

 

     predniSONE      -0      Yes            30mg      Take 6           Unive

rs



     5 mg tablet      7-27                               tablets by           it

y of



               00:00:                               mouth in           [place]



               00                                 the            Medical



                                                  morning           Branch



                                                  and 6           



                                                  tablets in           



                                                  the            



                                                  evening.           

 

     predniSONE      -0      Yes            30mg      Take 6           Unive

rs



     5 mg tablet      7-27                               tablets by           it

y of



               00:00:                               mouth in           [place]



               00                                 the            Medical



                                                  morning           Branch



                                                  and 6           



                                                  tablets in           



                                                  the            



                                                  evening.           

 

     temazepam      -0      Yes       [id number]           1 capsule          

 [facility]



     15 mg      7-24                               at bedtime           ity of



     capsule      00:00:                               as needed           [place]



               00                                 Orally           Medical



                                                  Once a day           Branch



                                                  for 30           



                                                  days           

 

     temazepam      3-0      Yes       [id number]           1 capsule          

 [facility]



     15 mg      7-24                               at bedtime           ity of



     capsule      00:00:                               as needed           [place]



               00                                 Orally           Medical



                                                  Once a day           Branch



                                                  for 30           



                                                  days           

 

     temazepam      3-0      Yes       [id number]           1 capsule          

 [facility]



     15 mg      7-24                               at bedtime           ity of



     capsule      00:00:                               as needed           [place]



               00                                 Orally           Medical



                                                  Once a day           Branch



                                                  for 30           



                                                  days           

 

     predniSONE      3-0      Yes       [id number]           Take one       

    [facility]



     (Deltasone)      7-16                [id number]           tab PO BID           

Health



     5 MG tablet      00:00:                               x 10 days           



               00                                 and one           



                                                  tab PO           



                                                  daily           



                                                  therafeter           

 

     predniSONE      3-0      Yes       [id number]           Take one       

    [facility]



     (Deltasone)      7-16                [id number]           tab PO BID           

Health



     5 MG tablet      00:00:                               x 10 days           



               00                                 and one           



                                                  tab PO           



                                                  daily           



                                                  therafeter           

 

     hydrOXYzine      -0      Yes       [id number] 10mg Q6H  Take 1          

 [facility]



     HCl       7-16                               tablet (10           Health



     (Atarax) 10      00:00:                               mg total)           



     MG tablet      00                                 by mouth           



                                                  every 6           



                                                  (six)           



                                                  hours if           



                                                  needed for           



                                                  itching           



                                                  for up to           



                                                  10 days.           

 

     hydrOXYzine      0 2023- Yes       [id number] 10mg Q6H  Take 1         

  [facility]



     HCl       7-16 07-27                          tablet (10           Health



     (Atarax) 10      00:00: 04:59                          mg total)           



     MG tablet      00   :00                           by mouth           



                                                  every 6           



                                                  (six)           



                                                  hours if           



                                                  needed for           



                                                  itching           



                                                  for up to           



                                                  10 days.           

 

     levothyroxi      -0      Yes       [id number]           TAKE 1          

 [facility]



     ne 175 mcg      7-13                               TABLET BY           ity 

of



     tablet      00:00:                               MOUTH           [place]



               00                                 EVERY DAY           Medical



                                                  IN THE           [place]



                                                  MORNING ON           



                                                  EMPTY           



                                                  STOMACH           

 

     levothyroxi      -0      Yes       [id number]           TAKE 1          

 [facility]



     ne 175 mcg      7-13                               TABLET BY           ity 

of



     tablet      00:00:                               MOUTH           [place]



               00                                 EVERY DAY           Medical



                                                  IN THE           [place]



                                                  MORNING ON           



                                                  EMPTY           



                                                  STOMACH           

 

     levothyroxi      -0      Yes       [id number]           TAKE 1          

 [facility]



     ne 175 mcg      7-13                               TABLET BY           ity 

of



     tablet      00:00:                               MOUTH           [place]



               00                                 EVERY DAY           Medical



                                                  IN THE           [place]



                                                  MORNING ON           



                                                  EMPTY           



                                                  STOMACH           

 

     levothyroxi      -0      Yes       [id number]           TAKE 1          

 [facility]



     ne 175 mcg      7-13                               TABLET BY           ity 

of



     tablet      00:00:                               MOUTH           [place]



               00                                 EVERY DAY           Medical



                                                  IN THE           [place]



                                                  MORNING ON           



                                                  EMPTY           



                                                  STOMACH           

 

     predniSONE      -0      Yes       [id number]           Take two       

    [facility]



     (Deltasone)      6-30                05             tablets           Healt

h



     5 MG tablet      00:00:                               together           



               00                                 PO BID           

 

     predniSONE      -0      Yes       [id number]           Take two       

    [facility]



     (Deltasone)      6-30                05             tablets           [facility]alt

h



     5 MG tablet      00:00:                               together           



               00                                 PO BID           

 

     predniSONE      -0      Yes       [id number]           Take two       

    [facility]



     (Deltasone)      6-30                05             tablets           Healt

h



     5 MG tablet      00:00:                               together           



               00                                 PO BID           

 

     sulfaSALAzi      -0      Yes            2000mg      Take 4           Un

daylin



     ne 500 mg      6-30                               tablets by           ity 

of



     EC tablet      00:00:                               mouth in           [place]

[facility]



               00                                 the            Medical



                                                  morning.           Branch

 

     sulfaSALAzi      -0      Yes            2000mg      Take 4           Un

[facility]lin



     ne 500 mg      6-30                               tablets by           ity 

of



     EC tablet      00:00:                               mouth in           [place]

[facility]



               00                                 the            Medical



                                                  morning.           Branch

 

     MULTIVITAMI            Yes                      Take by           Uni

vers



     NS WITH      6-26                               mouth.           ity of



     FLUORIDE      13:10:                                              [place]



     (MULTI-RAJESH      53                                                Medical



     MIN ORAL)                                                        Branch

 

     azaTHIOprin            Yes                      See            Univer

s



     e 50 mg      6-26                               administra           ity of



     tablet      13:10:                               tion           [address]            Branch

 

     metFORMIN            Yes                                     [facility]



     1,000 mg      6-26                                              ity of



     tablet      13:10:                                              [address]

 

     gabapentin            Yes                                     [facility]



     800 mg      6-26                                              ity of



     tablet      13:10:                                              [address]

 

     sulfaSALAzi            Yes                                     [facility]

s



     ne 500 mg      6-26                                              ity of



     tablet      13:10:                                              [address]

 

     MULTIVITAMI            Yes                      Take by           [facility]i

vers



     NS WITH      6-26                               mouth.           ity of



     FLUORIDE      13:10:                                              [place]



     (MULTI-RAJESH      53                                                Medical



     MIN ORAL)                                                        [place]

 

     azaTHIOprin            Yes                      See            Univer

s



     e 50 mg      6-26                               administra           ity of



     tablet      13:10:                               tion           [address]            [facility]

 

     metFORMIN            Yes                                     [facility]



     1,000 mg      6-26                                              ity of



     tablet      13:10:                                              [address]

 

     gabapentin            Yes                                     [facility]



     800 mg      6-26                                              ity of



     tablet      13:10:                                              [address]

 

     sulfaSALAzi            Yes                                     Un[facility]

s



     ne 500 mg      6-26                                              ity of



     tablet      13:10:                                              [address]

 

     MULTIVITAMI            Yes                      Take by           Uni

vers



     NS WITH      6-26                               mouth.           ity of



     FLUORIDE      13:10:                                              [place]



     (MULTI-RAJESH      53                                                Medical



     MIN ORAL)                                                        [place]

 

     azaTHIOprin            Yes                      See            Univer

s



     e 50 mg      6-26                               administra           ity of



     tablet      13:10:                               tion           [address]            [facility]

 

     metFORMIN            Yes                                     [facility]



     1,000 mg      6-26                                              ity of



     tablet      13:10:                                              [address]

 

     gabapentin            Yes                                     [facility]



     800 mg      6-26                                              ity of



     tablet      13:10:                                              [address]

 

     sulfaSALAzi            Yes                                     Un[facility]

s



     ne 500 mg      6-26                                              ity of



     tablet      13:10:                                              [address]

 

     MULTIVITAMI            Yes                      Take by           Uni

vers



     NS WITH      6-26                               mouth.           ity of



     FLUORIDE      13:10:                                              [place]



     (MULTI-RAJESH      53                                                Medical



     MIN ORAL)                                                        [place]

 

     azaTHIOprin            Yes                      See            Univer

s



     e 50 mg      6-26                               administra           ity of



     tablet      13:10:                               tion           [address]            [facility]

 

     metFORMIN            Yes                                     [facility]



     1,000 mg      6-26                                              ity of



     tablet      13:10:                                              [address]

 

     gabapentin      0      Yes                                     [facility]



     800 mg      6-26                                              ity of



     tablet      13:10:                                              [address]

 

     sulfaSALAzi            Yes                                     [facility]

s



     ne 500 mg      6-26                                              ity of



     tablet      13:10:                                              [address]

 

     azaTHIOprin            Yes                      See            Univer

s



     e 50 mg      6-26                               administra           ity of



     tablet      13:10:                               tion           [address]



                                                  ns.            Branch

 

     azaTHIOprin            Yes                      See            Univer

s



     e 50 mg      6-26                               administra           ity of



     tablet      13:10:                               tion           [address]



                                                  ns.            Branch

 

     inFLIXimab      -0 - No                       Inject           Univ

ers



     (REMICADE)      -                          intravenou           it

y of



     100 mg      13:10: 00:00                          sly once           [place]



     injection      26   :00                           now. Every           Medi

maryan



                                                  6 weeks           [facility]

 

     inFLIXimab      -0 - No                       Inject           Univ

ers



     (REMICADE)                                intravenou           it

y of



     100 mg      13:10: 00:00                          sly once           [place]



     injection      26   :00                           now. Every           Medi

maryan



                                                  6 weeks           [facility]

 

     aspirin 81      -0 - No             81mg      Take 81 mg           

[facility]



     mg EC                                by mouth           ity of



     tablet      13:10: 00:00                          daily.           [place]



               04   :00                                          [facility]

 

     aspirin 81      -0 - No             81mg      Take 81 mg           

[facility]



     mg EC                                by mouth           ity of



     tablet      13:10: 00:00                          daily.           [place]



               04   :00                                          [facility]

 

     Colestipol      -0      Yes        1g        Take 1           

[facility]



     HCl 1 gram      6-26                               tablet by           ity 

of



     tablet      00:00:                               mouth in           [place]



               00                                 the            Medical



                                                  morning           [place]



                                                  and 1           



                                                  tablet in           



                                                  the            



                                                  evening.           

 

     Colestipol      -0      Yes        1g        Take 1           

[facility]



     HCl 1 gram      6-26                               tablet by           ity 

of



     tablet      00:00:                               mouth in           [address]



                                                  morning           [place]



                                                  and 1           



                                                  tablet in           



                                                  the            



                                                  evening.           

 

     Colestipol      -0      Yes        1g        Take 1           

[facility]



     HCl 1 gram      6-26                               tablet by           ity 

of



     tablet      00:00:                               mouth in           [address]



                                                  morning           [place]



                                                  and 1           



                                                  tablet in           



                                                  the            



                                                  evening.           

 

     Colestipol      2023-0      Yes       [id number] 1g        Take 1           

[facility]



     HCl 1 gram      6-26                               tablet by           ity 

of



     tablet      00:00:                               mouth in           [place]



               00                                 the            Medical



                                                  morning           [facility]



                                                  and 1           



                                                  tablet in           



                                                  the            



                                                  evening.           

 

     Colestipol      -0      Yes        1g        Take 1           

[facility]



     HCl 1 gram      6-26                               tablet by           ity 

of



     tablet      00:00:                               mouth in           [place]



               00                                 the            Medical



                                                  morning           [facility]



                                                  and 1           



                                                  tablet in           



                                                  the            



                                                  evening.           

 

     Colestipol      -0      Yes        1g        Take 1           

[facility]



     HCl 1 gram      6-26                               tablet by           ity 

of



     tablet      00:00:                               mouth in           [place]



               00                                 the            Medical



                                                  morning           [facility]



                                                  and 1           



                                                  tablet in           



                                                  the            



                                                  evening.           

 

     methylPREDN      -0      Yes                      TAKE 6           Univ

ers



     ISolone 4      6-09                               TABLETS ON           ity 

of



     mg tablets      00:00:                               DAY 1 AS           [facility]

as



               00                                 DIRECTED           Medical



                                                  ON PACKAGE           [facility]



                                                  AND            



                                                  DECREASE           



                                                  BY 1 TAB           



                                                  EACH DAY           



                                                  FOR A           



                                                  TOTAL OF 6           



                                                  DAYS           

 

     methylPREDN      -0      Yes                      TAKE 6           Univ

ers



     ISolone 4      6-09                               TABLETS ON           ity 

of



     mg tablets      00:00:                               DAY 1 AS           [facility]

as



               00                                 DIRECTED           Medical



                                                  ON PACKAGE           [facility]



                                                  AND            



                                                  DECREASE           



                                                  BY 1 TAB           



                                                  EACH DAY           



                                                  FOR A           



                                                  TOTAL OF 6           



                                                  DAYS           

 

     methylPREDN      -0      Yes                      TAKE 6           Univ

ers



     ISolone 4      6-09                               TABLETS ON           ity 

of



     mg tablets      00:00:                               DAY 1 AS           [facility]

as



               00                                 DIRECTED           Medical



                                                  ON PACKAGE           [facility]



                                                  AND            



                                                  DECREASE           



                                                  BY 1 TAB           



                                                  EACH DAY           



                                                  FOR A           



                                                  TOTAL OF 6           



                                                  DAYS           

 

     methylPREDN      -0      Yes                      TAKE 6           Univ

ers



     ISolone 4      6-09                               TABLETS ON           ity 

of



     mg tablets      00:00:                               DAY 1 AS           [facility]

as



               00                                 DIRECTED           Medical



                                                  ON PACKAGE           [facility]



                                                  AND            



                                                  DECREASE           



                                                  BY 1 TAB           



                                                  EACH DAY           



                                                  FOR A           



                                                  TOTAL OF 6           



                                                  DAYS           

 

     methylPREDN      -0 - No                       TAKE 6           Uni

vers



     ISolone 4      6-09 08-14                          TABLETS ON           ity

 of



     mg tablets      00:00: 00:00                          DAY 1 AS           [place]

[place]



               00   :00                           DIRECTED           Medical



                                                  ON PACKAGE           [facility]



                                                  AND            



                                                  DECREASE           



                                                  BY 1 TAB           



                                                  EACH DAY           



                                                  FOR A           



                                                  TOTAL OF 6           



                                                  DAYS           

 

     methylPREDN      -0 - No                       TAKE 6           Uni

vers



     ISolone 4      6-09 08-14                          TABLETS ON           ity

 of



     mg tablets      00:00: 00:00                          DAY 1 AS           [facility]

[place]



               00   :00                           DIRECTED           Medical



                                                  ON PACKAGE           [facility]



                                                  AND            



                                                  DECREASE           



                                                  BY 1 TAB           



                                                  EACH DAY           



                                                  FOR A           



                                                  TOTAL OF 6           



                                                  DAYS           

 

     predniSONE      -0 2023- Yes       [id number] 5mg  Q.5D Take 1        

   [facility]



     (Deltasone)      6-09 07-10           [id number]           tablet (5           

Health



     5 MG tablet      00:00: 04:59                          mg total)           



               00   :00                           by mouth           



                                                  in the           



                                                  morning           



                                                  and 1           



                                                  tablet (5           



                                                  mg total)           



                                                  in the           



                                                  evening.           



                                                  Start this           



                                                  prescripti           



                                                  on after           



                                                  completing           



                                                  Medrol.           

 

     predniSONE      2023- Yes       [id number] 5mg  Q.5D Take 1        

   [facility]



     (Deltasone)      6-09 07-10           [id number]           tablet (5           

Health



     5 MG tablet      00:00: 04:59                          mg total)           



               00   :00                           by mouth           



                                                  in the           



                                                  morning           



                                                  and 1           



                                                  tablet (5           



                                                  mg total)           



                                                  in the           



                                                  evening.           



                                                  Start this           



                                                  prescripti           



                                                  on after           



                                                  completing           



                                                  Medrol.           

 

     HYDROcodone      2023- No        [id number] 1{tbl}      Take 1     

      [facility]



     -acetaminop                 05             tablet by           [facility]

alth



     hen ([place])      00:00: 04:59                          mouth           



      MG      00   :00                           every 4           



     tablet                                         (four)           



                                                  hours if           



                                                  needed for           



                                                  severe           



                                                  pain for           



                                                  up to 7           



                                                  days.           

 

     methylPREDN      2023- No        [id number] 4mg       Take 1       

    [facility]



     ISolone      6-09 06-10           [id number]           tablet (4           [facility]

[facility]



     (Medrol      00:00: 04:59                          mg total)           



     [place]) 4      00   :00                           by mouth 1           



     MG tablets                                         (one) time           



                                                  for 1           



                                                  dose.           



                                                  Follow           



                                                  schedule           



                                                  on package           



                                                  instructirene dumont             

 

     celecoxib      -0      Yes       [id number]           TAKE 1          

 [facility]



     (CeleBREX)                      [id number]           CAPSULE           Health



     200 MG      00:00:                               (200 MG           



     capsule      00                                 TOTAL) BY           



                                                  MOUTH IN           



                                                  THE            



                                                  MORNING           



                                                  AND IN THE           



                                                  EVENING           

 

     celecoxib      -0      Yes       [id number]           TAKE 1          

 [facility]



     (CeleBREX)      6-                9105           CAPSULE           Health



     200 MG      00:00:                               (200 MG           



     capsule      00                                 TOTAL) BY           



                                                  MOUTH IN           



                                                  THE            



                                                  MORNING           



                                                  AND IN THE           



                                                  EVENING           

 

     celecoxib      -0      Yes       [id number]           TAKE 1          

 [facility]



     (CeleBREX)      6-                9105           CAPSULE           Health



     200 MG      00:00:                               (200 MG           



     capsule      00                                 TOTAL) BY           



                                                  MOUTH IN           



                                                  THE            



                                                  MORNING           



                                                  AND IN THE           



                                                  EVENING           

 

     celecoxib      -0      Yes       [id number]           TAKE 1          

 [facility]



     (CeleBREX)      6-                9105           CAPSULE           Health



     200 MG      00:00:                               (200 MG           



     capsule      00                                 TOTAL) BY           



                                                  MOUTH IN           



                                                  THE            



                                                  MORNING           



                                                  AND IN THE           



                                                  EVENING           

 

     celecoxib      -0 - No                       TAKE 1           Unive

rs



     200 mg      6-06 08-14                          CAPSULE           ity of



     capsule      00:00: 00:00                          (200 MG           [place]



               00   :00                           TOTAL) BY           Medical



                                                  MOUTH IN           [place]



                                                  THE            



                                                  MORNING           



                                                  AND IN THE           



                                                  EVENING           

 

     celecoxib      2023-0 2023- No                       TAKE 1           Unive

rs



     200 mg                                CAPSULE           ity of



     capsule      00:00: 00:00                          (200 MG           [place]



               00   :00                           TOTAL) BY           Medical



                                                  MOUTH IN           [place]



                                                  THE            



                                                  MORNING           



                                                  AND IN THE           



                                                  EVENING           

 

     rosuvastati      2023-0      Yes            5mg       Take 1           [facility]

[facility]



     n 5 mg      6-05                               tablet by           [facility]



     tablet      00:00:                               mouth in           [place]



               00                                 the            Medical



                                                  morning.           [facility]

 

     rosuvastati      2023-0      Yes            5mg       Take 1           [facility]

[facility]



     n 5 mg      6-05                               tablet by           [facility]



     tablet      00:00:                               mouth in           [place]



                                                the            Medical



                                                  morning.           [facility]

 

     rosuvastati      2023-0      Yes            5mg       Take 1           [facility]

ers



     n 5 mg      6-05                               tablet by           [facility]



     tablet      00:00:                               mouth in           [place]



                                                the            Medical



                                                  morning.           [facility]

 

     rosuvastati      2023-0      Yes            5mg       Take 1           [facility]

ers



     n 5 mg      6-05                               tablet by           [facility]



     tablet      00:00:                               mouth in           [place]



                                                the            Medical



                                                  morning.           [facility]

 

     rosuvastati      2023-0      Yes            5mg       Take 1           [facility]

ers



     n 5 mg      6-05                               tablet by           [facility]



     tablet      00:00:                               mouth in           [place]



                                                the            Medical



                                                  morning.           [facility]

 

     rosuvastati      2023-0      Yes            5mg       Take 1           [facility]

ers



     n 5 mg      6-05                               tablet by           [facility]



     tablet      00:00:                               mouth in           [place]



                                                the            Medical



                                                  morning.           [place]

 

     temazepam      3-0      Yes                      1 capsule           [facility]

[facility]



     15 mg      5-24                               at bedtime           [facility]



     capsule      00:00:                               as needed           [place]



               00                                 Orally           Medical



                                                  Once a day           [place]



                                                  for 30           



                                                  days           

 

     temazepam      2023-0      Yes                      1 capsule           [facility]

[facility]



     15 mg      5-24                               at bedtime           [facility]



     capsule      00:00:                               as needed           [place]



               00                                 Orally           Medical



                                                  Once a day           [place]



                                                  for 30           



                                                  days           

 

     temazepam      2023-0      Yes                      1 capsule           [facility]

[facility]



     15 mg      5-24                               at bedtime           [facility]



     capsule      00:00:                               as needed           [place]



               00                                 Orally           Medical



                                                  Once a day           [place]



                                                  for 30           



                                                  days           

 

     Temazepam Temazepam 3-0      No             1{capsu QD   Temazepam      

     



     15 MG 15 MG 5-24                     le_at_b      15 MG           



               00:00:                     edtime_                     



               00                       as_need                     



                                        ed}                      

 

     temazepam      2023-0 2023- No                       1 capsule           [facility]

[facility]



     15 mg      5-24 07-24                          at bedtime           [facility]



     capsule      00:00: 00:00                          as needed           [place]

[facility]



               00   :00                           Orally           [facility]



                                                  Once a day           [facility]



                                                  for 30           



                                                  days           

 

     predniSONE      -0 - Yes       [id number] 5mg  QD   Take 1        

   [facility]



     (Deltasone)      -24 06-24           05             tablet (5           [facility]

[facility]



     5 MG tablet      00:00: 04:59                          mg total)           



               00   :00                           by mouth 1           



                                                  (one) time           



                                                  each day.           

 

     hydrOXYchlo      -0      Yes                      TAKE 1           [facility]

[facility]



     roQUINE 200      5-23                               TABLET BY           [facility]

 of



     mg tablet      00:00:                               MOUTH           [place]



               00                                 TWICE A           Medical



                                                  DAY WITH           [facility]



                                                  FOOD OR           



                                                  MILK           

 

     hydrOXYchlo      2023-0      Yes                      TAKE 1           [facility]

[facility]



     roQUINE 200      5-23                               TABLET BY           [facility]

 of



     mg tablet      00:00:                               MOUTH           [place]



               00                                 TWICE A           Medical



                                                  DAY WITH           [facility]



                                                  FOOD OR           



                                                  MILK           

 

     hydrOXYchlo      3-0      Yes                      TAKE 1           [facility]

ers



     roQUINE 200      5-23                               TABLET BY           [facility]

 of



     mg tablet      00:00:                               MOUTH           [place]



               00                                 TWICE A           Medical



                                                  DAY WITH           [facility]



                                                  FOOD OR           



                                                  MILK           

 

     hydrOXYchlo      3-0      Yes                      TAKE 1           [facility]

ers



     roQUINE 200      5-23                               TABLET BY           [facility]

 of



     mg tablet      00:00:                               MOUTH           [place]



               00                                 TWICE A           Medical



                                                  DAY WITH           [facility]



                                                  FOOD OR           



                                                  MILK           

 

     hydrOXYchlo      3-0      Yes                      TAKE 1           [facility]

ers



     roQUINE 200      5-23                               TABLET BY           [facility]

 of



     mg tablet      00:00:                               MOUTH           [place]



               00                                 TWICE A           Medical



                                                  DAY WITH           [facility]



                                                  FOOD OR           



                                                  MILK           

 

     hydrOXYchlo      3-0      Yes                      TAKE 1           [facility]

ers



     roQUINE 200      5-23                               TABLET BY           [facility]

 of



     mg tablet      00:00:                               MOUTH           [place]



               00                                 TWICE A           Medical



                                                  DAY WITH           [facility]



                                                  FOOD OR           



                                                  MILK           

 

     Colestipol      -0 - No             1g        Take 1           [facility]

ers



     HCl 1 gram      5-11 -26                          tablet by           [facility]

 of



     tablet      00:00: 00:00                          mouth in           [place]



               00   :00                           the            Medical



                                                  morning.           [facility]

 

     Colestipol      -0 - No             1g        Take 1           [facility]

ers



     HCl 1 gram      5-11 -26                          tablet by           [facility]

 of



     tablet      00:00: 00:00                          mouth in           [place]



               00   :00                           the            Medical



                                                  morning.           [facility]

 

     HYDROcodone      -0 - No        [id number] 1{tbl}      Take 1     

      [facility]



     -acetaminop      5-09 05-17           05             tablet by           [facility]

[facility]



     hen ([place])      00:00: 04:59                          mouth           



      MG      00   :00                           every 4           



     tablet                                         (four)           



                                                  hours if           



                                                  needed for           



                                                  severe           



                                                  pain for           



                                                  up to 7           



                                                  days.           

 

     meloxicam      3-0      Yes       [id number]           TAKE 1          

 [facility]



     (Mobic) 15      4-30                9109           TABLET (15           [facility]

[facility]



     MG tablet      00:00:                               MG TOTAL)           



               00                                 BY MOUTH           



                                                  ONE TIME           



                                                  EACH DAY.           

 

     meloxicam      2023-0      Yes       [id number]           TAKE 1          

 [facility]



     (Mobic) 15      4-30                9109           TABLET (15           [facility]

[facility]



     MG tablet      00:00:                               MG TOTAL)           



               00                                 BY MOUTH           



                                                  ONE TIME           



                                                  EACH DAY.           

 

     meloxicam      2023-0      Yes       [id number]           TAKE 1          

 [facility]



     (Mobic) 15      4-30                9109           TABLET (15           [facility]

[facility]



     MG tablet      00:00:                               MG TOTAL)           



               00                                 BY MOUTH           



                                                  ONE TIME           



                                                  EACH DAY.           

 

     meloxicam      2023-0      Yes       [id number]           TAKE 1          

 [facility]



     (Mobic) 15      4-30                9109           TABLET (15           [facility]

[facility]



     MG tablet      00:00:                               MG TOTAL)           



               00                                 BY MOUTH           



                                                  ONE TIME           



                                                  EACH DAY.           

 

     meloxicam      3-0      Yes       [id number]           TAKE 1          

 [facility]



     (Mobic) 15      4-30                9109           TABLET (15           [facility]

[facility]



     MG tablet      00:00:                               MG TOTAL)           



               00                                 BY MOUTH           



                                                  ONE TIME           



                                                  EACH DAY.           

 

     foLIC acid      -0      Yes                      TAKE 2           [facility]

[facility]



     1 mg tablet      4-27                               TABLETS BY           it

y of



               00:00:                               MOUTH           [place]



               00                                 EVERY DAY           Medical



                                                  FOR 90           [facility]



                                                  DAYS           

 

     foLIC acid      -0      Yes                      TAKE 2           [facility]

[facility]



     1 mg tablet      4-27                               TABLETS BY           it

y of



               00:00:                               MOUTH           [place]



               00                                 EVERY DAY           Medical



                                                  FOR 90           [facility]



                                                  DAYS           

 

     foLIC acid      -0      Yes                      TAKE 2           [facility]

[facility]



     1 mg tablet      4-27                               TABLETS BY           it

y of



               00:00:                               MOUTH           [place]



               00                                 EVERY DAY           Medical



                                                  FOR 90           [facility]



                                                  DAYS           

 

     foLIC acid      -0      Yes                      TAKE 2           [facility]

rs



     1 mg tablet      4-27                               TABLETS BY           it

y of



               00:00:                               MOUTH           [place]



               00                                 EVERY DAY           Medical



                                                  FOR 90           [facility]



                                                  DAYS           

 

     foLIC acid      -0      Yes                      TAKE 2           [facility]

rs



     1 mg tablet      4-27                               TABLETS BY           it

y of



               00:00:                               MOUTH           [place]



               00                                 EVERY DAY           Medical



                                                  FOR 90           [facility]



                                                  DAYS           

 

     foLIC acid      -0      Yes                      TAKE 2           [facility]

rs



     1 mg tablet      4-27                               TABLETS BY           it

y of



               00:00:                               MOUTH           [place]



               00                                 EVERY DAY           Medical



                                                  FOR 90           [facility]



                                                  DAYS           

 

     gabapentin      3-0      Yes       [id number] 100mg Q.[id number] Take 1  

         [facility]



     (Neurontin)      4-18                9109      [id number] capsule          

 [facility]



     100 MG      00:00:                          3D   (100 mg           



     capsule      00                                 total) by           



                                                  mouth in           



                                                  the            



                                                  morning           



                                                  and 1           



                                                  capsule           



                                                  (100 mg           



                                                  total) at           



                                                  noon and 1           



                                                  capsule           



                                                  (100 mg           



                                                  total) in           



                                                  the            



                                                  evening.           



                                                  To be           



                                                  added to           



                                                  current           



                                                  dose of           



                                                  600mg TID.           

 

     gabapentin      -0      Yes       [id number] 100mg Q.[id number] Take 1  

         [facility]



     (Neurontin)      4-18                9109      [id number] capsule          

 [facility]



     100 MG      00:00:                          3D   (100 mg           



     capsule      00                                 total) by           



                                                  mouth in           



                                                  the            



                                                  morning           



                                                  and 1           



                                                  capsule           



                                                  (100 mg           



                                                  total) at           



                                                  noon and 1           



                                                  capsule           



                                                  (100 mg           



                                                  total) in           



                                                  the            



                                                  evening.           



                                                  To be           



                                                  added to           



                                                  current           



                                                  dose of           



                                                  600mg TID.           

 

     gabapentin      -0      Yes       [id number] 100mg Q.[id number] Take 1  

         [facility]



     (Neurontin)      4-18                9109      [id number] capsule          

 [facility]



     100 MG      00:00:                          3D   (100 mg           



     capsule      00                                 total) by           



                                                  mouth in           



                                                  the            



                                                  morning           



                                                  and 1           



                                                  capsule           



                                                  (100 mg           



                                                  total) at           



                                                  noon and 1           



                                                  capsule           



                                                  (100 mg           



                                                  total) in           



                                                  the            



                                                  evening.           



                                                  To be           



                                                  added to           



                                                  current           



                                                  dose of           



                                                  600mg TID.           

 

     gabapentin      -0      Yes       [id number] 100mg Q.[id number] Take 1  

         [facility]



     (Neurontin)      4-18                9109      [id number] capsule          

 [facility]



     100 MG      00:00:                          3D   (100 mg           



     capsule      00                                 total) by           



                                                  mouth in           



                                                  the            



                                                  morning           



                                                  and 1           



                                                  capsule           



                                                  (100 mg           



                                                  total) at           



                                                  noon and 1           



                                                  capsule           



                                                  (100 mg           



                                                  total) in           



                                                  the            



                                                  evening.           



                                                  To be           



                                                  added to           



                                                  current           



                                                  dose of           



                                                  600mg TID.           

 

     gabapentin      -0 - No        [id number] 100mg Q.[id number] Take 1 

          [facility]



     (Neurontin)      -19           9109      [id number] capsule         

  Health



     100 MG      00:00: 04:59                     3D   (100 mg           



     capsule      00   :00                           total) by           



                                                  mouth in           



                                                  the            



                                                  morning           



                                                  and 1           



                                                  capsule           



                                                  (100 mg           



                                                  total) at           



                                                  noon and 1           



                                                  capsule           



                                                  (100 mg           



                                                  total) in           



                                                  the            



                                                  evening.           



                                                  To be           



                                                  added to           



                                                  current           



                                                  dose of           



                                                  600mg TID.           

 

     gabapentin      -0 - No        [id number] 100mg Q.[id number] Take 1 

          [facility]



     (Neurontin)      -19           9109      [id number] capsule         

  Health



     100 MG      00:00: 04:59                     3D   (100 mg           



     capsule      00   :00                           total) by           



                                                  mouth in           



                                                  the            



                                                  morning           



                                                  and 1           



                                                  capsule           



                                                  (100 mg           



                                                  total) at           



                                                  noon and 1           



                                                  capsule           



                                                  (100 mg           



                                                  total) in           



                                                  the            



                                                  evening.           



                                                  To be           



                                                  added to           



                                                  current           



                                                  dose of           



                                                  600mg TID.           

 

     DULoxetine      3-0      Yes       [id number]           TAKE 1           U

T



     (Cymbalta)      4-04                               CAPSULE BY           [facility]

[facility]



     30 MG DR      00:00:                               MOUTH           



     capsule      00                                 EVERY DAY           

 

     DULoxetine      2023-0      Yes       [id number]           TAKE 1           [facility]

T



     (Cymbalta)      4-04                               CAPSULE BY           [facility]

[facility]



     30 MG DR      00:00:                               MOUTH           



     capsule      00                                 EVERY DAY           

 

     DULoxetine      2023-0      Yes       [id number]           TAKE 1           U

T



     (Cymbalta)      4-04                               CAPSULE BY           [facility]

[facility]



     30 MG DR      00:00:                               MOUTH           



     capsule      00                                 EVERY DAY           

 

     DULoxetine      3-0      Yes       [id number]           TAKE 1           U

T



     (Cymbalta)      4-04                               CAPSULE BY           [facility]

[facility]



     30 MG DR      00:00:                               MOUTH           



     capsule      00                                 EVERY DAY           

 

     DULoxetine      3-0      Yes       [id number]           TAKE 1           U

T



     (Cymbalta)      4-04                               CAPSULE BY           a

lth



     30 MG DR      00:00:                               MOUTH           



     capsule      00                                 EVERY DAY           

 

     DULoxetine      3-0      Yes       [id number]           TAKE 1           U

T



     (Cymbalta)      4-04                               CAPSULE BY           [facility]

[facility]



     30 MG DR      00:00:                               MOUTH           



     capsule      00                                 EVERY DAY           

 

     DULoxetine      3-0      Yes            30mg      Take 1           [facility]

rs



     30 mg      4-04                               capsule by           ity of



     capsule      00:00:                               mouth in           [place]



                                                the            Medical



                                                  morning.           [facility]

 

     DULoxetine      2023-0      Yes            30mg      Take 1           [facility]

rs



     30 mg      4-04                               capsule by           ity of



     capsule      00:00:                               mouth in           [place]



                                                the            Medical



                                                  morning.           [facility]

 

     DULoxetine      2023-0      Yes            30mg      Take 1           [facility]

rs



     30 mg      4-04                               capsule by           ity of



     capsule      00:00:                               mouth in           [place]



                                                the            Medical



                                                  morning.           [facility]

 

     DULoxetine      2023-0      Yes            30mg      Take 1           [facility]

rs



     30 mg      4-04                               capsule by           ity of



     capsule      00:00:                               mouth in           [place]



                                                the            Medical



                                                  morning.           [facility]

 

     DULoxetine      2023-0      Yes            30mg      Take 1           [facility]

rs



     30 mg      4-04                               capsule by           ity of



     capsule      00:00:                               mouth in           [place]



               00                                 the            Medical



                                                  morning.           [facility]

 

     DULoxetine      2023-0      Yes            30mg      Take 1           [facility]

rs



     30 mg      4-04                               capsule by           ity of



     capsule      00:00:                               mouth in           [place]



               00                                 the            Medical



                                                  morning.           [place]

 

     meloxicam      2023- No        1068 15mg QD   Take 1           [facility]



     (Mobic) 15      3-31 05-                          tablet (15           [facility]

[facility]



     MG tablet      00:00: 04:59                          mg total)           



               00   :00                           by mouth 1           



                                                  (one) time           



                                                  each day.           

 

     acetaminoph      2023- No        1617 1{tbl} Q6H  Take 1           U

T



     en-codeine      3-28 04-08                          tablet by           [facility]



     (Tylenol      00:00: 04:59                          mouth           



     #3) 300-30      00   :00                           every 6           



     MG tablet                                         (six)           



                                                  hours if           



                                                  needed for           



                                                  moderate           



                                                  pain or           



                                                  severe           



                                                  pain for           



                                                  up to 10           



                                                  days.           

 

     acetaminoph      2023- No        1617 1{tbl} Q6H  Take 1           U

T



     en-codeine      3-28 04-08                          tablet by           [facility]



     (Tylenol      00:00: 04:59                          mouth           



     #3) 300-30      00   :00                           every 6           



     MG tablet                                         (six)           



                                                  hours if           



                                                  needed for           



                                                  moderate           



                                                  pain or           



                                                  severe           



                                                  pain for           



                                                  up to 10           



                                                  days.           

 

     gabapentin      2023- No        [id number] 100mg Q.[id number] Take 1 

          [facility]



     (Neurontin)      3-21 04-21           9109      [id number] capsule         

  Health



     100 MG      00:00: 04:59                     3D   (100 mg           



     capsule      00   :00                           total) by           



                                                  mouth in           



                                                  the            



                                                  morning           



                                                  and 1           



                                                  capsule           



                                                  (100 mg           



                                                  total) at           



                                                  noon and 1           



                                                  capsule           



                                                  (100 mg           



                                                  total) in           



                                                  the            



                                                  evening.           



                                                  To be           



                                                  added to           



                                                  current           



                                                  dose of           



                                                  600mg TID.           

 

     gabapentin      2023- No        [id number] 100mg Q.[id number] Take 1 

          [facility]



     (Neurontin)      3-21 04-21           9109      [id number] capsule         

  Health



     100 MG      00:00: 04:59                     3D   (100 mg           



     capsule      00   :00                           total) by           



                                                  mouth in           



                                                  the            



                                                  morning           



                                                  and 1           



                                                  capsule           



                                                  (100 mg           



                                                  total) at           



                                                  noon and 1           



                                                  capsule           



                                                  (100 mg           



                                                  total) in           



                                                  the            



                                                  evening.           



                                                  To be           



                                                  added to           



                                                  current           



                                                  dose of           



                                                  600mg TID.           

 

     gabapentin      2023- No        [id number] 100mg Q.[id number] Take 1 

          [facility]



     (Neurontin)      3-21 04-21           9109      [id number] capsule         

  Health



     100 MG      00:00: 04:59                     3D   (100 mg           



     capsule      00   :00                           total) by           



                                                  mouth in           



                                                  the            



                                                  morning           



                                                  and 1           



                                                  capsule           



                                                  (100 mg           



                                                  total) at           



                                                  noon and 1           



                                                  capsule           



                                                  (100 mg           



                                                  total) in           



                                                  the            



                                                  evening.           



                                                  To be           



                                                  added to           



                                                  current           



                                                  dose of           



                                                  600mg TID.           

 

     gabapentin       No        [id number] 100mg Q.[id number] Take 1 

          [facility]



     (Neurontin)      3-21 04-18           9109      [id number] capsule         

  Health



     100 MG      00:00: 00:00                     3D   (100 mg           



     capsule      00   :00                           total) by           



                                                  mouth in           



                                                  the            



                                                  morning           



                                                  and 1           



                                                  capsule           



                                                  (100 mg           



                                                  total) at           



                                                  noon and 1           



                                                  capsule           



                                                  (100 mg           



                                                  total) in           



                                                  the            



                                                  evening.           



                                                  To be           



                                                  added to           



                                                  current           



                                                  dose of           



                                                  600mg TID.           

 

     acetaminoph       No        161 1{tbl} Q6H  Take 1           U

T



     en-codeine      3-21 04-01                          tablet by           [facility]



     (Tylenol      00:00: 04:59                          mouth           



     #3) 300-30      00   :00                           every 6           



     MG tablet                                         (six)           



                                                  hours if           



                                                  needed for           



                                                  moderate           



                                                  pain or           



                                                  severe           



                                                  pain for           



                                                  up to 10           



                                                  days.           

 

     acetaminoph       No         1{tbl} Q6H  Take 1           U

T



     en-codeine      3-21 04-01                          tablet by           [facility]



     (Tylenol      00:00: 04:59                          mouth           



     #3) 300-30      00   :00                           every 6           



     MG tablet                                         (six)           



                                                  hours if           



                                                  needed for           



                                                  moderate           



                                                  pain or           



                                                  severe           



                                                  pain for           



                                                  up to 10           



                                                  days.           

 

     acetaminoph       No         1{tbl} Q6H  Take 1           U

T



     en-codeine      3-21 04-01                          tablet by           [facility]



     (Tylenol      00:00: 04:59                          mouth           



     #3) 300-30      00   :00                           every 6           



     MG tablet                                         (six)           



                                                  hours if           



                                                  needed for           



                                                  moderate           



                                                  pain or           



                                                  severe           



                                                  pain for           



                                                  up to 10           



                                                  days.           

 

     acetaminoph       No        161 1{tbl} Q6H  Take 1           U

T



     en-codeine      3-21 04-01                          tablet by           [facility]



     (Tylenol      00:00: 04:59                          mouth           



     #3) 300-30      00   :00                           every 6           



     MG tablet                                         (six)           



                                                  hours if           



                                                  needed for           



                                                  moderate           



                                                  pain or           



                                                  severe           



                                                  pain for           



                                                  up to 10           



                                                  days.           

 

     acetaminoph      2023-0 3- No        1617 1{tbl} Q6H  Take 1           U

T



     en-codeine      3-21 04-01                          tablet by           a

[facility]



     (Tylenol      00:00: 04:59                          mouth           



     #3) 300-30      00   :00                           every 6           



     MG tablet                                         (six)           



                                                  hours if           



                                                  needed for           



                                                  moderate           



                                                  pain or           



                                                  severe           



                                                  pain for           



                                                  up to 10           



                                                  days.           

 

     hydrOXYzine      2023-0      Yes       [id number] 25mg Q6H  Take 1        

   [facility]



     HCl       3-14                9109           tablet (25           Health



     (Atarax) 25      00:00:                               mg total)           



     MG tablet      00                                 by mouth           



                                                  every 6           



                                                  (six)           



                                                  hours if           



                                                  needed for           



                                                  itching           



                                                  for up to           



                                                  7 days.           

 

     hydrOXYzine      2023-0      Yes       [id number] 25mg Q6H  Take 1        

   [facility]



     HCl       3-14                9109           tablet (25           Health



     (Atarax) 25      00:00:                               mg total)           



     MG tablet      00                                 by mouth           



                                                  every 6           



                                                  (six)           



                                                  hours if           



                                                  needed for           



                                                  itching           



                                                  for up to           



                                                  7 days.           

 

     hydrOXYzine      2023-0      Yes       [id number] 25mg Q6H  Take 1        

   [facility]



     HCl       3-14                9109           tablet (25           Health



     (Atarax) 25      00:00:                               mg total)           



     MG tablet      00                                 by mouth           



                                                  every 6           



                                                  (six)           



                                                  hours if           



                                                  needed for           



                                                  itching           



                                                  for up to           



                                                  7 days.           

 

     hydrOXYzine      2023-0      Yes       [id number] 25mg Q6H  Take 1        

   [facility]



     HCl       3-14                9109           tablet (25           Health



     (Atarax) 25      00:00:                               mg total)           



     MG tablet      00                                 by mouth           



                                                  every 6           



                                                  (six)           



                                                  hours if           



                                                  needed for           



                                                  itching           



                                                  for up to           



                                                  7 days.           

 

     hydrOXYzine      2023-0      Yes       [id number] 25mg Q6H  Take 1        

   [facility]



     HCl       3-14                9109           tablet (25           Health



     (Atarax) 25      00:00:                               mg total)           



     MG tablet      00                                 by mouth           



                                                  every 6           



                                                  (six)           



                                                  hours if           



                                                  needed for           



                                                  itching           



                                                  for up to           



                                                  7 days.           

 

     hydrOXYzine      2023-0      Yes       [id number] 25mg Q6H  Take 1        

   [facility]



     HCl       3-14                9109           tablet (25           Health



     (Atarax) 25      00:00:                               mg total)           



     MG tablet      00                                 by mouth           



                                                  every 6           



                                                  (six)           



                                                  hours if           



                                                  needed for           



                                                  itching           



                                                  for up to           



                                                  7 days.           

 

     hydrOXYzine      2023-0      Yes       [id number] 25mg Q6H  Take 1        

   [facility]



     HCl       3-14                9109           tablet (25           [facility]



     (Atarax) 25      00:00:                               mg total)           



     MG tablet      00                                 by mouth           



                                                  every 6           



                                                  (six)           



                                                  hours if           



                                                  needed for           



                                                  itching           



                                                  for up to           



                                                  7 days.           

 

     hydrOXYzine      -0      Yes       [id number] 25mg Q6H  Take 1        

   [facility]



     HCl       3-14                9109           tablet (25           [facility]



     (Atarax) 25      00:00:                               mg total)           



     MG tablet      00                                 by mouth           



                                                  every 6           



                                                  (six)           



                                                  hours if           



                                                  needed for           



                                                  itching           



                                                  for up to           



                                                  7 days.           

 

     hydrOXYzine      -0      Yes       [id number] 25mg Q6H  Take 1        

   [facility]



     HCl       3-14                9109           tablet (25           [facility]



     (Atarax) 25      00:00:                               mg total)           



     MG tablet      00                                 by mouth           



                                                  every 6           



                                                  (six)           



                                                  hours if           



                                                  needed for           



                                                  itching           



                                                  for up to           



                                                  7 days.           

 

     hydrOXYzine      -0 - No             25mg      Take 1           [facility]

[facility]



     25 mg      3-14 08-14                          tablet by           [facility] of



     tablet      00:00: 00:00                          mouth           [place]



               00   :00                           every 6           [facility]



                                                  (six)           [facility]



                                                  hours as           



                                                  needed for           



                                                  Itching.           

 

     hydrOXYzine      -2023- No             25mg      Take 1           [facility]

[facility]



     25 mg      3-14 08-14                          tablet by           [facility]



     tablet      00:00: 00:00                          mouth           [place]



               00   :00                           every 6           [facility]



                                                  (six)           Branch



                                                  hours as           



                                                  needed for           



                                                  Itching.           

 

     hydrOXYzine      -2023- No        [id number] 25mg Q6H  Take 1       

    [facility]



     HCl       3-14 -22           9109           tablet (25           [facility]



     (Atarax) 25      00:00: 04:59                          mg total)           



     MG tablet      00   :00                           by mouth           



                                                  every 6           



                                                  (six)           



                                                  hours if           



                                                  needed for           



                                                  itching           



                                                  for up to           



                                                  7 days.           

 

     HYDROcodone      2023- No        [id number] 1{tbl}      Take 1     

      [facility]



     -acetaminop      3-14 -22           05             tablet by           [facility]

[facility]



     hen ([place])      00:00: 04:59                          mouth           



      MG      00   :00                           every 4           



     tablet                                         (four)           



                                                  hours if           



                                                  needed for           



                                                  severe           



                                                  pain for           



                                                  up to 7           



                                                  days.           

 

     predniSONE      -0      Yes       [id number]           Take one         

  [facility]



     (Deltasone)      3-12                               tab PO BID           [facility]

alth



     5 MG tablet      00:00:                               x 5 days           



               00                                 and then           



                                                  one tab PO           



                                                  Q day           



                                                  thereafter           

 

     predniSONE      -0      Yes       [id number]           Take one         

  [facility]



     (Deltasone)      3-12                               tab PO BID           [facility]

[facility]



     5 MG tablet      00:00:                               x 5 days           



               00                                 and then           



                                                  one tab PO           



                                                  Q day           



                                                  thereafter           

 

     predniSONE      -0      Yes       [id number]           Take one         

  [facility]



     (Deltasone)      3-12                               tab PO BID           [facility]

[facility]



     5 MG tablet      00:00:                               x 5 days           



               00                                 and then           



                                                  one tab PO           



                                                  Q day           



                                                  thereafter           

 

     predniSONE      -0      Yes       [id number]           Take one         

  [facility]



     (Deltasone)      3-12                               tab PO BID           [facility]

[facility]



     5 MG tablet      00:00:                               x 5 days           



               00                                 and then           



                                                  one tab PO           



                                                  Q day           



                                                  thereafter           

 

     predniSONE      -0      Yes       [id number]           Take one         

  [facility]



     (Deltasone)      3-12                               tab PO BID           [facility]

[facility]



     5 MG tablet      00:00:                               x 5 days           



               00                                 and then           



                                                  one tab PO           



                                                  Q day           



                                                  thereafter           

 

     predniSONE      0      Yes       [id number]           Take one         

  [facility]



     (Deltasone)      3-12                               tab PO BID           [facility]

[facility]



     5 MG tablet      00:00:                               x 5 days           



               00                                 and then           



                                                  one tab PO           



                                                  Q day           



                                                  thereafter           

 

     predniSONE      -0      Yes       [id number]           Take one         

  [facility]



     (Deltasone)      3-12                               tab PO BID           [facility]

[facility]



     5 MG tablet      00:00:                               x 5 days           



               00                                 and then           



                                                  one tab PO           



                                                  Q day           



                                                  thereafter           

 

     predniSONE      -0      Yes       [id number]           Take one         

  [facility]



     (Deltasone)      3-12                               tab PO BID           [facility]

[facility]



     5 MG tablet      00:00:                               x 5 days           



               00                                 and then           



                                                  one tab PO           



                                                  Q day           



                                                  thereafter           

 

     predniSONE      -0      Yes       [id number]           Take one         

  [facility]



     (Deltasone)      3-12                               tab PO BID           [facility]

[facility]



     5 MG tablet      00:00:                               x 5 days           



               00                                 and then           



                                                  one tab PO           



                                                  Q day           



                                                  thereafter           

 

     predniSONE      -0      Yes       [id number]           Take one         

  [facility]



     (Deltasone)      3-12                               tab PO BID           [facility]

[facility]



     5 MG tablet      00:00:                               x 5 days           



               00                                 and then           



                                                  one tab PO           



                                                  Q day           



                                                  thereafter           

 

     rivaroxaban            Yes       [id number] 10mg QD   Take 1        

   [facility]



     (Xarelto)      2-17                05             tablet (10           [facility]

[facility]



     10 MG      00:00:                               mg total)           



     tablet      00                                 by mouth 1           



                                                  (one) time           



                                                  each day.           



                                                  DVT            



                                                  prophylaxi           



                                                  s s/p           



                                                  joint           



                                                  replacemen           



                                                  t              

 

     rivaroxaban            Yes       [id number] 10mg QD   Take 1        

   [facility]



     (Xarelto)      2-17                05             tablet (10           [facility]

[facility]



     10 MG      00:00:                               mg total)           



     tablet      00                                 by mouth 1           



                                                  (one) time           



                                                  each day.           



                                                  DVT            



                                                  prophylaxi           



                                                  s s/p           



                                                  joint           



                                                  replacemen           



                                                  t              

 

     rivaroxaban            Yes       [id number] 10mg QD   Take 1        

   [facility]



     (Xarelto)      2-17                05             tablet (10           [facility]

[facility]



     10 MG      00:00:                               mg total)           



     tablet      00                                 by mouth 1           



                                                  (one) time           



                                                  each day.           



                                                  DVT            



                                                  prophylaxi           



                                                  s s/p           



                                                  joint           



                                                  replacemen           



                                                  t              

 

     rivaroxaban            Yes       [id number] 10mg QD   Take 1        

   [facility]



     (Xarelto)      2-17                05             tablet (10           [facility]

[facility]



     10 MG      00:00:                               mg total)           



     tablet      00                                 by mouth 1           



                                                  (one) time           



                                                  each day.           



                                                  DVT            



                                                  prophylaxi           



                                                  s s/p           



                                                  joint           



                                                  replacemen           



                                                  t              

 

     rivaroxaban            Yes       [id number] 10mg QD   Take 1        

   [facility]



     (Xarelto)      2-17                05             tablet (10           [facility]

[facility]



     10 MG      00:00:                               mg total)           



     tablet      00                                 by mouth 1           



                                                  (one) time           



                                                  each day.           



                                                  DVT            



                                                  prophylaxi           



                                                  s s/p           



                                                  joint           



                                                  replacemen           



                                                  t              

 

     rivaroxaban            Yes       [id number] 10mg QD   Take 1        

   [facility]



     (Xarelto)      2-17                05             tablet (10           [facility]

[facility]



     10 MG      00:00:                               mg total)           



     tablet      00                                 by mouth 1           



                                                  (one) time           



                                                  each day.           



                                                  DVT            



                                                  prophylaxi           



                                                  s s/p           



                                                  joint           



                                                  replacemen           



                                                  t              

 

     rivaroxaban            Yes       [id number] 10mg QD   Take 1        

   [facility]



     (Xarelto)      2-17                05             tablet (10           [facility]

[facility]



     10 MG      00:00:                               mg total)           



     tablet      00                                 by mouth 1           



                                                  (one) time           



                                                  each day.           



                                                  DVT            



                                                  prophylaxi           



                                                  s s/p           



                                                  joint           



                                                  replacemen           



                                                  t              

 

     rivaroxaban            Yes       [id number] 10mg QD   Take 1        

   [facility]



     (Xarelto)      2-17                05             tablet (10           [facility]

[facility]



     10 MG      00:00:                               mg total)           



     tablet      00                                 by mouth 1           



                                                  (one) time           



                                                  each day.           



                                                  DVT            



                                                  prophylaxi           



                                                  s s/p           



                                                  joint           



                                                  replacemen           



                                                  t              

 

     rivaroxaban            Yes       [id number] 10mg QD   Take 1        

   [facility]



     (Xarelto)      2-17                05             tablet (10           [facility]

[facility]



     10 MG      00:00:                               mg total)           



     tablet      00                                 by mouth 1           



                                                  (one) time           



                                                  each day.           



                                                  DVT            



                                                  prophylaxi           



                                                  s s/p           



                                                  joint           



                                                  replacemen           



                                                  t              

 

     rivaroxaban            Yes            10mg      Take 1           [facility]

[facility]



     10 mg      2-17                               tablet by           [facility]



     tablet      00:00:                               mouth in           [place]



               00                                 the            Medical



                                                  morning.           [facility]

 

     rivaroxaban      -0      Yes            10mg      Take 1           [facility]

[facility]



     10 mg      2-17                               tablet by           [facility]



     tablet      00:00:                               mouth in           [place]



               00                                 the            Medical



                                                  morning.           [facility]anch

 

     rivaroxaban      -0 3- No        [id number] 10mg QD   Take 1       

    [facility]



     (Xarelto)      2-17 03-20           05             tablet (10           [facility]

[facility]



     10 MG      00:00: 04:59                          mg total)           



     tablet      00   :00                           by mouth 1           



                                                  (one) time           



                                                  each day.           



                                                  DVT            



                                                  prophylaxi           



                                                  s s/p           



                                                  joint           



                                                  replacemen           



                                                  t              

 

     rivaroxaban      -0 - No        [id number] 10mg QD   Take 1       

    [facility]



     (Xarelto)      2-17 03-20           05             tablet (10           [facility]

[facility]



     10 MG      00:00: 04:59                          mg total)           



     tablet      00   :00                           by mouth 1           



                                                  (one) time           



                                                  each day.           



                                                  DVT            



                                                  prophylaxi           



                                                  s s/p           



                                                  joint           



                                                  replacemen           



                                                  t              

 

     aspirin 81      -0 - No             81mg      Take 81 mg           

[facility]



     MG EC      16 16                          by mouth.           Health



     tablet      10:33: 00:00                                         



               40   :00                                          

 

     Golimumab      -0      Yes                                     [facility]



     (SIMPONI      2-08                                              [facility]



     ARIA IV)      10:07:                                              



               28                                                

 

     Golimumab      2023-0      Yes                                     [facility]



     ([facility]ONI      2-08                                              [facility]



     ARIA IV)      10:07:                                              



               28                                                

 

     Golimumab      2023-0      Yes                                     [facility]



     ([facility]      2-08                                              [facility]



     ARIA IV)      10:07:                                              



               28                                                

 

     Golimumab      2023-0      Yes                                     [facility]



     ([facility]      2-08                                              [facility]



     ARIA IV)      10:07:                                              



               28                                                

 

     Golimumab      2023-0      Yes                                     [facility]



     ([facility]I      2-08                                              [facility]



     ARIA IV)      10:07:                                              



               28                                                

 

     Golimumab      2023-0      Yes                                     [facility]



     (SIMPONI      2-08                                              Health



     ARIA IV)      10:07:                                              



               28                                                

 

     Golimumab      2023-0      Yes                                     [facility]



     ([facility]I      2-08                                              Health



     ARIA IV)      10:07:                                              



               28                                                

 

     Golimumab      2023-0      Yes                                     [facility]



     ([facility]      2-08                                              Health



     ARIA IV)      10:07:                                              



               28                                                

 

     Golimumab      2023-0      Yes                                     [facility]



     ([facility]      2-08                                              [facility]



     ARIA IV)      10:07:                                              



               28                                                

 

     Golimumab      2023-0      Yes                                     [facility]



     ([facility]      2-08                                              Health



     ARIA IV)      10:07:                                              



               28                                                

 

     Golimumab      2023-0      Yes                                     [facility]



     ([facility]      2-08                                              Health



     ARIA IV)      10:07:                                              



               28                                                

 

     Golimumab      -0      Yes                                     [facility]



     ([facility]      2-08                                              Health



     ARIA IV)      10:07:                                              



               28                                                

 

     acetaminoph      -0      Yes       [id number] 1{tbl}      Take 1        

   [facility]



     en-codeine      2-08                               tablet by           [facility]



     (TYLENOL/CO      00:00:                               mouth 1           



     DEINE #3)      00                                 (one) time           



     300-30 MG                                         each day           



     tablet                                         if needed           



                                                  for severe           



                                                  pain for           



                                                  up to 15           



                                                  doses.           

 

     acetaminoph      -0      Yes       [id number] 1{tbl}      Take 1        

   [facility]



     en-codeine      2-08                               tablet by           [facility]



     (TYLENOL/CO      00:00:                               mouth 1           



     DEINE #3)      00                                 (one) time           



     300-30 MG                                         each day           



     tablet                                         if needed           



                                                  for severe           



                                                  pain for           



                                                  up to 15           



                                                  doses.           

 

     acetaminoph      -0      Yes       [id number] 1{tbl}      Take 1        

   [facility]



     en-codeine      2-08                               tablet by           [facility]



     (TYLENOL/CO      00:00:                               mouth 1           



     DEINE #3)      00                                 (one) time           



     300-30 MG                                         each day           



     tablet                                         if needed           



                                                  for severe           



                                                  pain for           



                                                  up to 15           



                                                  doses.           

 

     acetaminoph      -0 - No        [id number] 1{tbl}      Take 1       

    [facility]



     en-codeine      2-08 03-21                          tablet by           [facility]



     (TYLENOL/CO      00:00: 00:00                          mouth 1           



     DEINE #3)      00   :00                           (one) time           



     300-30 MG                                         each day           



     tablet                                         if needed           



                                                  for severe           



                                                  pain for           



                                                  up to 15           



                                                  doses.           

 

     Diclofenac      -0 - No        [id number] 1{patch      Apply 1      

     [facility]



     Epolamine      2--11                }         patch           Health



     (Licart)      00:00: 05:59                          topically           



     1.3 % patch      00   :00                           1 (one)           



     24 hour                                         time each           



                                                  day if           



                                                  needed           



                                                  (pain).           

 

     Diclofenac      -0 - No        [id number] 1{patch      Apply 1      

     [facility]



     Epolamine      2-08 -11                }         patch           Health



     (Licart)      00:00: 05:59                          topically           



     1.3 % patch      00   :00                           1 (one)           



     24 hour                                         time each           



                                                  day if           



                                                  needed           



                                                  (pain).           

 

     COLESTIPOL      -0      Yes                                     [facility]



     HCL PO      1-09                                              Health



               00:00:                                              



               00                                                

 

     COLESTIPOL      2023-0      Yes                                     [facility]



     HCL PO      1-09                                              Health



               00:00:                                              



               00                                                

 

     COLESTIPOL      3-0      Yes                                     [facility]



     HCL PO                                                    Health



               00:00:                                              



               00                                                

 

     COLESTIPOL      3-0      Yes                                     [facility]



     HCL PO                                                    Health



               00:00:                                              



               00                                                

 

     COLESTIPOL      2023-0      Yes                                     [facility]



     HCL PO                                                    Health



               00:00:                                              



               00                                                

 

     COLESTIPOL      3-0      Yes                                     [facility]



     HCL PO                                                    Health



               00:00:                                              



               00                                                

 

     COLESTIPOL      3-0      Yes                                     [facility]



     HCL PO                                                    Health



               00:00:                                              



               00                                                

 

     COLESTIPOL      2023-0      Yes                                     [facility]



     HCL PO                                                    Health



               00:00:                                              



               00                                                

 

     COLESTIPOL      3-0      Yes                                     [facility]



     HCL PO                                                    Health



               00:00:                                              



               00                                                

 

     COLESTIPOL      3-0      Yes                                     UT



     HCL PO                                                    Health



               00:00:                                              



               00                                                

 

     COLESTIPOL      3-0      Yes                                     [facility]



     HCL PO                                                    Health



               00:00:                                              



               00                                                

 

     COLESTIPOL      3-0      Yes                                     [facility]



     HCL PO                                                    Health



               00:00:                                              



               00                                                

 

     Temazepam Temazepam -1      No                       Temazepam         

  



     15 MG 15 MG 1-14                               15 MG           



               00:00:                                              



               00                                                

 

     Temazepam Temazepam -1      No                       Temazepam         

  



     15 MG 15 MG 1-14                               15 MG           



               00:00:                                              



               00                                                

 

     Temazepam Temazepam -1      No                       Temazepam         

  



     15 MG 15 MG 1-14                               15 MG           



               00:00:                                              



               00                                                

 

     Temazepam Temazepam -1      No                       Temazepam         

  



     15 MG 15 MG 1-14                               15 MG           



               00:00:                                              



               00                                                

 

     Temazepam Temazepam -1      No                       Temazepam         

  



     15 MG 15 MG 1-14                               15 MG           



               00:00:                                              



               00                                                

 

     Temazepam Temazepam -1      No                       Temazepam         

  



     15 MG 15 MG 1-14                               15 MG           



               00:00:                                              



               00                                                

 

     Temazepam Temazepam -0      No                       Temazepam         

  



     15 MG 15 MG 8-17                               15 MG           



               00:00:                                              



               00                                                

 

     levothyroxi      -0      Yes                                     [facility]



     ne                                                      Health



     (Synthroid,      00:00:                                              



     Levoxyl)      00                                                



     175 MCG                                                        



     tablet                                                        

 

     levothyroxi      -0      Yes                                     [facility]



     ne                                                      [facility]



     (Synthroid,      00:00:                                              



     Levoxyl)      00                                                



     175 MCG                                                        



     tablet                                                        

 

     levothyroxi      -0      Yes                                     [facility]



     ne                                                      Health



     (Synthroid,      00:00:                                              



     Levoxyl)      00                                                



     175 MCG                                                        



     tablet                                                        

 

     levothyroxi      -0      Yes                                     [facility]



     ne        7-26                                              Health



     (Synthroid,      00:00:                                              



     Levoxyl)      00                                                



     175 MCG                                                        



     tablet                                                        

 

     levothyroxi      -0      Yes                                     [facility]



     ne        7-26                                              Health



     (Synthroid,      00:00:                                              



     Levoxyl)      00                                                



     175 MCG                                                        



     tablet                                                        

 

     levothyroxi      -0      Yes                                     [facility]



     ne        7-26                                              Health



     (Synthroid,      00:00:                                              



     Levoxyl)      00                                                



     175 MCG                                                        



     tablet                                                        

 

     levothyroxi      -0      Yes                                     [facility]



     ne        7-26                                              Health



     (Synthroid,      00:00:                                              



     Levoxyl)      00                                                



     175 MCG                                                        



     tablet                                                        

 

     levothyroxi      -0      Yes                                     [facility]



     ne        7-26                                              Health



     (Synthroid,      00:00:                                              



     Levoxyl)      00                                                



     175 MCG                                                        



     tablet                                                        

 

     levothyroxi      -0      Yes                                     [facility]



     ne        7-26                                              Health



     (Synthroid,      00:00:                                              



     Levoxyl)      00                                                



     175 MCG                                                        



     tablet                                                        

 

     levothyroxi      -0      Yes                                     [facility]



     ne        726                                              Health



     (Synthroid,      00:00:                                              



     Levoxyl)      00                                                



     175 MCG                                                        



     tablet                                                        

 

     levothyroxi      -0      Yes                                     [facility]



     ne        7                                              Health



     (Synthroid,      00:00:                                              



     Levoxyl)      00                                                



     175 MCG                                                        



     tablet                                                        

 

     levothyroxi      -0      Yes                                     [facility]



     ne        726                                              Health



     (Synthroid,      00:00:                                              



     Levoxyl)      00                                                



     175 MCG                                                        



     tablet                                                        

 

     Temazepam Temazepam -0      No                       Temazepam         

  



     15 MG 15 MG 5-11                               15 MG           



               00:00:                                              



               00                                                

 

     Temazepam Temazepam -0      No                       Temazepam         

  



     15 MG 15 MG 5-11                               15 MG           



               00:00:                                              



               00                                                

 

     Temazepam Temazepam -0      No                       Temazepam         

  



     15 MG 15 MG 5-11                               15 MG           



               00:00:                                              



               00                                                

 

     Temazepam Temazepam -0      No                       Temazepam         

  



     15 MG 15 MG 5-11                               15 MG           



               00:00:                                              



               00                                                

 

     Temazepam Temazepam -0      No                       Temazepam         

  



     15 MG 15 MG 5-11                               15 MG           



               00:00:                                              



               00                                                

 

     ergocalcife      -0 - No             400U QD   Take 400           U

T



     rol       - 04-12                          Units by           Health



     (Vitamin      11:34: 00:00                          mouth 1           



     D-2) 10 MCG      31   :00                           (one) time           



     (400 UNIT)                                         each day.           



     tablet                                                        

 

     ergocalcife      -0 - No             400U QD   Take 400           U

T



     rol       4-12 04-12                          Units by           Health



     (Vitamin      11:34: 00:00                          mouth 1           



     D-2) 10 MCG      31   :00                           (one) time           



     (400 UNIT)                                         each day.           



     tablet                                                        

 

     DULoxetine            Yes       [id number]           TAKE 1           U

T



     (Cymbalta)      4-12                               CAPSULE BY           [facility]

[facility]



     30 MG DR      00:00:                               MOUTH           



     capsule      00                                 EVERY DAY           

 

     DULoxetine            Yes       [id number]           TAKE 1           U

T



     (Cymbalta)      4-12                               CAPSULE BY           [facility]

[facility]



     30 MG DR      00:00:                               MOUTH           



     capsule      00                                 EVERY DAY           

 

     DULoxetine            Yes       [id number]           TAKE 1           U

T



     (Cymbalta)      4-12                               CAPSULE BY           [facility]

[facility]



     30 MG DR      00:00:                               MOUTH           



     capsule      00                                 EVERY DAY           

 

     DULoxetine            Yes       [id number]           TAKE 1           U

T



     (Cymbalta)      4-12                               CAPSULE BY           [facility]

[facility]



     30 MG DR      00:00:                               MOUTH           



     capsule      00                                 EVERY DAY           

 

     DULoxetine            Yes       [id number]           TAKE 1           U

T



     (Cymbalta)      4-12                               CAPSULE BY           [facility]

[facility]



     30 MG DR      00:00:                               MOUTH           



     capsule      00                                 EVERY DAY           

 

     DULoxetine            Yes       [id number]           TAKE 1           U

T



     (Cymbalta)      4-12                               CAPSULE BY           a

lth



     30 MG DR      00:00:                               MOUTH           



     capsule      00                                 EVERY DAY           

 

     DULoxetine            Yes       [id number]           TAKE 1           U

T



     (Cymbalta)      4-12                               CAPSULE BY           [facility]

[facility]



     30 MG DR      00:00:                               MOUTH           



     capsule      00                                 EVERY DAY           

 

     DULoxetine            Yes       [id number]           TAKE 1           U

T



     (Cymbalta)      4-12                               CAPSULE BY           [facility]

[facility]



     30 MG DR      00:00:                               MOUTH           



     capsule      00                                 EVERY DAY           

 

     DULoxetine            Yes       [id number]           TAKE 1           U

T



     (Cymbalta)      4-12                               CAPSULE BY           a

lth



     30 MG DR      00:00:                               MOUTH           



     capsule      00                                 EVERY DAY           

 

     gabapentin            Yes                      gabapentin           U

T



     (Neurontin)      3-09                               600 mg           Health



     600 MG      09:24:                               tablet           



     tablet      05                                                

 

     sulfaSALAzi            Yes                      sulfasalaz           

UT



     ne        3-09                               ine 500 mg           Health



     (Azulfidine      09:24:                               tablet           



     ) 500 MG      05                                 TAKE 4           



     tablet                                         TABLETS BY           



                                                  MOUTH           



                                                  EVERY DAY           

 

     azaTHIOprin            Yes                      See            [facility]



     e (Imuran)      3-09                               administra           [facility]

[facility]



     50 MG      09:24:                               tion           



     tablet      05                                 instructio           



                                                  ns.            

 

     Golimumab            Yes                                     [facility]



     (SIMPONI      3-09                                              Health



     ARIA IV)      09:24:                                              



               05                                                

 

     aspirin 81      -0      Yes            81mg      Take 81 mg           U

T



     MG EC      309                               by mouth.           Health



     tablet      09:24:                                              



               05                                                

 

     gabapentin      -0      Yes                      gabapentin           U

T



     (Neurontin)      3-09                               600 mg           Health



     600 MG      09:24:                               tablet           



     tablet      05                                                

 

     sulfaSALAzi            Yes                      sulfasalaz           

UT



     ne        3-09                               ine 500 mg           Health



     (Azulfidine      09:24:                               tablet           



     ) 500 MG      05                                 TAKE 4           



     tablet                                         TABLETS BY           



                                                  MOUTH           



                                                  EVERY DAY           

 

     azaTHIOprin      0      Yes                      See            [facility]



     e (Imuran)      3-09                               administra           [facility]

[facility]



     50 MG      09:24:                               tion           



     tablet      05                                 instructio           



                                                  ns.            

 

     Golimumab      0      Yes                                     [facility]



     [facility]      09[facility]                                              



               05                                                

 

     aspirin 81      -0      Yes            81mg      Take 81 mg           U

T



     MG EC      3-09                               by mouth.           Health



     tablet      09:24:                                              



               05                                                

 

     gabapentin      0      Yes                      gabapentin           U

T



     (Neurontin)      3-                               600 mg           Health



     600 MG      09:24:                               tablet           



     tablet      05                                                

 

     sulfaSALAzi            Yes                      sulfasalaz           

UT



     ne        3-09                               ine 500 mg           Health



     (Azulfidine      09:24:                               tablet           



     ) 500 MG      05                                 TAKE 4           



     tablet                                         TABLETS BY           



                                                  MOUTH           



                                                  EVERY DAY           

 

     azaTHIOprin      0      Yes                      See            UT



     e (Imuran)      3-09                               administra           [facility]

[facility]



     50 MG      09:24:                               tion           



     tablet      05                                 instructio           



                                                  ns.            

 

     Golimumab      -0      Yes                                     [facility]



     ([facility]      [facility]                                              [facility]      09:24:                                              



               05                                                

 

     aspirin 81      -0      Yes            81mg      Take 81 mg           U

T



     MG EC      3-09                               by mouth.           Health



     tablet      09:24:                                              



               05                                                

 

     gabapentin      -0      Yes                      gabapentin           U

T



     (Neurontin)      3-09                               600 mg           Health



     600 MG      09:24:                               tablet           



     tablet      05                                                

 

     sulfaSALAzi            Yes                      sulfasalaz           

UT



     ne        3-09                               ine 500 mg           Health



     (Azulfidine      09:24:                               tablet           



     ) 500 MG      05                                 TAKE 4           



     tablet                                         TABLETS BY           



                                                  MOUTH           



                                                  EVERY DAY           

 

     azaTHIOprin      0      Yes                      See            UT



     e (Imuran)      3-09                               administra           [facility]

[facility]



     50 MG      09:24:                               tion           



     tablet      05                                 instructio           



                                                  ns.            

 

     Golimumab      -0      Yes                                     [facility]



     ([facility]      [facility]-09                                              [facility]      09:24:                                              



               05                                                

 

     aspirin 81      -0      Yes            81mg      Take 81 mg           U

T



     MG EC      3-09                               by mouth.           Health



     tablet      09:24:                                              



               05                                                

 

     gabapentin      -0      Yes                      gabapentin           U

T



     (Neurontin)      3-                               600 mg           Health



     600 MG      09:24:                               tablet           



     tablet      05                                                

 

     sulfaSALAzi      0      Yes                      sulfasalaz           

UT



     ne        3-09                               ine 500 mg           Health



     (Azulfidine      09:24:                               tablet           



     ) 500 MG      05                                 TAKE 4           



     tablet                                         TABLETS BY           



                                                  MOUTH           



                                                  EVERY DAY           

 

     azaTHIOprin      0      Yes                      See            UT



     e (Imuran)      3-09                               administra           [facility]

[facility]



     50 MG      09:24:                               tion           



     tablet      05                                 instructio           



                                                  ns.            

 

     Golimumab            Yes                                     UT



     (SIMPONI      3-09                                              Health



     ARIA IV)      09:24:                                              



               05                                                

 

     aspirin 81      0      Yes            81mg      Take 81 mg           U

T



     MG EC      3-09                               by mouth.           Health



     tablet      09:24:                                              



               05                                                

 

     gabapentin      0      Yes                      gabapentin           U

T



     (Neurontin)      3-09                               600 mg           Health



     600 MG      09:24:                               tablet           



     tablet      05                                                

 

     sulfaSALAzi            Yes                      sulfasalaz           

UT



     ne        3-09                               ine 500 mg           Health



     (Azulfidine      09:24:                               tablet           



     ) 500 MG      05                                 TAKE 4           



     tablet                                         TABLETS BY           



                                                  MOUTH           



                                                  EVERY DAY           

 

     azaTHIOprin      0      Yes                      See            UT



     e (Imuran)      3-09                               administra           [facility]

[facility]



     50 MG      09:24:                               tion           



     tablet      05                                 instructio           



                                                  ns.            

 

     aspirin 81      0      Yes            81mg      Take 81 mg           U

T



     MG EC      3-09                               by mouth.           Health



     tablet      09:24:                                              



               05                                                

 

     gabapentin      0      Yes                      gabapentin           U

T



     (Neurontin)      3-09                               600 mg           Health



     600 MG      09:24:                               tablet           



     tablet      05                                                

 

     sulfaSALAzi            Yes                      sulfasalaz           

UT



     ne        3-09                               ine 500 mg           [facility]



     (Azulfidine      09:24:                               tablet           



     ) 500 MG      05                                 TAKE 4           



     tablet                                         TABLETS BY           



                                                  MOUTH           



                                                  EVERY DAY           

 

     azaTHIOprin      0      Yes                      See            UT



     e (Imuran)      3-09                               administra           [facility]

[facility]



     50 MG      09:24:                               tion           



     tablet      05                                 instructio           



                                                  ns.            

 

     gabapentin      0      Yes                      gabapentin           U

T



     (Neurontin)      3-09                               600 mg           Health



     600 MG      09:24:                               tablet           



     tablet      05                                                

 

     sulfaSALAzi            Yes                      sulfasalaz           

UT



     ne        3-09                               ine 500 mg           [facility]



     (Azulfidine      09:24:                               tablet           



     ) 500 MG      05                                 TAKE 4           



     tablet                                         TABLETS BY           



                                                  MOUTH           



                                                  EVERY DAY           

 

     azaTHIOprin      0      Yes                      See            UT



     e (Imuran)      3-09                               administra           [facility]

[facility]



     50 MG      09:24:                               tion           



     tablet      05                                 instructio           



                                                  ns.            

 

     gabapentin            Yes                      gabapentin           U

T



     (Neurontin)      3                               600 mg           Health



     600 MG      09:24:                               tablet           



     tablet      05                                                

 

     sulfaSALAzi            Yes                      sulfasalaz           

UT



     ne        3-09                               ine 500 mg           [facility]



     (Azulfidine      09:24:                               tablet           



     ) 500 MG      05                                 TAKE 4           



     tablet                                         TABLETS BY           



                                                  MOUTH           



                                                  EVERY DAY           

 

     azaTHIOprin            Yes                      See            UT



     e (Imuran)      3-09                               administra           [facility]

[facility]



     50 MG      09:24:                               tion           



     tablet      05                                 instructio           



                                                  ns.            

 

     gabapentin            Yes                      gabapentin           U

T



     (Neurontin)      3-09                               600 mg           [facility]



     600 MG      09:24:                               tablet           



     tablet      05                                                

 

     sulfaSALAzi            Yes                      sulfasalaz           

UT



     ne        3-09                               ine 500 mg           [facility]



     (Azulfidine      09:24:                               tablet           



     ) 500 MG      05                                 TAKE 4           



     tablet                                         TABLETS BY           



                                                  MOUTH           



                                                  EVERY DAY           

 

     azaTHIOprin            Yes                      See            UT



     e (Imuran)      3-09                               administra           [facility]

[facility]



     50 MG      09:24:                               tion           



     tablet      05                                 instructio           



                                                  ns.            

 

     gabapentin            Yes                      gabapentin           U

T



     (Neurontin)      3-09                               600 mg           Health



     600 MG      09:24:                               tablet           



     tablet      05                                                

 

     sulfaSALAzi            Yes                      sulfasalaz           

UT



     ne        3-09                               ine 500 mg           Health



     (Azulfidine      09:24:                               tablet           



     ) 500 MG      05                                 TAKE 4           



     tablet                                         TABLETS BY           



                                                  MOUTH           



                                                  EVERY DAY           

 

     azaTHIOprin            Yes                      See            UT



     e (Imuran)      3-09                               administra           [facility]

[facility]



     50 MG      09:24:                               tion           



     tablet      05                                 instructio           



                                                  ns.            

 

     gabapentin            Yes                      gabapentin           U

T



     (Neurontin)      3-09                               600 mg           Health



     600 MG      09:24:                               tablet           



     tablet      05                                                

 

     sulfaSALAzi            Yes                      sulfasalaz           

UT



     ne        3-09                               ine 500 mg           [facility]



     (Azulfidine      09:24:                               tablet           



     ) 500 MG      05                                 TAKE 4           



     tablet                                         TABLETS BY           



                                                  MOUTH           



                                                  EVERY DAY           

 

     azaTHIOprin            Yes                      See            UT



     e (Imuran)      3-09                               administra           [facility]

[facility]



     50 MG      09:24:                               tion           



     tablet      05                                 instructio           



                                                  ns.            

 

     gabapentin            Yes                      gabapentin           U

T



     (Neurontin)      3-09                               600 mg           Health



     600 MG      09:24:                               tablet           



     tablet      05                                                

 

     sulfaSALAzi            Yes                      sulfasalaz           

UT



     ne        3-09                               ine 500 mg           [facility]



     (Azulfidine      09:24:                               tablet           



     ) 500 MG      05                                 TAKE 4           



     tablet                                         TABLETS BY           



                                                  MOUTH           



                                                  EVERY DAY           

 

     azaTHIOprin            Yes                      See            UT



     e (Imuran)      3-09                               administra           [facility]

[facility]



     50 MG      09:24:                               tion           



     tablet      05                                 instructio           



                                                  ns.            

 

     gabapentin            Yes                      gabapentin           U

T



     (Neurontin)      3-09                               600 mg           [facility]



     600 MG      09:24:                               tablet           



     tablet      05                                                

 

     sulfaSALAzi            Yes                      sulfasalaz           

UT



     ne        3-09                               ine 500 mg           [facility]



     (Azulfidine      09:24:                               tablet           



     ) 500 MG      05                                 TAKE 4           



     tablet                                         TABLETS BY           



                                                  MOUTH           



                                                  EVERY DAY           

 

     azaTHIOprin            Yes                      See            UT



     e (Imuran)      3-09                               administra           [facility]

[facility]



     50 MG      09:24:                               tion           



     tablet      05                                 instructio           



                                                  ns.            

 

     gabapentin            Yes                      gabapentin           U

T



     (Neurontin)      3-09                               600 mg           Health



     600 MG      09:24:                               tablet           



     tablet      05                                                

 

     sulfaSALAzi            Yes                      sulfasalaz           

UT



     ne        3-09                               ine 500 mg           [facility]



     (Azulfidine      09:24:                               tablet           



     ) 500 MG      05                                 TAKE 4           



     tablet                                         TABLETS BY           



                                                  MOUTH           



                                                  EVERY DAY           

 

     azaTHIOprin            Yes                      See            UT



     e (Imuran)      3-09                               administra           [facility]

[facility]



     50 MG      09:24:                               tion           



     tablet      05                                 instructio           



                                                  ns.            

 

     gabapentin            Yes                      gabapentin           U

T



     (Neurontin)      3-09                               600 mg           Health



     600 MG      09:24:                               tablet           



     tablet      05                                                

 

     sulfaSALAzi            Yes                      sulfasalaz           

UT



     ne        3-09                               ine 500 mg           Health



     (Azulfidine      09:24:                               tablet           



     ) 500 MG      05                                 TAKE 4           



     tablet                                         TABLETS BY           



                                                  MOUTH           



                                                  EVERY DAY           

 

     azaTHIOprin      0      Yes                      See            UT



     e (Imuran)      3-09                               administra           [facility]

[facility]



     50 MG      09:24:                               tion           



     tablet      05                                 instructio           



                                                  ns.            

 

     gabapentin            Yes                      gabapentin           U

T



     (Neurontin)      3-09                               600 mg           Health



     600 MG      09:24:                               tablet           



     tablet      05                                                

 

     sulfaSALAzi            Yes                      sulfasalaz           

UT



     ne        3-09                               ine 500 mg           Health



     (Azulfidine      09:24:                               tablet           



     ) 500 MG      05                                 TAKE 4           



     tablet                                         TABLETS BY           



                                                  MOUTH           



                                                  EVERY DAY           

 

     azaTHIOprin      0      Yes                      See            UT



     e (Imuran)      3-09                               administra           [facility]

[facility]



     50 MG      09:24:                               tion           



     tablet      05                                 instructio           



                                                  ns.            

 

     acetaminoph            Yes       [id number] 1{tbl} Q.5D Take 1         

  UT



     en-codeine      3-09                               tablet by           [facility]

[facility]



     (Tylenol w/      00:00:                               mouth 2           



     Codeine #3)      00                                 (two)           



     300-30 MG                                         times a           



     tablet                                         day if           



                                                  needed for           



                                                  moderate           



                                                  pain or           



                                                  severe           



                                                  pain.           

 

     acetaminoph      2022- No        [id number] 1{tbl} Q.5D Take 1        

   UT



     en-codeine      3-09 04-20                          tablet by           [facility]

[facility]



     (Tylenol w/      00:00: 00:00                          mouth 2           



     Codeine #3)      00   :00                           (two)           



     300-30 MG                                         times a           



     tablet                                         day if           



                                                  needed for           



                                                  moderate           



                                                  pain or           



                                                  severe           



                                                  pain.           

 

     Restoril 15 Restoril 15       No             1{capsu QD   Restoril   

        



     MG   MG   1-25                     le_at_b      15 MG           



               00:00:                     edtime_                     



               00                       as_need                     



                                        ed}                      

 

     Restoril 15 Restoril 15       No             1{capsu QD   Restoril   

        



     MG   MG   1-25                     le_at_b      15 MG           



               00:00:                     edtime_                     



               00                       as_need                     



                                        ed}                      

 

     Macrobid Macrobid 2022- No             1{capsu BID  Macrobid        

   



     100  MG 1-14 -21                le_with      100 MG           



               00:00: 00:00                _food}                     



               00   :00                                          

 

     temazepam      2021      Yes                                     [facility]



     ([facility])      2-06                                              [facility]



     15 MG      00:00:                                              



     capsule      00                                                

 

     temazepam      2021      Yes                                     [facility]



     ([facility])      2-06                                              [facility]



     15 MG      00:00:                                              



     capsule      00                                                

 

     temazepam      2021      Yes                                     [facility]



     ([facility])      2-06                                              [facility]



     15 MG      00:00:                                              



     capsule      00                                                

 

     temazepam      2021      Yes                                     [facility]



     ([facility])      2-06                                              [facility]



     15 MG      00:00:                                              



     capsule      00                                                

 

     temazepam      2021      Yes                                     [facility]



     ([facility])      2-06                                              [facility]



     15 MG      00:00:                                              



     capsule      00                                                

 

     temazepam      2021      Yes                                     [facility]



     ([facility])      2-06                                              [facility]



     15 MG      00:00:                                              



     capsule      00                                                

 

     temazepam      2021      Yes                                     [facility]



     ([facility])      2-06                                              [facility]



     15 MG      00:00:                                              



     capsule      00                                                

 

     temazepam      2021      Yes                                     [facility]



     ([facility])      2-06                                              [facility]



     15 MG      00:00:                                              



     capsule      00                                                

 

     temazepam      2021      Yes                                     [facility]



     ([facility])      2-06                                              [facility]



     15 MG      00:00:                                              



     capsule      00                                                

 

     temazepam      2021      Yes                                     [facility]



     ([facility])      2-06                                              [facility]



     15 MG      00:00:                                              



     capsule      00                                                

 

     temazepam      2021      Yes                                     [facility]



     ([facility])      2-06                                              [facility]



     15 MG      00:00:                                              



     capsule      00                                                

 

     temazepam      2021      Yes                                     [facility]



     ([facility])      2-06                                              [facility]



     15 MG      00:00:                                              



     capsule      00                                                

 

     temazepam      2021      Yes                                     [facility]



     ([facility])      2-06                                              [facility]



     15 MG      00:00:                                              



     capsule      00                                                

 

     temazepam      2021      Yes                                     [facility]



     ([facility])      2-06                                              [facility]



     15 MG      00:00:                                              



     capsule      00                                                

 

     temazepam      2021      Yes                                     [facility]



     ([facility])      2-06                                              [facility]



     15 MG      00:00:                                              



     capsule      00                                                

 

     temazepam      2021      Yes                                     [facility]



     ([facility])      2-06                                              [facility]



     15 MG      00:00:                                              



     capsule      00                                                

 

     temazepam      2021      Yes                                     [facility]



     ([facility])      2-06                                              [facility]



     15 MG      00:00:                                              



     capsule      00                                                

 

     hydroxychlo      2021      Yes                                     [facility]



     roquine      2-03                                              [facility]



     (Plaquenil)      00:00:                                              



     200 MG      00                                                



     tablet                                                        

 

     hydroxychlo      -1      Yes                                     [facility]



     roquine      2-03                                              [facility]



     (Plaquenil)      00:00:                                              



     200 MG      00                                                



     tablet                                                        

 

     hydroxychlo      -1      Yes                                     [facility]



     roquine      2-03                                              [facility]



     (Plaquenil)      00:00:                                              



     200 MG      00                                                



     tablet                                                        

 

     hydroxychlo      -1      Yes                                     [facility]



     roquine      2-03                                              [facility]



     (Plaquenil)      00:00:                                              



     200 MG      00                                                



     tablet                                                        

 

     hydroxychlo      -1      Yes                                     [facility]



     roquine      2-03                                              [facility]



     (Plaquenil)      00:00:                                              



     200 MG      00                                                



     tablet                                                        

 

     hydroxychlo      -1      Yes                                     [facility]



     roquine      2-03                                              [facility]



     (Plaquenil)      00:00:                                              



     200 MG      00                                                



     tablet                                                        

 

     hydroxychlo      -1      Yes                                     [facility]



     roquine      2-03                                              [facility]



     (Plaquenil)      00:00:                                              



     200 MG      00                                                



     tablet                                                        

 

     hydroxychlo      -1      Yes                                     [facility]



     roquine      2-03                                              [facility]



     (Plaquenil)      00:00:                                              



     200 MG      00                                                



     tablet                                                        

 

     hydroxychlo      202-1      Yes                                     [facility]



     roquine      2-03                                              [facility]



     (Plaquenil)      00:00:                                              



     200 MG      00                                                



     tablet                                                        

 

     hydroxychlo      2021-1      Yes                                     [facility]



     roquine      2-03                                              [facility]



     (Plaquenil)      00:00:                                              



     200 MG      00                                                



     tablet                                                        

 

     hydroxychlo      202-1      Yes                                     [facility]



     roquine      2-03                                              [facility]



     (Plaquenil)      00:00:                                              



     200 MG      00                                                



     tablet                                                        

 

     hydroxychlo      2021      Yes                                     [facility]



     roquine      2-03                                              [facility]



     (Plaquenil)      00:00:                                              



     200 MG      00                                                



     tablet                                                        

 

     hydroxychlo      2021      Yes                                     [facility]



     roquine      2-03                                              [facility]



     (Plaquenil)      00:00:                                              



     200 MG      00                                                



     tablet                                                        

 

     hydroxychlo      2021      Yes                                     [facility]



     roquine      2-03                                              [facility]



     (Plaquenil)      00:00:                                              



     200 MG      00                                                



     tablet                                                        

 

     hydroxychlo      2021      Yes                                     [facility]



     roquine      2-03                                              [facility]



     (Plaquenil)      00:00:                                              



     200 MG      00                                                



     tablet                                                        

 

     hydroxychlo      2021      Yes                                     [facility]



     roquine      2-03                                              [facility]



     (Plaquenil)      00:00:                                              



     200 MG      00                                                



     tablet                                                        

 

     hydroxychlo      2021      Yes                                     [facility]



     roquine      2-03                                              [facility]



     (Plaquenil)      00:00:                                              



     200 MG      00                                                



     tablet                                                        

 

     rosuvastati      2021      Yes                                     [facility]



     n ([facility])      1-04                                              [facility]



     5 MG tablet      00:00:                                              



               00                                                

 

     rosuvastati      2021      Yes                                     [facility]



     n ([facility])      1-04                                              [facility]



     5 MG tablet      00:00:                                              



               00                                                

 

     rosuvastati      2021      Yes                                     [facility]



     n ([facility])      1-04                                              [facility]



     5 MG tablet      00:00:                                              



               00                                                

 

     rosuvastati      2021      Yes                                     [facility]



     n ([facility])      1-04                                              [facility]



     5 MG tablet      00:00:                                              



               00                                                

 

     rosuvastati      2021      Yes                                     [facility]



     n ([facility])      1-04                                              [facility]



     5 MG tablet      00:00:                                              



               00                                                

 

     rosuvastati      2021      Yes                                     [facility]



     n ([facility])      1-04                                              [facility]



     5 MG tablet      00:00:                                              



               00                                                

 

     rosuvastati      2021      Yes                                     [facility]



     n ([facility])      1-04                                              [facility]



     5 MG tablet      00:00:                                              



               00                                                

 

     rosuvastati      2021      Yes                                     [facility]



     n ([facility])      1-04                                              [facility]



     5 MG tablet      00:00:                                              



               00                                                

 

     rosuvastati      2021      Yes                                     [facility]



     n ([facility])      1-04                                              [facility]



     5 MG tablet      00:00:                                              



               00                                                

 

     rosuvastati      2021      Yes                                     [facility]



     n ([facility])      1-04                                              [facility]



     5 MG tablet      00:00:                                              



               00                                                

 

     rosuvastati      2021      Yes                                     [facility]



     n ([facility])      1-04                                              [facility]



     5 MG tablet      00:00:                                              



               00                                                

 

     rosuvastati      2021      Yes                                     [facility]



     n ([facility])      1-04                                              [facility]



     5 MG tablet      00:00:                                              



               00                                                

 

     rosuvastati      2021      Yes                                     [facility]



     n ([facility])      1-04                                              [facility]



     5 MG tablet      00:00:                                              



               00                                                

 

     rosuvastati      2021      Yes                                     [facility]



     n ([facility])      1-04                                              [facility]



     5 MG tablet      00:00:                                              



               00                                                

 

     rosuvastati      2021      Yes                                     [facility]



     n ([facility])      1-04                                              [facility]



     5 MG tablet      00:00:                                              



               00                                                

 

     rosuvastati      2021      Yes                                     [facility]



     n ([facility])      1-04                                              [facility]



     5 MG tablet      00:00:                                              



               00                                                

 

     rosuvastati      2021      Yes                                     [facility]



     n ([facility])      1-04                                              [facility]



     5 MG tablet      00:00:                                              



               00                                                

 

     levothyroxi      2021      Yes                                     [facility]



     ne        0-21                                              [facility]



     (Synthroid,      00:00:                                              



     Levoxyl)      00                                                



     150 MCG                                                        



     tablet                                                        

 

     levothyroxi      2021      Yes                                     [facility]



     ne        0-21                                              [facility]



     (Synthroid,      00:00:                                              



     Levoxyl)      00                                                



     150 MCG                                                        



     tablet                                                        

 

     levothyroxi      2021      Yes                                     [facility]



     ne        0-21                                              [facility]



     (Synthroid,      00:00:                                              



     Levoxyl)      00                                                



     150 MCG                                                        



     tablet                                                        

 

     levothyroxi      2021      Yes                                     [facility]



     ne        0-21                                              [facility]



     (Synthroid,      00:00:                                              



     Levoxyl)      00                                                



     150 MCG                                                        



     tablet                                                        

 

     levothyroxi      2021      Yes                                     [facility]



     ne        0-21                                              [facility]



     (Synthroid,      00:00:                                              



     Levoxyl)      00                                                



     150 MCG                                                        



     tablet                                                        

 

     levothyroxi      2021      Yes                                     [facility]



     ne        0-21                                              [facility]



     (Synthroid,      00:00:                                              



     Levoxyl)      00                                                



     150 MCG                                                        



     tablet                                                        

 

     levothyroxi      2021      Yes                                     [facility]



     ne        0-21                                              [facility]



     (Synthroid,      00:00:                                              



     Levoxyl)      00                                                



     150 MCG                                                        



     tablet                                                        

 

     levothyroxi      2021      Yes                                     [facility]



     ne        0-21                                              [facility]



     (Synthroid,      00:00:                                              



     Levoxyl)      00                                                



     150 MCG                                                        



     tablet                                                        

 

     levothyroxi      2021      Yes                                     [facility]



     ne        0-21                                              [facility]



     (Synthroid,      00:00:                                              



     Levoxyl)      00                                                



     150 MCG                                                        



     tablet                                                        

 

     levothyroxi      2021      Yes                                     [facility]



     ne        0-21                                              [facility]



     (Synthroid,      00:00:                                              



     Levoxyl)      00                                                



     150 MCG                                                        



     tablet                                                        

 

     levothyroxi      2021      Yes                                     [facility]



     ne        0-21                                              [facility]



     (Synthroid,      00:00:                                              



     Levoxyl)      00                                                



     150 MCG                                                        



     tablet                                                        

 

     levothyroxi      2021      Yes                                     [facility]



     ne        0-21                                              [facility]



     (Synthroid,      00:00:                                              



     Levoxyl)      00                                                



     150 MCG                                                        



     tablet                                                        

 

     levothyroxi      2021      Yes                                     [facility]



     ne        0-21                                              [facility]



     (Synthroid,      00:00:                                              



     Levoxyl)      00                                                



     150 MCG                                                        



     tablet                                                        

 

     levothyroxi      2021      Yes                                     [facility]



     ne        0-21                                              [facility]



     (Synthroid,      00:00:                                              



     Levoxyl)      00                                                



     150 MCG                                                        



     tablet                                                        

 

     levothyroxi      2021      Yes                                     [facility]



     ne        0-21                                              [facility]



     (Synthroid,      00:00:                                              



     Levoxyl)      00                                                



     150 MCG                                                        



     tablet                                                        

 

     levothyroxi      2021      Yes                                     [facility]



     ne        0-21                                              [facility]



     (Synthroid,      00:00:                                              



     Levoxyl)      00                                                



     150 MCG                                                        



     tablet                                                        

 

     levothyroxi      2021      Yes                                     [facility]



     ne        0-21                                              [facility]



     (Synthroid,      00:00:                                              



     Levoxyl)      00                                                



     150 MCG                                                        



     tablet                                                        

 

     folic acid      2021      Yes                                     [facility]



     (Folvite) 1      0-17                                              [facility]



     MG tablet      00:00:                                              



               00                                                

 

     folic acid      2021      Yes                                     [facility]



     (Folvite) 1      0-17                                              Health



     MG tablet      00:00:                                              



               00                                                

 

     folic acid      2021      Yes                                     [facility]



     (Folvite) 1      0-17                                              [facility]



     MG tablet      00:00:                                              



               00                                                

 

     folic acid      2021      Yes                                     [facility]



     (Folvite) 1      0-17                                              [facility]



     MG tablet      00:00:                                              



               00                                                

 

     folic acid      2021      Yes                                     [facility]



     (Folvite) 1      0-17                                              [facility]



     MG tablet      00:00:                                              



               00                                                

 

     folic acid      2021      Yes                                     [facility]



     (Folvite) 1      0-17                                              [facility]



     MG tablet      00:00:                                              



               00                                                

 

     folic acid      2021      Yes                                     [facility]



     (Folvite) 1      0-17                                              [facility]



     MG tablet      00:00:                                              



               00                                                

 

     folic acid      2021      Yes                                     [facility]



     (Folvite) 1      0-17                                              [facility]



     MG tablet      00:00:                                              



               00                                                

 

     folic acid      2021      Yes                                     [facility]



     (Folvite) 1      0-17                                              [facility]



     MG tablet      00:00:                                              



               00                                                

 

     folic acid      2021      Yes                                     [facility]



     (Folvite) 1      0-17                                              [facility]



     MG tablet      00:00:                                              



               00                                                

 

     folic acid      2021      Yes                                     [facility]



     (Folvite) 1      0-17                                              [facility]



     MG tablet      00:00:                                              



               00                                                

 

     folic acid      2021      Yes                                     [facility]



     (Folvite) 1      0-17                                              [facility]



     MG tablet      00:00:                                              



               00                                                

 

     folic acid      2021      Yes                                     [facility]



     (Folvite) 1      0-17                                              [facility]



     MG tablet      00:00:                                              



               00                                                

 

     folic acid      2021-1      Yes                                     [facility]



     (Folvite) 1      0-17                                              [facility]



     MG tablet      00:00:                                              



               00                                                

 

     folic acid      -1      Yes                                     [facility]



     (Folvite) 1      0-17                                              [facility]



     MG tablet      00:00:                                              



               00                                                

 

     folic acid      -1      Yes                                     [facility]



     (Folvite) 1      0-17                                              [facility]



     MG tablet      00:00:                                              



               00                                                

 

     folic acid      -1      Yes                                     [facility]



     (Folvite) 1      0-17                                              [facility]



     MG tablet      00:00:                                              



               00                                                

 

     DULoxetine      2021- No                                      [facility]



     (Cymbalta)      0-12 04-12                                         [facility]



     30 MG DR      00:00: 00:00                                         



     capsule      00   :00                                          

 

     DULoxetine      -2022- No                                      [facility]



     (Cymbalta)      0-12 04-12                                         [facility]



     30 MG DR      00:00: 00:00                                         



     capsule      00   :00                                          

 

     methocarbam      2021-0      Yes                                     [facility]



     ol        9-28                                              [facility]



     (Robaxin)      00:00:                                              



     750 MG      00                                                



     tablet                                                        

 

     methocarbam      2021-0      Yes                                     [facility]



     ol        9-28                                              [facility]



     (Robaxin)      00:00:                                              



     750 MG      00                                                



     tablet                                                        

 

     methocarbam      2021-0      Yes                                     [facility]



     ol        9-28                                              [facility]



     (Robaxin)      00:00:                                              



     750 MG      00                                                



     tablet                                                        

 

     methocarbam      2021-0      Yes                                     [facility]



     ol        9-28                                              [facility]



     (Robaxin)      00:00:                                              



     750 MG      00                                                



     tablet                                                        

 

     methocarbam      2021-0      Yes                                     [facility]



     ol        9-28                                              [facility]



     (Robaxin)      00:00:                                              



     750 MG      00                                                



     tablet                                                        

 

     methocarbam      2021-0      Yes                                     [facility]



     ol        9-28                                              [facility]



     (Robaxin)      00:00:                                              



     750 MG      00                                                



     tablet                                                        

 

     methocarbam      2021-0      Yes                                     [facility]



     ol        9-28                                              [facility]



     (Robaxin)      00:00:                                              



     750 MG      00                                                



     tablet                                                        

 

     methocarbam      2021-0      Yes                                     [facility]



     ol        9-28                                              [facility]



     (Robaxin)      00:00:                                              



     750 MG      00                                                



     tablet                                                        

 

     methocarbam      2021-0      Yes                                     [facility]



     ol        9-28                                              [facility]



     (Robaxin)      00:00:                                              



     750 MG      00                                                



     tablet                                                        

 

     methocarbam      2021-0      Yes                                     [facility]



     ol        9-28                                              [facility]



     (Robaxin)      00:00:                                              



     750 MG      00                                                



     tablet                                                        

 

     methocarbam      2021-0      Yes                                     [facility]



     ol        9-28                                              [facility]



     (Robaxin)      00:00:                                              



     750 MG      00                                                



     tablet                                                        

 

     methocarbam      2021-0      Yes                                     [facility]



     ol        9-28                                              [facility]



     (Robaxin)      00:00:                                              



     750 MG      00                                                



     tablet                                                        

 

     methocarbam      2021-0      Yes                                     [facility]



     ol        9-28                                              [facility]



     (Robaxin)      00:00:                                              



     750 MG      00                                                



     tablet                                                        

 

     methocarbam      -0      Yes                                     [facility]



     ol        9-28                                              [facility]



     (Robaxin)      00:00:                                              



     750 MG      00                                                



     tablet                                                        

 

     methocarbam      -0      Yes                                     [facility]



     ol        9-28                                              [facility]



     (Robaxin)      00:00:                                              



     750 MG      00                                                



     tablet                                                        

 

     methocarbam      -0      Yes                                     [facility]



     ol        9-28                                              [facility]



     (Robaxin)      00:00:                                              



     750 MG      00                                                



     tablet                                                        

 

     methocarbam      -0      Yes                                     [facility]



     ol        9-28                                              [facility]



     (Robaxin)      00:00:                                              



     750 MG      00                                                



     tablet                                                        

 

     metFORMIN      -0      Yes                                     [facility]



     (Glucophage      9-19                                              Health



     ) 1000 MG      00:00:                                              



     tablet      00                                                

 

     metFORMIN      -0      Yes                                     [facility]



     (Glucophage      9-19                                              [facility]



     ) 1000 MG      00:00:                                              



     tablet      00                                                

 

     metFORMIN      -0      Yes                                     [facility]



     (Glucophage      9-19                                              [facility]



     ) 1000 MG      00:00:                                              



     tablet      00                                                

 

     metFORMIN      -0      Yes                                     [facility]



     (Glucophage      9-19                                              [facility]



     ) 1000 MG      00:00:                                              



     tablet      00                                                

 

     metFORMIN      -0      Yes                                     [facility]



     (Glucophage      9-19                                              [facility]



     ) 1000 MG      00:00:                                              



     tablet      00                                                

 

     metFORMIN      -0      Yes                                     [facility]



     (Glucophage      9-19                                              [facility]



     ) 1000 MG      00:00:                                              



     tablet      00                                                

 

     metFORMIN      1-0      Yes                                     [facility]



     (Glucophage      9-19                                              Health



     ) 1000 MG      00:00:                                              



     tablet      00                                                

 

     metFORMIN      1-0      Yes                                     [facility]



     (Glucophage      9-19                                              [facility]



     ) 1000 MG      00:00:                                              



     tablet      00                                                

 

     metFORMIN      1-0      Yes                                     [facility]



     (Glucophage      9-19                                              [facility]



     ) 1000 MG      00:00:                                              



     tablet      00                                                

 

     metFORMIN      1-0      Yes                                     [facility]



     (Glucophage      9-19                                              Health



     ) 1000 MG      00:00:                                              



     tablet      00                                                

 

     metFORMIN      1-0      Yes                                     [facility]



     (Glucophage      9-19                                              Health



     ) 1000 MG      00:00:                                              



     tablet      00                                                

 

     metFORMIN      1-0      Yes                                     [facility]



     (Glucophage      9-19                                              Health



     ) 1000 MG      00:00:                                              



     tablet      00                                                

 

     metFORMIN      1-0      Yes                                     [facility]



     (Glucophage      9-19                                              [facility]



     ) 1000 MG      00:00:                                              



     tablet      00                                                

 

     metFORMIN      1-0      Yes                                     [facility]



     (Glucophage      9-19                                              [facility]



     ) 1000 MG      00:00:                                              



     tablet      00                                                

 

     metFORMIN      1-0      Yes                                     [facility]



     (Glucophage      9-19                                              Health



     ) 1000 MG      00:00:                                              



     tablet      00                                                

 

     metFORMIN      1-0      Yes                                     [facility]



     (Glucophage      9-19                                              Health



     ) 1000 MG      00:00:                                              



     tablet      00                                                

 

     metFORMIN      1-0      Yes                                     [facility]



     (Glucophage      9-19                                              Health



     ) 1000 MG      00:00:                                              



     tablet      00                                                

 

     glimepiride      2021-0      Yes                      glimepirid           

[facility]



     (Amaryl) 2      1-20                               e 2 mg           [facility]



     MG tablet      00:00:                               tablet           



               00                                                

 

     glimepiride      -0      Yes                      glimepirid           

[facility]



     (Amaryl) 2      1-20                               e 2 mg           [facility]



     MG tablet      00:00:                               tablet           



               00                                                

 

     glimepiride      -0      Yes                      glimepirid           

[facility]



     (Amaryl) 2      1-20                               e 2 mg           [facility]



     MG tablet      00:00:                               tablet           



               00                                                

 

     glimepiride      -0      Yes                      glimepirid           

[facility]



     (Amaryl) 2      1-20                               e 2 mg           [facility]



     MG tablet      00:00:                               tablet           



               00                                                

 

     glimepiride      0      Yes                      glimepirid           

[facility]



     (Amaryl) 2      1-20                               e 2 mg           [facility]



     MG tablet      00:00:                               tablet           



               00                                                

 

     glimepiride      0      Yes                      glimepirid           

[facility]



     (Amaryl) 2      1-20                               e 2 mg           [facility]



     MG tablet      00:00:                               tablet           



               00                                                

 

     glimepiride      0 - No                       glimepirid          

 [facility]



     (Amaryl) 2      1-20 02-16                          e 2 mg           Health



     MG tablet      00:00: 00:00                          tablet           



               00   :00                                          

 

     One Touch One Touch 2020- No                  QD   One Touch        

   



     Verio n/s Verio n/s -                          Verio n/s          

 



               00:00: 00:00                                         



               00   :00                                          

 

     One Touch One Touch 2020- No                  QD                  



     Verio n/s Verio n/s                                          



               00:00: 00:00                                         



               00   :00                                          

 

     One Touch One Touch 2020- No                  QD   One Touch        

   



     Verio n/s Verio n/s -                          Verio n/s          

 



               00:00: 00:00                                         



               00   :00                                          

 

     One Touch One Touch 2020- No                  QD   One Touch        

   



     Verio n/s Verio n/s -                          Verio n/s          

 



               00:00: 00:00                                         



               00   :00                                          

 

     One Touch One Touch 2020- No                  QD   One Touch        

   



     Verio n/s Verio n/s -                          Verio n/s          

 



               00:00: 00:00                                         



               00   :00                                          

 

     One Touch One Touch 2020- No                  QD                  



     Verio n/s Verio n/s                                          



               00:00: 00:00                                         



               00   :00                                          

 

     Celebrex Celebrex 2020-0 2020- No   Na Strickland                1 capsule       

    Common



                                         with food           [facility]



               00:00: 00:00                                         - [facility]



               00   :00                                          [facility]

 

     Aspirin Aspirin 2018      No             1{table QD   Aspirin           



     Adult Low Adult Low 2-14                     t}        Adult Low           



     Dose 81 MG Dose 81 MG 00:00:                               Dose 81 MG      

     



               00                                                

 

     Aspirin Aspirin 2018      No             1{table QD   Aspirin           



     Adult Low Adult Low 2-14                     t}        Adult Low           



     Dose 81 MG Dose 81 MG 00:00:                               Dose 81 MG      

     



               00                                                

 

     Zofran 8 MG Zofran 8 MG 2018      No             1{table      Zofran 8   

        



               2-14                     t}        MG             



               00:00:                                              



               00                                                

 

     Zofran 8 MG Zofran 8 MG 2018      No             1{table      Zofran 8   

        



               2-14                     t}        MG             



               00:00:                                              



               00                                                

 

     Alpha Alpha 2018      Yes  Na Strickland                as             Common



     Lipoic Acid Lipoic Acid 2-14                               directed        

   [facility]



               00:00:                                              - [facility]



               00                                                [facility]

 

     Aspirin Aspirin 2018      Yes  Na Strickland                1 tablet           

Common



     Adult Low Adult Low 2-14                                              [facility]

t



     Dose Dose 00:00:                                              - [facility]



               00                                                [facility]

 

     Zofran Zofran 2018      Yes  Na Strickland                1 tablet           Co

mmon



               2-14                                              [facility]



               00:00:                                              - [facility]



               00                                                [facility]

 

     Aspirin Aspirin 2018      No             1{table QD   Aspirin           



     Adult Low Adult Low 2-14                     t}        Adult Low           



     Dose 81 MG Dose 81 MG 00:00:                               Dose 81 MG      

     



               00                                                

 

     Zofran 8 MG Zofran 8 MG 2018      No             1{table                 

    



               2-14                     t}                       



               00:00:                                              



               00                                                

 

     Aspirin Aspirin 2018      No             1{table QD                  



     Adult Low Adult Low 2-14                     t}                       



     Dose 81 MG Dose 81 MG 00:00:                                              



               00                                                

 

     Zofran 8 MG Zofran 8 MG 2018      No             1{table      Zofran 8   

        



               2-14                     t}        MG             



               00:00:                                              



               00                                                

 

     Aspirin Aspirin 2018      No             1{table QD   Aspirin           



     Adult Low Adult Low 2-14                     t}        Adult Low           



     Dose 81 MG Dose 81 MG 00:00:                               Dose 81 MG      

     



               00                                                

 

     Zofran 8 MG Zofran 8 MG 2018      No             1{table      Zofran 8   

        



               2-14                     t}        MG             



               00:00:                                              



               00                                                

 

     Aspirin Aspirin 2018      No             1{table QD   Aspirin           



     Adult Low Adult Low 2-14                     t}        Adult Low           



     Dose 81 MG Dose 81 MG 00:00:                               Dose 81 MG      

     



               00                                                

 

     Zofran 8 MG Zofran 8 MG 2018      No             1{table      Zofran 8   

        



               2-14                     t}        MG             



               00:00:                                              



               00                                                

 

     Aspirin Aspirin 2018      No             1{table QD   Aspirin           



     Adult Low Adult Low 2-14                     t}        Adult Low           



     Dose 81 MG Dose 81 MG 00:00:                               Dose 81 MG      

     



               00                                                

 

     Zofran 8 MG Zofran 8 MG 2018      No             1{table                 

    



               2-14                     t}                       



               00:00:                                              



               00                                                

 

     Aspirin Aspirin 2018      No             1{table QD                  



     Adult Low Adult Low 2-14                     t}                       



     Dose 81 MG Dose 81 MG 00:00:                                              



               00                                                

 

     Aspirin Aspirin 2018      No             1{table QD   Aspirin           



     Adult Low Adult Low 2-14                     t}        Adult Low           



     Dose 81 MG Dose 81 MG 00:00:                               Dose 81 MG      

     



               00                                                

 

     Zofran 8 MG Zofran 8 MG 2018      No             1{table      Zofran 8   

        



               2-14                     t}        MG             



               00:00:                                              



               00                                                

 

     Aspirin Aspirin 2018      No             1{table QD   Aspirin           



     Adult Low Adult Low 2-14                     t}        Adult Low           



     Dose 81 MG Dose 81 MG 00:00:                               Dose 81 MG      

     



               00                                                

 

     Zofran 8 MG Zofran 8 MG 2018      No             1{table      Zofran 8   

        



               2-14                     t}        MG             



               00:00:                                              



               00                                                

 

     Aspirin Aspirin 2018      No             1{table QD   Aspirin           



     Adult Low Adult Low 2-14                     t}        Adult Low           



     Dose 81 MG Dose 81 MG 00:00:                               Dose 81 MG      

     



               00                                                

 

     Zofran 8 MG Zofran 8 MG 2018      No             1{table      Zofran 8   

        



               2-14                     t}        MG             



               00:00:                                              



               00                                                

 

     Aspirin Aspirin 2018      No             1{table QD   Aspirin           



     Adult Low Adult Low 2-14                     t}        Adult Low           



     Dose 81 MG Dose 81 MG 00:00:                               Dose 81 MG      

     



               00                                                

 

     Zofran 8 MG Zofran 8 MG 2018      No             1{table      Zofran 8   

        



               2-14                     t}        MG             



               00:00:                                              



               00                                                

 

     Zofran 8 MG Zofran 8 MG 2018      No             1{table      Zofran 8   

        



               2-14                     t}        MG             



               00:00:                                              



               00                                                

 

     Aspirin Aspirin 2018      No             1{table QD   Aspirin           



     Adult Low Adult Low 2-14                     t}        Adult Low           



     Dose 81 MG Dose 81 MG 00:00:                               Dose 81 MG      

     



               00                                                

 

     Aspirin Aspirin 2018      No             1{table QD   Aspirin           



     Adult Low Adult Low 2-14                     t}        Adult Low           



     Dose 81 MG Dose 81 MG 00:00:                               Dose 81 MG      

     



               00                                                

 

     Zofran 8 MG Zofran 8 MG 2018      No             1{table      Zofran 8   

        



               2-14                     t}        MG             



               00:00:                                              



               00                                                

 

     Zofran 8 MG Zofran 8 MG 2018      No             1{table      Zofran 8   

        



               2-14                     t}        MG             



               00:00:                                              



               00                                                

 

     Aspirin Aspirin 2018      No             1{table QD   Aspirin           



     Adult Low Adult Low 2-14                     t}        Adult Low           



     Dose 81 MG Dose 81 MG 00:00:                               Dose 81 MG      

     



               00                                                

 

     Zofran 8 MG Zofran 8 MG 2018      No             1{table      Zofran 8   

        



               2-14                     t}        MG             



               00:00:                                              



               00                                                

 

     Aspirin Aspirin 2018      No             1{table QD   Aspirin           



     Adult Low Adult Low 2-14                     t}        Adult Low           



     Dose 81 MG Dose 81 MG 00:00:                               Dose 81 MG      

     



               00                                                

 

     Zofran 8 MG Zofran 8 MG 2018      No             1{table      Zofran 8   

        



               2-14                     t}        MG             



               00:00:                                              



               00                                                

 

     Aspirin Aspirin 2018      No             1{table QD   Aspirin           



     Adult Low Adult Low 2-14                     t}        Adult Low           



     Dose 81 MG Dose 81 MG 00:00:                               Dose 81 MG      

     



               00                                                

 

     Zofran 8 MG Zofran 8 MG 2018      No             1{table      Zofran 8   

        



               2-14                     t}        MG             



               00:00:                                              



               00                                                

 

     Aspirin Aspirin 2018      No             1{table QD   Aspirin           



     Adult Low Adult Low 2-14                     t}        Adult Low           



     Dose 81 MG Dose 81 MG 00:00:                               Dose 81 MG      

     



               00                                                

 

     Zofran 8 MG Zofran 8 MG 2018      No             1{table      Zofran 8   

        



               2-14                     t}        MG             



               00:00:                                              



               00                                                

 

     Aspirin Aspirin 2018      No             1{table QD   Aspirin           



     Adult Low Adult Low 2-14                     t}        Adult Low           



     Dose 81 MG Dose 81 MG 00:00:                               Dose 81 MG      

     



               00                                                

 

     Zofran 8 MG Zofran 8 MG 2018      No             1{table      Zofran 8   

        



               2-14                     t}        MG             



               00:00:                                              



               00                                                

 

     Aspirin Aspirin 2018      No             1{table QD   Aspirin           



     Adult Low Adult Low 2-14                     t}        Adult Low           



     Dose 81 MG Dose 81 MG 00:00:                               Dose 81 MG      

     



               00                                                

 

     Zofran 8 MG Zofran 8 MG 2018      No             1{table      Zofran 8   

        



               2-14                     t}        MG             



               00:00:                                              



               00                                                

 

     Aspirin Aspirin 2018      No             1{table QD   Aspirin           



     Adult Low Adult Low 2-14                     t}        Adult Low           



     Dose 81 MG Dose 81 MG 00:00:                               Dose 81 MG      

     



               00                                                

 

     Zofran 8 MG Zofran 8 MG 2018      No             1{table      Zofran 8   

        



               2-14                     t}        MG             



               00:00:                                              



               00                                                

 

     Aspirin Aspirin 2018      No             1{table QD   Aspirin           



     Adult Low Adult Low 2-14                     t}        Adult Low           



     Dose 81 MG Dose 81 MG 00:00:                               Dose 81 MG      

     



               00                                                

 

     Zofran 8 MG Zofran 8 MG 2018      No             1{table      Zofran 8   

        



               2-14                     t}        MG             



               00:00:                                              



               00                                                

 

     Aspirin Aspirin 2018      No             1{table QD   Aspirin           



     Adult Low Adult Low 2-14                     t}        Adult Low           



     Dose 81 MG Dose 81 MG 00:00:                               Dose 81 MG      

     



               00                                                

 

     Zofran 8 MG Zofran 8 MG 2018      No             1{table      Zofran 8   

        



               2-14                     t}        MG             



               00:00:                                              



               00                                                

 

     Vitamin B12 Vitamin B12 2018      No             1000ug                  

   Common



     (Cyanocobal (Cyanocobal 0-18                                              S

pirit



     amin) amin) 00:00:                                              - [facility]



                                                               [facility]

 

     Vitamin B12 Vitamin B12 2018      No             1000ug                  

   Common



     (Cyanocobal (Cyanocobal 0-18                                              S

pirit



     amin) amin) 00:00:                                              - [facility]



                                                               [facility]

 

     Vitamin B12 Vitamin B12 2018      No             1000ug                  

   Common



     (Cyanocobal (Cyanocobal 0-18                                              S

pirit



     amin) amin) 00:00:                                              - [facility]



                                                               [facility]

 

     Vitamin B12 Vitamin B12 2018      No             1000ug                  

   Common



     (Cyanocobal (Cyanocobal 0-18                                              S

pirit



     amin) amin) 00:00:                                              - [facility]



                                                               [facility]

 

     Vitamin B12 Vitamin B12 2018      No             1000ug                  

   Common



     (Cyanocobal (Cyanocobal 0-18                                              S

pirit



     amin) amin) 00:00:                                              - [facility]



               00                                                [facility]

 

     Vitamin B12 Vitamin B12 2018      No             1000ug                  

   Common



     (Cyanocobal (Cyanocobal 0-18                                              S

pirit



     amin) amin) 00:00:                                              - [facility]



               00                                                [facility]

 

     Vitamin B12 Vitamin B12 2018      No             1000ug                  

   Common



     (Cyanocobal (Cyanocobal 0-18                                              S

pirit



     amin) amin) 00:00:                                              - [facility]



               00                                                [facility]

 

     Vitamin B12 Vitamin B12 2018      No             1000ug                  

   Common



     (Cyanocobal (Cyanocobal 0-18                                              S

pirit



     amin) amin) 00:00:                                              - [facility]



               00                                                [facility]

 

     Vitamin B12 Vitamin B12 -      No             1000ug                  

   Common



     (Cyanocobal (Cyanocobal 0-18                                              S

pirit



     amin) amin) 00:00:                                              - [facility]



               00                                                [facility]

 

     Vitamin B12 Vitamin B12 2018      No             1000ug                  

   Common



     (Cyanocobal (Cyanocobal 0-18                                              S

pirit



     amin) amin) 00:00:                                              - [facility]



               00                                                [facility]

 

     Vitamin B12 Vitamin B12 2018      No             1000ug                  

   Common



     (Cyanocobal (Cyanocobal 0-18                                              S

pirit



     amin) amin) 00:00:                                              - [facility]



               00                                                [facility]

 

     Vitamin B12 Vitamin B12 2018      No             1000ug                  

   Common



     (Cyanocobal (Cyanocobal 0-18                                              S

pirit



     amin) amin) 00:00:                                              - [facility]



               00                                                [facility]

 

     Vitamin B12 Vitamin B12 2018      No             1000ug                  

   Common



     (Cyanocobal (Cyanocobal 0-18                                              S

pirit



     amin) amin) 00:00:                                              - [facility]



               00                                                [facility]

 

     Vitamin B12 Vitamin B12 2018      No             1000ug                  

   Common



     (Cyanocobal (Cyanocobal 0-18                                              S

pirit



     amin) amin) 00:00:                                              - [facility]



               00                                                [facility]

 

     Vitamin B12 Vitamin B12 2018      No             1000ug                  

   Common



     (Cyanocobal (Cyanocobal 0-18                                              S

pirit



     amin) amin) 00:00:                                              - [facility]



               00                                                [facility] 100      2017      Yes                      TAKE 1           [facility]

ers



     mg tablet      1-27                               TABLET           ity of



               00:00:                               DAILY           [place]



                                                               [facility]

 

     LEVOTHYROXI      2017      Yes       [id number]           TAKE 1          

 [facility]



      mcg      1-27                               TABLET           ity of



     tablet      00:00:                               DAILY           [place]



                                                               [facility] 2017      Yes                      TAKE 1           Univ

ers



     mg tablet      1-27                               TABLET           ity of



               00:00:                               DAILY           [place]



                                                               [facility]

 

     LEVOTHYROXI      2017      Yes       [id number]           TAKE 1          

 [facility]



      mcg      1-27                               TABLET           ity of



     tablet      00:00:                               DAILY           [place]



                                                               [facility] 100      2017      Yes                      TAKE 1           Univ

ers



     mg tablet      1-27                               TABLET           ity of



               00:00:                               DAILY           [place]



                                                               [facility]

 

     LEVOTHYROXI      2017      Yes       [id number]           TAKE 1          

 [facility]



      mcg      1-27                               TABLET           ity of



     tablet      00:00:                               DAILY           [place]



                                                               [place] [address]      2017      Yes                      TAKE 1           Univ

ers



     mg tablet      1-27                               TABLET           ity of



               00:00:                               DAILY           [place]



                                                               [facility]

 

     LEVOTHYROXI      2017      Yes       [id number]           TAKE 1          

 [facility]



      mcg      1-27                               TABLET           ity of



     tablet      00:00:                               DAILY           [place]



                                                               [place] [address]      2017      Yes                      TAKE 1           Univ

ers



     mg tablet      1-27                               TABLET           ity of



               00:00:                               DAILY           [place]



                                                               [facility]

 

     LEVOTHYROXI      2017      Yes       [id number]           TAKE 1          

 [facility]



      mcg      1-27                               TABLET           ity of



     tablet      00:00:                               DAILY           [place]



                                                               [place] [address]      2017      Yes                      TAKE 1           Univ

ers



     mg tablet      1-27                               TABLET           ity of



               00:00:                               DAILY           [place]



                                                               [facility]

 

     LEVOTHYROXI      2017      Yes       [id number]           TAKE 1          

 [facility]



      mcg      1-27                               TABLET           ity of



     tablet      00:00:                               DAILY           [place]



                                                               [facility]

 

     LEVOTHYROXI      2017      Yes       [id number]           TAKE 1          

 [facility]



      mcg      1-27                               TABLET           ity of



     tablet      00:00:                               DAILY           [place]



                                                               [facility]

 

     LEVOTHYROXI      2017      Yes       [id number]           TAKE 1          

 [facility]



      mcg      1-27                               TABLET           ity of



     tablet      00:00:                               DAILY           [place]



                                                               [place] [address]      2017      Yes                      TAKE 1           Univ

ers



     mg tablet      1-27                               TABLET           ity of



               00:00:                               DAILY           [place]



                                                               [facility]

 

     LEVOTHYROXI      2017      Yes       [id number]           TAKE 1          

 [facility]



      mcg      1-27                               TABLET           ity of



     tablet      00:00:                               DAILY           [place]



                                                               [place] [address]      2017      Yes                      TAKE 1           Univ

ers



     mg tablet      1-27                               TABLET           ity of



               00:00:                               DAILY           [place]



                                                               [facility]

 

     LEVOTHYROXI      2017      Yes       [id number]           TAKE 1          

 [facility]



      mcg      1-27                               TABLET           ity of



     tablet      00:00:                               DAILY           [place]



                                                               [place] [address]      2017      Yes                      TAKE 1           Univ

ers



     mg tablet      1-27                               TABLET           ity of



               00:00:                               DAILY           [place]



                                                               [facility]

 

     LEVOTHYROXI      2017      Yes       [id number]           TAKE 1          

 [facility]



      mcg      1-27                               TABLET           ity of



     tablet      00:00:                               DAILY           [place]



                                                               [facility]

 

     LEVOTHYROXI      2017 2023- No        [id number]           TAKE 1         

  [facility]



      mcg      1-27 07-13                          TABLET           ity of



     tablet      00:00: 00:00                          DAILY           [place]



               00   :00                                          [facility]

 

     TRACIEIA 2017- No                       TAKE 1           Uni

vers



     mg tablet      -                          TABLET           ity of



               00:00: 00:00                          DAILY           [place]



               00   :00                                          [facility]

 

     [facility]                       TAKE 1           [facility]

vers



     mg tablet      -                          TABLET           ity of



               00:00: 00:00                          DAILY           [place]



               00   :00                                          Medical



                                                                 Branch

 

     acetaminoph      2016      Yes            1{tbl}      Take 1           Un

daylin



     en-codeine      0-19                               tablet by           ity 

of



     (TYLENOL-CO      00:00:                               mouth           [place]



     DEINE #3)      00                                 every 4           Medical



     300-30 mg                                         (four)           Branch



     tablet                                         hours as           



                                                  needed for           



                                                  Pain           



                                                  (scale           



                                                  4-6) or           



                                                  Pain           



                                                  (scale           



                                                  7-10).           

 

     acetaminoph      2016      Yes            1{tbl}      Take 1           Un

daylin



     en-codeine      0-19                               tablet by           ity 

of



     (TYLENOL-CO      00:00:                               mouth           [place]



     DEINE #3)      00                                 every 4           Medical



     300-30 mg                                         (four)           Branch



     tablet                                         hours as           



                                                  needed for           



                                                  Pain           



                                                  (scale           



                                                  4-6) or           



                                                  Pain           



                                                  (scale           



                                                  7-10).           

 

     acetaminoph      2016      Yes            1{tbl}      Take 1           Un

daylin



     en-codeine      0-19                               tablet by           ity 

of



     (TYLENOL-CO      00:00:                               mouth           [place]



     DEINE #3)      00                                 every 4           Medical



     300-30 mg                                         (four)           Branch



     tablet                                         hours as           



                                                  needed for           



                                                  Pain           



                                                  (scale           



                                                  4-6) or           



                                                  Pain           



                                                  (scale           



                                                  7-10).           

 

     acetaminoph      2016      Yes            1{tbl}      Take 1           Un

daylin



     en-codeine      0-19                               tablet by           ity 

of



     (TYLENOL-CO      00:00:                               mouth           [place]



     DEINE #3)      00                                 every 4           Medical



     300-30 mg                                         (four)           Branch



     tablet                                         hours as           



                                                  needed for           



                                                  Pain           



                                                  (scale           



                                                  4-6) or           



                                                  Pain           



                                                  (scale           



                                                  7-10).           

 

     acetaminoph      2016      Yes            1{tbl}      Take 1           Un

daylin



     en-codeine      0-19                               tablet by           ity 

of



     (TYLENOL-CO      00:00:                               mouth           [place]



     DEINE #3)      00                                 every 4           Medical



     300-30 mg                                         (four)           Branch



     tablet                                         hours as           



                                                  needed for           



                                                  Pain           



                                                  (scale           



                                                  4-6) or           



                                                  Pain           



                                                  (scale           



                                                  7-10).           

 

     acetaminoph      2016      Yes            1{tbl}      Take 1           Un

daylin



     en-codeine      0-19                               tablet by           ity 

of



     (TYLENOL-CO      00:00:                               mouth           [place]



     DEINE #3)      00                                 every 4           Medical



     300-30 mg                                         (four)           Branch



     tablet                                         hours as           



                                                  needed for           



                                                  Pain           



                                                  (scale           



                                                  4-6) or           



                                                  Pain           



                                                  (scale           



                                                  7-10).           

 

     [facility]      Yes            1{tbl}      Take 1           Un

[facility]lin



     en-codeine      0-19                               tablet by           ity 

of



     (TYLENOL-CO      00:00:                               mouth           [place]



     DEINE #3)      00                                 every 4           Medical



     300-30 mg                                         (four)           Branch



     tablet                                         hours as           



                                                  needed for           



                                                  Pain           



                                                  (scale           



                                                  4-6) or           



                                                  Pain           



                                                  (scale           



                                                  7-10).           

 

     acetamino      2016      Yes            1{tbl}      Take 1           Un

daylin



     en-codeine      0-19                               tablet by           ity 

of



     (TYLENOL-CO      00:00:                               mouth           [place]



     DEINE #3)      00                                 every 4           Medical



     300-30 mg                                         (four)           Branch



     tablet                                         hours as           



                                                  needed for           



                                                  Pain           



                                                  (scale           



                                                  4-6) or           



                                                  Pain           



                                                  (scale           



                                                  7-10).           

 

     [facility]      2016      Yes            1{tbl}      Take 1           Un

[facility]lin



     en-codeine      0-19                               tablet by           ity 

of



     (TYLENOL-CO      00:00:                               mouth           [place]



     DEINE #3)      00                                 every 4           Medical



     300-30 mg                                         (four)           Branch



     tablet                                         hours as           



                                                  needed for           



                                                  Pain           



                                                  (scale           



                                                  4-6) or           



                                                  Pain           



                                                  (scale           



                                                  7-10).           

 

     [facility]      [facility]- No             1{tbl}      Take 1           U

nivers



     en-codeine      0-19 06-26                          tablet by           ity

 of



     (TYLENOL-CO      00:00: 00:00                          mouth           [place]

[place]



     DEINE #3)      00   :00                           every 4           Medical



     300-30 mg                                         (four)           Branch



     tablet                                         hours as           



                                                  needed for           



                                                  Pain           



                                                  (scale           



                                                  4-6) or           



                                                  Pain           



                                                  (scale           



                                                  7-10).           

 

     acetamino      2016- No             1{tbl}      Take 1           U

nivers



     en-codeine      0-19 06-26                          tablet by           ity

 of



     (TYLENOL-CO      00:00: 00:00                          mouth           [place]

[place]



     DEINE #3)      00   :00                           every 4           Medical



     300-30 mg                                         (four)           Branch



     tablet                                         hours as           



                                                  needed for           



                                                  Pain           



                                                  (scale           



                                                  4-6) or           



                                                  Pain           



                                                  (scale           



                                                  7-10).           

 

     blood sugar            Yes                      Test once           U

nivers



     diagnostic                                     daily           ity of



     (ONETOUCH      00:00:                                              [place]



     VERIO)                                                      Medical



     strip                                                        Branch

 

     blood sugar            Yes                      Test once           U

nivers



     diagnostic                                     daily           ity of



     (ONETOUCH      00:00:                                              [place]



     VERIO)                                                      Medical



     strip                                                        Branch

 

     blood sugar            Yes                      Test once           U

nivers



     diagnostic                                     daily           ity of



     (ONETOUCH      00:00:                                              [place]



     VERIO)                                                      Medical



     strip                                                        Branch

 

     blood sugar            Yes                      Test once           U

nivers



     diagnostic                                     daily           ity of



     (ONETOUCH      00:00:                                              [place]



     VERIO)                                                      Medical



     strip                                                        Branch

 

     blood sugar            Yes                      Test once           U

nivers



     diagnostic                                     daily           ity of



     (ONETOUCH      00:00:                                              [place]



     VERIO)                                                      Medical



     strip                                                        Branch

 

     blood sugar            Yes                      Test once           U

nivers



     diagnostic                                     daily           ity of



     (ONETOUCH      00:00:                                              [place]



     VERIO)                                                      Medical



     strip                                                        Branch

 

     blood sugar            Yes                      Test once           U

nivers



     diagnostic                                     daily           ity of



     (ONETOUCH      00:00:                                              [place]



     VERIO)                                                      Medical



     strip                                                        Branch

 

     blood sugar            Yes                      Test once           U

nivers



     diagnostic                                     daily           ity of



     (ONETOUCH      00:00:                                              [place]



     VERIO)                                                      Medical



     strip                                                        Branch

 

     blood sugar            Yes                      Test once           U

nivers



     diagnostic                                     daily           ity of



     (ONETOUCH      00:00:                                              [place]



     VERIO)                                                      Medical



     strip                                                        Branch

 

     blood sugar      2023- No                       Test once           

[facility]



     diagnostic                                daily           ity of



     (ONETOUCH      00:00: 00:00                                         [place]



     VERIO)      00   :00                                          Medical



     strip                                                        Branch

 

     blood sugar      2023- No                       Test once           

[facility]



     diagnostic                                daily           ity of



     (ONETOUCH      00:00: 00:00                                         [place]



     VERIO)      00   :00                                          Medical



     strip                                                        Branch

 

     inFLIXimab            Yes                      Inject           Unive

rs



     (REMICADE)                                     intravenou           ity

 of



     100 mg      20:05:                               sly once           [place]



     injection      27                                 now. Every           Medi

maryan



                                                  6 weeks           Branch

 

     aspirin 81            Yes            81mg      Take 81 mg           U

nivers



     mg EC                                     by mouth           ity of



     tablet      20:05:                               daily.           [place]



               27                                                Medical



                                                                 Branch

 

     MULTIVITAMI            Yes                      Take by           Uni

vers



     NS WITH                                     mouth.           ity of



     FLUORIDE      20:05:                                              [place]



     (MULTI-RAJESH      27                                                Medical



     MIN ORAL)                                                        Branch

 

     inFLIXimab            Yes                      Inject           Unive

rs



     (REMICADE)      7-                               intravenou           ity

 of



     100 mg      20:05:                               sly once           [place]



     injection      27                                 now. Every           Medi

maryan



                                                  6 weeks           Branch

 

     aspirin 81      2016      Yes            81mg      Take 81 mg           U

nivers



     mg EC      7-26                               by mouth           ity of



     tablet      20:05:                               daily.           [address]



                                                                 Branch

 

     MULTIVITAMI            Yes                      Take by           Uni

vers



     NS WITH      7-26                               mouth.           ity of



     FLUORIDE      20:05:                                              [place]



     (MULTI-RAJESH      27                                                Medical



     MIN ORAL)                                                        Branch

 

     inFLIXimab            Yes                      Inject           Unive

rs



     (REMICADE)      7-                               intravenou           ity

 of



     100 mg      20:05:                               sly once           [place]



     injection      27                                 now. Every           Medi

maryan



                                                  6 weeks           Branch

 

     aspirin 81            Yes            81mg      Take 81 mg           U

nivers



     mg EC      7-26                               by mouth           ity of



     tablet      20:05:                               daily.           [address]

 

     MULTIVITAMI            Yes                      Take by           Uni

vers



     NS WITH      7-26                               mouth.           ity of



     FLUORIDE      20:05:                                              [place]



     (MULTI-RAJESH      27                                                Medical



     MIN ORAL)                                                        Branch

 

     inFLIXimab      2016      Yes                      Inject           Unive

rs



     (REMICADE)      7-                               intravenou           ity

 of



     100 mg      20:05:                               sly once           [place]



     injection      27                                 now. Every           Medi

maryan



                                                  6 weeks           Branch

 

     aspirin 81      2016      Yes            81mg      Take 81 mg           U

nivers



     mg EC      7-26                               by mouth           ity of



     tablet      20:05:                               daily.           [address]

 

     MULTIVITAMI            Yes                      Take by           Uni

vers



     NS WITH      7-26                               mouth.           ity of



     FLUORIDE      20:05:                                              [place]



     (MULTI-RAJESH      27                                                Medical



     MIN ORAL)                                                        Branch

 

     inFLIXimab      2016      Yes                      Inject           Unive

rs



     (REMICADE)      7-                               intravenou           ity

 of



     100 mg      20:05:                               sly once           [place]



     injection      27                                 now. Every           Medi

maryan



                                                  6 weeks           Branch

 

     aspirin 81      -      Yes            81mg      Take 81 mg           U

nivers



     mg EC      7-26                               by mouth           ity of



     tablet      20:05:                               daily.           [address]



                                                                 Branch

 

     MULTIVITAMI            Yes                      Take by           Uni

vers



     NS WITH      7-26                               mouth.           ity of



     FLUORIDE      20:05:                                              [place]



     (MULTI-RAJESH      27                                                Medical



     MIN ORAL)                                                        Branch

 

     inFLIXimab      2016-0      Yes                      Inject           Unive

rs



     (REMICADE)      7-26                               intravenou           ity

 of



     100 mg      20:05:                               sly once           [place]



     injection      27                                 now. Every           Medi

maryan



                                                  6 weeks           Branch

 

     aspirin 81            Yes            81mg      Take 81 mg           U

nivers



     mg EC      7-26                               by mouth           ity of



     tablet      20:05:                               daily.           [address]

 

     MULTIVITAMI      2016      Yes                      Take by           Uni

vers



     NS WITH      7-26                               mouth.           ity of



     FLUORIDE      20:05:                                              [place]



     (MULTI-RAJESH      27                                                Medical



     MIN ORAL)                                                        Branch

 

     inFLIXimab      2016      Yes                      Inject           Unive

rs



     (REMICADE)      7-26                               intravenou           ity

 of



     100 mg      20:05:                               sly once           [place]



     injection      27                                 now. Every           Medi

maryan



                                                  6 weeks           Branch

 

     aspirin 81            Yes            81mg      Take 81 mg           U

nivers



     mg EC      7-26                               by mouth           ity of



     tablet      20:05:                               daily.           [address]

 

     MULTIVITAMI            Yes                      Take by           Uni

vers



     NS WITH      7-26                               mouth.           ity of



     FLUORIDE      20:05:                                              [place]



     (MULTI-RAJSEH      27                                                Medical



     MIN ORAL)                                                        Branch

 

     inFLIXimab            Yes                      Inject           Unive

rs



     (REMICADE)      7-26                               intravenou           ity

 of



     100 mg      15:05:                               sly once           [place]



     injection      27                                 now. Every           Medi

maryan



                                                  6 weeks           Branch

 

     aspirin 81            Yes            81mg      Take 81 mg           U

nivers



     mg EC      7-26                               by mouth           ity of



     tablet      15:05:                               daily.           [address]

 

     MULTIVITAMI            Yes                      Take by           Uni

vers



     NS WITH      7-26                               mouth.           ity of



     FLUORIDE      15:05:                                              [place]



     (MULTI-RAJESH      27                                                Medical



     MIN ORAL)                                                        Branch

 

     inFLIXimab            Yes                      Inject           Unive

rs



     (REMICADE)      7-26                               intravenou           ity

 of



     100 mg      15:05:                               sly once           [place]



     injection      27                                 now. Every           Medi

maryan



                                                  6 weeks           Branch

 

     aspirin 81      2016      Yes            81mg      Take 81 mg           U

nivers



     mg EC      7-26                               by mouth           ity of



     tablet      15:05:                               daily.           [address]

 

     MULTIVITAMI            Yes                      Take by           Uni

vers



     NS WITH      7-26                               mouth.           ity of



     FLUORIDE      15:05:                                              [place]



     (MULTI-RAJESH      27                                                Medical



     MIN ORAL)                                                        Branch

 

     meloxicam      2016      Yes                                     [facility]



     (MOBIC) 15      7-21                                              ity of



     mg tablet      00:00:                                              [place]



                                                               Medical



                                                                 [place]

 

     meloxicam      20160      Yes                                     [facility]



     (MOBIC) 15      7-21                                              ity of



     mg tablet      00:00:                                              [place]



               00                                                Medical



                                                                 [place]

 

     meloxicam      20160      Yes                                     [facility]



     (MOBIC) 15      7-21                                              ity of



     mg tablet      00:00:                                              [place]



               00                                                Medical



                                                                 [facility]anch

 

     meloxicam      20160      Yes                                     [facility]



     (MOBIC) 15      -                                              ity of



     mg tablet      00:00:                                              [place]



               00                                                Medical



                                                                 [facility]anch

 

     meloxicam      20160      Yes                                     [facility]



     (MOBIC) 15      -                                              ity of



     mg tablet      00:00:                                              [place]



               00                                                Medical



                                                                 [facility]anch

 

     meloxicam      20160      Yes                                     [facility]



     (MOBIC) 15      -                                              ity of



     mg tablet      00:00:                                              [place]



               00                                                Medical



                                                                 [facility]anch

 

     meloxicam      2016-0      Yes                                     [facility]



     (MOBIC) 15      -                                              ity of



     mg tablet      00:00:                                              [place]



               00                                                Medical



                                                                 [facility]anch

 

     meloxicam      20160      Yes                                     [facility]



     (MOBIC) 15      -                                              ity of



     mg tablet      00:00:                                              [place]



               00                                                Medical



                                                                 [facility]anch

 

     meloxicam      20160      Yes                                     [facility]



     (MOBIC) 15                                                    ity of



     mg tablet      00:00:                                              [place]



               00                                                Medical



                                                                 [facility]anch

 

     meloxicam      0 3- No                                      [facility]



     (MOBIC) 15                                               ity of



     mg tablet      00:00: 00:00                                         [place]



               00   :00                                          Medical



                                                                 Branch

 

     meloxicam      2016-0 - No                                      [facility]



     (MOBIC) 15                                               ity of



     mg tablet      00:00: 00:00                                         [place]



               00   :00                                          Medical



                                                                 Branch

 

     acetaminoph      20160      Yes                      TK 1 TO 2           U

nivers



     en-codeine      7-13                               T PO EVERY           ity

 of



     (TYLENOL      00:00:                               4 T 6           [place]



     #3) 300-30      00                                 HOURS PRN           Medi

[facility]



     mg tablet                                         P              [facility]

 

     acetaminoph      20160      Yes                      TK 1 TO 2           U

nivers



     en-codeine      7-13                               T PO EVERY           ity

 of



     (TYLENOL      00:00:                               4 T 6           [place]



     #3) 300-30      00                                 HOURS PRN           Medi

[facility]



     mg tablet                                         P              Branch

 

     acetaminoph      20160      Yes                      TK 1 TO 2           U

nivers



     en-codeine      7-13                               T PO EVERY           ity

 of



     (TYLENOL      00:00:                               4 T 6           [place]



     #3) 300-30      00                                 HOURS PRN           Medi

[facility]



     mg tablet                                         P              Branch

 

     acetaminoph      20160      Yes                      TK 1 TO 2           U

nivers



     en-codeine      7-13                               T PO EVERY           ity

 of



     (TYLENOL      00:00:                               4 T 6           [place]



     #3) 300-30      00                                 HOURS PRN           Medi

[facility]



     mg tablet                                         P              Branch

 

     acetaminoph      0      Yes                      TK 1 TO 2           U

nivers



     en-codeine      7-13                               T PO EVERY           ity

 of



     (TYLENOL      00:00:                               4 T 6           [place]



     #3) 300-30      00                                 HOURS PRN           Medi

[facility]



     mg tablet                                         P              Branch

 

     acetaminoph      0      Yes                      TK 1 TO 2           U

nivers



     en-codeine      7-13                               T PO EVERY           ity

 of



     (TYLENOL      00:00:                               4 T 6           [place]



     #3) 300-30      00                                 HOURS PRN           Medi

[facility]



     mg tablet                                         P              Branch

 

     acetaminoph            Yes                      TK 1 TO 2           U

nivers



     en-codeine      7-13                               T PO EVERY           ity

 of



     (TYLENOL      00:00:                               4 T 6           [place]



     #3) 300-30      00                                 HOURS PRN           Medi

[facility]



     mg tablet                                         P              Branch

 

     acetaminoph      0      Yes                      TK 1 TO 2           U

nivers



     en-codeine      7-13                               T PO EVERY           ity

 of



     (TYLENOL      00:00:                               4 T 6           [place]



     #3) 300-30      00                                 HOURS PRN           Medi

[facility]



     mg tablet                                         P              Branch

 

     acetaminoph      0      Yes                      TK 1 TO 2           U

nivers



     en-codeine      7-13                               T PO EVERY           ity

 of



     (TYLENOL      00:00:                               4 T 6           [place]



     #3) 300-30      00                                 HOURS PRN           Medi

[facility]



     mg tablet                                         P              Branch

 

     acetaminoph      0 2023- No                       TK 1 TO 2           

[facility]



     en-codeine      7-13 06-26                          T PO EVERY           it

y of



     (TYLENOL      00:00: 00:00                          4 T 6           [place]



     #3) 300-30      00   :00                           HOURS PRN           Medi

[facility]



     mg tablet                                         P              Branch

 

     acetaminoph      0 2023- No                       TK 1 TO 2           

[facility]



     en-codeine      7-13 06-26                          T PO EVERY           it

y of



     (TYLENOL      00:00: 00:00                          4 T 6           [place]



     #3) 300-30      00   :00                           HOURS PRN           Medi

[facility]



     mg tablet                                         P              Branch

 

     sulfaSALAzi      2015      Yes                                     Univer

s



     ne        0-29                                              ity of



     (AZULFIDINE      00:00:                                              [place]



     ) 500 mg      00                                                Medical



     tablet                                                        Branch

 

     sulfaSALAzi      2015      Yes                                     Univer

s



     ne        0-29                                              ity of



     (AZULFIDINE      00:00:                                              [place]



     ) 500 mg      00                                                Medical



     tablet                                                        Branch

 

     sulfaSALAzi      2015      Yes                                     Univer

s



     ne        0-29                                              ity of



     (AZULFIDINE      00:00:                                              [place]



     ) 500 mg      00                                                Medical



     tablet                                                        Branch

 

     sulfaSALAzi      2015      Yes                                     Univer

s



     ne        0-29                                              ity of



     (AZULFIDINE      00:00:                                              [place]



     ) 500 mg      00                                                Medical



     tablet                                                        Branch

 

     sulfaSALAzi      2015      Yes                                     Univer

s



     ne        0-29                                              ity of



     (AZULFIDINE      00:00:                                              [place]



     ) 500 mg      00                                                Medical



     tablet                                                        Branch

 

     sulfaSALAzi      2015      Yes                                     Univer

s



     ne        0-29                                              ity of



     (AZULFIDINE      00:00:                                              [place]



     ) 500 mg      00                                                Medical



     tablet                                                        Branch

 

     sulfaSALAzi      2015      Yes                                     Univer

s



     ne        0-29                                              ity of



     (AZULFIDINE      00:00:                                              [place]



     ) 500 mg      00                                                Medical



     tablet                                                        Branch

 

     sulfaSALAzi      2015      Yes                                     Univer

s



     ne        0-29                                              ity of



     (AZULFIDINE      00:00:                                              [place]



     ) 500 mg      00                                                Medical



     tablet                                                        Branch

 

     sulfaSALAzi      2015      Yes                                     Univer

s



     ne        0-29                                              ity of



     (AZULFIDINE      00:00:                                              [place]



     ) 500 mg      00                                                Medical



     tablet                                                        Branch

 

     sulfaSALAzi      2015- No                                      Unive

rs



     ne        0-29 06-26                                         ity of



     (AZULFIDINE      00:00: 00:00                                         [place]



     ) 500 mg      00   :00                                          Medical



     tablet                                                        Branch

 

     sulfaSALAzi      2015- No                                      Unive

rs



     ne        0-29 06-26                                         ity of



     (AZULFIDINE      00:00: 00:00                                         [place]



     ) 500 mg      00   :00                                          Medical



     tablet                                                        Branch

 

     gabapentin      2015      Yes            300mg      300 mg 3           Un

[facility]



     (NEURONTIN)      0-18                               (three)           ity o

f



     300 mg      00:00:                               times           [place]



     capsule      00                                 daily.           Medical



                                                                 Branch

 

     gabapentin      2015      Yes            300mg      300 mg 3           Un

[facility]



     (NEURONTIN)      0-18                               (three)           ity o

f



     300 mg      00:00:                               times           [place]



     capsule      00                                 daily.           Medical



                                                                 Branch

 

     gabapentin      2015      Yes            300mg      300 mg 3           Un

[facility]



     (NEURONTIN)      0-18                               (three)           ity o

f



     300 mg      00:00:                               times           [place]



     capsule      00                                 daily.           Medical



                                                                 Branch

 

     gabapentin      2015      Yes            300mg      300 mg 3           Un

[facility]



     (NEURONTIN)      0-18                               (three)           ity o

f



     300 mg      00:00:                               times           [place]



     capsule      00                                 daily.           Medical



                                                                 Branch

 

     gabapentin      2015      Yes            300mg      300 mg 3           Un

[facility]



     (NEURONTIN)      0-18                               (three)           ity o

f



     300 mg      00:00:                               times           [place]



     capsule      00                                 daily.           Medical



                                                                 Branch

 

     gabapentin      2015      Yes            300mg      300 mg 3           Un

[facility]



     (NEURONTIN)      0-18                               (three)           ity o

f



     300 mg      00:00:                               times           [place]



     capsule      00                                 daily.           Medical



                                                                 Branch

 

     gabapentin      2015      Yes            300mg      300 mg 3           Un

[facility]



     (NEURONTIN)      0-18                               (three)           ity o

f



     300 mg      00:00:                               times           [place]



     capsule      00                                 daily.           Medical



                                                                 Branch

 

     gabapentin      2015      Yes            300mg      300 mg 3           Un

[facility]



     (NEURONTIN)      0-18                               (three)           ity o

f



     300 mg      00:00:                               times           [place]



     capsule      00                                 daily.           Medical



                                                                 Branch

 

     gabapentin      2015      Yes            300mg      300 mg 3           Un

daylin



     (NEURONTIN)      0-18                               (three)           ity o

f



     300 mg      00:00:                               times           [place]



     capsule      00                                 daily.           Medical



                                                                 Branch

 

     gabapentin      2015- No             300mg      300 mg 3           U

nivers



     (NEURONTIN)      0--                          (three)           ity 

of



     300 mg      00:00: 00:00                          times           [place]



     capsule      00   :00                           daily.           Medical



                                                                 Branch

 

     gabapentin      2015- No             300mg      300 mg 3           U

nivers



     (NEURONTIN)      0-                          (three)           ity 

of



     300 mg      00:00: 00:00                          times           [place]



     capsule      00   :00                           daily.           Medical



                                                                 Branch

 

     Restoril Restoril           Yes  [facility]                1 capsule          

 [facility]



                                                  at bedtime           [facility]



                                                  as needed           [facility]

 

     Azathioprin Azathioprin           Yes  [facility]                as           

  Common



     e    e                                       directed           [facility]

 

     Iron Iron           Yes  [facility]                1 tablet           Common



                                                                 [facility]

 

     Gabapentin Gabapentin           Yes  [facility]                1 capsule      

     Common



                                                  before           [facility]



                                                  bedtime           [facility]

 

     Sulfasalazi Sulfasalazi           Yes  [facility]                3 tablet     

      Common



     ne   ne                                                     [facility]



                                                                 [facility]

 

     Nateglinide Nateglinide           Yes  [facility]                1 tablet     

      Common



                                                  before           [facility]



                                                  meals           [facility]

 

     Glucosamine Glucosamine           Yes  [facility]                not          

  Common



     Chondro Chondro                                    defined           [facility]



     Complex Complex                                                   [facility]

 

     Hydroxychlo Hydroxychlo           Yes  [facility]                1 tablet     

      Common



     roquine roquine                                                   [facility]



     Sulfate Sulfate                                                   - [facility]

 

     Etodolac Etodolac           Yes  Na [facility]                1 tablet           

Common



                                                  with food           [facility]

 

     Synthroid Synthroid           Yes  [facility]                1 tablet         

  [facility]



                                                  on an           [facility]



                                                  empty           - [facility]



                                                  stomach in           [facility]

 

     Rosuvastati Rosuvastati           Yes  [facility]                1 tablet     

      Common



     n Calcium n Calcium                                                   [facility]

t



                                                                 - [facility]

 

     Humira Pen Humira Pen           Yes  [facility]                as             

Common



                                                  directed           [facility]

 

     Duloxetine Duloxetine           Yes  [facility]                1 capsule      

     Common



     HCl  HCl                                                    [facility]

 

     Methocarbam Methocarbam           Yes  [facility]                1 tablet     

      Common



     ol   ol                                                     [facility]

 

     Turmeric Turmeric           Yes  [facility]                not            Comm

on



                                                  defined           [facility]

 

     Remicade Remicade           Yes  Na [facility]                as             Comm

on



                                                  directed           [facility]

 

     Folic Acid Folic Acid           Yes  Na [facility]                2 tablet       

    Common



                                                                 [facility]

 

     Plaquenil Plaquenil           Yes  Na [place]                1 tablet         

  [facility]



                                                  with food           [facility]



                                                  or milk           [facility]

 

     Levothyroxi Levothyroxi           No                       Levothyrox      

     



     ne Sodium ne Sodium                                    ine Sodium          

 



     150  MCG                                    150 MCG           

 

     Iron 65 MG Iron 65 MG           No             1{table QD   Iron 65 MG     

      



                                        t}                       

 

     sulfaSALAzi sulfaSALAzi           No             2{table BID  sulfaSALAz   

        



     ne 500 MG ne 500 MG                          t}        ine 500 MG          

 

 

     Temazepam Temazepam           No             1{capsu QD   Temazepam        

   



     15 MG 15 MG                          le_at_b      15 MG           



                                        edtime_                     



                                        as_need                     



                                        ed}                      

 

     Restoril 15 Restoril 15           No             1{capsu QD   Restoril     

      



     MG   MG                            le_at_b      15 MG           



                                        edtime_                     



                                        as_need                     



                                        ed}                      

 

     Rosuvastati Rosuvastati           No             1{table QD   Rosuvastat   

        



     n Calcium 5 n Calcium 5                          t}        in Calcium      

     



     MG   MG                                      5 MG           

 

     Simponi Simponi           No                       Simponi           



     Aria 50 Aria 50                                    Aria 50           



     MG/4ML MG/4ML                                    MG/4ML           

 

     Diclofenac Diclofenac           No             1{appli BID  Diclofenac     

      



     Sodium 3 % Sodium 3 %                          cation}      Sodium 3 %     

      

 

     azaTHIOprin azaTHIOprin           No             1{table QD   azaTHIOpri   

        



     e 50 MG e 50 MG                          t}        ne 50 MG           

 

     Glimepiride Glimepiride           No             1{table      Glimepirid   

        



     1 MG 1 MG                          t}        e 1 MG           

 

     metFORMIN metFORMIN           No             1{table BID  metFORMIN        

   



     HCl 1000 MG HCl 1000 MG                          t_with_      HCl 1000     

      



                                        a_meal}      MG             

 

     Turmeric Turmeric           No                                      

 

     DULoxetine DULoxetine           No             1{capsu QD                  



     HCl 30 MG HCl 30 MG                          le}                      

 

     Temazepam Temazepam           No             1{capsu QD                  



     15 MG 15 MG                          le_at_b                     



                                        edtime_                     



                                        as_need                     



                                        ed}                      

 

     Hydroxychlo Hydroxychlo           No             1{table BID               

  



     roquine roquine                          t}                       



     Sulfate 200 Sulfate 200                                                   



     MG   MG                                                     

 

     Restoril 15 Restoril 15           No             1{capsu QD                

  



     MG   MG                            le_at_b                     



                                        edtime_                     



                                        as_need                     



                                        ed}                      

 

     Rosuvastati Rosuvastati           No                                      



     n Calcium 5 n Calcium 5                                                   



     MG   MG                                                     

 

     Levothyroxi Levothyroxi           No                                      



     ne Sodium ne Sodium                                                   



     150  MCG                                                   

 

     Folic Acid Folic Acid           No             2{table QD                  



     1 MG 1 MG                          t}                       

 

     Methocarbam Methocarbam           No             1{table TID               

  



     ol 750 MG ol 750 MG                          t}                       

 

     sulfaSALAzi sulfaSALAzi           No             2{table BID               

  



     ne 500 MG ne 500 MG                          t}                       

 

     Synthroid Synthroid           No                  QD                  



     150  MCG                                                   

 

     Simponi Simponi           No                                      



     Aria 50 Aria 50                                                   



     MG/4ML MG/4ML                                                   

 

     Gabapentin Gabapentin           No             1{capsu TID                 



     600  MG                          le_befo                     



                                        re_bedt                     



                                        ivette}                     

 

     metFORMIN metFORMIN           No             1{table BID                 



     HCl 1000 MG HCl 1000 MG                          t_with_                   

  



                                        a_meal}                     

 

     azaTHIOprin azaTHIOprin           No             1{table QD                

  



     e 50 MG e 50 MG                          t}                       

 

     Iron 65 MG Iron 65 MG           No             1{table QD                  



                                        t}                       

 

     Diclofenac Diclofenac           No             1{appli BID                 



     Sodium 3 % Sodium 3 %                          cation}                     

 

     Glimepiride Glimepiride           No                                      



     2 MG 2 MG                                                   

 

     sulfaSALAzi sulfaSALAzi           No             2{table BID  sulfaSALAz   

        



     ne 500 MG ne 500 MG                          t}        ine 500 MG          

 

 

     Glimepiride Glimepiride           No                       Glimepirid      

     



     2 MG 2 MG                                    e 2 MG           

 

     Folic Acid Folic Acid           No             2{table QD   Folic Acid     

      



     1 MG 1 MG                          t}        1 MG           

 

     Methocarbam Methocarbam           No             1{table TID  Methocarba   

        



     ol 750 MG ol 750 MG                          t}        mol 750 MG          

 

 

     Rosuvastati Rosuvastati           No                       Rosuvastat      

     



     n Calcium 5 n Calcium 5                                    in Calcium      

     



     MG   MG                                      5 MG           

 

     Temazepam Temazepam           No             1{capsu QD   Temazepam        

   



     15 MG 15 MG                          le_at_b      15 MG           



                                        edtime_                     



                                        as_need                     



                                        ed}                      

 

     Restoril 15 Restoril 15           No             1{capsu QD   Restoril     

      



     MG   MG                            le_at_b      15 MG           



                                        edtime_                     



                                        as_need                     



                                        ed}                      

 

     Diclofenac Diclofenac           No             1{appli BID  Diclofenac     

      



     Sodium 3 % Sodium 3 %                          cation}      Sodium 3 %     

      

 

     Simponi Simponi           No                       Simponi           



     Aria 50 Aria 50                                    Aria 50           



     MG/4ML MG/4ML                                    MG/4ML           

 

     Levothyroxi Levothyroxi           No                       Levothyrox      

     



     ne Sodium ne Sodium                                    ine Sodium          

 



     150  MCG                                    150 MCG           

 

     DULoxetine DULoxetine           No             1{capsu QD   DULoxetine     

      



     HCl 30 MG HCl 30 MG                          le}       HCl 30 MG           

 

     Hydroxychlo Hydroxychlo           No             1{table BID  Hydroxychl   

        



     roquine roquine                          t}        oroquine           



     Sulfate 200 Sulfate 200                                    Sulfate         

  



     MG   MG                                      200 MG           

 

     Synthroid Synthroid           No                  QD   Synthroid           



     150  MCG                                    150 MCG           

 

     metFORMIN metFORMIN           No             1{table BID  metFORMIN        

   



     HCl 1000 MG HCl 1000 MG                          t_with_      HCl 1000     

      



                                        a_meal}      MG             

 

     Iron 65 MG Iron 65 MG           No             1{table QD   Iron 65 MG     

      



                                        t}                       

 

     Turmeric Turmeric           No                       Turmeric           

 

     azaTHIOprin azaTHIOprin           No             1{table QD   azaTHIOpri   

        



     e 50 MG e 50 MG                          t}        ne 50 MG           

 

     Gabapentin Gabapentin           No             1{capsu TID  Gabapentin     

      



     600  MG                          le_befo      600 MG           



                                        re_bedt                     



                                        ivette}                     

 

     sulfaSALAzi sulfaSALAzi           No             2{table BID  sulfaSALAz   

        



     ne 500 MG ne 500 MG                          t}        ine 500 MG          

 

 

     Glimepiride Glimepiride           No                       Glimepirid      

     



     2 MG 2 MG                                    e 2 MG           

 

     Folic Acid Folic Acid           No             2{table QD   Folic Acid     

      



     1 MG 1 MG                          t}        1 MG           

 

     Methocarbam Methocarbam           No             1{table TID  Methocarba   

        



     ol 750 MG ol 750 MG                          t}        mol 750 MG          

 

 

     Rosuvastati Rosuvastati           No                       Rosuvastat      

     



     n Calcium 5 n Calcium 5                                    in Calcium      

     



     MG   MG                                      5 MG           

 

     Temazepam Temazepam           No             1{capsu QD   Temazepam        

   



     15 MG 15 MG                          le_at_b      15 MG           



                                        edtime_                     



                                        as_need                     



                                        ed}                      

 

     Restoril 15 Restoril 15           No             1{capsu QD   Restoril     

      



     MG   MG                            le_at_b      15 MG           



                                        edtime_                     



                                        as_need                     



                                        ed}                      

 

     Diclofenac Diclofenac           No             1{appli BID  Diclofenac     

      



     Sodium 3 % Sodium 3 %                          cation}      Sodium 3 %     

      

 

     Simponi Simponi           No                       Simponi           



     Aria 50 Aria 50                                    Aria 50           



     MG/4ML MG/4ML                                    MG/4ML           

 

     Levothyroxi Levothyroxi           No                       Levothyrox      

     



     ne Sodium ne Sodium                                    ine Sodium          

 



     150  MCG                                    150 MCG           

 

     DULoxetine DULoxetine           No             1{capsu QD   DULoxetine     

      



     HCl 30 MG HCl 30 MG                          le}       HCl 30 MG           

 

     Hydroxychlo Hydroxychlo           No             1{table BID  Hydroxychl   

        



     roquine roquine                          t}        oroquine           



     Sulfate 200 Sulfate 200                                    Sulfate         

  



     MG   MG                                      200 MG           

 

     Synthroid Synthroid           No                  QD   Synthroid           



     150  MCG                                    150 MCG           

 

     metFORMIN metFORMIN           No             1{table BID  metFORMIN        

   



     HCl 1000 MG HCl 1000 MG                          t_with_      HCl 1000     

      



                                        a_meal}      MG             

 

     Iron 65 MG Iron 65 MG           No             1{table QD   Iron 65 MG     

      



                                        t}                       

 

     Turmeric Turmeric           No                       Turmeric           

 

     azaTHIOprin azaTHIOprin           No             1{table QD   azaTHIOpri   

        



     e 50 MG e 50 MG                          t}        ne 50 MG           

 

     Gabapentin Gabapentin           No             1{capsu TID  Gabapentin     

      



     600  MG                          le_befo      600 MG           



                                        re_bedt                     



                                        ivette}                     

 

     Iron 65 MG Iron 65 MG           No             1{table QD   Iron 65 MG     

      



                                        t}                       

 

     Multivitami Multivitami           No                       Multivitam      

     



     n    n                                       in             

 

     Turmeric Turmeric           No                       Turmeric           

 

     Cholestyram Cholestyram           No             1{packe BID  Cholestyra   

        



     ine 4 GM ine 4 GM                          t_mixed      mine 4 GM          

 



                                        _with_w                     



                                        ater_or                     



                                        _non-ca                     



                                        rbonate                     



                                        d_drink                     



                                        }                        

 

     Rosuvastati Rosuvastati           No                       Rosuvastat      

     



     n Calcium 5 n Calcium 5                                    in Calcium      

     



     MG   MG                                      5 MG           

 

     Hydroxychlo Hydroxychlo           No             1{table BID  Hydroxychl   

        



     roquine roquine                          t}        oroquine           



     Sulfate 200 Sulfate 200                                    Sulfate         

  



     MG   MG                                      200 MG           

 

     Restoril 15 Restoril 15           No             1{capsu QD   Restoril     

      



     MG   MG                            le_at_b      15 MG           



                                        edtime_                     



                                        as_need                     



                                        ed}                      

 

     sulfaSALAzi sulfaSALAzi           No             2{table BID  sulfaSALAz   

        



     ne 500 MG ne 500 MG                          t}        ine 500 MG          

 

 

     Folic Acid Folic Acid           No             2{table QD   Folic Acid     

      



     1 MG 1 MG                          t}        1 MG           

 

     DULoxetine DULoxetine           No             1{capsu QD   DULoxetine     

      



     HCl 30 MG HCl 30 MG                          le}       HCl 30 MG           

 

     Diclofenac Diclofenac           No             1{appli BID  Diclofenac     

      



     Sodium 3 % Sodium 3 %                          cation}      Sodium 3 %     

      

 

     Gabapentin Gabapentin           No             1{capsu TID  Gabapentin     

      



     600  MG                          le_befo      600 MG           



                                        re_bedt                     



                                        ivette}                     

 

     Simponi Simponi           No                       Simponi           



     Aria 50 Aria 50                                    Aria 50           



     MG/4ML MG/4ML                                    MG/4ML           

 

     azaTHIOprin azaTHIOprin           No             1{table QD   azaTHIOpri   

        



     e 50 MG e 50 MG                          t}        ne 50 MG           

 

     Methocarbam Methocarbam           No             1{table TID  Methocarba   

        



     ol 750 MG ol 750 MG                          t}        mol 750 MG          

 

 

     Temazepam Temazepam           No             1{capsu QD   Temazepam        

   



     15 MG 15 MG                          le_at_b      15 MG           



                                        edtime_                     



                                        as_need                     



                                        ed}                      

 

     metFORMIN metFORMIN           No             1{table BID  metFORMIN        

   



     HCl 1000 MG HCl 1000 MG                          t_with_      HCl 1000     

      



                                        a_meal}      MG             

 

     Levothyroxi Levothyroxi           No                       Levothyrox      

     



     ne Sodium ne Sodium                                    ine Sodium          

 



     150  MCG                                    150 MCG           

 

     Glimepiride Glimepiride           No                       Glimepirid      

     



     2 MG 2 MG                                    e 2 MG           

 

     Iron 65 MG Iron 65 MG           No             1{table QD                  



                                        t}                       

 

     Multivitami Multivitami           No                                      



     n    n                                                      

 

     Turmeric Turmeric           No                                      

 

     Cholestyram Cholestyram           No             1{packe BID               

  



     ine 4 GM ine 4 GM                          t_mixed                     



                                        _with_w                     



                                        ater_or                     



                                        _non-ca                     



                                        rbonate                     



                                        d_drink                     



                                        }                        

 

     Rosuvastati Rosuvastati           No                                      



     n Calcium 5 n Calcium 5                                                   



     MG   MG                                                     

 

     Hydroxychlo Hydroxychlo           No             1{table BID               

  



     roquine roquine                          t}                       



     Sulfate 200 Sulfate 200                                                   



     MG   MG                                                     

 

     Restoril 15 Restoril 15           No             1{capsu QD                

  



     MG   MG                            le_at_b                     



                                        edtime_                     



                                        as_need                     



                                        ed}                      

 

     sulfaSALAzi sulfaSALAzi           No             2{table BID               

  



     ne 500 MG ne 500 MG                          t}                       

 

     Folic Acid Folic Acid           No             2{table QD                  



     1 MG 1 MG                          t}                       

 

     DULoxetine DULoxetine           No             1{capsu QD                  



     HCl 30 MG HCl 30 MG                          le}                      

 

     Diclofenac Diclofenac           No             1{appli BID                 



     Sodium 3 % Sodium 3 %                          cation}                     

 

     Gabapentin Gabapentin           No             1{capsu TID                 



     600  MG                          le_befo                     



                                        re_bedt                     



                                        ivette}                     

 

     Simponi Simponi           No                                      



     Aria 50 Aria 50                                                   



     MG/4ML MG/4ML                                                   

 

     azaTHIOprin azaTHIOprin           No             1{table QD                

  



     e 50 MG e 50 MG                          t}                       

 

     Methocarbam Methocarbam           No             1{table TID               

  



     ol 750 MG ol 750 MG                          t}                       

 

     Temazepam Temazepam           No             1{capsu QD                  



     15 MG 15 MG                          le_at_b                     



                                        edtime_                     



                                        as_need                     



                                        ed}                      

 

     metFORMIN metFORMIN           No             1{table BID                 



     HCl 1000 MG HCl 1000 MG                          t_with_                   

  



                                        a_meal}                     

 

     Levothyroxi Levothyroxi           No                                      



     ne Sodium ne Sodium                                                   



     150  MCG                                                   

 

     Glimepiride Glimepiride           No                                      



     2 MG 2 MG                                                   

 

     sulfaSALAzi sulfaSALAzi           No             2{table BID  sulfaSALAz   

        



     ne 500 MG ne 500 MG                          t}        ine 500 MG          

 

 

     Hydroxychlo Hydroxychlo           No             1{table BID  Hydroxychl   

        



     roquine roquine                          t}        oroquine           



     Sulfate 200 Sulfate 200                                    Sulfate         

  



     MG   MG                                      200 MG           

 

     Gabapentin Gabapentin           No             1{capsu TID  Gabapentin     

      



     600  MG                          le_befo      600 MG           



                                        re_bedt                     



                                        ivette}                     

 

     Multivitami Multivitami           No                       Multivitam      

     



     n    n                                       in             

 

     Iron 65 MG Iron 65 MG           No             1{table QD   Iron 65 MG     

      



                                        t}                       

 

     Diclofenac Diclofenac           No             1{appli BID  Diclofenac     

      



     Sodium 3 % Sodium 3 %                          cation}      Sodium 3 %     

      

 

     Methocarbam Methocarbam           No             1{table TID  Methocarba   

        



     ol 750 MG ol 750 MG                          t}        mol 750 MG          

 

 

     Restoril 15 Restoril 15           No             1{capsu QD   Restoril     

      



     MG   MG                            le_at_b      15 MG           



                                        edtime_                     



                                        as_need                     



                                        ed}                      

 

     metFORMIN metFORMIN           No             1{table BID  metFORMIN        

   



     HCl 1000 MG HCl 1000 MG                          t_with_      HCl 1000     

      



                                        a_meal}      MG             

 

     Temazepam Temazepam           No             1{capsu QD   Temazepam        

   



     15 MG 15 MG                          le_at_b      15 MG           



                                        edtime_                     



                                        as_need                     



                                        ed}                      

 

     Simponi Simponi           No                       Simponi           



     Aria 50 Aria 50                                    Aria 50           



     MG/4ML MG/4ML                                    MG/4ML           

 

     Turmeric Turmeric           No                       Turmeric           

 

     Cholestyram Cholestyram           No                       Cholestyra      

     



     ine 4 GM ine 4 GM                                    mine 4 GM           

 

     Folic Acid Folic Acid           No             2{table QD   Folic Acid     

      



     1 MG 1 MG                          t}        1 MG           

 

     Levothyroxi Levothyroxi           No                       Levothyrox      

     



     ne Sodium ne Sodium                                    ine Sodium          

 



     150  MCG                                    150 MCG           

 

     Rosuvastati Rosuvastati           No                       Rosuvastat      

     



     n Calcium 5 n Calcium 5                                    in Calcium      

     



     MG   MG                                      5 MG           

 

     Glimepiride Glimepiride           No                       Glimepirid      

     



     2 MG 2 MG                                    e 2 MG           

 

     DULoxetine DULoxetine           No             1{capsu QD   DULoxetine     

      



     HCl 30 MG HCl 30 MG                          le}       HCl 30 MG           

 

     azaTHIOprin azaTHIOprin           No             1{table QD   azaTHIOpri   

        



     e 50 MG e 50 MG                          t}        ne 50 MG           

 

     Simponi Simponi           No                       Simponi           



     Aria 50 Aria 50                                    Aria 50           



     MG/4ML MG/4ML                                    MG/4ML           

 

     Cholestyram Cholestyram           No                       Cholestyra      

     



     ine 4 GM ine 4 GM                                    mine 4 GM           

 

     Glimepiride Glimepiride           No             1{table      Glimepirid   

        



     1 MG 1 MG                          t}        e 1 MG           

 

     sulfaSALAzi sulfaSALAzi           No             2{table BID  sulfaSALAz   

        



     ne 500 MG ne 500 MG                          t}        ine 500 MG          

 

 

     Hydroxychlo Hydroxychlo           No             1{table BID  Hydroxychl   

        



     roquine roquine                          t}        oroquine           



     Sulfate 200 Sulfate 200                                    Sulfate         

  



     MG   MG                                      200 MG           

 

     Diclofenac Diclofenac           No             1{appli BID  Diclofenac     

      



     Sodium 3 % Sodium 3 %                          cation}      Sodium 3 %     

      

 

     metFORMIN metFORMIN           No             1{table BID  metFORMIN        

   



     HCl 1000 MG HCl 1000 MG                          t_with_      HCl 1000     

      



                                        a_meal}      MG             

 

     Methocarbam Methocarbam           No             1{table TID  Methocarba   

        



     ol 750 MG ol 750 MG                          t}        mol 750 MG          

 

 

     Synthroid Synthroid           No                  QD   Synthroid           



     150  MCG                                    150 MCG           

 

     Glimepiride Glimepiride           No                       Glimepirid      

     



     2 MG 2 MG                                    e 2 MG           

 

     Iron 65 MG Iron 65 MG           No             1{table QD   Iron 65 MG     

      



                                        t}                       

 

     Multivitami Multivitami           No                       Multivitam      

     



     n    n                                       in             

 

     Temazepam Temazepam           No             1{capsu QD   Temazepam        

   



     15 MG 15 MG                          le_at_b      15 MG           



                                        edtime_                     



                                        as_need                     



                                        ed}                      

 

     Rosuvastati Rosuvastati           No                       Rosuvastat      

     



     n Calcium 5 n Calcium 5                                    in Calcium      

     



     MG   MG                                      5 MG           

 

     azaTHIOprin azaTHIOprin           No             1{table QD   azaTHIOpri   

        



     e 50 MG e 50 MG                          t}        ne 50 MG           

 

     Turmeric Turmeric           No                       Turmeric           

 

     Gabapentin Gabapentin           No             1{capsu TID  Gabapentin     

      



     600  MG                          le_befo      600 MG           



                                        re_bedt                     



                                        ivette}                     

 

     Rosuvastati Rosuvastati           No             1{table QD   Rosuvastat   

        



     n Calcium 5 n Calcium 5                          t}        in Calcium      

     



     MG   MG                                      5 MG           

 

     Folic Acid Folic Acid           No             2{table QD   Folic Acid     

      



     1 MG 1 MG                          t}        1 MG           

 

     DULoxetine DULoxetine           No             1{capsu QD   DULoxetine     

      



     HCl 30 MG HCl 30 MG                          le}       HCl 30 MG           

 

     Levothyroxi Levothyroxi           No                       Levothyrox      

     



     ne Sodium ne Sodium                                    ine Sodium          

 



     150  MCG                                    150 MCG           

 

     Simponi Simponi           No                       Simponi           



     Aria 50 Aria 50                                    Aria 50           



     MG/4ML MG/4ML                                    MG/4ML           

 

     Cholestyram Cholestyram           No                       Cholestyra      

     



     ine 4 GM ine 4 GM                                    mine 4 GM           

 

     Glimepiride Glimepiride           No             1{table      Glimepirid   

        



     1 MG 1 MG                          t}        e 1 MG           

 

     sulfaSALAzi sulfaSALAzi           No             2{table BID  sulfaSALAz   

        



     ne 500 MG ne 500 MG                          t}        ine 500 MG          

 

 

     Synthroid Synthroid           No                  QD   Synthroid           



     150  MCG                                    150 MCG           

 

     Diclofenac Diclofenac           No             1{appli BID  Diclofenac     

      



     Sodium 3 % Sodium 3 %                          cation}      Sodium 3 %     

      

 

     Hydroxychlo Hydroxychlo           No             1{table BID  Hydroxychl   

        



     roquine roquine                          t}        oroquine           



     Sulfate 200 Sulfate 200                                    Sulfate         

  



     MG   MG                                      200 MG           

 

     Methocarbam Methocarbam           No             1{table TID  Methocarba   

        



     ol 750 MG ol 750 MG                          t}        mol 750 MG          

 

 

     metFORMIN metFORMIN           No                       metFORMIN           



     HCl 1000 MG HCl 1000 MG                                    HCl 1000        

   



                                                  MG             

 

     Glimepiride Glimepiride           No                       Glimepirid      

     



     2 MG 2 MG                                    e 2 MG           

 

     Iron 65 MG Iron 65 MG           No             1{table QD   Iron 65 MG     

      



                                        t}                       

 

     Multivitami Multivitami           No                       Multivitam      

     



     n    n                                       in             

 

     Temazepam Temazepam           No             1{capsu QD   Temazepam        

   



     15 MG 15 MG                          le_at_b      15 MG           



                                        edtime_                     



                                        as_need                     



                                        ed}                      

 

     Rosuvastati Rosuvastati           No                       Rosuvastat      

     



     n Calcium 5 n Calcium 5                                    in Calcium      

     



     MG   MG                                      5 MG           

 

     azaTHIOprin azaTHIOprin           No             1{table QD   azaTHIOpri   

        



     e 50 MG e 50 MG                          t}        ne 50 MG           

 

     Turmeric Turmeric           No                       Turmeric           

 

     Gabapentin Gabapentin           No             1{capsu TID  Gabapentin     

      



     600  MG                          le_befo      600 MG           



                                        re_bedt                     



                                        ivette}                     

 

     Rosuvastati Rosuvastati           No             1{table QD   Rosuvastat   

        



     n Calcium 5 n Calcium 5                          t}        in Calcium      

     



     MG   MG                                      5 MG           

 

     Folic Acid Folic Acid           No             2{table QD   Folic Acid     

      



     1 MG 1 MG                          t}        1 MG           

 

     DULoxetine DULoxetine           No             1{capsu QD   DULoxetine     

      



     HCl 30 MG HCl 30 MG                          le}       HCl 30 MG           

 

     Levothyroxi Levothyroxi           No                       Levothyrox      

     



     ne Sodium ne Sodium                                    ine Sodium          

 



     150  MCG                                    150 MCG           

 

     metFORMIN metFORMIN           No             1{table QD   metFORMIN        

   



     HCl 1000 MG HCl 1000 MG                          t_with_      HCl 1000     

      



                                        a_meal}      MG             

 

     Rosuvastati Rosuvastati           No                       Rosuvastat      

     



     n Calcium 5 n Calcium 5                                    in Calcium      

     



     MG   MG                                      5 MG           

 

     Methocarbam Methocarbam           No             1{table TID  Methocarba   

        



     ol 750 MG ol 750 MG                          t}        mol 750 MG          

 

 

     Diclofenac Diclofenac           No             1{appli BID  Diclofenac     

      



     Sodium 3 % Sodium 3 %                          cation}      Sodium 3 %     

      

 

     azaTHIOprin azaTHIOprin           No             1{table QD   azaTHIOpri   

        



     e 50 MG e 50 MG                          t}        ne 50 MG           

 

     Gabapentin Gabapentin           No             1{capsu TID  Gabapentin     

      



     600  MG                          le_befo      600 MG           



                                        re_bedt                     



                                        ivette}                     

 

     Restoril 15 Restoril 15           No             1{capsu QD   Restoril     

      



     MG   MG                            le_at_b      15 MG           



                                        edtime_                     



                                        as_need                     



                                        ed}                      

 

     Synthroid Synthroid           No                  QD   Synthroid           



     150  MCG                                    150 MCG           

 

     Turmeric Turmeric           No                       Turmeric           

 

     Folic Acid Folic Acid           No             2{table QD   Folic Acid     

      



     1 MG 1 MG                          t}        1 MG           

 

     Glimepiride Glimepiride           No                       Glimepirid      

     



     2 MG 2 MG                                    e 2 MG           

 

     Temazepam Temazepam           No             1{capsu QD   Temazepam        

   



     15 MG 15 MG                          le_at_b      15 MG           



                                        edtime_                     



                                        as_need                     



                                        ed}                      

 

     Cholestyram Cholestyram           No                       Cholestyra      

     



     ine 4 GM ine 4 GM                                    mine 4 GM           

 

     Simponi Simponi           No                       Simponi           



     Aria 50 Aria 50                                    Aria 50           



     MG/4ML MG/4ML                                    MG/4ML           

 

     Levothyroxi Levothyroxi           No                       Levothyrox      

     



     ne Sodium ne Sodium                                    ine Sodium          

 



     150  MCG                                    150 MCG           

 

     metFORMIN metFORMIN           No                       metFORMIN           



     HCl 1000 MG HCl 1000 MG                                    HCl 1000        

   



                                                  MG             

 

     Multivitami Multivitami           No                       Multivitam      

     



     n    n                                       in             

 

     sulfaSALAzi sulfaSALAzi           No             2{table BID  sulfaSALAz   

        



     ne 500 MG ne 500 MG                          t}        ine 500 MG          

 

 

     Rosuvastati Rosuvastati           No             1{table QD   Rosuvastat   

        



     n Calcium 5 n Calcium 5                          t}        in Calcium      

     



     MG   MG                                      5 MG           

 

     Iron 65 MG Iron 65 MG           No             1{table QD   Iron 65 MG     

      



                                        t}                       

 

     DULoxetine DULoxetine           No             1{capsu QD   DULoxetine     

      



     HCl 30 MG HCl 30 MG                          le}       HCl 30 MG           

 

     Hydroxychlo Hydroxychlo           No             1{table BID  Hydroxychl   

        



     roquine roquine                          t}        oroquine           



     Sulfate 200 Sulfate 200                                    Sulfate         

  



     MG   MG                                      200 MG           

 

     Gabapentin Gabapentin           No             1{capsu TID  Gabapentin     

      



     600  MG                          le_befo      600 MG           



                                        re_bedt                     



                                        ivette}                     

 

     metFORMIN metFORMIN           No             1{table QD   metFORMIN        

   



     HCl 1000 MG HCl 1000 MG                          t_with_      HCl 1000     

      



                                        a_meal}      MG             

 

     Rosuvastati Rosuvastati           No                       Rosuvastat      

     



     n Calcium 5 n Calcium 5                                    in Calcium      

     



     MG   MG                                      5 MG           

 

     Synthroid Synthroid           No                  QD   Synthroid           



     150  MCG                                    150 MCG           

 

     metFORMIN metFORMIN           No                       metFORMIN           



     HCl 1000 MG HCl 1000 MG                                    HCl 1000        

   



                                                  MG             

 

     Diclofenac Diclofenac           No             1{appli BID  Diclofenac     

      



     Sodium 3 % Sodium 3 %                          cation}      Sodium 3 %     

      

 

     Turmeric Turmeric           No                       Turmeric           

 

     sulfaSALAzi sulfaSALAzi           No             2{table BID  sulfaSALAz   

        



     ne 500 MG ne 500 MG                          t}        ine 500 MG          

 

 

     Cholestyram Cholestyram           No                       Cholestyra      

     



     ine 4 GM ine 4 GM                                    mine 4 GM           

 

     Glimepiride Glimepiride           No                       Glimepirid      

     



     2 MG 2 MG                                    e 2 MG           

 

     Iron 65 MG Iron 65 MG           No             1{table QD   Iron 65 MG     

      



                                        t}                       

 

     Multivitami Multivitami           No                       Multivitam      

     



     n    n                                       in             

 

     azaTHIOprin azaTHIOprin           No             1{table QD   azaTHIOpri   

        



     e 50 MG e 50 MG                          t}        ne 50 MG           

 

     Folic Acid Folic Acid           No             2{table QD   Folic Acid     

      



     1 MG 1 MG                          t}        1 MG           

 

     Hydroxychlo Hydroxychlo           No             1{table BID  Hydroxychl   

        



     roquine roquine                          t}        oroquine           



     Sulfate 200 Sulfate 200                                    Sulfate         

  



     MG   MG                                      200 MG           

 

     Rosuvastati Rosuvastati           No             1{table QD   Rosuvastat   

        



     n Calcium 5 n Calcium 5                          t}        in Calcium      

     



     MG   MG                                      5 MG           

 

     DULoxetine DULoxetine           No             1{capsu QD   DULoxetine     

      



     HCl 30 MG HCl 30 MG                          le}       HCl 30 MG           

 

     Levothyroxi Levothyroxi           No                       Levothyrox      

     



     ne Sodium ne Sodium                                    ine Sodium          

 



     150  MCG                                    150 MCG           

 

     Methocarbam Methocarbam           No             1{table TID  Methocarba   

        



     ol 750 MG ol 750 MG                          t}        mol 750 MG          

 

 

     Simponi Simponi           No                       Simponi           



     Aria 50 Aria 50                                    Aria 50           



     MG/4ML MG/4ML                                    MG/4ML           

 

     metFORMIN metFORMIN           No             1{table QD   metFORMIN        

   



     HCl 1000 MG HCl 1000 MG                          t_with_      HCl 1000     

      



                                        a_meal}      MG             

 

     Rosuvastati Rosuvastati           No                       Rosuvastat      

     



     n Calcium 5 n Calcium 5                                    in Calcium      

     



     MG   MG                                      5 MG           

 

     Diclofenac Diclofenac           No             1{appli BID  Diclofenac     

      



     Sodium 3 % Sodium 3 %                          cation}      Sodium 3 %     

      

 

     DULoxetine DULoxetine           No             1{capsu QD   DULoxetine     

      



     HCl 30 MG HCl 30 MG                          le}       HCl 30 MG           

 

     Gabapentin Gabapentin           No             1{capsu TID  Gabapentin     

      



     600  MG                          le_befo      600 MG           



                                        re_bedt                     



                                        ivette}                     

 

     Glimepiride Glimepiride           No                       Glimepirid      

     



     2 MG 2 MG                                    e 2 MG           

 

     Synthroid Synthroid           No                  QD   Synthroid           



     150  MCG                                    150 MCG           

 

     Iron 65 MG Iron 65 MG           No             1{table QD   Iron 65 MG     

      



                                        t}                       

 

     Cholestyram Cholestyram           No                       Cholestyra      

     



     ine 4 GM ine 4 GM                                    mine 4 GM           

 

     Levothyroxi Levothyroxi           No                       Levothyrox      

     



     ne Sodium ne Sodium                                    ine Sodium          

 



     150  MCG                                    150 MCG           

 

     azaTHIOprin azaTHIOprin           No             1{table QD   azaTHIOpri   

        



     e 50 MG e 50 MG                          t}        ne 50 MG           

 

     metFORMIN metFORMIN           No                       metFORMIN           



     HCl 1000 MG HCl 1000 MG                                    HCl 1000        

   



                                                  MG             

 

     Turmeric Turmeric           No                       Turmeric           

 

     Rosuvastati Rosuvastati           No             1{table QD   Rosuvastat   

        



     n Calcium 5 n Calcium 5                          t}        in Calcium      

     



     MG   MG                                      5 MG           

 

     Simponi Simponi           No                       Simponi           



     Aria 50 Aria 50                                    Aria 50           



     MG/4ML MG/4ML                                    MG/4ML           

 

     Multivitami Multivitami           No                       [employer]      

     



     n    n                                       in             

 

     sulfaSALAzi sulfaSALAzi           No             2{table BID  sulfaSALAz   

        



     ne 500 MG ne 500 MG                          t}        ine 500 MG          

 

 

     Methocarbam Methocarbam           No             1{table TID  Methocarba   

        



     ol 750 MG ol 750 MG                          t}        mol 750 MG          

 

 

     Folic Acid Folic Acid           No             2{table QD   Folic Acid     

      



     1 MG 1 MG                          t}        1 MG           

 

     Hydroxychlo Hydroxychlo           No             1{table BID  Hydroxychl   

        



     roquine roquine                          t}        oroquine           



     Sulfate 200 Sulfate 200                                    Sulfate         

  



     MG   MG                                      200 MG           

 

     DULoxetine DULoxetine           No             1{capsu QD   DULoxetine     

      



     HCl 30 MG HCl 30 MG                          le}       HCl 30 MG           

 

     OneTouch OneTouch           No                  QD   OneTouch           



     Verio - Verio -                                    Verio -           

 

     Hydroxychlo Hydroxychlo           No             1{table BID  Hydroxychl   

        



     roquine roquine                          t}        oroquine           



     Sulfate 200 Sulfate 200                                    Sulfate         

  



     MG   MG                                      200 MG           

 

     Diclofenac Diclofenac           No             1{appli BID  Diclofenac     

      



     Sodium 3 % Sodium 3 %                          cation}      Sodium 3 %     

      

 

     Turmeric Turmeric           No                       Turmeric           

 

     Simponi Simponi           No                       Simponi           



     Aria 50 Aria 50                                    Aria 50           



     MG/4ML MG/4ML                                    MG/4ML           

 

     Synthroid Synthroid           No                  QD   Synthroid           



     175  MCG                                    175 MCG           

 

     Glimepiride Glimepiride           No                       Glimepirid      

     



     2 MG 2 MG                                    e 2 MG           

 

     azaTHIOprin azaTHIOprin           No             1.5{tab QD   azaTHIOpri   

        



     e 50 MG e 50 MG                          let}      ne 50 MG           

 

     Iron 65 MG Iron 65 MG           No             1{table QD   Iron 65 MG     

      



                                        t}                       

 

     metFORMIN metFORMIN           No             1{table BID  metFORMIN        

   



     HCl 1000 MG HCl 1000 MG                          t_with_      HCl 1000     

      



                                        a_meal}      MG             

 

     Restoril 15 Restoril 15           No             1{capsu QD   Restoril     

      



     MG   MG                            le_at_b      15 MG           



                                        edtime_                     



                                        as_need                     



                                        ed}                      

 

     Folic Acid Folic Acid           No             2{table QD   Folic Acid     

      



     1 MG 1 MG                          t}        1 MG           

 

     Cholestyram Cholestyram           No                       Cholestyra      

     



     ine 4 GM ine 4 GM                                    mine 4 GM           

 

     Gabapentin Gabapentin           No             1{capsu TID  Gabapentin     

      



     600  MG                          le_befo      600 MG           



                                        re_bedt                     



                                        ivette}                     

 

     Rosuvastati Rosuvastati           No                       Rosuvastat      

     



     n Calcium 5 n Calcium 5                                    in Calcium      

     



     MG   MG                                      5 MG           

 

     Methocarbam Methocarbam           No             1{table TID  Methocarba   

        



     ol 750 MG ol 750 MG                          t}        mol 750 MG          

 

 

     Levothyroxi Levothyroxi           No                       Levothyrox      

     



     ne Sodium ne Sodium                                    ine Sodium          

 



     150  MCG                                    150 MCG           

 

     Multivitami Multivitami           No                       Multivitam      

     



     n    n                                       in             

 

     Rosuvastati Rosuvastati           No             1{table QD   Rosuvastat   

        



     n Calcium 5 n Calcium 5                          t}        in Calcium      

     



     MG   MG                                      5 MG           

 

     sulfaSALAzi sulfaSALAzi           No             2{table BID  sulfaSALAz   

        



     ne 500 MG ne 500 MG                          t}        ine 500 MG          

 

 

     DULoxetine DULoxetine           No             1{capsu QD   DULoxetine     

      



     HCl 30 MG HCl 30 MG                          le}       HCl 30 MG           

 

     OneTouch OneTouch           No                  QD   OneTouch           



     Verio - Verio -                                    Verio -           

 

     Hydroxychlo Hydroxychlo           No             1{table BID  Hydroxychl   

        



     roquine roquine                          t}        oroquine           



     Sulfate 200 Sulfate 200                                    Sulfate         

  



     MG   MG                                      200 MG           

 

     Diclofenac Diclofenac           No             1{appli BID  Diclofenac     

      



     Sodium 3 % Sodium 3 %                          cation}      Sodium 3 %     

      

 

     Turmeric Turmeric           No                       Turmeric           

 

     Simponi Simponi           No                       Simponi           



     Aria 50 Aria 50                                    Aria 50           



     MG/4ML MG/4ML                                    MG/4ML           

 

     Synthroid Synthroid           No                  QD   Synthroid           



     175  MCG                                    175 MCG           

 

     Glimepiride Glimepiride           No                       Glimepirid      

     



     2 MG 2 MG                                    e 2 MG           

 

     azaTHIOprin azaTHIOprin           No             1.5{tab QD   azaTHIOpri   

        



     e 50 MG e 50 MG                          let}      ne 50 MG           

 

     Iron 65 MG Iron 65 MG           No             1{table QD   Iron 65 MG     

      



                                        t}                       

 

     metFORMIN metFORMIN           No             1{table BID  metFORMIN        

   



     HCl 1000 MG HCl 1000 MG                          t_with_      HCl 1000     

      



                                        a_meal}      MG             

 

     Restoril 15 Restoril 15           No             1{capsu QD   Restoril     

      



     MG   MG                            le_at_b      15 MG           



                                        edtime_                     



                                        as_need                     



                                        ed}                      

 

     Folic Acid Folic Acid           No             2{table QD   Folic Acid     

      



     1 MG 1 MG                          t}        1 MG           

 

     Cholestyram Cholestyram           No                       Cholestyra      

     



     ine 4 GM ine 4 GM                                    mine 4 GM           

 

     Gabapentin Gabapentin           No             1{capsu TID  Gabapentin     

      



     600  MG                          le_befo      600 MG           



                                        re_bedt                     



                                        ivette}                     

 

     Rosuvastati Rosuvastati           No                       Rosuvastat      

     



     n Calcium 5 n Calcium 5                                    in Calcium      

     



     MG   MG                                      5 MG           

 

     Methocarbam Methocarbam           No             1{table TID  Methocarba   

        



     ol 750 MG ol 750 MG                          t}        mol 750 MG          

 

 

     Levothyroxi Levothyroxi           No                       Levothyrox      

     



     ne Sodium ne Sodium                                    ine Sodium          

 



     150  MCG                                    150 MCG           

 

     Multivitami Multivitami           No                       Multivitam      

     



     n    n                                       in             

 

     Rosuvastati Rosuvastati           No             1{table QD   Rosuvastat   

        



     n Calcium 5 n Calcium 5                          t}        in Calcium      

     



     MG   MG                                      5 MG           

 

     sulfaSALAzi sulfaSALAzi           No             2{table BID  sulfaSALAz   

        



     ne 500 MG ne 500 MG                          t}        ine 500 MG          

 

 

     DULoxetine DULoxetine           No             1{capsu QD   DULoxetine     

      



     HCl 30 MG HCl 30 MG                          le}       HCl 30 MG           

 

     OneTouch OneTouch           No                  QD   OneTouch           



     Verio - Verio -                                    Verio -           

 

     Hydroxychlo Hydroxychlo           No             1{table BID  Hydroxychl   

        



     roquine roquine                          t}        oroquine           



     Sulfate 200 Sulfate 200                                    Sulfate         

  



     MG   MG                                      200 MG           

 

     Diclofenac Diclofenac           No             1{appli BID  Diclofenac     

      



     Sodium 3 % Sodium 3 %                          cation}      Sodium 3 %     

      

 

     Turmeric Turmeric           No                       Turmeric           

 

     Simponi Simponi           No                       Simponi           



     Aria 50 Aria 50                                    Aria 50           



     MG/4ML MG/4ML                                    MG/4ML           

 

     Synthroid Synthroid           No                  QD   Synthroid           



     175  MCG                                    175 MCG           

 

     Glimepiride Glimepiride           No                       Glimepirid      

     



     2 MG 2 MG                                    e 2 MG           

 

     azaTHIOprin azaTHIOprin           No             1.5{tab QD   azaTHIOpri   

        



     e 50 MG e 50 MG                          let}      ne 50 MG           

 

     Iron 65 MG Iron 65 MG           No             1{table QD   Iron 65 MG     

      



                                        t}                       

 

     metFORMIN metFORMIN           No             1{table BID  metFORMIN        

   



     HCl 1000 MG HCl 1000 MG                          t_with_      HCl 1000     

      



                                        a_meal}      MG             

 

     Restoril 15 Restoril 15           No             1{capsu QD   Restoril     

      



     MG   MG                            le_at_b      15 MG           



                                        edtime_                     



                                        as_need                     



                                        ed}                      

 

     Folic Acid Folic Acid           No             2{table QD   Folic Acid     

      



     1 MG 1 MG                          t}        1 MG           

 

     Cholestyram Cholestyram           No                       Cholestyra      

     



     ine 4 GM ine 4 GM                                    mine 4 GM           

 

     Gabapentin Gabapentin           No             1{capsu TID  Gabapentin     

      



     600  MG                          le_befo      600 MG           



                                        re_bedt                     



                                        ivette}                     

 

     Rosuvastati Rosuvastati           No                       Rosuvastat      

     



     n Calcium 5 n Calcium 5                                    in Calcium      

     



     MG   MG                                      5 MG           

 

     Methocarbam Methocarbam           No             1{table TID  Methocarba   

        



     ol 750 MG ol 750 MG                          t}        mol 750 MG          

 

 

     Levothyroxi Levothyroxi           No                       Levothyrox      

     



     ne Sodium ne Sodium                                    ine Sodium          

 



     150  MCG                                    150 MCG           

 

     Multivitami Multivitami           No                       Multivitam      

     



     n    n                                       in             

 

     Rosuvastati Rosuvastati           No             1{table QD   Rosuvastat   

        



     n Calcium 5 n Calcium 5                          t}        in Calcium      

     



     MG   MG                                      5 MG           

 

     sulfaSALAzi sulfaSALAzi           No             2{table BID  sulfaSALAz   

        



     ne 500 MG ne 500 MG                          t}        ine 500 MG          

 

 

     Levothyroxi Levothyroxi           No                       Levothyrox      

     



     ne Sodium ne Sodium                                    ine Sodium          

 



     150  MCG                                    150 MCG           

 

     OneTouch OneTouch           No                  QD   OneTouch           



     Verio - Verio -                                    Verio -           

 

     Glimepiride Glimepiride           No                       Glimepirid      

     



     2 MG 2 MG                                    e 2 MG           

 

     Cholestyram Cholestyram           No                       Cholestyra      

     



     ine 4 GM ine 4 GM                                    mine 4 GM           

 

     Multivitami Multivitami           No                       Multivitam      

     



     n    n                                       in             

 

     Rosuvastati Rosuvastati           No             1{table QD   Rosuvastat   

        



     n Calcium 5 n Calcium 5                          t}        in Calcium      

     



     MG   MG                                      5 MG           

 

     DULoxetine DULoxetine           No             1{capsu QD   DULoxetine     

      



     HCl 30 MG HCl 30 MG                          le}       HCl 30 MG           

 

     Diclofenac Diclofenac           No             1{appli BID  Diclofenac     

      



     Sodium 3 % Sodium 3 %                          cation}      Sodium 3 %     

      

 

     Gabapentin Gabapentin           No             1{capsu TID  Gabapentin     

      



     600  MG                          le_befo      600 MG           



                                        re_bedt                     



                                        ivette}                     

 

     Iron 65 MG Iron 65 MG           No             1{table QD   Iron 65 MG     

      



                                        t}                       

 

     Synthroid Synthroid           No                  QD   Synthroid           



     175  MCG                                    175 MCG           

 

     Hydroxychlo Hydroxychlo           No             1{table BID  Hydroxychl   

        



     roquine roquine                          t}        oroquine           



     Sulfate 200 Sulfate 200                                    Sulfate         

  



     MG   MG                                      200 MG           

 

     Folic Acid Folic Acid           No             2{table QD   Folic Acid     

      



     1 MG 1 MG                          t}        1 MG           

 

     Simponi Simponi           No                       Simponi           



     Aria 50 Aria 50                                    Aria 50           



     MG/4ML MG/4ML                                    MG/4ML           

 

     azaTHIOprin azaTHIOprin           No             1.5{tab QD   azaTHIOpri   

        



     e 50 MG e 50 MG                          let}      ne 50 MG           

 

     Turmeric Turmeric           No                       Turmeric           

 

     metFORMIN metFORMIN           No             1{table BID  metFORMIN        

   



     HCl 1000 MG HCl 1000 MG                          t_with_      HCl 1000     

      



                                        a_meal}      MG             

 

     Restoril 15 Restoril 15           No             1{capsu QD   Restoril     

      



     MG   MG                            le_at_b      15 MG           



                                        edtime_                     



                                        as_need                     



                                        ed}                      

 

     sulfaSALAzi sulfaSALAzi           No             2{table BID  sulfaSALAz   

        



     ne 500 MG ne 500 MG                          t}        ine 500 MG          

 

 

     Methocarbam Methocarbam           No             1{table TID  Methocarba   

        



     ol 750 MG ol 750 MG                          t}        mol 750 MG          

 

 

     Rosuvastati Rosuvastati           No                       Rosuvastat      

     



     n Calcium 5 n Calcium 5                                    in Calcium      

     



     MG   MG                                      5 MG           

 

     Levothyroxi Levothyroxi           No                       Levothyrox      

     



     ne Sodium ne Sodium                                    ine Sodium          

 



     150  MCG                                    150 MCG           

 

     OneTouch OneTouch           No                  QD   OneTouch           



     Verio - Verio -                                    Verio -           

 

     Glimepiride Glimepiride           No                       Glimepirid      

     



     2 MG 2 MG                                    e 2 MG           

 

     Cholestyram Cholestyram           No                       Cholestyra      

     



     ine 4 GM ine 4 GM                                    mine 4 GM           

 

     Multivitami Multivitami           No                       Multivitam      

     



     n    n                                       in             

 

     Rosuvastati Rosuvastati           No             1{table QD   Rosuvastat   

        



     n Calcium 5 n Calcium 5                          t}        in Calcium      

     



     MG   MG                                      5 MG           

 

     DULoxetine DULoxetine           No             1{capsu QD   DULoxetine     

      



     HCl 30 MG HCl 30 MG                          le}       HCl 30 MG           

 

     Diclofenac Diclofenac           No             1{appli BID  Diclofenac     

      



     Sodium 3 % Sodium 3 %                          cation}      Sodium 3 %     

      

 

     Gabapentin Gabapentin           No             1{capsu TID  Gabapentin     

      



     600  MG                          le_befo      600 MG           



                                        re_bedt                     



                                        ivette}                     

 

     Iron 65 MG Iron 65 MG           No             1{table QD   Iron 65 MG     

      



                                        t}                       

 

     Synthroid Synthroid           No                  QD   Synthroid           



     175  MCG                                    175 MCG           

 

     Hydroxychlo Hydroxychlo           No             1{table BID  Hydroxychl   

        



     roquine roquine                          t}        oroquine           



     Sulfate 200 Sulfate 200                                    Sulfate         

  



     MG   MG                                      200 MG           

 

     Folic Acid Folic Acid           No             2{table QD   Folic Acid     

      



     1 MG 1 MG                          t}        1 MG           

 

     Simponi Simponi           No                       Simponi           



     Aria 50 Aria 50                                    Aria 50           



     MG/4ML MG/4ML                                    MG/4ML           

 

     azaTHIOprin azaTHIOprin           No             1.5{tab QD   azaTHIOpri   

        



     e 50 MG e 50 MG                          let}      ne 50 MG           

 

     Turmeric Turmeric           No                       Turmeric           

 

     metFORMIN metFORMIN           No             1{table BID  metFORMIN        

   



     HCl 1000 MG HCl 1000 MG                          t_with_      HCl 1000     

      



                                        a_meal}      MG             

 

     Restoril 15 Restoril 15           No             1{capsu QD   Restoril     

      



     MG   MG                            le_at_b      15 MG           



                                        edtime_                     



                                        as_need                     



                                        ed}                      

 

     sulfaSALAzi sulfaSALAzi           No             2{table BID  sulfaSALAz   

        



     ne 500 MG ne 500 MG                          t}        ine 500 MG          

 

 

     Methocarbam Methocarbam           No             1{table TID  Methocarba   

        



     ol 750 MG ol 750 MG                          t}        mol 750 MG          

 

 

     Rosuvastati Rosuvastati           No                       Rosuvastat      

     



     n Calcium 5 n Calcium 5                                    in Calcium      

     



     MG   MG                                      5 MG           

 

     Levothyroxi Levothyroxi           No                       Levothyrox      

     



     ne Sodium ne Sodium                                    ine Sodium          

 



     150  MCG                                    150 MCG           

 

     OneTouch OneTouch           No                  QD   OneTouch           



     Verio - Verio -                                    Verio -           

 

     Glimepiride Glimepiride           No                       Glimepirid      

     



     2 MG 2 MG                                    e 2 MG           

 

     Cholestyram Cholestyram           No                       Cholestyra      

     



     ine 4 GM ine 4 GM                                    mine 4 GM           

 

     Multivitami Multivitami           No                       Multivitam      

     



     n    n                                       in             

 

     Rosuvastati Rosuvastati           No             1{table QD   Rosuvastat   

        



     n Calcium 5 n Calcium 5                          t}        in Calcium      

     



     MG   MG                                      5 MG           

 

     DULoxetine DULoxetine           No             1{capsu QD   DULoxetine     

      



     HCl 30 MG HCl 30 MG                          le}       HCl 30 MG           

 

     Diclofenac Diclofenac           No             1{appli BID  Diclofenac     

      



     Sodium 3 % Sodium 3 %                          cation}      Sodium 3 %     

      

 

     Gabapentin Gabapentin           No             1{capsu TID  Gabapentin     

      



     600  MG                          le_befo      600 MG           



                                        re_bedt                     



                                        ivette}                     

 

     Iron 65 MG Iron 65 MG           No             1{table QD   Iron 65 MG     

      



                                        t}                       

 

     Synthroid Synthroid           No                  QD   Synthroid           



     175  MCG                                    175 MCG           

 

     Hydroxychlo Hydroxychlo           No             1{table BID  Hydroxychl   

        



     roquine roquine                          t}        oroquine           



     Sulfate 200 Sulfate 200                                    Sulfate         

  



     MG   MG                                      200 MG           

 

     Folic Acid Folic Acid           No             2{table QD   Folic Acid     

      



     1 MG 1 MG                          t}        1 MG           

 

     Simponi Simponi           No                       Simponi           



     Aria 50 Aria 50                                    Aria 50           



     MG/4ML MG/4ML                                    MG/4ML           

 

     azaTHIOprin azaTHIOprin           No             1.5{tab QD   azaTHIOpri   

        



     e 50 MG e 50 MG                          let}      ne 50 MG           

 

     Turmeric Turmeric           No                       Turmeric           

 

     metFORMIN metFORMIN           No             1{table BID  metFORMIN        

   



     HCl 1000 MG HCl 1000 MG                          t_with_      HCl 1000     

      



                                        a_meal}      MG             

 

     Restoril 15 Restoril 15           No             1{capsu QD   Restoril     

      



     MG   MG                            le_at_b      15 MG           



                                        edtime_                     



                                        as_need                     



                                        ed}                      

 

     sulfaSALAzi sulfaSALAzi           No             2{table BID  sulfaSALAz   

        



     ne 500 MG ne 500 MG                          t}        ine 500 MG          

 

 

     Methocarbam Methocarbam           No             1{table TID  Methocarba   

        



     ol 750 MG ol 750 MG                          t}        mol 750 MG          

 

 

     Rosuvastati Rosuvastati           No                       Rosuvastat      

     



     n Calcium 5 n Calcium 5                                    in Calcium      

     



     MG   MG                                      5 MG           

 

     Levothyroxi Levothyroxi           No                       Levothyrox      

     



     ne Sodium ne Sodium                                    ine Sodium          

 



     150  MCG                                    150 MCG           

 

     OneTouch OneTouch           No                  QD   OneTouch           



     Verio - Verio -                                    Verio -           

 

     Glimepiride Glimepiride           No                       Glimepirid      

     



     2 MG 2 MG                                    e 2 MG           

 

     Cholestyram Cholestyram           No                       Cholestyra      

     



     ine 4 GM ine 4 GM                                    mine 4 GM           

 

     Multivitami Multivitami           No                       Multivitam      

     



     n    n                                       in             

 

     Rosuvastati Rosuvastati           No             1{table QD   Rosuvastat   

        



     n Calcium 5 n Calcium 5                          t}        in Calcium      

     



     MG   MG                                      5 MG           

 

     DULoxetine DULoxetine           No             1{capsu QD   DULoxetine     

      



     HCl 30 MG HCl 30 MG                          le}       HCl 30 MG           

 

     Diclofenac Diclofenac           No             1{appli BID  Diclofenac     

      



     Sodium 3 % Sodium 3 %                          cation}      Sodium 3 %     

      

 

     Gabapentin Gabapentin           No             1{capsu TID  Gabapentin     

      



     600  MG                          le_befo      600 MG           



                                        re_bedt                     



                                        ivette}                     

 

     Iron 65 MG Iron 65 MG           No             1{table QD   Iron 65 MG     

      



                                        t}                       

 

     Synthroid Synthroid           No                  QD   Synthroid           



     175  MCG                                    175 MCG           

 

     Hydroxychlo Hydroxychlo           No             1{table BID  Hydroxychl   

        



     roquine roquine                          t}        oroquine           



     Sulfate 200 Sulfate 200                                    Sulfate         

  



     MG   MG                                      200 MG           

 

     Folic Acid Folic Acid           No             2{table QD   Folic Acid     

      



     1 MG 1 MG                          t}        1 MG           

 

     Simponi Simponi           No                       Simponi           



     Aria 50 Aria 50                                    Aria 50           



     MG/4ML MG/4ML                                    MG/4ML           

 

     azaTHIOprin azaTHIOprin           No             1.5{tab QD   azaTHIOpri   

        



     e 50 MG e 50 MG                          let}      ne 50 MG           

 

     Turmeric Turmeric           No                       Turmeric           

 

     metFORMIN metFORMIN           No             1{table BID  metFORMIN        

   



     HCl 1000 MG HCl 1000 MG                          t_with_      HCl 1000     

      



                                        a_meal}      MG             

 

     Restoril 15 Restoril 15           No             1{capsu QD   Restoril     

      



     MG   MG                            le_at_b      15 MG           



                                        edtime_                     



                                        as_need                     



                                        ed}                      

 

     sulfaSALAzi sulfaSALAzi           No             2{table BID  sulfaSALAz   

        



     ne 500 MG ne 500 MG                          t}        ine 500 MG          

 

 

     Methocarbam Methocarbam           No             1{table TID  Methocarba   

        



     ol 750 MG ol 750 MG                          t}        mol 750 MG          

 

 

     Rosuvastati Rosuvastati           No                       Rosuvastat      

     



     n Calcium 5 n Calcium 5                                    in Calcium      

     



     MG   MG                                      5 MG           

 

     Levothyroxi Levothyroxi           No                       Levothyrox      

     



     ne Sodium ne Sodium                                    ine Sodium          

 



     150  MCG                                    150 MCG           

 

     OneTouch OneTouch           No                  QD   OneTouch           



     Verio - Verio -                                    Verio -           

 

     Glimepiride Glimepiride           No                       Glimepirid      

     



     2 MG 2 MG                                    e 2 MG           

 

     Cholestyram Cholestyram           No                       Cholestyra      

     



     ine 4 GM ine 4 GM                                    mine 4 GM           

 

     Multivitami Multivitami           No                       Multivitam      

     



     n    n                                       in             

 

     Rosuvastati Rosuvastati           No             1{table QD   Rosuvastat   

        



     n Calcium 5 n Calcium 5                          t}        in Calcium      

     



     MG   MG                                      5 MG           

 

     DULoxetine DULoxetine           No             1{capsu QD   DULoxetine     

      



     HCl 30 MG HCl 30 MG                          le}       HCl 30 MG           

 

     Diclofenac Diclofenac           No             1{appli BID  Diclofenac     

      



     Sodium 3 % Sodium 3 %                          cation}      Sodium 3 %     

      

 

     Gabapentin Gabapentin           No             1{capsu TID  Gabapentin     

      



     600  MG                          le_befo      600 MG           



                                        re_bedt                     



                                        ivette}                     

 

     Iron 65 MG Iron 65 MG           No             1{table QD   Iron 65 MG     

      



                                        t}                       

 

     Synthroid Synthroid           No                  QD   Synthroid           



     175  MCG                                    175 MCG           

 

     Hydroxychlo Hydroxychlo           No             1{table BID  Hydroxychl   

        



     roquine roquine                          t}        oroquine           



     Sulfate 200 Sulfate 200                                    Sulfate         

  



     MG   MG                                      200 MG           

 

     Folic Acid Folic Acid           No             2{table QD   Folic Acid     

      



     1 MG 1 MG                          t}        1 MG           

 

     Simponi Simponi           No                       Simponi           



     Aria 50 Aria 50                                    Aria 50           



     MG/4ML MG/4ML                                    MG/4ML           

 

     azaTHIOprin azaTHIOprin           No             1.5{tab QD   azaTHIOpri   

        



     e 50 MG e 50 MG                          let}      ne 50 MG           

 

     Turmeric Turmeric           No                       Turmeric           

 

     metFORMIN metFORMIN           No             1{table BID  metFORMIN        

   



     HCl 1000 MG HCl 1000 MG                          t_with_      HCl 1000     

      



                                        a_meal}      MG             

 

     Restoril 15 Restoril 15           No             1{capsu QD   Restoril     

      



     MG   MG                            le_at_b      15 MG           



                                        edtime_                     



                                        as_need                     



                                        ed}                      

 

     sulfaSALAzi sulfaSALAzi           No             2{table BID  sulfaSALAz   

        



     ne 500 MG ne 500 MG                          t}        ine 500 MG          

 

 

     Methocarbam Methocarbam           No             1{table TID  Methocarba   

        



     ol 750 MG ol 750 MG                          t}        mol 750 MG          

 

 

     Rosuvastati Rosuvastati           No                       Rosuvastat      

     



     n Calcium 5 n Calcium 5                                    in Calcium      

     



     MG   MG                                      5 MG           

 

     Glimepiride Glimepiride           No                       Glimepirid      

     



     2 MG 2 MG                                    e 2 MG           

 

     OneTouch OneTouch           No                  QD   OneTouch           



     Verio - Verio -                                    Verio -           

 

     Diclofenac Diclofenac           No             1{appli BID  Diclofenac     

      



     Sodium 3 % Sodium 3 %                          cation}      Sodium 3 %     

      

 

     Turmeric Turmeric           No                       Turmeric           

 

     Multivitami Multivitami           No                       Multivitam      

     



     n    n                                       in             

 

     DULoxetine DULoxetine           No             1{capsu QD   DULoxetine     

      



     HCl 30 MG HCl 30 MG                          le}       HCl 30 MG           

 

     Restoril 15 Restoril 15           No             1{capsu QD   Restoril     

      



     MG   MG                            le_at_b      15 MG           



                                        edtime_                     



                                        as_need                     



                                        ed}                      

 

     Gabapentin Gabapentin           No             1{capsu TID  Gabapentin     

      



     600  MG                          le_befo      600 MG           



                                        re_bedt                     



                                        ivette}                     

 

     Iron 65 MG Iron 65 MG           No             1{table QD   Iron 65 MG     

      



                                        t}                       

 

     Hydroxychlo Hydroxychlo           No             1{table BID  Hydroxychl   

        



     roquine roquine                          t}        oroquine           



     Sulfate 200 Sulfate 200                                    Sulfate         

  



     MG   MG                                      200 MG           

 

     Rosuvastati Rosuvastati           No             1{table QD   Rosuvastat   

        



     n Calcium 5 n Calcium 5                          t}        in Calcium      

     



     MG   MG                                      5 MG           

 

     Folic Acid Folic Acid           No             2{table QD   Folic Acid     

      



     1 MG 1 MG                          t}        1 MG           

 

     Simponi Simponi           No                       Simponi           



     Aria 50 Aria 50                                    Aria 50           



     MG/4ML MG/4ML                                    MG/4ML           

 

     Methocarbam Methocarbam           No             1{table TID  Methocarba   

        



     ol 750 MG ol 750 MG                          t}        mol 750 MG          

 

 

     metFORMIN metFORMIN           No             1{table BID  metFORMIN        

   



     HCl 1000 MG HCl 1000 MG                          t_with_      HCl 1000     

      



                                        a_meal}      MG             

 

     azaTHIOprin azaTHIOprin           No             1.5{tab QD   azaTHIOpri   

        



     e 50 MG e 50 MG                          let}      ne 50 MG           

 

     Cholestyram Cholestyram           No                       Cholestyra      

     



     ine 4 GM ine 4 GM                                    mine 4 GM           

 

     sulfaSALAzi sulfaSALAzi           No             2{table BID  sulfaSALAz   

        



     ne 500 MG ne 500 MG                          t}        ine 500 MG          

 

 

     Levothyroxi Levothyroxi           No                  QD   Levothyrox      

     



     ne Sodium ne Sodium                                    ine Sodium          

 



     175  MCG                                    175 MCG           

 

     Rosuvastati Rosuvastati           No                       Rosuvastat      

     



     n Calcium 5 n Calcium 5                                    in Calcium      

     



     MG   MG                                      5 MG           

 

     Glimepiride Glimepiride           No                       Glimepirid      

     



     2 MG 2 MG                                    e 2 MG           

 

     Restoril 15 Restoril 15           No             1{capsu QD   Restoril     

      



     MG   MG                            le_at_b      15 MG           



                                        edtime_                     



                                        as_need                     



                                        ed}                      

 

     Gabapentin Gabapentin           No             1{capsu TID  Gabapentin     

      



     600  MG                          le_befo      600 MG           



                                        re_bedt                     



                                        ivette}                     

 

     Diclofenac Diclofenac           No             1{appli BID  Diclofenac     

      



     Sodium 3 % Sodium 3 %                          cation}      Sodium 3 %     

      

 

     OneTouch OneTouch           No                  QD   OneTouch           



     Verio - Verio -                                    Verio -           

 

     Hydroxychlo Hydroxychlo           No             1{table BID  Hydroxychl   

        



     roquine roquine                          t}        oroquine           



     Sulfate 200 Sulfate 200                                    Sulfate         

  



     MG   MG                                      200 MG           

 

     Simponi Simponi           No                       Simponi           



     Aria 50 Aria 50                                    Aria 50           



     MG/4ML MG/4ML                                    MG/4ML           

 

     Multivitami Multivitami           No                       Multivitam      

     



     n    n                                       in             

 

     Rosuvastati Rosuvastati           No             1{table QD   Rosuvastat   

        



     n Calcium 5 n Calcium 5                          t}        in Calcium      

     



     MG   MG                                      5 MG           

 

     Iron 65 MG Iron 65 MG           No             1{table QD   Iron 65 MG     

      



                                        t}                       

 

     Methocarbam Methocarbam           No             1{table TID  Methocarba   

        



     ol 750 MG ol 750 MG                          t}        mol 750 MG          

 

 

     Turmeric Turmeric           No                       Turmeric           

 

     Cholestyram Cholestyram           No                       Cholestyra      

     



     ine 4 GM ine 4 GM                                    mine 4 GM           

 

     metFORMIN metFORMIN           No             1{table BID  metFORMIN        

   



     HCl 1000 MG HCl 1000 MG                          t_with_      HCl 1000     

      



                                        a_meal}      MG             

 

     sulfaSALAzi sulfaSALAzi           No             2{table BID  sulfaSALAz   

        



     ne 500 MG ne 500 MG                          t}        ine 500 MG          

 

 

     Vitamin D3 Vitamin D3           No                       Vitamin D3        

   



     125  MCG                                    125 MCG           



     (5000 UT) (5000 UT)                                    (5000 UT)           

 

     azaTHIOprin azaTHIOprin           No             1.5{tab QD   azaTHIOpri   

        



     e 50 MG e 50 MG                          let}      ne 50 MG           

 

     Levothyroxi Levothyroxi           No                  QD   Levothyrox      

     



     ne Sodium ne Sodium                                    ine Sodium          

 



     175  MCG                                    175 MCG           

 

     Cyanocobala Cyanocobala           No                       Cyanocobal      

     



     min 1000 min 1000                                    alonzo 1000           



     MCG/ML MCG/ML                                    MCG/ML           

 

     Folic Acid Folic Acid           No             2{table QD   Folic Acid     

      



     1 MG 1 MG                          t}        1 MG           

 

     DULoxetine DULoxetine           No             1{capsu QD   DULoxetine     

      



     HCl 30 MG HCl 30 MG                          le}       HCl 30 MG           

 

     metFORMIN metFORMIN           No             1{table BID  metFORMIN        

   



     HCl 1000 MG HCl 1000 MG                          t_with_      HCl 1000     

      



                                        a_meal}      MG             

 

     Restoril 15 Restoril 15           No             1{capsu QD   Restoril     

      



     MG   MG                            le_at_b      15 MG           



                                        edtime_                     



                                        as_need                     



                                        ed}                      

 

     Methocarbam Methocarbam           No             1{table TID  Methocarba   

        



     ol 750 MG ol 750 MG                          t}        mol 750 MG          

 

 

     azaTHIOprin azaTHIOprin           No             1.5{tab QD   azaTHIOpri   

        



     e 50 MG e 50 MG                          let}      ne 50 MG           

 

     Folic Acid Folic Acid           No             2{table QD   Folic Acid     

      



     1 MG 1 MG                          t}        1 MG           

 

     sulfaSALAzi sulfaSALAzi           No             2{table BID  sulfaSALAz   

        



     ne 500 MG ne 500 MG                          t}        ine 500 MG          

 

 

     Simponi Simponi           No                       Simponi           



     Aria 50 Aria 50                                    Aria 50           



     MG/4ML MG/4ML                                    MG/4ML           

 

     Rosuvastati Rosuvastati           No                       Rosuvastat      

     



     n Calcium 5 n Calcium 5                                    in Calcium      

     



     MG   MG                                      5 MG           

 

     Cyanocobala Cyanocobala           No                       Cyanocobal      

     



     min 1000 min 1000                                    alonzo 1000           



     MCG/ML MCG/ML                                    MCG/ML           

 

     Turmeric Turmeric           No                       Turmeric           

 

     Iron 65 MG Iron 65 MG           No             1{table QD   Iron 65 MG     

      



                                        t}                       

 

     Gabapentin Gabapentin           No             1{capsu TID  Gabapentin     

      



     600  MG                          le_befo      600 MG           



                                        re_bedt                     



                                        ivette}                     

 

     Diclofenac Diclofenac           No             1{appli BID  Diclofenac     

      



     Sodium 3 % Sodium 3 %                          cation}      Sodium 3 %     

      

 

     Glimepiride Glimepiride           No                       Glimepirid      

     



     2 MG 2 MG                                    e 2 MG           

 

     Levothyroxi Levothyroxi           No                  QD   Levothyrox      

     



     ne Sodium ne Sodium                                    ine Sodium          

 



     175  MCG                                    175 MCG           

 

     Cholestyram Cholestyram           No                       Cholestyra      

     



     ine 4 GM ine 4 GM                                    mine 4 GM           

 

     Hydroxychlo Hydroxychlo           No             1{table BID  Hydroxychl   

        



     roquine roquine                          t}        oroquine           



     Sulfate 200 Sulfate 200                                    Sulfate         

  



     MG   MG                                      200 MG           

 

     DULoxetine DULoxetine           No             1{capsu QD   DULoxetine     

      



     HCl 30 MG HCl 30 MG                          le}       HCl 30 MG           

 

     Multivitami Multivitami           No                       Multivitam      

     



     n    n                                       in             

 

     OneTouch OneTouch           No                  QD   OneTouch           



     Verio - Verio -                                    Verio -           

 

     Vitamin D3 Vitamin D3           No                       Vitamin D3        

   



     125  MCG                                    125 MCG           



     (5000 UT) (5000 UT)                                    (5000 UT)           

 

     Turmeric Turmeric           No                       Turmeric           

 

     Synthroid Synthroid           No                  QD   Synthroid           



     150  MCG                                    150 MCG           

 

     Hydroxychlo Hydroxychlo           No             1{table BID  Hydroxychl   

        



     roquine roquine                          t}        oroquine           



     Sulfate 200 Sulfate 200                                    Sulfate         

  



     MG   MG                                      200 MG           

 

     Gabapentin Gabapentin           No             1{capsu TID  Gabapentin     

      



     600  MG                          le_befo      600 MG           



                                        re_bedt                     



                                        ivette}                     

 

     Folic Acid Folic Acid           No             2{table QD   Folic Acid     

      



     1 MG 1 MG                          t}        1 MG           

 

     DULoxetine DULoxetine           No             1{capsu QD   DULoxetine     

      



     HCl 30 MG HCl 30 MG                          le}       HCl 30 MG           

 

     Methocarbam Methocarbam           No             1{table TID  Methocarba   

        



     ol 750 MG ol 750 MG                          t}        mol 750 MG          

 







Immunizations







           Ordered    Filled Immunization Date       Status     Comments   Sour

e



           Immunization Name Name                                        

 

           COVID-19 Pfizer 12            2021-04-10 Completed             [facility]



           & Over Vaccination            00:00:00                         

 

           COVID-19 Pfizer 12            2021-04-10 Completed             [facility]



           & Over Vaccination            00:00:00                         



           ([facility]IL[facility])                                             

 

           COVID-19 Pfizer 12            2021-04-10 Completed             [facility]



           & Over Vaccination            00:00:00                         



           (PURPLE-DILUTE)                                             

 

           COVID-19 Pfizer 12            2021-04-10 Completed             [facility]



           & Over Vaccination            00:00:00                         



           (PURPLE-DILUTE)                                             

 

           COVID-19 Pfizer 12            2021-04-10 Completed             [facility]



           & Over Vaccination            00:00:00                         



           (PURPLE-DILUTE)                                             

 

           COVID-19 Pfizer 12            2021-04-10 Completed             [facility]



           & Over Vaccination            00:00:00                         



           (PURPLE-DILUTE)                                             

 

           COVID-19 Pfizer 12            2021-04-10 Completed             [facility]



           & Over Vaccination            00:00:00                         



           (PURPLE-DILUTE)                                             

 

           COVID-19 Pfizer 12            2021-04-10 Completed             [facility]



           & Over Vaccination            00:00:00                         



           (PUR[facility]-DILUTE)                                             

 

           COVID-19 Pfizer 12            2021-04-10 Completed             [facility]



           & Over Vaccination            00:00:00                         



           (PURPLE-DILUTE)                                             

 

           COVID-19 Pfizer 12            2021-04-10 Completed             [facility]



           & Over Vaccination            00:00:00                         



           (PURPLE-DILUTE)                                             

 

           COVID-19 Pfizer 12            2021-04-10 Completed             [facility]



           & Over Vaccination            00:00:00                         



           (PUR[facility]-DILUTE)                                             

 

           COVID-19 Pfizer 12            2021-04-10 Completed             [facility]



           & Over Vaccination            00:00:00                         



           ([facility]-DILUTE)                                             

 

           COVID-19 Pfizer 12            2021-04-10 Completed             [facility]



           & Over Vaccination            00:00:00                         



           (PURPLE-DILUTE)                                             

 

           COVID-19 Pfizer 12            2021-04-10 Completed             [facility]



           & Over Vaccination            00:00:00                         



           (PURPLE-DILUTE)                                             

 

           COVID-19 Pfizer 12            2021-04-10 Completed             [facility]



           & Over Vaccination            00:00:00                         



           ([facility]-DILUTE)                                             

 

           COVID-19 Pfizer 12            2021-04-10 Completed             [facility]



           & Over Vaccination            00:00:00                         



           (P[facility]-DILUTE)                                             

 

           COVID-19 Pfizer 12            2021-04-10 Completed             [facility]



           & Over Vaccination            00:00:00                         



           ([facility]-DIL[facility])                                             

 

           SARS-COV-2 COVID-19            2021-04-10 Completed             [facility]



           PFIZER VACCINE            00:00:00                         [facility]

 

           SARS-COV-2 COVID-19            2021-04-10 Completed             [facility]



           PFIZER VACCINE            00:00:00                         [facility]

 

           SARS-COV-2 COVID-19            2021-04-10 Completed             [facility]



           PFIZER VACCINE            00:00:00                         [facility]



                                                                  [facility]

 

           SARS-COV-2 COVID-19            2021-04-10 Completed             [facility]



           PFIZER VACCINE            00:00:00                         [facility]

[facility]



                                                                  [facility]

 

           SARS-COV-2 COVID-19            2021-04-10 Completed             [facility]



           PFIZER VACCINE            00:00:00                         [facility]

 

           SARS-COV-2 COVID-19            2021-04-10 Completed             [facility]



           PFIZER VACCINE            00:00:00                         [facility]

[facility]



                                                                  [facility]

 

           SARS-COV-2 COVID-19            2021-04-10 Completed             [facility]



           PFIZER VACCINE            00:00:00                         [facility]

[facility]



                                                                  [facility]

 

           SARS-COV-2 COVID-19            2021-04-10 Completed             [facility]



           PFIZER VACCINE            00:00:00                         [facility]

[facility]



                                                                  [facility]

 

           SARS-COV-2 COVID-19            2021-04-10 Completed             [facility]



           PFIZER VACCINE            00:00:00                         [facility]

 

           SARS-COV-2 COVID-19            2021-04-10 Completed             [facility]



           PFIZER VACCINE            00:00:00                         [facility]

 

           SARS-COV-2 COVID-19            2021-04-10 Completed             [facility]



           PFIZER VACCINE            00:00:00                         [facility]

 

           SARS-COV-2 COVID-19            2021-04-10 Completed             [facility]



           PFIZER VACCINE            00:00:00                         [facility]

 

           SARS-COV-2 COVID-19            2021-04-10 Completed             [facility]



           PFIZER VACCINE            00:00:00                         [facility]

 

           SARS-COV-2 COVID-19            2021-04-10 Completed             [facility]



           PFIZER VACCINE            00:00:00                         [facility]

 

           SARS-COV-2 COVID-19            2021-04-10 Completed             [facility]



           PFIZER VACCINE            00:00:00                         [facility]

 

           COVID-19 Pfizer 2021 Completed             [facility]



           & Over Vaccination            00:00:00                         

 

           COVID-19 Pfizer 12            2021 Completed             [facility]



           & Over Vaccination            00:00:00                         



           ([facility])                                             

 

           COVID-19 Pfizer 2021 Completed             [facility]



           & Over Vaccination            00:00:00                         



           ([facility])                                             

 

           COVID-19 Pfizer 2021 Completed             [facility]



           & Over Vaccination            00:00:00                         



           ([facility]-DIL[facility])                                             

 

           COVID-19 Pfizer 2021 Completed             [facility]



           & Over Vaccination            00:00:00                         



           ([facility]-DILUTE)                                             

 

           COVID-19 Pfizer 2021 Completed             [facility]



           & Over Vaccination            00:00:00                         



           ([facility]IL[facility])                                             

 

           COVID-19 Pfizer 2021 Completed             [facility]



           & Over Vaccination            00:00:00                         



           (P[facility]-DILUTE)                                             

 

           COVID-19 Pfizer 2021 Completed             [facility]



           & Over Vaccination            00:00:00                         



           (P[facility]-DILUTE)                                             

 

           COVID-19 Pfizer 2021 Completed             [facility]



           & Over Vaccination            00:00:00                         



           (P[facility]-DIL[facility])                                             

 

           COVID-19 Pfizer 2021 Completed             [facility]



           & Over Vaccination            00:00:00                         



           (PURPLE-DILUTE)                                             

 

           COVID-19 Pfizer 2021 Completed             [facility]



           & Over Vaccination            00:00:00                         



           ([facility]UR[facility]-DILUTE)                                             

 

           COVID-19 Pfizer 2021 Completed             [facility]



           & Over Vaccination            00:00:00                         



           (PURPLE-DILUTE)                                             

 

           COVID-19 Pfizer 2021 Completed             [facility]



           & Over Vaccination            00:00:00                         



           (PURPLE-DILUTE)                                             

 

           COVID-19 Pfizer 2021 Completed             [facility]



           & Over Vaccination            00:00:00                         



           (PURPLE-DILUTE)                                             

 

           COVID-19 Pfizer 2021 Completed             [facility]



           & Over Vaccination            00:00:00                         



           (PURPLE-DILUTE)                                             

 

           COVID-19 Pfizer 2021 Completed             [facility]



           & Over Vaccination            00:00:00                         



           (PUR[facility]-DILUTE)                                             

 

           COVID-19 Pfizer 2021 Completed             [facility]



           & Over Vaccination            00:00:00                         



           (PUR[facility]-DILUTE)                                             

 

           SARS-COV-2 COVID-19            2021 Completed             [facility]



           PFIZER VACCINE            00:00:00                         [facility]

 

           SARS-COV-2 COVID-19            2021 Completed             [facility]



           PFIZER VACCINE            00:00:00                         [facility]



                                                                  [facility]

 

           SARS-COV-2 COVID-19            2021 Completed             [facility]



           PFIZER VACCINE            00:00:00                         [facility]

[facility]



                                                                  [facility]

 

           SARS-COV-2 COVID-19            2021 Completed             [facility]



           PFIZER VACCINE            00:00:00                         [facility]

[facility]



                                                                  [facility]

 

           SARS-COV-2 COVID-19            2021 Completed             [facility]



           PFIZER VACCINE            00:00:00                         [facility]

[facility]



                                                                  [facility]

 

           SARS-COV-2 COVID-19            2021 Completed             [facility]



           PFIZER VACCINE            00:00:00                         [facility]

[facility]



                                                                  [facility]

 

           SARS-COV-2 COVID-19            2021 Completed             [facility]



           PFIZER VACCINE            00:00:00                         [facility]

[facility]



                                                                  [facility]

 

           SARS-COV-2 COVID-19            2021 Completed             [facility]



           PFIZER VACCINE            00:00:00                         [facility]

[facility]



                                                                  [facility]

 

           SARS-COV-2 COVID-19            2021 Completed             [facility]



           PFIZER VACCINE            00:00:00                         [facility]

 

           SARS-COV-2 COVID-19            2021 Completed             [facility]



           PFIZER VACCINE            00:00:00                         [facility]

 

           SARS-COV-2 COVID-19            2021 Completed             [facility]



           PFIZER VACCINE            00:00:00                         [facility]

 

           SARS-COV-2 COVID-19            2021 Completed             [facility]



           PFIZER VACCINE            00:00:00                         [facility]

 

           SARS-COV-2 COVID-19            2021 Completed             [facility]



           PFIZER VACCINE            00:00:00                         [facility]

 

           SARS-COV-2 COVID-19            2021 Completed             [facility]



           PFIZER VACCINE            00:00:00                         [facility]

 

           SARS-COV-2 COVID-19            2021 Completed             [facility]



           PFIZER VACCINE            00:00:00                         [facility]

 

           Influenza,            2020 Completed             [facility]



           injectable,            00:00:00                         



           quadrivalent                                             



           (afluria, fluzone)                                             

 

           Influenza,            2020 Completed             [facility]



           injectable,            00:00:00                         



           quadrivalent                                             



           (afluria, fluzone)                                             

 

           Influenza,            2020 Completed             [facility]



           injectable,            00:00:00                         



           quadrivalent                                             



           (afluria, fluzone)                                             

 

           Influenza,            2020 Completed             [facility]



           injectable,            00:00:00                         



           quadrivalent                                             



           (afluria, fluzone)                                             

 

           Influenza,            2020 Completed             [facility]



           injectable,            00:00:00                         



           quadrivalent                                             



           (afluria, fluzone)                                             

 

           Influenza,            2020 Completed             [facility]



           injectable,            00:00:00                         



           quadrivalent                                             



           (afluria, fluzone)                                             

 

           Influenza,            2020 Completed             [facility]



           injectable,            00:00:00                         



           quadrivalent                                             



           (afluria, fluzone)                                             

 

           Influenza,            2020 Completed             [facility]



           injectable,            00:00:00                         



           quadrivalent                                             



           (afluria, fluzone)                                             

 

           Influenza,            2020 Completed             [facility]



           injectable,            00:00:00                         



           quadrivalent                                             



           (afluria, fluzone)                                             

 

           Influenza,            2020 Completed             [facility]



           injectable,            00:00:00                         



           quadrivalent                                             



           (afluria, fluzone)                                             

 

           Influenza,            2020 Completed             [facility]



           injectable,            00:00:00                         



           quadrivalent                                             



           (afluria, fluzone)                                             

 

           Influenza,            2020 Completed             [facility]



           injectable,            00:00:00                         



           quadrivalent                                             



           (afluria, fluzone)                                             

 

           Influenza,            2020 Completed             [facility]



           injectable,            00:00:00                         



           quadrivalent                                             



           (afluria, fluzone)                                             

 

           Influenza,            2020 Completed             [facility]



           injectable,            00:00:00                         



           quadrivalent                                             



           (afluria, fluzone)                                             

 

           Influenza,            2020 Completed             [facility]



           injectable,            00:00:00                         



           quadrivalent                                             



           (afluria, fluzone)                                             

 

           Influenza,            2020 Completed             [facility]



           injectable,            00:00:00                         



           quadrivalent                                             



           (afluria, fluzone)                                             

 

           Influenza,            2020 Completed             [facility]



           injectable,            00:00:00                         



           quadrivalent                                             



           (afluria, fluzone)                                             

 

           Influenza Virus            2020 Completed             [facility]



           Vaccine Quad IM            00:00:00                         [facility]



           Multi-dose 6+ MO                                             [facility]

 

           Influenza Virus            2020 Completed             [facility]



           Vaccine Quad IM            00:00:00                         [facility]



           Multi-dose 6+ MO                                             [facility]

 

           Influenza,            2019-10-01 Completed             [facility]



           injectable,            00:00:00                         



           quadrivalent                                             



           (afluria, fluzone)                                             

 

           Influenza,            2019-10-01 Completed             [facility]



           injectable,            00:00:00                         



           quadrivalent                                             



           (afluria, fluzone)                                             

 

           Influenza,            2019-10-01 Completed             [facility]



           injectable,            00:00:00                         



           quadrivalent                                             



           (afluria, fluzone)                                             

 

           Influenza,            2019-10-01 Completed             [facility]



           injectable,            00:00:00                         



           quadrivalent                                             



           (afluria, fluzone)                                             

 

           Influenza,            2019-10-01 Completed             [facility]



           injectable,            00:00:00                         



           quadrivalent                                             



           (afluria, fluzone)                                             

 

           Influenza,            2019-10-01 Completed             [facility]



           injectable,            00:00:00                         



           quadrivalent                                             



           (afluria, fluzone)                                             

 

           Influenza,            2019-10-01 Completed             [facility]



           injectable,            00:00:00                         



           quadrivalent                                             



           (afluria, fluzone)                                             

 

           Influenza,            2019-10-01 Completed             [facility]



           injectable,            00:00:00                         



           quadrivalent                                             



           (afluria, fluzone)                                             

 

           Influenza,            2019-10-01 Completed             [facility]



           injectable,            00:00:00                         



           quadrivalent                                             



           (afluria, fluzone)                                             

 

           Influenza,            2019-10-01 Completed             [facility]



           injectable,            00:00:00                         



           quadrivalent                                             



           (afluria, fluzone)                                             

 

           Influenza,            2019-10-01 Completed             [facility]



           injectable,            00:00:00                         



           quadrivalent                                             



           (afluria, fluzone)                                             

 

           Influenza,            2019-10-01 Completed             [facility]



           injectable,            00:00:00                         



           quadrivalent                                             



           (afluria, fluzone)                                             

 

           Influenza,            2019-10-01 Completed             [facility]



           injectable,            00:00:00                         



           quadrivalent                                             



           (afluria, fluzone)                                             

 

           Influenza,            2019-10-01 Completed             [facility]



           injectable,            00:00:00                         



           quadrivalent                                             



           (afluria, fluzone)                                             

 

           Influenza,            2019-10-01 Completed             [facility]



           injectable,            00:00:00                         



           quadrivalent                                             



           (afluria, fluzone)                                             

 

           Influenza,            2019-10-01 Completed             [facility]



           injectable,            00:00:00                         



           quadrivalent                                             



           (afluria, fluzone)                                             

 

           Influenza,            2019-10-01 Completed             [facility]



           injectable,            00:00:00                         



           quadrivalent                                             



           (afluria, fluzone)                                             

 

           Influenza Virus            2019-10-01 Completed             [facility]

[facility]



           Vaccine Quad IM            00:00:00                         [place] [facility]

ical



           Multi-dose 6+ MO                                             [facility]

 

           Influenza Virus            2019-10-01 Completed             [facility]

[facility] of



           Vaccine Quad IM            00:00:00                         [place] [facility]

ical



           Multi-dose 6+ MO                                             [facility]

 

           Vitamin B12 Vitamin B12 2018-10-18 Completed             [facility]

t -



           (Cyanocobalamin) (Cyanocobalamin) 14:52:00                         

I [facility]







Vital Signs







             Vital Name   Observation Time Observation Value Comments     Source

 

             Systolic blood 2023 19:38:00 88 mm[Hg]                 Un[facility]

sity of



             pressure                                            [facility]

 

             Diastolic blood 2023 19:38:00 60 mm[Hg]                 [facility]



             [facility]

 

             Heart rate   2023 19:38:00 98 /min                   [facility]

 

             Body temperature 2023 19:38:00 36.56 Arlene                 [facility]

 

             Respiratory rate 2023 19:38:00 18 /min                   [facility]

 

             Body height  2023 19:38:00 172.7 cm                  [facility]

 

             Body weight  2023 19:38:00 79.652 kg                 [facility]

 

             BMI          2023 19:38:00 26.70 kg/m2               [facility]

 

             Oxygen saturation in 2023 19:38:00 96 /min                   

[facility]



             Arterial blood by                                        [place] [facility]

maryan



             Pulse oximetry                                        Branch

 

             Systolic blood 2023 18:01:00 104 mm[Hg]                Univer

sity of



             [facility]

 

             Diastolic blood 2023 18:01:00 68 mm[Hg]                 Unive

rsity of



             pressure                                            [facility]

 

             Heart rate   2023 18:01:00 106 /min                  [facility]

[facility]

 

             Body temperature 2023 18:01:00 36.56 Arlene                 Univ

ersity of



                                                                 [facility]

 

             Body height  2023 18:01:00 172.7 cm                  Universi

[facility]

 

             Systolic blood 2023 16:05:00 105 mm[Hg]                [facility]



             pressure                                            

 

             Diastolic blood 2023 16:05:00 71 mm[Hg]                 [facility]

[facility]



             pressure                                            

 

             Heart rate   2023 16:05:00 93 /min                   [facility]

[facility]

 

             Body height  2023 16:05:00 172.7 cm                  [facility]

[facility]

 

             Body weight  2023 16:05:00 85.412 kg                 [facility]

[facility]

 

             BMI          2023 16:05:00 28.63 kg/m2               [facility]

[facility]

 

             height       2022-10-27 13:40:00 67.5 [in_i]               [facility]

 

             weight       2022-10-27 13:40:00 195 [lb_av]               [facility]

 

             bmi          2022-10-27 13:40:00 30.09 kg/m2               [facility]

 

             Systolic blood 2022 16:16:00 112 mm[Hg]                [facility]



             pressure                                            

 

             Diastolic blood 2022 16:16:00 73 mm[Hg]                 [facility]

[facility]



             pressure                                            

 

             Heart rate   2022 16:16:00 97 /min                   [facility]

[facility]

 

             Body height  2022 16:16:00 172.7 cm                  [facility]

[facility]

 

             Body weight  2022 16:16:00 88.95 kg                  [facility]

[facility]

 

             BMI          2022 16:16:00 29.82 kg/m2               [facility]

[facility]

 

             height       2022 11:00:00 68.50 [in_i]              [facility]

 

             weight       2022 11:00:00 197.0 [lb_av]              [facility]

 

             temperature  2022 11:00:00 96.6 [degF]               [facility]

 

             bmi          2022 11:00:00 29.51 kg/m2               [facility]

 

             oximetry     2022 11:00:00 95 %                      [facility]

 

             respiratory rate 2022 11:00:00 15 /min                   Comm

on [facility]

 

             blood pressure 2022 11:00:00 119 mm[Hg]                Common

 [facility] -



             systolic                                            [facility]

 

             blood pressure 2022 11:00:00 75 mm[Hg]                 Common

 [facility] -



             diastolic                                           [facility]

 

             height       2022 11:40:00 67.50 [in_i]              [facility]

 

             weight       2022 11:40:00 197.0 [lb_av]              [facility]

 

             temperature  2022 11:40:00 96.6 [degF]               [facility]

 

             bmi          2022 11:40:00 30.40 kg/m2               [facility]

 

             oximetry     2022 11:40:00 95 %                      [facility]

 

             respiratory rate 2022 11:40:00 15 /min                   Comm

on [facility]

 

             blood pressure 2022 11:40:00 119 mm[Hg]                Common

 [facility] -



             systolic                                            [facility]

 

             blood pressure 2022 11:40:00 75 mm[Hg]                 Common

 [facility] -



             diastolic                                           [facility]

 

             Body height  2022 15:29:00 172.7 cm                  [facility]

[facility]

 

             Body weight  2022 15:29:00 94.802 kg                 [facility]

[facility]

 

             BMI          2022 15:29:00 31.78 kg/m2               [facility]

[facility]

 

             height       2022 08:20:00 68.50 [in_i]              [facility]

 

             weight       2022 08:20:00 204.2 [lb_av]              [facility]

 

             temperature  2022 08:20:00 97.3 [degF]               [facility] S

[facility]

 

             bmi          2022 08:20:00 30.59 kg/m2               [facility]

[facility]

 

             oximetry     2022 08:20:00 99 %                      [facility]

[facility]

 

             respiratory rate 2022 08:20:00 16 /min                   Comm

on [facility]

 

             blood pressure 2022 08:20:00 125 mm[Hg]                [facility]



             systolic                                            [facility]

 

             blood pressure 2022 08:20:00 82 mm[Hg]                 [facility]



             diastolic                                           [facility]

 

             Body height  2022 16:14:00 172.7 cm                  [facility]

[facility]

 

             Body weight  2022 16:14:00 94.802 kg                 [facility]

[facility]

 

             BMI          2022 16:14:00 31.78 kg/m2               [facility]

[facility]

 

             Body height  2022 16:14:00 172.7 cm                  [facility]

[facility]

 

             Body weight  2022 16:14:00 94.802 kg                 [facility]

[facility]

 

             BMI          2022 16:14:00 31.78 kg/m2               [facility]

[facility]

 

             height       2022 08:00:00 68.50 [in_i]              [facility]

 

             weight       2022 08:00:00 210.0 [lb_av]              [facility]

 

             temperature  2022 08:00:00 97.5 [degF]               [facility]

 

             bmi          2022 08:00:00 31.46 kg/m2               [facility]

[facility]

 

             oximetry     2022 08:00:00 95 %                      [facility]

[facility]

 

             respiratory rate 2022 08:00:00 15 /min                   Comm

on [facility]

 

             blood pressure 2022 08:00:00 112 mm[Hg]                [facility]

 [facility] -



             systolic                                            [facility]

 

             blood pressure 2022 08:00:00 67 mm[Hg]                 [facility]

 [facility]



             diastolic                                           [facility]

 

             height       2021 09:00:00 68.50 [in_i]              Common S

pirit -



                                                                 [facility]

 

             weight       2021 09:00:00 207 [lb_av]               Common S

pirit -



                                                                 [facility]

 

             temperature  2021 09:00:00 97.7 [degF]               [facility] S

pirit -



                                                                 [facility]

 

             bmi          2021 09:00:00 31.01 kg/m2               [facility] S

[facility]

 

             oximetry     2021 09:00:00 97 %                      [facility] S

pirit -



                                                                 [facility]

 

             blood pressure 2021 09:00:00 119 mm[Hg]                Common

 [facility] -



             systolic                                            [facility]

 

             blood pressure 2021 09:00:00 73 mm[Hg]                 [facility]

 [facility] -



             diastolic                                           [facility]

 

             height       2021-10-25 10:00:00 68.50 [in_i]              [facility]

[facility]

 

             weight       2021-10-25 10:00:00 217 [lb_av]               [facility]

[facility]

 

             temperature  2021-10-25 10:00:00 97.3 [degF]               [facility]

[facility]

 

             bmi          2021-10-25 10:00:00 32.51 kg/m2               [facility]

pirit [facility]

 

             oximetry     2021-10-25 10:00:00 96 %                      [facility]

[facility]

 

             respiratory rate 2021-10-25 10:00:00 17 /min                   Comm

on [facility] -



                                                                 [facility]

 

             blood pressure 2021-10-25 10:00:00 120 mm[Hg]                [facility]

 [facility] -



             systolic                                            [facility]

 

             blood pressure 2021-10-25 10:00:00 67 mm[Hg]                 Common

 [facility] -



             diastolic                                           [facility]

 

             Systolic blood 2021 18:28:00 111 mm[Hg]                [facility]ern[facility] portillo of



             [facility]

 

             Diastolic blood 2021 18:28:00 72 mm[Hg]                 Unive

rsity of



             pressure                                            [facility]

 

             Heart rate   2021 18:28:00 87 /min                   [facility]

 

             Body height  2021 18:28:00 175.3 cm                  [facility]

 

             Body weight  2021 18:28:00 95.709 kg                 [facility]

 

             BMI          2021 18:28:00 31.16 kg/m2               [facility]

 

             Oxygen saturation in 2021 18:28:00 97 /min                   

[facility]



             Arterial blood by                                        [place] [facility]

maryan



             Pulse oximetry                                        Branch







Procedures







                Procedure       Date / Time Performed Performing Clinician John pozo

 

                EMG             2022 15:12:31 Philip Mcarthur [facility]

 

                MR BRAIN WO CONTRAST 2021 16:56:21 [facility]

 

                XR ABDOMEN 2 VW 2021 15:50:22 Ashley Cai  [facility]

 

                XR PELVIS <3 VW 2021 15:50:22 Ashley Cai  [facility]

 

                REFERRAL-       2021 05:01:00 Doctor Unassigned, No [facility]

[facility] [place]



                REQUEST/RESPONSE                 Name            [facility]

 

                REFERRAL-       2021 05:01:00 Doctor Unassigned, No [facility]

[facility] [place]



                REQUEST/RESPONSE                 Name            [facility]







Encounters







        Start   End     Encounter Admission Attending Care    Care    Encounter 

Source



        Date/Time Date/Time Type    Type    Clinicians Facility Department ID   

   

 

        2023         Outpatient                 [facility]     [id number]

 [facility]



        12:58:35                                                 [id number]  [facility]

 

        2023         Outpatient                 [facility]     [id number]

 [facility]



        10:44:50                                                 [id number]  [facility]

 

        2023         Outpatient                 [facility]     [id number]

 [facility]



        09:47:16                                                 [id number]  [facility]

 

        2023         Outpatient                 [facility]     [id number]

 [facility]



        15:28:12                                                 [id number]  [facility]

 

        2023-07-10         Outpatient                 [facility]     [id number]

 [facility]



        09:23:05                                                 [id number]  [facility]

 

        2023         Outpatient                 [facility]     [id number]

 [facility]



        12:38:38                                                 [id number]  [facility]

 

        2023         Outpatient                 [facility]     [id number]

 [facility]



        08:40:50                                                 [id number]  [facility]

 

        2023         Outpatient                 [facility]     [facility]     [id number]

 [facility]



        08:28:12                                                 [id number]  [facility]

 

        2023         Outpatient                 [facility]     [facility]     [id number]

 [facility]



        11:35:26                                                 [id number]  [facility]

 

        2023-05-15         Outpatient                 [facility]     [facility]     [id number]

 [facility]



        08:10:23                                                 [id number]  [facility]

 

        2023-05-10         Outpatient                 [facility]     [facility]     [id number]

 [facility]



        10:24:56                                                 [id number]  [facility]

 

        2023         Outpatient                 [facility]     [facility]     [id number]

 [facility]



        10:58:35                                                 [id number]  [facility]

 

        2023         Outpatient                 [facility]  [facility]  [id number]

 Common



        15:08:00                                                 [id number]   [facility]

 

        2023         Outpatient                 [facility]     [facility]     [id number]

 [facility]



        12:06:02                                                 [id number]  [facility]

 

        2023         Outpatient                 UT     UT     [id number]

 [facility]



        11:42:22                                                 [id number]  [facility]

 

        2023         Outpatient                 [facility]     [facility]     [id number]

 [facility]



        10:00:38                                                 [id number]  [facility]

 

        2023         Outpatient                 UT     UT     [id number]

 [facility]



        16:18:32                                                 [id number]  [facility]

 

        2023         Outpatient                 UT     UT     [id number]

 [facility]



        12:02:12                                                 [id number]  [facility]

 

        2023         Outpatient                 UT     UT     [id number]

 [facility]



        11:55:08                                                 [id number]  [facility]

 

        2023         Outpatient                 UT     [facility]     [id number]

 [facility]



        09:31:51                                                 [id number]  [facility]

 

        2023         Outpatient                 UT     UT     [id number]

 [facility]



        14:39:25                                                 [id number]  [facility]

 

        2023         Outpatient                 UTH     [facility]     [id number]

 [facility]



        08:25:16                                                 [id number]  [facility]

 

        2023         Outpatient                 UT     UT     [id number]

 [facility]



        08:26:44                                                 [id number]  Health

 

        2023         Outpatient                 UT     UT     [id number]

 [facility]



        14:16:36                                                 [id number]  [facility]

 

        2022         Outpatient                 UTH     UTH     [id number]

 [facility]



        14:32:35                                                 [id number]  [facility]

 

        2022-10-25         Outpatient         [place], Na [facility]  [facility]  [id number]

2 [facility]



        15:27:00                                                    [facility]

 

        2022         Outpatient         Strickland, Na [facility]  [facility]  [id number]

2 [facility]



        13:18:00                                                    [facility]

 

        2022         Outpatient         Strick[place], Na [facility]  [facility]  [id number]

2 [facility]



        10:35:01                                                    [facility]

 

        2022         Outpatient         [place]rick[place], Na [facility]  [facility]  [id number]

2 [facility]



        08:51:01                                                    [facility]

 

        2022         Outpatient         [place], Na [facility]  ST[facility]  [id number]

2 [facility]



        11:58:00                                                    [facility]

 

        2022         Outpatient         [place], Na [facility]  ST[facility]  [id number]

2 [facility]



        14:39:41                                                    [facility]

 

        2022         Outpatient         Strick[place], Na [facility]  [facility]  [id number]

2 [facility]



        14:39:15                                                    [facility]

 

        2022         Outpatient         [place], Na [facility]  [facility]  [id number]

2 [facility]



        14:38:51                                                    [facility]

 

        2022         Outpatient         [place], Na [facility]  [facility]  [id number]

2 [facility]



        14:32:05                                                    [facility]

 

        2022         Outpatient         [place], Na [facility]  ST[facility]  [id number]

2 [facility]



        14:25:05                                                    [facility]

 

        2022         Outpatient         [place]rick[place], Na [facility]  [facility]  [id number]

2 [facility]



        14:24:00                                                    [facility]

 

        2022         Outpatient         Strick[place], Na [facility]  [facility]  [id number]

2 [facility]



        14:15:29                                                    [facility]

 

        2022         Outpatient         [place], Na [facility]  ST[facility]  [id number]

2 [facility]



        13:18:56                                                 [id number]   [facility]

 

        2022         Outpatient         Strickland, Na STLMLC  STLMLC  [id number]

2 Common



        12:45:24                                                 [id number]   [facility]

 

        2022         Outpatient         Strickland, Na STLMLC  STLMLC  [id number]

2 Common



        12:44:51                                                 [id number]   [facility]

 

        2022         Outpatient         Strickland, Na STLMLC  STLMLC  [id number]

2 Common



        12:16:18                                                 [id number]   [facility]

 

        2022         Outpatient         Strickland, Na STLMLC  STLMLC  [id number]

2 Common



        12:14:31                                                 [id number]   [facility]

 

        2022         Outpatient         Strickland, Na STLMLC  STLMLC  [id number]

2 Common



        11:48:13                                                 [id number]   [facility]

 

        2022         Outpatient         Strickland, Na STLMLC  STLMLC  [id number]

2 Common



        11:15:39                                                 [id number]   [facility]

 

        2022         Outpatient         Strickland, Na STLMLC  STLMLC  [id number]

2 Common



        11:15:32                                                 76622   [facility]

 

        2022         Outpatient         Strickland, Na STLMLC  STLMLC  [id number]

2 Common



        11:09:46                                                 [id number]   [facility]

 

        2021-12-10         Outpatient                 [facility]     [id number] 

[place]



        08:54:56                                                         Health

 

        2021         Outpatient                 [facility]     [id number] 

[place]



        14:23:40                                                         [facility]

 

        2021         Outpatient         RIC, [facility]     [id number]

 [place]



        13:07:59                         [facility]

 

        2023 Outpatient TALIB GOODE [facility]    [id number]

4104 Univers



        09:00:00 09:00:00                 JOVANA bautista [facility]

 

        2023 Outpatient         IBETH [facility]     [id number]

46 [place]



        09:30:00 09:30:00                 [facility]

 

        2023 Outpatient TALIB GREENWOOD  [facility]    [id number]

631 Univers



        14:30:00 15:43:49                 LUTHER bautista [facility]

 

        2023 [facility]    1.2.840.114 [id number]

792 [facility]



        14:30:00 15:43:49 Visit           [facility]  350.1.13.10         it

y of



                                                TYSHAWN 4.2.7.2.686         [place]orte[place]

as



                                                ROJELIO?BLEA [phone]         [address]                 



                                                OFFICE                  



                                                [facility]                 

 

        2023 [facility]     [id number]

[address]14 [place]



        11:00:00 11:00:00                 [facility]

 

        2023 Office          CATIE Barnes     1.2.570.412 [id number]

[address] [place]



        09:30:00 09:30:00 Visit           Jovana BAIRES 350.1.13.58         

Health



                                                T CARE  9.2.7.2.686         



                                                CENTER .[id number]         



                                                [address]                 

 

        2023 Outpatient         KEITH [facility]     [id number]

7086 [place]



        13:00:00 13:00:00                 [facility]

 

        2023 Clinical         CATIE Hdz     1.2.840.114 [id number]

0806 [place]



        08:30:00 08:58:57 Support         Aleksander BAIRES 350.1.13.58         

Health



                                                T CARE  9.2.7.2.686         



                                                CENTER .[id number]         



                                                [address]                 

 

        [facility]    1.2.840.114 [id number]

5377 [facility]



        00:00:00 00:00:00                 [facility]  350.1.13.10         it

y of



                                        Jonathan VILLAGRAN 4.2.7.2.686         [place]

as



                                                ROJELIO?BLEA [phone]         [address]                 



                                                OFFICE                  



                                                [facility]                 

 

        2023 Outpatient         FARZANA BARNES    [facility]    7501 

   [facility]



        07:15:00 13:25:00                 [facility]                           

 

        2023 [facility]    1.2.840.114 [id number]

9284 [facility]



        00:00:00 00:00:00                 Jovana GOODWIN  350.1.13.10         it

y of



                                        Edward  ANGLETON 4.2.7.2.686         [place]

as



                                                ROJELIO?BLEA [phone]         [address]                 



                                                OFFICE                  



                                                [facility]                 

 

        2023 Office          CATIE Barnes     1.2.349.485 [id number]

[address] [place]



        09:15:00 09:49:14 Visit           Jovana BAIRES 350.1.13.58         

Health



                                                T CARE  9.2.7.2.686         



                                                CENTER .[id number]         



                                                [address]                 

 

        2023 Office          [facility]    1.2.840.114 [id number]

4316 [facility]



        13:00:00 13:30:00 Visit           Jovana GOODWIN  350.1.13.10         it

y of



                                        Edward  ANGLETON 4.2.7.2.686         [place]

as



                                                ROJELIO?BLEA [phone]         [address]                 



                                                OFFICE                  



                                                [facility]                 

 

        2023 Outpatient [facility]    [id number]

5525 [facility]



        13:00:00 13:29:11                 JOVANA                           [facility]

 

        2023 Office          CATIE Barnes     1.2.206.897 [id number]

[address] [place]



        10:00:00 11:03:15 Visit           Jovana GARCIAELIDA 350.1.13.58         

Health



                                                T CARE  9.2.7.2.686         



                                                CENTER .[id number]         



                                                [address]                 

 

        2023 Telephone         [facility]    1.2.840.114 [id number]

[id number] [facility]



        00:00:00 00:00:00                 Jovana GOODWIN  350.1.13.10         it

y of



                                        Edward  ANGLETON 4.2.7.2.686         [place]

as



                                                ROJELIO?BLEA [phone]         [address]                 



                                                OFFICE                  



                                                [facility]                 

 

        2023 Office          CATIE Barnes     1.2.341.886 [id number]

[address] [place]



        14:00:00 14:13:21 Visit           Jovana GARCIAELIDA 350.1.13.58         

Health



                                                T CARE  9.2.7.2.686         



                                                CENTER .[id number]         



                                                [facility] 1               



                                                [facility]                 

 

        2023 Clinical         CATIE Hdz     1.2.840.114 [id number]

7423 [place]



        13:30:00 14:08:59 Support         Aleksander BAIRES 350.1.13.58         

Health



                                                T CARE  9.2.7.2.686         



                                                CENTER .[id number]         



                                                [facility] 1               



                                                [facility]                 

 

        2023 Clinical         CATIE Hdz     1.2.840.114 [id number]

8799 [place]



        13:30:00 14:08:05 Support         Aleksander BAIRES 350.1.13.58         

Health



                                                T CARE  9.2.7.2.686         



                                                CENTER .[id number]         



                                                [facility] 1               



                                                [facility]                 

 

        2023 Office          CATIE CRISTINA     1.2.840.114 [id number]

871 [place]



        10:30:00 13:04:45 Visit           GWENDOLYN BAIRES 350.1.13.58         

Health



                                                T CARE  9.2.7.2.686         



                                                CENTER .[id number]         



                                                [facility] 1               



                                                [facility]                 

 

        2023 Clinical         CATIE Hdz     1.2.840.114 [id number]

3515 [place]



        10:30:00 12:03:01 Support         Aleksander BAIRES 350.1.13.58         

Health



                                                T CARE  9.2.7.2.686         



                                                CENTER .[id number]         



                                                [facility] 1               



                                                [facility]                 

 

        2023 Outpatient         FARZANA BARNES    7508 

   MHRASW



        12:26:00 13:25:00                 JOVANA                           

 

        2023 Outpatient         KEITH [facility]     [id number]

5196 [place]



        08:45:00 08:45:00                 [facility]

 

        2023 Outpatient         IBETH [facility]     [id number]

41 [place]



        14:15:00 14:15:00                 [facility]

 

        2023 Emergency E       FARZANA GARSIA    7509  

  MHPIYUSH



        08:35:00 11:55:00                 EDUARDO                           

 

        2023 Telemedici         CATIE Cristina [facility] 1.2.840.114 [id number]

[id number] [place]



        10:30:00 10:45:00 ne              Gwendolyn   [facility] 350.1.13.58         H

israel OLSEN 9.2.7.2.686         



                                                [facility]     [phone]         



                                                        1               

 

        2023 Office          YUSRA   CATIE [facility] 1.2.840.114 [id number]

408 [place]



        09:50:00 09:50:00 Visit           TARAS FITCH    350.1.13.58         He

alth



                                                MEDICAL 9.2.7.2.686         



                                                PLA 2 [phone]         



                                                        5               

 

        2023 (TEL)                   STLMLC  STLMLC  [id number] Co

mmon



        00:00:00 00:00:00                                                 [facility]

 

        2023 (TEL)                   STLMLC  STLMLC  [id number] Co

mmon



        00:00:00 00:00:00                                                 [facility]

 

        2022-12-15 2022-12-15 (WEB)                   STLMLC  STLMLC  [id number] Co

mmon



        00:00:00 00:00:00                                                 [facility]

 

        2022-12-15 2022-12-15 (WEB)                   STLMLC  STLMLC  [id number] Co

mmon



        00:00:00 00:00:00                                                 [facility]

 

        2022 (WEB)                   STLMLC  STLMLC  [id number] Co

mmon



        00:00:00 00:00:00                                                 [facility]

 

        2022 (TEL)                   STLMLC  STLMLC  [id number] Co

mmon



        00:00:00 00:00:00                                                 [facility]

 

        2022-10-27 2022-10-27 OFFICE                  STLMLC  STLMLC  [id number] Co

mmon



        00:00:00 00:00:00 VISIT                                           Spirit



                        ESTAB PT                                         - [facility]



                        LEVEL 4                                         [facility]

 

        2022-10-14 2022-10-14 Office          CATIE Barnes     1.2.476.041 [id number]

[address] [place]



        12:30:00 13:29:28 Visit           Jovana BAIRES 350.1.13.58         

Health



                                                T CARE  9.2.7.2.686         



                                                [place] .[id number]         



                                                [address]                 

 

        2022 Outpatient         [facility]     [id number]

34 [place]



        04:15:00 04:15:00                 TARAS Goodwin

 

        2022 Office          YUSRA,   [facility] 1.2.840.114 [id number]

104 [place]



        11:00:00 11:00:00 Visit           TARAS FITCH    350.1.13.58         [facility]



                                                MEDICAL 9.2.7.2.686         



                                                PLA 2 [phone]         



                                                        5               

 

        2022-08-10 2022-08-10 (TEL)                   STLMLC  STLMLC  [id number] Co

mmon



        00:00:00 00:00:00                                                 [facility]

 

        2022 WELCOME TO                 STLC  STLMLC  [id number]

 Common



        00:00:00 00:00:00 MEDICARE                                         Spiri

t



                        PREV PHY                                         - [facility]



                        EXAM                                            [facility]

 

        2022 OFFICE                  STLMLC  STLMLC  [id number] Co

mmon



        00:00:00 00:00:00 VISIT EST                                         Spir

it



                        PT LEVEL 3                                         [facility]

 

        2022 (TEL)                   STLMLC  STLMLC  [id number] Co

mmon



        00:00:00 00:00:00                                                 [facility]

 

        2022 (TEL)                   STLMLC  STLMLC  [id number] Co

mmon



        00:00:00 00:00:00                                                 [facility]

 

        2022 Office          Alfredo  [facility] 1.2.840.114 [id number]

424 [place]



        10:30:00 11:17:20 Visit           Jerrica  SIMEON .1.13.58         

Health



                                                SPINE   9.2.7.2.686         



                                                MEDICAL [phone]         



                                                [facility]   2               

 

        2022 (TEL)                   STLMLC  STLMLC  [id number] Co

mmon



        00:00:00 00:00:00                                                 [facility]

 

        2022 OFFICE                  STLMLC  STLMLC  [id number] Co

mmon



        00:00:00 00:00:00 VISIT                                           Spirit



                        ESTAB PT                                         - [facility]



                        LEVEL 4                                         [facility]

 

        2022 Outpatient         HILLARY WINTER   [facility]   7507  

  



        09:37:00 23:59:00                 [facility]                            Orthop

e



                                                                        dic and



                                                                        Spine



                                                                        Hospita



                                                                        l

 

        2022 Office          CATIE Thornton 6400 1.2.765.694 [id number]

[address] [place]



        11:00:00 12:09:41 Visit           Williams MEDINAN .1.13.58         

Health



                                                        9.2.7.2.686         



                                                        [phone]         



                                                        5               

 

        2022 Office          CATIE Thornton 6400 1.2.902.973 4649

[address] [place]



        11:00:00 12:09:41 Visit           Williams MEDINAN .1.13.58         

Health



                                                        9.2.7.2.686         



                                                        [phone]         



                                                        5               

 

        2022 (TEL)                   [facility]  [id number] [facility]

mmon



        00:00:00 00:00:00                                                 Spirit



                                                                        - [facility]



                                                                        [facility]

 

        2022 Ancillary         CATIE Boone 6410 1.2.840.114 135

[id number] [place]



        13:00:00 14:11:14 Procedure         Pam RODRIGUEZ .1.13.58       

  Health



                                                        9.2.7.2.686         



                                                        [phone]         



                                                        8               

 

        2022 Office          KATEY CALDERA 6400 1.2.840.114 [id number]

[id number] [place]



        09:00:00 09:15:00 Visit                   JENNIFER .1.13.58         

Health



                                                        9.2.7.2.686         



                                                        [phone]         



                                                        7               

 

        2022 Outpatient         BUYS   [facility]   MED     [id number]   

 



        13:46:00 23:59:00                 PHILIP                         Ortho

pe



                                                                        dic and



                                                                        Spine



                                                                        Hospita



                                                                        l

 

        2022 Outpatient         BUYS,   [facility]   MED     [id number]   

 



        12:28:00 23:59:00                 PHILIP                         Ortho

pe



                                                                        dic and



                                                                        Spine



                                                                        Hospita



                                                                        l

 

        2022 Telephone         Katey Caldera [facility] 6400 1.2.840.114

 [id number] [place]



        00:00:00 00:00:00                 Guy RODRIGUEZ .1.13.58     

    Health



                                                        9.2.7.2.686         



                                                        [phone]         



                                                        7               

 

        2022 OFFICE                  STLMLC  STLMLC  [id number] Co

mmon



        00:00:00 00:00:00 VISIT                                           Jose



                        ESTAB PT                                         - [facility]



                        LEVEL 4                                         [facility]

 

        2022 (TEL)                   STLMLC  STLMLC  [id number] Co

mmon



        00:00:00 00:00:00                                                 [facility]

 

        2021 (TEL)                   STLMLC  STLMLC  [id number] Co

mmon



        00:00:00 00:00:00                                                 [facility]

 

        2021 PREV VISIT                 STLMLC  STLMLC  [id number]

 Common



        00:00:00 00:00:00 EST AGE                                         Jose



                        40-64                                           - [facility]



                                                                        [facility]

 

        2021-12-10 2021-12-10 Office          CATIE Gomez 6400 1.2.810.795 [id number]

[address] [place]



        09:00:00 09:42:21 Visit           Michael RODRIGUEZ .1.13.58         

Health



                                                        9.2.7.2.686         



                                                        [phone]         



                                                        5               

 

        2021 (TEL)                   STLMLC  STLMLC  [id number] Co

mmon



        00:00:00 00:00:00                                                 [facility]

 

        2021 OFFICE                  STLMLC  STLMLC  [id number] Co

mmon



        00:00:00 00:00:00 VISIT EST                                         Spir

it



                        PT LEVEL 3                                         - [facility]

 

        2021 (TEL)                   STLMLC  STLMLC  [id number] Co

mmon



        00:00:00 00:00:00                                                 [facility]

 

        2021-10-25 2021-10-25 OFFICE                  STLMLC  STLMLC  [id number] Co

mmon



        00:00:00 00:00:00 VISIT                                           Jose



                        [facility] PT                                         - [facility]



                        LEVEL 4                                         [facility]

 

        2021 Outpatient                 STLMLC  STLC  [id number]

 Common



        00:00:00 00:00:00                                                 Spirit



                                                                        [facility]

 

        2021 Outpatient                 STLMLC  STLC  [id number]

 Common



        00:00:00 00:00:00                                                 Spirit



                                                                        [facility]

 

        2021 Telephone         [facility]    1.2.840.114 [id number]

[id number] [facility]



        00:00:00 00:00:00                 [place]ando [place] 350.1.13.10      

   ity of



                                                [place] 4.2.7.2.686         [place]

[place]



                                                [place] [phone]         [place] [id number]             [facility]                 

 

        2021 [facility]    1.2.034.790 [id number]

5165 Univers



        10:36:40 23:59:00 Encounter         Armando Rivera [facility]  350.1.13.10     

    ity of



                                                [place]   4.2.7.2.686         [place]

[place]



                                                [place]    [phone]         [place] [address]264             [place]



                                                ([place] [facility]                   

 

        2021 [facility]    [id number]

178 Univers



        10:30:00 10:35:00 Encounter         Ashley LATHAM  [facility]  [id number]         

ity of



                                                [place]   [id number]         [place]

[place]



                                                [place]    [phone]         [place] [id number]             Branch



                                                ([facility])                   

 

        [facility]    [id number]

[id number] Univers



        10:30:00 10:30:00                 ASHLEY                            ity of



                                                                        [facility]

 

        2021 Outpatient                 STLMLC  [facility]  [id number]

 Common



        00:00:00 00:00:00                                                 [facility]

 

        2021 Office          [facility]    [id number]

[id number] Univers



        13:04:06 14:04:34 Visit           Armando [place] [id number]      

   ity of



                                                [place] [id number]         [place]

[place]



                                                [place] [phone]         Me

dical



                                                nal     092             [facility]                 

 

        2021 Outpatient R       ARMANDO FAIR [facility]   

 [id number] Univers



        13:00:00 13:00:00                 ARMANDO FAIR                       

  ity [facility]

 

        2021 Orders          Doctor  KATEY    [id number]

27 Univers



        00:00:00 00:00:00 Only            Unassigned, BASILIO   [id number]       

  ity [facility] [id number]         [place]

as



                                                        [phone]         Medi

maryan



                                                        009             Branch

 

        2021 Outpatient                 STLMLC  STLMLC  [id number]

 Common



        00:00:00 00:00:00                                                 [facility]

 

        2021 Orders          Doctor  KATEY    [id number]

84 Univers



        00:00:00 00:00:00 Only            Unassigned, BASILIO   350.1.13.10       

  ity of



                                        [facility] [id number]         [place]

as



                                                        [phone]         Medi

maryan



                                                        009             Branch

 

        2021 Outpatient                 STLMLC  STLMLC  [id number]

 Common



        00:00:00 00:00:00                                                 [facility]

 

        2021 Outpatient                 STLMLC  STLMLC  [id number]

 Common



        00:00:00 00:00:00                                                 [facility]

 

        2021-04-10 2021-04-10 Outpatient                 [facility]    [id number]

914 Univers



        09:15:00 09:15:00                                                 ity [facility]

 

        2021 Outpatient                 STLMLC  STLMLC  [id number]

 Common



        00:00:00 00:00:00                                                 [facility]

 

        2021 Outpatient                 [facility]    [id number]

371 Univers



        09:10:00 09:10:00                                                 ity [facility]

 

        2021 Outpatient                 STLMLC  STLMLC  [id number]

 Common



        00:00:00 00:00:00                                                 [facility]

 

        2021 Outpatient                 STLMLC  STLMLC  [id number]

 Common



        00:00:00 00:00:00                                                 [facility]

 

        2020 Outpatient                 STLMLC  STLMLC  [id number]

 Common



        00:00:00 00:00:00                                                 [facility]

 

        2020 Outpatient                 STLMLC  STLMLC  [id number]

 Common



        00:00:00 00:00:00                                                 [facility]

 

        2020 Outpatient                 STLMLC  STLMLC  [id number]

 Common



        00:00:00 00:00:00                                                 [facility]

 

        2020 Outpatient                 STLMLC  STLMLC  [id number]

 Common



        00:00:00 00:00:00                                                 [facility]

 

        2020 Outpatient                 STLMLC  STLMLC  [id number]

 Common



        00:00:00 00:00:00                                                 [facility]

 

        2020 Outpatient                 [address]

[id number] Common



        10:00:00 10:00:00                         t [place]   [address]         Spir

it



                                                Drive   [facility]

 

        2020 Outpatient                 [address]

[id number] Common



        15:42:00 15:42:00                         t [place]   [address]         Spir

it



                                                Drive   [facility]

 

        2020 Outpatient                 [address]

[id number] Common



        10:20:00 10:20:00                         t [place]   [address]         Spir

it



                                                Drive   [facility]

 

        2020 Outpatient                 [address]

[id number] Common



        11:37:00 11:37:00                         t [place]   [address]         Spir

it



                                                Drive   [facility]

 

        2020 Outpatient                 [address]

[id number] Common



        11:00:00 11:00:00                         t [place]   [address]         Spir

it



                                                Drive   [facility]

 

        2019 Outpatient                 [address]

[id number] Common



        08:40:00 08:40:00                         t [place]   [address]         Spir

it



                                                Drive   [facility]

 

        2019 Outpatient                 [address]

[id number] Common



        11:04:00 11:04:00                         t [place]   [address]         Spir

it



                                                Drive   [facility]

 

        2019 Outpatient                 [address]

[id number] Common



        17:00:00 17:00:00                         [facility]         S

pirit



                                                Care    [facility]          - [facility]

 

        2019 Outpatient                 [address]

[id number] Common



        09:15:00 09:15:00                         t [place]   [address]         Spir

it



                                                Drive   [facility]

 

        2018 Outpatient                 STLMLC  STLMLC  [id number]

 Common



        00:00:00 00:00:00                                                 [facility]







Results







           Test       Test       Test       Results    Result     Source



           Description Time       Comments              Comments   

 

           MR BRAIN WO 2021-               No acute intracranial            U

niversity of



           CONTRAST   19                    abnormality. Preliminary            

[facility]



                      19:18:14              Report Dictated by            Branch



                                            Resident: Kurt Patino MD., have reviewed this            



                                            study and agree [facility]            



                                            [facility]            



                                            WO CONTRAST COMPARISON:            



                                            None. HISTORY: see above            



                                            history RA, had sxs of            



                                            ?TIA, left arm/face            



                                            paresthesia. TECHNIQUE:            



                                            1.5 Drea multiplanar            



                                            multiweighted MRI of brain          

  



                                            withoutintravenous            



                                            contrast administration.            



                                            FINDINGS: The ventricles            



                                            and cerebral sulci are            



                                            normal in caliber and            



                                            configuration.No midline            



                                            shift, hydrocephalus or            



                                            pathological extra-axial            



                                            fluidcollection is            



                                            present. The basal            



                                            cisterns are unremarkable.          

  



                                            No restricted diffusion is          

  



                                            present to suggest acute            



                                            infarct. A fewscattered            



                                            nonspecific T2/FLAIR            



                                            hyperintensities are            



                                            demonstrated in thedeep            



                                            and subcortical            



                                            frontoparietal white            



                                            matter, likely            



                                            reflectingmicrovascular            



                                            ischemic changes. Focus of          

  



                                            SWI hypointensity            



                                            demonstrated inthe right            



                                            parietal subcortical white          

  



                                            matter, likely reflecting           

 



                                            amicrohemorrhagic deposit           

 



                                            or mineralization. The T2           

 



                                            flow voids for the major            



                                            intracranial vessels are            



                                            unremarkable. Noabnormal            



                                            fluid signal is present in          

  



                                            the mastoid air cells or            



                                            paranasal airsinuses.            



                                            [facility], Radiant Results Inft          

  



                                            User - 2021 2:19 PM           

 



                                            CDTFormatting of this note          

  



                                            might be different from            



                                            the original.MR BRAIN WO            



                                            CONTRASTCOMPARISON:            



                                            None.HISTORY: see above            



                                            history RA, had sxs of            



                                            ?TIA, left arm/face            



                                            paresthesia. TECHNIQUE:            



                                            1.5 Drea multiplanar            



                                            multiweighted MRI of brain          

  



                                            withoutintravenous            



                                            contrast              



                                            administration.FINDINGS:Th          

  



                                            e ventricles and cerebral           

 



                                            sulci are normal in            



                                            caliber and            



                                            configuration.No midline            



                                            shift, hydrocephalus or            



                                            pathological extra-axial            



                                            fluidcollection is            



                                            present. The basal            



                                            cisterns are            



                                            unremarkable.No restricted          

  



                                            diffusion is present to            



                                            suggest acute infarct. A            



                                            fewscattered nonspecific            



                                            T2/FLAIR hyperintensities           

 



                                            are demonstrated in            



                                            thedeep and subcortical            



                                            frontoparietal white            



                                            matter, likely            



                                            reflectingmicrovascular            



                                            ischemic changes. Focus of          

  



                                            SWI hypointensity            



                                            demonstrated inthe right            



                                            parietal subcortical white          

  



                                            matter, likely reflecting           

 



                                            amicrohemorrhagic deposit           

 



                                            or mineralization.The T2            



                                            flow voids for the major            



                                            intracranial vessels are            



                                            unremarkable. Noabnormal            



                                            fluid signal is present in          

  



                                            the mastoid air cells or            



                                            paranasal             



                                            airsinuses.IMPRESSIONNo            



                                            acute intracranial            



                                            abnormality.Preliminary            



                                            Report Dictated by            



                                            Resident: Kurt Bolton MD., have            



                                            reviewed this study and            



                                            agree with theabove            



                                            report.               

 

           XR PELVIS <3 VW 2021. Spinal stimulator           

 [place] [address]                    device superimposed over            

[facility]



                      15:59:21              the right side of the            [facility]



                                            pelvis2. Spinal fixation            



                                            hardware superimposed over          

  



                                            the lower lumbar spine            



                                            andpelvis EXAM: Pelvis 1            



                                            view HISTORY: mri            



                                            clearance Encounter for            



                                            imaging to screen for            



                                            metal prior toMRI            



                                            TECHNIQUE:An AP view of            



                                            the pelvis is obtained.            



                                            FINDINGS:Spinal fixation            



                                            hardware is seen extending          

  



                                            to the level of theiliac            



                                            bones. Spinal stimulator            



                                            device is seen            



                                            superimposed over the            



                                            rightiliac            



                                            bone/acetabulum.            



                                            Degenerative changes are            



                                            seen in the hip joints.            



                                            [facility], Radiant Results Inft          

  



                                            User - 2021 11:00 AM          

  



                                            CDTFormatting of this note          

  



                                            might be different from            



                                            the original.EXAM: Pelvis           

 



                                            1 viewHISTORY: mri            



                                            clearance Encounter for            



                                            imaging to screen for            



                                            metal prior            



                                            toMRITECHNIQUE:An AP view           

 



                                            of the pelvis is            



                                            obtained.FINDINGS:Spinal            



                                            fixation hardware is seen           

 



                                            extending to the level of           

 



                                            theiliac bones. Spinal            



                                            stimulator device is seen           

 



                                            superimposed over the            



                                            rightiliac            



                                            bone/acetabulum.Degenerati          

  



                                            ve changes are seen in the          

  



                                            hip joints.IMPRESSION1.            



                                            Spinal stimulator device            



                                            superimposed over the            



                                            right side of the pelvis2.          

  



                                            Spinal fixation hardware            



                                            superimposed over the            



                                            lower lumbar spine            



                                            andpelvis             

 

           XR ABDOMEN 2 VW 2021. Spinal stimulator           

                     superimposed over the            [facility]

[facility]



                      15:52:36              right iliac bone with            Bra

nch



                                            leads mostlikely above            



                                            T92. Spinal fixation            



                                            hardware EXAM: Abdomen 2            



                                            views HISTORY: mri            



                                            clearance Encounter for            



                                            imaging to screen for            



                                            metal prior toMRI            



                                            TECHNIQUE:2 images of the           

 



                                            abdomen are obtained.            



                                            FINDINGS:Heart rate            



                                            fixation of the lumbar            



                                            spine as well as SI joints          

  



                                            isnoted. Disc prostheses            



                                            are noted at L3-L4 and            



                                            L4-L5. A spinal stimulator          

  



                                            device is seen            



                                            superimposed over the            



                                            right iliac bonewith leads          

  



                                            terminating most likely            



                                            above T9 . Moderate amount          

  



                                            of stool is seen in the            



                                            colon. [facility], Radiant            



                                            Results Inft User -            



                                            2021 10:53 AM            



                                            CDTFormatting of this note          

  



                                            might be different from            



                                            the original.EXAM: Abdomen          

  



                                            2 viewsHISTORY: mri            



                                            clearance Encounter for            



                                            imaging to screen for            



                                            metal prior            



                                            toMRITECHNIQUE:2 images of          

  



                                            the abdomen are            



                                            obtained.FINDINGS:Heart            



                                            rate fixation of the            



                                            lumbar spine as well as SI          

  



                                            joints isnoted. Disc            



                                            prostheses are noted at            



                                            L3-L4 and L4-L5.A spinal            



                                            stimulator device is seen           

 



                                            superimposed over the            



                                            right iliac bonewith leads          

  



                                            terminating most likely            



                                            above T9 .Moderate amount           

 



                                            of stool is seen in the            



                                            colon.IMPRESSION1. Spinal           

 



                                            stimulator superimposed            



                                            over the right iliac bone           

 



                                            with leads mostlikely            



                                            above T92. Spinal fixation          

  



                                            hardware              







Notes







                Date/Time       Note            Provider        Source

 

                    2023          Formatting of this note is different fro

m the original. Nancy Valentine LVN                              [facility]



                15:47:26-00:00  Images from the original note were not included.

                 



                                Requested Renewals                 



                                                                



                                 temazepam 15 mg capsule                 



                                                                



                                 Sig: N/A                       



                                 Disp: Not specified Refills:                 



                                 Start: 2023                 



                                 Class: eRX                     



                                 Non-formulary                  



                                 Last ordered: 3 weeks ago (2023) by No Nam

e Doctor Unassigned                 



                                 Provider Review Required Failed 2023 03:2

6 PM                 



                                Protocol Details This refill cannot be delegated

                 



                                 Valid encounter within last 12 months          

       



                                                                



                                To be filled at: [facility] [id number] [place] [facility]

[place], [place] - [address]                 



                                                                



                                                                



                                Recent Visits                   



                                Date Type Provider Dept                 



                                23 Office Visit Jovana Goode MD 

[facility]                 



                                                    Showing recent visits within

 past 540 days with a meds authorizing provider and

 meeting all other requirements                           



                                Future Appointments                 



                                No visits were found meeting these conditions.  

               



                                                    Showing future appointments 

within next 150 days with a meds authorizing 

provider and meeting all other requirements                           



                                                                



                                                                



                                Electronically signed by Nancy Valentine LVN 

at 2023  3:47 PM CDT

## 2023-08-15 NOTE — RAD REPORT
EXAM DESCRIPTION:  CT - Facial Bones W/ Mpr - 8/15/2023 7:30 am

 

CLINICAL HISTORY:  Right facial pain

 

COMPARISON:  None

 

TECHNIQUE:  Computed axial tomography of the face was obtained. Coronal and sagittal reconstruction w
as performed.

 

All CT scans are performed using dose optimization technique as appropriate and may include automated
 exposure control or mA/KV adjustment according to patient size.

 

FINDINGS:   A fracture is not seen.

 

A TMJ dislocation is not noted.

 

The globes are intact.

 

Fluid within the sinuses is not seen.

 

No soft tissue abscess noted.

 

Parotid and submandibular glands appear unremarkable.

 

The visualized airway unremarkable

 

IMPRESSION:  No significant abnormality is displayed

## 2023-08-16 ENCOUNTER — HOSPITAL ENCOUNTER (INPATIENT)
Dept: HOSPITAL 97 - ER | Age: 68
LOS: 7 days | Discharge: HOME | DRG: 42 | End: 2023-08-23
Attending: HOSPITALIST | Admitting: HOSPITALIST
Payer: COMMERCIAL

## 2023-08-16 VITALS — BODY MASS INDEX: 27 KG/M2

## 2023-08-16 DIAGNOSIS — E03.9: ICD-10-CM

## 2023-08-16 DIAGNOSIS — Z90.49: ICD-10-CM

## 2023-08-16 DIAGNOSIS — G89.29: ICD-10-CM

## 2023-08-16 DIAGNOSIS — Z79.899: ICD-10-CM

## 2023-08-16 DIAGNOSIS — G43.909: ICD-10-CM

## 2023-08-16 DIAGNOSIS — G50.0: Primary | ICD-10-CM

## 2023-08-16 DIAGNOSIS — E78.5: ICD-10-CM

## 2023-08-16 DIAGNOSIS — Z79.82: ICD-10-CM

## 2023-08-16 DIAGNOSIS — Z79.890: ICD-10-CM

## 2023-08-16 DIAGNOSIS — Z79.52: ICD-10-CM

## 2023-08-16 DIAGNOSIS — Z79.84: ICD-10-CM

## 2023-08-16 DIAGNOSIS — Z90.711: ICD-10-CM

## 2023-08-16 DIAGNOSIS — E11.9: ICD-10-CM

## 2023-08-16 DIAGNOSIS — M54.9: ICD-10-CM

## 2023-08-16 DIAGNOSIS — D50.9: ICD-10-CM

## 2023-08-16 DIAGNOSIS — M06.9: ICD-10-CM

## 2023-08-16 PROCEDURE — 97161 PT EVAL LOW COMPLEX 20 MIN: CPT

## 2023-08-16 PROCEDURE — 80048 BASIC METABOLIC PNL TOTAL CA: CPT

## 2023-08-16 PROCEDURE — 84132 ASSAY OF SERUM POTASSIUM: CPT

## 2023-08-16 PROCEDURE — 80053 COMPREHEN METABOLIC PANEL: CPT

## 2023-08-16 PROCEDURE — 88305 TISSUE EXAM BY PATHOLOGIST: CPT

## 2023-08-16 PROCEDURE — 99285 EMERGENCY DEPT VISIT HI MDM: CPT

## 2023-08-16 PROCEDURE — 82947 ASSAY GLUCOSE BLOOD QUANT: CPT

## 2023-08-16 PROCEDURE — 36415 COLL VENOUS BLD VENIPUNCTURE: CPT

## 2023-08-16 PROCEDURE — 97116 GAIT TRAINING THERAPY: CPT

## 2023-08-16 PROCEDURE — 96374 THER/PROPH/DIAG INJ IV PUSH: CPT

## 2023-08-16 PROCEDURE — 96361 HYDRATE IV INFUSION ADD-ON: CPT

## 2023-08-16 PROCEDURE — 86140 C-REACTIVE PROTEIN: CPT

## 2023-08-16 PROCEDURE — 97530 THERAPEUTIC ACTIVITIES: CPT

## 2023-08-16 PROCEDURE — 94760 N-INVAS EAR/PLS OXIMETRY 1: CPT

## 2023-08-16 PROCEDURE — 96375 TX/PRO/DX INJ NEW DRUG ADDON: CPT

## 2023-08-16 PROCEDURE — 83735 ASSAY OF MAGNESIUM: CPT

## 2023-08-16 PROCEDURE — 85025 COMPLETE CBC W/AUTO DIFF WBC: CPT

## 2023-08-16 NOTE — XMS REPORT
Continuity of Care Document

                           Created on:2023



Patient:ESSENCE MERCHANT

Sex:Female

:1955

External Reference #:[id number]





Demographics







                          Address                   [address] [place] [address]

 

                          Home Phone                [phone]

 

                          Work Phone                [phone]

 

                          Mobile Phone              [phone]

 

                          Email Address             [email or web address]

 

                          Preferred Language        English

 

                          Marital Status            Unknown

 

                          Oriental orthodox Affiliation     Unknown

 

                          Race                      Unknown

 

                          Additional Race(s)        Unavailable



                                                    White



                                                    Other Race

 

                          Ethnic Group              Unknown









Author







                          Organization              [facility]

t

 

                          Address                   [address] [place] [address]

 

                          Phone                     [phone]









Support







                Name            Relationship    Address         Phone

 

                MANUEL MERCHANT              [address]  [phone]



                                                [place] [address] 

 

                MANUEL MERCHANT              [address]  [phone]



                                                [place] [address] 

 

                MAYUR, LUKAS  Spouse          [address] [facility] Unavailable



                                                [place] [id number] 

 

                Mayur Lukas  Spouse          [address] [facility] [phone]

[phone]



                                                [place] [id number] 

 

                MANUEL MERCHANT     Spouse          [address] [facility] Unavailable



                                                [place], 

[address] 

 

                Mayur, Manuel     Spouse          [address] [facility] [phone]

[phone]



                                                [place], [place] [address]

66 

 

                MayurEssence  Unavailable     [address] [facility] [phone]



                                                [place] [phone] 

 

                Manuel Merchant     Unavailable     [address] [facility] [phone]



                                                [place] [address] 









Care Team Providers







                    Name                Role                Phone

 

                    Jovana Goode MD Primary Care Physician [phone]

[phone]

 

                    Nara Strickland          Attending Clinician Unavailable

 

                    MICHAEL GOMEZ     Attending Clinician Unavailable

 

                    JOVANA GOODE Attending Clinician Unavailable

 

                    TARAS MENG        Attending Clinician Unavailable

 

                    GWENDOLYN CRISTINA       Attending Clinician Unavailable

 

                    KAYLEE ARGUELLES  Attending Clinician Unavailable

 

                    Kaylee Arguelles DO Attending Clinician [phone]

 

                    LUTHER GREENWOOD      Attending Clinician Unavailable

 

                    Luther Figueroa  Attending Clinician [phone]

 

                    JOVANA BARNES     Attending Clinician Unavailable

 

                    Aleksander Canseco    Attending Clinician [phone]

 

                    Jovana Goode MD Attending Clinician [phone]

 

                    JOVANA BARNES Attending Clinician Unavailable

 

                    EDUARDO GARSIA     Attending Clinician Unavailable

 

                    Jerrica Ardon  Attending Clinician [phone]

 

                    WILLIAMS THORNTON  Attending Clinician Unavailable

 

                    Williams Thornton MD    Attending Clinician Unavailable

 

                    Pam Boone MD   Attending Clinician [phone]

 

                    KATEY FERNANDEZ Attending Clinician Unavailable

 

                    PHILIP MCARTHUR Attending Clinician Unavailable

 

                    Armando Fair MD Attending Clinician [phone]

 

                    Ashley Cai MD   Attending Clinician [phone]

 

                    ASHLEY CAI      Attending Clinician Unavailable

 

                    ARMANDO FAIR Attending Clinician Unavailable

 

                    ARMANDO FAIR Attending Clinician Unavailable

 

                    Doctor Unassigned, No Name Attending Clinician Unavailable









Payers







           Payer Name Policy Type Policy Number Effective Date Expiration Date S

[employer]

 

           [place]BS [place] PPO AND            [id number] 



           OUT OF STATE                       00:00:00   00:00:00   

 

           [facility]            [id number] 2023            



           HEALTH [facility]                       00:00:00              



           PPO                                                    

 

           [facility] MEDICARE            [id number]  2022            



           ADVANTAGE                        00:00:00              

 

           [facility] HealthSelect 1          [id number]  2022            Common



           TRS/ERS [facility] PPO                       00:00:00              [facility]

 

           [employer] Blue 6          [id number] 2021 Common



           [facility] [place]                       00:00:00   00:00:00   [facility]

 

           SRC AN AETNA            [id number] 2011            



           COMPANY                          00:00:00              







Problems







       Condition Condition Condition Status Onset  Resolution Last   Treating Co

mments 

Source



       Name   Details Category        Date   Date   Treatment Clinician        



                                                 Date                 

 

       Acute  Acute  Disease Active                              Univers



       non-recurr non-recurr               8-14                               it

y of



       ent    ent                  00:00:                             [place]



       maxillary maxillary               00                                 Medi

maryan



       sinusitis sinusitis                                                  [facility]

ch

 

       Other  Other  Disease Active                              Univers



       irritable irritable               8-14                               ity 

of



       bowel  bowel                00:00:                             [place]



       syndrome syndrome               00                                 Medica

l



                                                                      Branch

 

       Weakness Weakness Disease Active 2023-0                             Unive

rs



                                   8-14                               ity of



                                   00:00:                             [place]



                                   00                                 Medical



                                                                      Branch

 

       Dyslipidem Dyslipidem Disease Active                              U

nivers



       ia     ia                   8-14                               ity of



                                   00:00:                             [place]



                                   00                                 Medical



                                                                      Branch

 

       Leg    Leg    Disease Active                              UT



       erythema erythema               5-10                               Health



                                   00:00:                             



                                   00                                 

 

       Status Status Disease Active                              [facility]



       post total post total               3-14                               He

alth



       knee   knee                 00:00:                             



       replacemen replacemen               00                                 



       t, left t, left                                                  

 

       Ganglion Ganglion Disease Active                              UT



       cyst of cyst of               5-02                               Health



       volar  volar                00:00:                             



       aspect of aspect of               00                                 



       left wrist left wrist                                                  

 

       Cervical Cervical Disease Active                              UT



       radiculopa radiculopa               4-18                               He

alth



       thy    thy                  00:00:                             



                                   00                                 

 

       Carpal Carpal Disease Active                              UT



       tunnel tunnel               4-12                               Health



       syndrome, syndrome,               00:00:                             



       right  right                00                                 

 

       Primary Primary Disease Active                              UT



       osteoarthr osteoarthr               4-12                               He

alth



       itis of itis of               00:00:                             



       left wrist left wrist               00                                 

 

       TIA    TIA    Disease Active                              UT



       (transient (transient               3-09                               He

alth



       ischemic ischemic               00:00:                             



       attack) attack)               00                                 

 

       Sacroiliac Sacroiliac Disease Active                              U

T



       joint pain joint pain               3-30                               He

alth



                                   00:00:                             



                                   00                                 

 

       Hypothyroi Hypothyroi Disease Active 2015                             U

T



       dism due dism due               11                               Health



       to     to                   00:00:                             



       acquired acquired               00                                 



       atrophy of atrophy of                                                  



       thyroid thyroid                                                  

 

       [id number] Status Problem                                           Common



              post                                                    Spirit



              laparoscop                                                  - [facility]



              ic                                                      [facility]

 

       [id number] Leukopenia Problem                                           Com

mon



              ,                                                       Spirit



              unspecifie                                                  - [facility]



              d type                                                  [facility]

 

       [id number] Primary Problem                                           Comm

on



         osteoarthr                                                  Spirit



              itis of                                                  - [facility]



              left knee                                                  [facility]

 

       [id number] Controlled Problem                                           Co

mmon



              type 2                                                  [facility]



              diabetes                                                  - [facility]



              mellitus                                                  [facility]



              complicati                                                  Medica

l



              [facility]



              without                                                  



              long-term                                                  



              current                                                  



              use of                                                  



              insulin                                                  

 

       [id number] Primary Problem                                           Common



              insomnia                                                  Spirit



                                                                      - [facility]



                                                                      [facility]

 

       [id number] Neuropathy Problem                                           C

ommon



         due to                                                  Spirit



              type 2                                                  - [facility]



              diabetes                                                  



              mellitus                                                  [facility]

 

       Diabetes Diabetes Problem                                           Commo

n



       mellitus DMII                                                    Spirit



       without without                                                  - [facility]



       complicati complicati                                                  St



       on     [facility]

 

       [id number] Malabsorpt Problem                                           Com

mon



              ion due to                                                  Spirit



              intoleranc                                                  - [facility]



              e, not                                                  St



              elsewhere                                                  [facility]



              classified                                                  Medica

l



                                                                      Center

 

       [id number] Iron   Problem                                           Common



              deficiency                                                  Spirit



              anemia,                                                  - [facility]



              unspecifie                                                  St



              d iron                                                  [facility]



              deficiency                                                  Medica

l



              anemia                                                  Center



              type                                                    

 

       Shingles Shingles Problem                                           Commo

n



                                                                      Spirit



                                                                      - [facility]I



                                                                      [facility]

 

       Rheumatoid Rheumatoid Problem                                           C

ommon



       arthritis arthritis                                                  Spir

it



              involving                                                  - [facility]



              multiple                                                  St



              sites with                                                  [facility]



              positive                                                  Medical



              rheumatoid                                                  Center



              factor                                                  

 

       [id number] Itching Problem                                           Commo

n



                                                                      Spirit



                                                                      - CHI



                                                                      [facility]

 

       [id number] Acquired Problem                                           Comm

on



              hypothyroi                                                  Spirit



              dism                                                    [facility]

 

       [id number] Encounter Problem                                           Com

mon



              for                                                     Spirit



              screening                                                  - [facility]



              colonoscop                                                  [facility]

 

       [id number] Pure   Problem                                           Common



              hyperchole                                                  Spirit



              sterolemia                                                  - [facility]

 

       [id number] Fever, Problem                                           Common



              unspecifie                                                  Spirit



              d fever                                                  - [facility]



              cause                                                   [facility]

 

       [id number] Nausea Problem                                           Common



              alone                                                   [facility]

 

       [id number] Axillary Problem                                           Comm

on



              adenopathy                                                  [facility]

 

       [id number] DDD    Problem                                           Common



              (degenerat                                                  Spirit



              young disc                                                  - [facility]



              disease),                                                  [facility]







Allergies, Adverse Reactions, Alerts







       Allergy Allergy Status Severity Reaction(s) Onset  Inactive Treating Comm

ents 

Source



       Name   Type                        Date   Date   Clinician        

 

       NO KNOWN Drug   Active                                           Univers



       ALLERGIE Class                                                   ity of



       S                                                              [facility]







Social History







           Social Habit Start Date Stop Date  Quantity   Comments   Source

 

           History of Tobacco                                             Common

 Spirit -



           Use                                                    [facility]

 

           Sex Assigned At                                             Common Sp

claude -



           Birth                                                  [facility]

 

           History SDOH Social                                             Unive

rsity of



           Connections Phone                                             [facility]

edical



                                                                  Branch

 

           History SDOH Social                                             Unive

rsity of



           Connections [facility]

ical



           Together                                               Branch

 

           History SDOH Social                                             Unive

rsity of



           Connections [facility]

 

           History SDOH                                             University o

f



           Housing Places                                             [place] [facility]i

maryan



           Lived                                                  Branch

 

           Gender identity                                             Universit

y of



                                                                  [facility]

 

           Sexual orientation                                             Univer

sity of



                                                                  [facility]

 

           History of Social 2023                       Univers

ity of



           function   00:00:00   00:00:00                         [facility]

 

           Exposure to 2023 Not sure              University of



           SARS-CoV-2 (event) 00:00:00   13:40:00                         [facility]

 

           History SDOH 2023 1                     University o

f



           Alcohol Frequency 00:00:00   00:00:00                         [place] M

edical



                                                                  Branch

 

           History SDOH 2023 0                     University o

f



           Alcohol Std Drinks 00:00:00   00:00:00                         [place] 

Medical



                                                                  Branch

 

           History SDOH 2023 1                     University o

f



           Alcohol Binge 00:00:00   00:00:00                         [place] [facility]

al



                                                                  Branch

 

           History SDOH Social 2023 2                     Unive

rsity of



           Connections 00:00:00   00:00:00                         [place] [facility]



           Membership                                             Branch

 

           History SDOH Social 2023 1                     Unive

rsity of



           Connections 00:00:00   00:00:00                         [place] [facility]



           Meetings                                               Branch

 

           History SDOH Social 2023 3                     Unive

rsity of



           Connections Living 00:00:00   00:00:00                         [place] 

Medical



                                                                  Branch

 

           History SDOH 2023 0                     University o

f



           Physical Activity 00:00:00   00:00:00                         [place] M

edical



           DPW                                                    Branch

 

           History SDOH 2023 0                     University o

f



           Physical Activity 00:00:00   00:00:00                         [place] M

edical



           MPS                                                    Branch

 

           History SDOH Stress 2023 5                     Unive

rsity of



                      00:00:00   00:00:00                         [place] [facility]



                                                                  Branch

 

           History SDOH Food 2023 1                     Univers

ity of



           Worry      00:00:00   00:00:00                         [place] [facility]



                                                                  Branch

 

           History SDOH Food 2023 1                     Univers

ity of



           Scarcity   00:00:00   00:00:00                         [place] [facility]



                                                                  [facility]

 

           History SDOH 2023 2                     [facility]

[facility]



           Transport Med 00:00:00   00:00:00                         [place] [facility]

[facility]



                                                                  [facility]

 

           History SDOH 2023 2                     [facility]

[facility]



           Transport Non-Med 00:00:00   00:00:00                         [place] [facility]

[facility]



                                                                  [facility]

 

           History SDOH 2023 2                     [facility]

[facility]



           Housing Unable to 00:00:00   00:00:00                         [place] [facility]

[facility]



           Pay                                                    Branch

 

           History SDOH 2023 2                     [place] [facility]

f



           Housing Homeless 00:00:00   00:00:00                         [place] [facility]

[facility]



           Last Year                                              Branch

 

           Alcohol intake 2023 Lifetime              [facility]



                      00:00:00   00:00:00   non-drinker            



                                            (finding)             

 

           Tobacco use and 2022 Smokeless             [facility]



           exposure   00:00:00   00:00:00   tobacco non-user            

 

           Cigarette  2022                       [facility]



           pack-years 00:00:00   00:00:00                         

 

           Tobacco Comment 2022 Smoking History            [facility]

[facility]



                      00:00:00   00:00:00   Packs/day: N.            



                                            Recorded:            



                                            021                   









                Smoking Status  Start Date      Stop Date       Source

 

                Never smoked tobacco                                 [facility]







Medications







       Ordered Filled Start  Stop   Current Ordering Indication Dosage Frequency

 Signature

                    Comments            Components          Source



     Medication Medication Date Date Medication? Clinician                (SIG) 

          



     Name Name                                                   

 

     FENTanyl PF       No             100ug      100 mcg,           

Univers



     (SUBLIMAZE      8-15 08-15                          Intramuscu           it

y of



     (PF))      22:00: 21:29                          lar, ONCE,           [place]



     injection      00   :00                           1 dose, On           Medi

maryan



     100 mcg                                         Tue            Branch



                                                  8/15/23 at           



                                                  1700, ASAP           

 

     carBAMazepi      2023- No             400mg      400 mg,           U

nivers



     ne        8-15 08-15                          Oral,           ity of



     (TEGRETOL)      21:15: 21:30                          ONCE, 1           [facility]

as



     tablet 400      00   :00                           dose, On           Medic

al



     mg                                           Tue            Branch



                                                  8/15/23 at           



                                                  1615, ASAP           

 

     ketorolac      2023- No             30mg      30 mg,           Unive

rs



     (TORADOL)      8-15 08-15                          Intramuscu           ity

 of



     injection      21:15: 21:29                          lar, ONCE,           T

[facility]



     30 mg      00   :00                           1 dose, On           Medical



                                                  Tue            Branch



                                                  8/15/23 at           



                                                  1615, ASAP           

 

     ketorolac            Yes       [id number] 10mg      Take 1           Un

[facility]lin



     10 mg      8-15                               tablet by           ity of



     tablet      00:00:                               mouth           [place]



               00                                 every 6           Medical



                                                  (six)           Branch



                                                  hours as           



                                                  needed for           



                                                  Pain           



                                                  (scale           



                                                  7-10).           

 

     MULTIVITAMI            Yes            1{tbl}      Take 1           Un

[facility]lin



     NS WITH      8-14                               tablet by           ity of



     FLUORIDE      15:12:                               mouth in           [place]



     (MULTI-RAJESH                                       the            Medical



     MIN ORAL)                                         morning.           Branch

 

     metFORMIN      2023-0      Yes            1000mg      Take 1           Univ

ers



     1,000 mg      8-14                               tablet by           ity of



     tablet      15:12:                               mouth           [place]



               07                                 daily with           Medical



                                                  breakfast.           Branch

 

     gabapentin      2023-0      Yes            800mg      Take 1           Univ

ers



     800 mg      8-14                               tablet by           ity of



     tablet      15:12:                               mouth in           [place]



               07                                 the            Medical



                                                  morning           Branch



                                                  and 1           



                                                  tablet at           



                                                  noon and 1           



                                                  tablet in           



                                                  the            



                                                  evening.           

 

     MULTIVITAMI      2023-0      Yes            1{tbl}      Take 1           Un

daylin



     NS WITH      8-14                               tablet by           ity of



     FLUORIDE      15:12:                               mouth in           [place]



     (MULTI-RAJESH                                       the            Medical



     MIN ORAL)                                         morning.           Branch

 

     metFORMIN      2023-0      Yes            1000mg      Take 1           Univ

ers



     1,000 mg      8-14                               tablet by           ity of



     tablet      15:12:                               mouth           [place]



                                                daily with           Medical



                                                  breakfast.           Branch

 

     gabapentin      2023-0      Yes            800mg      Take 1           Univ

ers



     800 mg      8-14                               tablet by           ity of



     tablet      15:12:                               mouth in           [place]



               07                                 the            Medical



                                                  morning           Branch



                                                  and 1           



                                                  tablet at           



                                                  noon and 1           



                                                  tablet in           



                                                  the            



                                                  evening.           

 

     MULTIVITAMI      2023-0      Yes            1{tbl}      Take 1           Un

daylin



     NS WITH      8-14                               tablet by           ity of



     FLUORIDE      15:12:                               mouth in           [place]



     (MULTI-RAJESH                                       the            Medical



     MIN ORAL)                                         morning.           Branch

 

     metFORMIN      2023-0      Yes            1000mg      Take 1           Univ

ers



     1,000 mg      8-14                               tablet by           ity of



     tablet      15:12:                               mouth           [place]



                                                daily with           Medical



                                                  breakfast.           Branch

 

     gabapentin      2023-0      Yes            800mg      Take 1           Univ

ers



     800 mg      8-14                               tablet by           ity of



     tablet      15:12:                               mouth in           [place]



               07                                 the            Medical



                                                  morning           Branch



                                                  and 1           



                                                  tablet at           



                                                  noon and 1           



                                                  tablet in           



                                                  the            



                                                  evening.           

 

     ondansetron      2023-0      Yes            4mg       Take 1           Univ

ers



     4 mg      8-13                               tablet by           ity of



     disintegrat      00:00:                               mouth           [place]



     ing tablet      00                                 every 6           Medica

l



                                                  (six)           Branch



                                                  hours as           



                                                  needed for           



                                                  Nausea and           



                                                  Vomiting           



                                                  (N/V).           

 

     diclofenac      2023-0      Yes            75mg      Take 1           Unive

rs



     75 mg EC      8-13                               tablet by           ity of



     tablet      00:00:                               mouth 2           [place]



               00                                 (two)           Medical



                                                  times           Branch



                                                  daily as           



                                                  needed.           

 

     amoxicillin      2023-0      Yes            1{tbl}      Take 1           Un

daylin



     -clavulanat      8-13                               tablet by           ity

 of



     e 875-125      00:00:                               [place] in           [place]

[facility]



     mg per      00                                 the            Medical



     tablet                                         morning           Branch



                                                  and 1           



                                                  tablet in           



                                                  the            



                                                  evening.           



                                                  x10 days           

 

     ondansetron      2023-0      Yes            4mg       Take 1           Univ

ers



     4 mg      8-13                               tablet by           ity of



     disintegrat      00:00:                               mouth           [place]



     ing tablet      00                                 every 6           Medica

l



                                                  (six)           Branch



                                                  hours as           



                                                  needed for           



                                                  Nausea and           



                                                  Vomiting           



                                                  (N/V).           

 

     diclofenac      2023-0      Yes            75mg      Take 1           Unive

rs



     75 mg EC      8-13                               tablet by           ity of



     tablet      00:00:                               mouth 2           [place]



               00                                 (two)           Medical



                                                  times           Branch



                                                  daily as           



                                                  needed.           

 

     amoxicillin      2023-0      Yes            1{tbl}      Take 1           Un

[facility]



     -clavulanat      8-13                               tablet by           ity

 of



     e 875-125      00:00:                               [place] in           [place]

[facility]



     mg per      00                                 the            Medical



     tablet                                         morning           Branch



                                                  and 1           



                                                  tablet in           



                                                  the            



                                                  evening.           



                                                  x10 days           

 

     ondansetron      2023-0      Yes            4mg       Take 1           Univ

ers



     4 mg      8-13                               tablet by           ity of



     disintegrat      00:00:                               mouth           [place]



     ing tablet      00                                 every 6           Medica

l



                                                  (six)           Branch



                                                  hours as           



                                                  needed for           



                                                  Nausea and           



                                                  Vomiting           



                                                  (N/V).           

 

     diclofenac      2023-0      Yes            75mg      Take 1           Unive

rs



     75 mg EC      8-13                               tablet by           ity of



     tablet      00:00:                               mouth 2           [place]



               00                                 (two)           Medical



                                                  times           Branch



                                                  daily as           



                                                  needed.           

 

     amoxicillin      2023-0      Yes            1{tbl}      Take 1           Un

[facility]



     -clavulanat      8-13                               tablet by           ity

 of



     e 875-125      00:00:                               [place] in           [place]

[facility]



     mg per      00                                 the            Medical



     tablet                                         morning           Branch



                                                  and 1           



                                                  tablet in           



                                                  the            



                                                  evening.           



                                                  x10 days           

 

     chlorhexidi      2023-0      Yes                      [place] and           U

nivers



     ne 0.12 %      8-10                               spit out 2           ity 

of



     mouthwash      00:00:                               (two)           [place]



               00                                 times           Medical



                                                  daily.           Branch

 

     chlorhexidi      2023-0      Yes                      [place] and           U

nivers



     ne 0.12 %      8-10                               spit out 2           ity 

of



     mouthwash      00:00:                               (two)           [place]



               00                                 times           Medical



                                                  daily.           Branch

 

     chlorhexidi      2023-0      Yes                      [place] and           U

nivers



     ne 0.12 %      8-10                               spit out 2           ity 

of



     mouthwash      00:00:                               (two)           [place]



               00                                 times           Medical



                                                  daily.           Branch

 

     HYDROcodone      0      Yes                      TAKE 1           Univ

ers



     -acetaminop      8-04                               TABLET BY           ity

 of



     hen       00:00:                               MOUTH           [place]



     mg tablet      00                                 EVERY 4           Medical



                                                  HOURS AS           Branch



                                                  NEEDED FOR           



                                                  SEVERE           



                                                  PAIN FOR           



                                                  UP TO 7           



                                                  DAYS           

 

     HYDROcodone      -0      Yes                      TAKE 1           Univ

ers



     -acetaminop      8-04                               TABLET BY           ity

 of



     hen       00:00:                               MOUTH           [place]



     mg tablet      00                                 EVERY 4           Medical



                                                  HOURS AS           Branch



                                                  NEEDED FOR           



                                                  SEVERE           



                                                  PAIN FOR           



                                                  UP TO 7           



                                                  DAYS           

 

     HYDROcodone      -0      Yes                      TAKE 1           Univ

ers



     -acetaminop      8-04                               TABLET BY           ity

 of



     hen       00:00:                               MOUTH           [place]



     mg tablet      00                                 EVERY 4           Medical



                                                  HOURS AS           Branch



                                                  NEEDED FOR           



                                                  SEVERE           



                                                  PAIN FOR           



                                                  UP TO 7           



                                                  DAYS           

 

     HYDROcodone      -0 - Yes       [id number] 1{tbl}      Take 1     

      [facility]



     -acetaminop      8-04 08-12           [id number]           tablet by           

Health



     hen ([place])      00:00: 04:59                          mouth           



      MG      00   :00                           every 4           



     tablet                                         (four)           



                                                  hours if           



                                                  needed for           



                                                  severe           



                                                  pain for           



                                                  up to 7           



                                                  days.           

 

     cyanocobala            Yes                      INJECT           Univ

ers



     min 1,000      8-01                               INTRAMUSCU           ity 

of



     mcg/mL      00:00:                               LARLY           [place]



     injection      00                                 1000MCG (1           Medi

maryan



                                                  ML ) ONCE           Branch



                                                  A MONTH           

 

     cyanocobala      -0      Yes                      INJECT           Univ

ers



     min 1,000      8-01                               INTRAMUSCU           ity 

of



     mcg/mL      00:00:                               LARLY           [place]



     injection      00                                 1000MCG (1           Medi

maryan



                                                  ML ) ONCE           Branch



                                                  A MONTH           

 

     cyanocobala      -0      Yes                      INJECT           Univ

ers



     min 1,000      8-01                               INTRAMUSCU           ity 

of



     mcg/mL      00:00:                               LARLY           [place]



     injection      00                                 1000MCG (1           Medi

maryan



                                                  ML ) ONCE           Branch



                                                  A MONTH           

 

     predniSONE      -0      Yes            30mg      Take 6           Unive

rs



     5 mg tablet      7-27                               tablets by           it

y of



               00:00:                               mouth in           [place]



               00                                 the            Medical



                                                  morning           Branch



                                                  and 6           



                                                  tablets in           



                                                  the            



                                                  evening.           

 

     predniSONE      -0      Yes            30mg      Take 6           Unive

rs



     5 mg tablet      7-27                               tablets by           it

y of



               00:00:                               mouth in           [place]



               00                                 the            Medical



                                                  morning           Branch



                                                  and 6           



                                                  tablets in           



                                                  the            



                                                  evening.           

 

     predniSONE      3-0      Yes            30mg      Take 6           Unive

rs



     5 mg tablet      7-27                               tablets by           it

y of



               00:00:                               mouth in           [place]



               00                                 the            Medical



                                                  morning           Branch



                                                  and 6           



                                                  tablets in           



                                                  the            



                                                  evening.           

 

     temazepam      3-0      Yes       [id number]           1 capsule          

 [facility]



     15 mg      7-24                               at bedtime           ity of



     capsule      00:00:                               as needed           [place]



               00                                 [place]           Medical



                                                  Once a day           [facility]



                                                  for 30           



                                                  days           

 

     temazepam      2023-0      Yes       [id number]           1 capsule          

 [facility]



     15 mg      7-24                               at bedtime           [facility]



     capsule      00:00:                               as needed           [place]



               00                                 Orally           Medical



                                                  Once a day           [facility]



                                                  for 30           



                                                  days           

 

     temazepam      2023-0      Yes       [id number]           1 capsule          

 [facility]



     15 mg      7-24                               at bedtime           [facility]



     capsule      00:00:                               as needed           [place]



               00                                 Orally           Medical



                                                  Once a day           [facility]



                                                  for 30           



                                                  days           

 

     temazepam      2023-0      Yes       [id number]           1 capsule          

 [facility]



     15 mg      7-24                               at bedtime           [facility]



     capsule      00:00:                               as needed           [place]



               00                                 Orally           Medical



                                                  Once a day           [facility]



                                                  for 30           



                                                  days           

 

     predniSONE      3-0      Yes       [id number]           Take one       

    [facility]



     (Deltasone)      7-16                [id number]           tab PO BID           

[facility]



     5 MG tablet      00:00:                               x 10 days           



               00                                 and one           



                                                  tab PO           



                                                  daily           



                                                  therafeter           

 

     predniSONE      -0      Yes       [id number]           Take one       

    [facility]



     (Deltasone)      7-16                [id number]           tab PO BID           

[facility]



     5 MG tablet      00:00:                               x 10 days           



               00                                 and one           



                                                  tab PO           



                                                  daily           



                                                  therafeter           

 

     hydrOXYzine      -0      Yes       [id number] 10mg Q6H  Take 1          

 [facility]



     HCl       7-16                               tablet (10           [facility]



     (Atarax) 10      00:00:                               mg total)           



     MG tablet      00                                 by mouth           



                                                  every 6           



                                                  (six)           



                                                  hours if           



                                                  needed for           



                                                  itching           



                                                  for up to           



                                                  10 days.           

 

     hydrOXYzine      -0 2023- Yes       [id number] 10mg Q6H  Take 1         

  [facility]



     HCl       7-16 07-27                          tablet (10           [facility]



     (Atarax) 10      00:00: 04:59                          mg total)           



     MG tablet      00   :00                           by mouth           



                                                  every 6           



                                                  (six)           



                                                  hours if           



                                                  needed for           



                                                  itching           



                                                  for up to           



                                                  10 days.           

 

     levothyroxi      -0      Yes       [id number]           TAKE 1          

 [facility]



     ne 175 mcg      7-13                               TABLET BY           [facility] 

of



     tablet      00:00:                               MOUTH           [place]



               00                                 EVERY DAY           Medical



                                                  IN THE           [facility]



                                                  MORNING ON           



                                                  EMPTY           



                                                  STOMACH           

 

     levothyroxi      -0      Yes       [id number]           TAKE 1          

 [facility]



     ne 175 mcg      7-13                               TABLET BY           [facility] 

of



     tablet      00:00:                               MOUTH           [place]



               00                                 EVERY DAY           [facility]



                                                  IN THE           [place]



                                                  MORNING ON           



                                                  EMPTY           



                                                  STOMACH           

 

     levothyroxi      -0      Yes       [id number]           TAKE 1          

 [facility]



     ne 175 mcg      7-13                               TABLET BY           [facility] 

of



     tablet      00:00:                               MOUTH           [place]



               00                                 EVERY DAY           Medical



                                                  IN THE           [place]



                                                  MORNING ON           



                                                  EMPTY           



                                                  STOMACH           

 

     levothyroxi      -0      Yes       [id number]           TAKE 1          

 [facility]



     ne 175 mcg      7-13                               TABLET BY           [facility] 

of



     tablet      00:00:                               MOUTH           [place]



               00                                 EVERY DAY           Medical



                                                  IN THE           [place]



                                                  MORNING ON           



                                                  EMPTY           



                                                  STOMACH           

 

     levothyroxi      -0      Yes       [id number]           TAKE 1          

 [facility]



     ne 175 mcg      7-13                               TABLET BY           [facility] 

of



     tablet      00:00:                               MOUTH           [place]



               00                                 EVERY DAY           Medical



                                                  IN THE           [place]



                                                  MORNING ON           



                                                  EMPTY           



                                                  STOMACH           

 

     predniSONE      -0      Yes       [id number]           Take two       

    [facility]



     (Deltasone)      6-30                05             tablets           [facility]

h



     5 MG tablet      00:00:                               together           



               00                                 PO BID           

 

     predniSONE      -0      Yes       [id number]           Take two       

    [facility]



     (Deltasone)      6-30                05             tablets           [facility]

h



     5 MG tablet      00:00:                               together           



               00                                 PO BID           

 

     predniSONE      -0      Yes       [id number]           Take two       

    [facility]



     (Deltasone)      6-30                05             tablets           [facility]

h



     5 MG tablet      00:00:                               together           



               00                                 PO BID           

 

     sulfaSALAzi      -0      Yes            2000mg      Take 4           [facility]

[facility]



     ne 500 mg      6-30                               tablets by           [facility] 

of



     EC tablet      00:00:                               mouth in           [place]

[facility]



               00                                 the            Medical



                                                  morning.           [facility]

 

     sulfaSALAzi      -0      Yes            2000mg      Take 4           [facility]

[facility]



     ne 500 mg      6-30                               tablets by           [facility] 

of



     EC tablet      00:00:                               mouth in           [place]

[facility]



               00                                 the            Medical



                                                  morning.           [facility]

 

     sulfaSALAzi      -0      Yes            2000mg      Take 4           [facility]

[facility]



     ne 500 mg      6-30                               tablets by           [facility] 

of



     EC tablet      00:00:                               mouth in           [place]

[facility]



               00                                 the            Medical



                                                  morning.           [facility]

 

     MULTIVITAMI      0      Yes                      Take by           [facility]

vers



     NS WITH      6-26                               mouth.           [facility]



     FLUORIDE      13:10:                                              [place]



     (MULTI-RAJESH      53                                                Medical



     MIN ORAL)                                                        [facility]

 

     azaTHIOprin      -0      Yes                      See            [facility]

s



     e 50 mg      6-26                               administra           [facility]



     tablet      13:10:                               tion           [place]



               53                                 instructio           Medical



                                                  ns.            [facility]

 

     metFORMIN      -0      Yes                                     [facility]



     1,000 mg      6-26                                              [facility]



     tablet      13:10:                                              [place]



               53                                                Medical



                                                                 [facility]

 

     gabapentin      -0      Yes                                     [facility]



     800 mg      6-26                                              [facility]



     tablet      13:10:                                              [address]

 

     sulfaSALAzi            Yes                                     [facility]

s



     ne 500 mg      6-26                                              [facility] of



     tablet      13:10:                                              [address]

 

     MULTIVITAMI            Yes                      Take by           [facility]

vers



     NS WITH      6-26                               mouth.           ity of



     FLUORIDE      13:10:                                              [place]



     (MULTI-RAJESH      53                                                Medical



     MIN ORAL)                                                        [place]

 

     azaTHIOprin            Yes                      See            [facility]

s



     e 50 mg      6-26                               administra           [facility] of



     tablet      13:10:                               tion           [address]            [facility]

 

     metFORMIN            Yes                                     [facility]



     1,000 mg      6-26                                              [facility]



     tablet      13:10:                                              [address]

 

     gabapentin            Yes                                     [facility]



     800 mg      6-26                                              [facility]



     tablet      13:10:                                              [address]

 

     sulfaSALAzi            Yes                                     [facility]

s



     ne 500 mg      6-26                                              [facility] of



     tablet      13:10:                                              [address]

 

     MULTIVITAMI            Yes                      Take by           [facility]i

vers



     NS WITH      6-26                               mouth.           ity of



     FLUORIDE      13:10:                                              [place]



     (MULTI-RAJESH      53                                                Medical



     MIN ORAL)                                                        [place]

 

     azaTHIOprin            Yes                      See            [facility]

s



     e 50 mg      6-26                               administra           [facility] of



     tablet      13:10:                               tion           [address]            [facility]

 

     metFORMIN            Yes                                     [facility]



     1,000 mg      6-26                                              ity of



     tablet      13:10:                                              [address]

 

     gabapentin            Yes                                     [facility]



     800 mg      6-26                                              ity of



     tablet      13:10:                                              [address]

 

     sulfaSALAzi            Yes                                     [facility]

s



     ne 500 mg      6-26                                              ity of



     tablet      13:10:                                              [address]

 

     MULTIVITAMI            Yes                      Take by           [facility]

vers



     NS WITH      6-26                               mouth.           ity of



     FLUORIDE      13:10:                                              [place]



     (MULTI-RAJESH      53                                                Medical



     MIN ORAL)                                                        [place]

 

     azaTHIOprin            Yes                      See            Un[facility]

s



     e 50 mg      6-26                               administra           ity of



     tablet      13:10:                               tion           [address]            [facility]

 

     metFORMIN            Yes                                     [facility]



     1,000 mg      6-26                                              [facility]



     tablet      13:10:                                              [address]

 

     gabapentin            Yes                                     [facility]



     800 mg      6-26                                              ity of



     tablet      13:10:                                              [address]

 

     sulfaSALAzi            Yes                                     [facility]

s



     ne 500 mg      6-26                                              ity of



     tablet      13:10:                                              [place]



               53                                                Medical



                                                                 [facility]

 

     azaTHIOprin      0      Yes                      See            Univer

s



     e 50 mg      6-26                               administra           ity of



     tablet      13:10:                               tion           [address]



                                                  ns.            [facility]

 

     azaTHIOprin      0      Yes                      See            [facility]

s



     e 50 mg      6-26                               administra           ity of



     tablet      13:10:                               tion           [address]



                                                  ns.            [facility]

 

     azaTHIOprin      0      Yes                      See            [facility]

s



     e 50 mg      6-26                               administra           ity of



     tablet      13:10:                               tion           [address]



                                                  ns.            [facility]

 

     inFLIXimab      -0 - No                       Inject           [facility]

ers



     (REMICADE)      -                          intravenou           it

y of



     100 mg      13:10: 00:00                          sly once           [place]



     injection      26   :00                           now. Every           Medi

maryan



                                                  6 weeks           [facility]

 

     inFLIXimab      -0 2023- No                       Inject           [facility]

ers



     (REMICADE)      -                          intravenou           it

y of



     100 mg      13:10: 00:00                          sly once           [place]



     injection      26   :00                           now. Every           Medi

maryan



                                                  6 weeks           [facility]

 

     aspirin 81      -0 2023- No             81mg      Take 81 mg           

[facility]



     mg EC      -                          by mouth           [facility]



     tablet      13:10: 00:00                          daily.           [place]



               04   :00                                          [facility]

 

     aspirin 81      -0 - No             81mg      Take 81 mg           

[facility]



     mg EC      --                          by mouth           [facility]



     tablet      13:10: 00:00                          daily.           [place]



               04   :00                                          Medical



                                                                 [facility]

 

     Colestipol      -0      Yes        1g        Take 1           

[facility]



     HCl 1 gram      6-26                               tablet by           ity 

of



     tablet      00:00:                               mouth in           [place]



               00                                 the            Medical



                                                  morning           [facility]



                                                  and 1           



                                                  tablet in           



                                                  the            



                                                  evening.           

 

     Colestipol      -0      Yes        1g        Take 1           

[facility]



     HCl 1 gram      6-26                               tablet by           ity 

of



     tablet      00:00:                               mouth in           [place]



               00                                 the            Medical



                                                  morning           [facility]



                                                  and 1           



                                                  tablet in           



                                                  the            



                                                  evening.           

 

     Colestipol      2023-0      Yes        1g        Take 1           

[facility]



     HCl 1 gram      6-26                               tablet by           ity 

of



     tablet      00:00:                               mouth in           [place]



               00                                 the            Medical



                                                  morning           [facility]



                                                  and 1           



                                                  tablet in           



                                                  the            



                                                  evening.           

 

     Colestipol      -0      Yes        1g        Take 1           

[facility]



     HCl 1 gram      6-26                               tablet by           ity 

of



     tablet      00:00:                               mouth in           [place]



               00                                 the            Medical



                                                  morning           [facility]



                                                  and 1           



                                                  tablet in           



                                                  the            



                                                  evening.           

 

     Colestipol      2023-0      Yes        1g        Take 1           

[facility]



     HCl 1 gram      6-26                               tablet by           ity 

of



     tablet      00:00:                               mouth in           [place]



               00                                 the            Medical



                                                  morning           [facility]



                                                  and 1           



                                                  tablet in           



                                                  the            



                                                  evening.           

 

     Colestipol      2023-0      Yes        1g        Take 1           

[facility]



     HCl 1 gram      6-26                               tablet by           ity 

of



     tablet      00:00:                               mouth in           [place]



               00                                 the            Medical



                                                  morning           [facility]



                                                  and 1           



                                                  tablet in           



                                                  the            



                                                  evening.           

 

     Colestipol      2023-0      Yes        1g        Take 1           

[facility]



     HCl 1 gram      6-26                               tablet by           ity 

of



     tablet      00:00:                               mouth in           [place]



               00                                 the            Medical



                                                  morning           [facility]



                                                  and 1           



                                                  tablet in           



                                                  the            



                                                  evening.           

 

     methylPREDN      -0      Yes                      TAKE 6           [facility]

ers



     ISolone 4      6-09                               TABLETS ON           ity 

of



     mg tablets      00:00:                               DAY 1 AS           [facility]

as



               00                                 DIRECTED           Medical



                                                  ON PACKAGE           [facility]



                                                  AND            



                                                  DECREASE           



                                                  BY 1 TAB           



                                                  EACH DAY           



                                                  FOR A           



                                                  TOTAL OF 6           



                                                  DAYS           

 

     methylPREDN      -0      Yes                      TAKE 6           [facility]

[facility]



     ISolone 4      6-09                               TABLETS ON           [facility] 

of



     mg tablets      00:00:                               DAY 1 AS           [facility]

[facility]



               00                                 DIRECTED           [facility]



                                                  ON PACKAGE           [facility]



                                                  AND            



                                                  DECREASE           



                                                  BY 1 TAB           



                                                  EACH DAY           



                                                  FOR A           



                                                  TOTAL OF 6           



                                                  DAYS           

 

     methylPREDN      3-0      Yes                      TAKE 6           [facility]

[facility]



     ISolone 4      6-09                               TABLETS ON           [facility] 

of



     mg tablets      00:00:                               DAY 1 AS           [facility]

[facility]



               00                                 DIRECTED           Medical



                                                  ON PACKAGE           [facility]



                                                  AND            



                                                  DECREASE           



                                                  BY 1 TAB           



                                                  EACH DAY           



                                                  FOR A           



                                                  TOTAL OF 6           



                                                  DAYS           

 

     methylPREDN      -0      Yes                      TAKE 6           [facility]

ers



     ISolone 4      6-09                               TABLETS ON           [facility] 

of



     mg tablets      00:00:                               DAY 1 AS           [facility]

[facility]



               00                                 DIRECTED           Medical



                                                  ON PACKAGE           [facility]



                                                  AND            



                                                  DECREASE           



                                                  BY 1 TAB           



                                                  EACH DAY           



                                                  FOR A           



                                                  TOTAL OF 6           



                                                  DAYS           

 

     methylPREDN      3-0 - No                       TAKE 6           [facility]

[facility]



     ISolone 4      6-09 08-14                          TABLETS ON           [facility]

 of



     mg tablets      00:00: 00:00                          DAY 1 AS           [facility]

[place]



               00   :00                           DIRECTED           [facility]



                                                  ON PACKAGE           [facility]



                                                  AND            



                                                  DECREASE           



                                                  BY 1 TAB           



                                                  EACH DAY           



                                                  FOR A           



                                                  TOTAL OF 6           



                                                  DAYS           

 

     methylPREDN      3-0 - No                       TAKE 6           [facility]

[facility]



     ISolone 4      6-09 08-14                          TABLETS ON           [facility]

 of



     mg tablets      00:00: 00:00                          DAY 1 AS           [facility]

[place]



               00   :00                           DIRECTED           [facility]



                                                  ON PACKAGE           [facility]



                                                  AND            



                                                  DECREASE           



                                                  BY 1 TAB           



                                                  EACH DAY           



                                                  FOR A           



                                                  TOTAL OF 6           



                                                  DAYS           

 

     predniSONE      3-0 2023- Yes       [id number] 5mg  Q.5D Take 1        

   [facility]



     (Deltasone)      6-09 07-10           [id number]           tablet (5           

Health



     5 MG tablet      00:00: 04:59                          mg total)           



               00   :00                           by mouth           



                                                  in the           



                                                  morning           



                                                  and 1           



                                                  tablet (5           



                                                  mg total)           



                                                  in the           



                                                  evening.           



                                                  Start this           



                                                  prescripti           



                                                  on after           



                                                  completing           



                                                  Medrol.           

 

     predniSONE      -2023- Yes       [id number] 5mg  Q.5D Take 1        

   [facility]



     (Deltasone)      6-09 07-10           [id number]           tablet (5           

Health



     5 MG tablet      00:00: 04:59                          mg total)           



               00   :00                           by mouth           



                                                  in the           



                                                  morning           



                                                  and 1           



                                                  tablet (5           



                                                  mg total)           



                                                  in the           



                                                  evening.           



                                                  Start this           



                                                  prescripti           



                                                  on after           



                                                  completing           



                                                  Medrol.           

 

     HYDROcodone      2023- No        [id number] 1{tbl}      Take 1     

      [facility]



     -acetaminop                 05             tablet by           [facility]

[facility]



     hen ([place])      00:00: 04:59                          mouth           



      MG      00   :00                           every 4           



     tablet                                         (four)           



                                                  hours if           



                                                  needed for           



                                                  severe           



                                                  pain for           



                                                  up to 7           



                                                  days.           

 

     methylPREDN      2023- No        [id number] 4mg       Take 1       

    [facility]



     ISolone      6-09 06-10           [id number]           tablet (4           [facility]

[facility]



     (Medrol      00:00: 04:59                          mg total)           



     [place]) 4      00   :00                           by mouth 1           



     MG tablets                                         (one) time           



                                                  for 1           



                                                  dose.           



                                                  Follow           



                                                  schedule           



                                                  on package           



                                                  instructio           



                                                  ns             

 

     celecoxib      -0      Yes       [id number]           TAKE 1          

 [facility]



     (CeleBREX)      6-                9105           CAPSULE           Health



     200 MG      00:00:                               (200 MG           



     capsule      00                                 TOTAL) BY           



                                                  MOUTH IN           



                                                  THE            



                                                  MORNING           



                                                  AND IN THE           



                                                  EVENING           

 

     celecoxib      -0      Yes       [id number]           TAKE 1          

 [facility]



     (CeleBREX)      6-                9105           CAPSULE           Health



     200 MG      00:00:                               (200 MG           



     capsule      00                                 TOTAL) BY           



                                                  MOUTH IN           



                                                  THE            



                                                  MORNING           



                                                  AND IN THE           



                                                  EVENING           

 

     celecoxib      -0      Yes       [id number]           TAKE 1          

 [facility]



     (CeleBREX)      6-                9105           CAPSULE           Health



     200 MG      00:00:                               (200 MG           



     capsule      00                                 TOTAL) BY           



                                                  MOUTH IN           



                                                  THE            



                                                  MORNING           



                                                  AND IN THE           



                                                  EVENING           

 

     celecoxib      -0      Yes       [id number]           TAKE 1          

 [facility]



     (CeleBREX)      6-                9105           CAPSULE           Health



     200 MG      00:00:                               (200 MG           



     capsule      00                                 TOTAL) BY           



                                                  MOUTH IN           



                                                  THE            



                                                  MORNING           



                                                  AND IN THE           



                                                  EVENING           

 

     celecoxib      2023-0 2023- No                       TAKE 1           [facility]

[facility]



     200 mg                                CAPSULE           [facility] of



     capsule      00:00: 00:00                          (200 MG           [place]



               00   :00                           TOTAL) BY           Medical



                                                  MOUTH IN           [place]



                                                  THE            



                                                  MORNING           



                                                  AND IN THE           



                                                  EVENING           

 

     celecoxib      2023-0 2023- No                       TAKE 1           [facility]

[facility]



     200 mg                                CAPSULE           [facility] of



     capsule      00:00: 00:00                          (200 MG           [place]



               00   :00                           TOTAL) BY           Medical



                                                  MOUTH IN           [place]



                                                  THE            



                                                  MORNING           



                                                  AND IN THE           



                                                  EVENING           

 

     rosuvastati      2023-0      Yes            5mg       Take 1           [facility]

[facility]



     n 5 mg      6-05                               tablet by           [facility]



     tablet      00:00:                               mouth in           [place]



               00                                 the            Medical



                                                  morning.           [facility]

 

     rosuvastati      2023-0      Yes            5mg       Take 1           [facility]

[facility]



     n 5 mg      6-05                               tablet by           [facility]



     tablet      00:00:                               mouth in           [place]



                                                the            Medical



                                                  morning.           [facility]

 

     rosuvastati      2023-0      Yes            5mg       Take 1           [facility]

[facility]



     n 5 mg      6-05                               tablet by           [facility]



     tablet      00:00:                               mouth in           [place]



                                                the            Medical



                                                  morning.           [facility]

 

     rosuvastati      2023-0      Yes            5mg       Take 1           [facility]

[facility]



     n 5 mg      6-05                               tablet by           [facility]



     tablet      00:00:                               mouth in           [place]



               00                                 the            Medical



                                                  morning.           [facility]

 

     rosuvastati      2023-0      Yes            5mg       Take 1           [facility]

[facility]



     n 5 mg      6-05                               tablet by           [facility]



     tablet      00:00:                               mouth in           [place]



                                                the            Medical



                                                  morning.           [facility]

 

     rosuvastati      2023-0      Yes            5mg       Take 1           [facility]

[facility]



     n 5 mg      6-05                               tablet by           [facility]



     tablet      00:00:                               mouth in           [place]



                                                the            Medical



                                                  morning.           [facility]anch

 

     rosuvastati      2023-0      Yes            5mg       Take 1           [facility]

[facility]



     n 5 mg      6-05                               tablet by           [facility]



     tablet      00:00:                               mouth in           [place]



                                                the            Medical



                                                  morning.           [facility]anch

 

     temazepam      2023-0      Yes                      1 capsule           [facility]

[facility]



     15 mg      5-24                               at bedtime           [facility] of



     capsule      00:00:                               as needed           [place]



               00                                 Orally           Medical



                                                  Once a day           [place]



                                                  for 30           



                                                  days           

 

     temazepam      2023-0      Yes                      1 capsule           [facility]

[facility]



     15 mg      5-24                               at bedtime           [facility]



     capsule      00:00:                               as needed           [place]



               00                                 Orally           Medical



                                                  Once a day           [place]



                                                  for 30           



                                                  days           

 

     temazepam      2023-0      Yes                      1 capsule           [facility]

[facility]



     15 mg      5-24                               at bedtime           [facility] of



     capsule      00:00:                               as needed           [place]



               00                                 Orally           Medical



                                                  Once a day           [facility]



                                                  for 30           



                                                  days           

 

     Temazepam Temazepam -0      No             1{capsu QD   Temazepam      

     



     15 MG 15 MG 24                     le_at_b      15 MG           



               00:00:                     edtime_                     



               00                       as_need                     



                                        ed}                      

 

     temazepam      -0 - No                       1 capsule           [facility]

[facility]



     15 mg      -24                          at bedtime           [facility]



     capsule      00:00: 00:00                          as needed           [place]

[facility]



               00   :00                           Orally           Medical



                                                  Once a day           [facility]



                                                  for 30           



                                                  days           

 

     predniSONE      -0 3- Yes       [id number] 5mg  QD   Take 1        

   [facility]



     (Deltasone)                 05             tablet (5           [facility]

[facility]



     5 MG tablet      00:00: 04:59                          mg total)           



               00   :00                           by mouth 1           



                                                  (one) time           



                                                  each day.           

 

     hydrOXYchlo      3-0      Yes                      TAKE 1           [facility]

[facility]



     roQUINE 200      5-23                               TABLET BY           [facility]

 of



     mg tablet      00:00:                               MOUTH           [place]



               00                                 TWICE A           Medical



                                                  DAY WITH           [facility]



                                                  FOOD OR           



                                                  MILK           

 

     hydrOXYchlo      2023-0      Yes                      TAKE 1           [facility]

[facility]



     roQUINE 200      5-23                               TABLET BY           [facility]

 of



     mg tablet      00:00:                               MOUTH           [place]



               00                                 TWICE A           Medical



                                                  DAY WITH           [facility]



                                                  FOOD OR           



                                                  MILK           

 

     hydrOXYchlo      2023-0      Yes                      TAKE 1           [facility]

[facility]



     roQUINE 200      5-23                               TABLET BY           [facility]

 of



     mg tablet      00:00:                               MOUTH           [place]



               00                                 TWICE A           Medical



                                                  DAY WITH           [facility]



                                                  FOOD OR           



                                                  MILK           

 

     hydrOXYchlo      2023-0      Yes                      TAKE 1           [facility]

ers



     roQUINE 200      5-23                               TABLET BY           ity

 of



     mg tablet      00:00:                               MOUTH           [place]



               00                                 TWICE A           Medical



                                                  DAY WITH           [facility]



                                                  FOOD OR           



                                                  MILK           

 

     hydrOXYchlo      2023-0      Yes                      TAKE 1           [facility]

ers



     roQUINE 200      5-23                               TABLET BY           ity

 of



     mg tablet      00:00:                               MOUTH           [place]



               00                                 TWICE A           Medical



                                                  DAY WITH           [facility]



                                                  FOOD OR           



                                                  MILK           

 

     hydrOXYchlo      2023-0      Yes                      TAKE 1           [facility]

[facility]



     roQUINE 200      5-23                               TABLET BY           ity

 of



     mg tablet      00:00:                               MOUTH           [place]



               00                                 TWICE A           Medical



                                                  DAY WITH           [facility]



                                                  FOOD OR           



                                                  MILK           

 

     hydrOXYchlo      2023-0      Yes                      TAKE 1           [facility]

[facility]



     roQUINE 200      5-23                               TABLET BY           [facility]

 of



     mg tablet      00:00:                               MOUTH           [place]



               00                                 TWICE A           Medical



                                                  DAY WITH           [facility]



                                                  FOOD OR           



                                                  MILK           

 

     Colestipol      2023-0 - No             1g        Take 1           [facility]

ers



     HCl 1 gram      5-11 06-26                          tablet by           [facility]

 of



     tablet      00:00: 00:00                          mouth in           [place]



               00   :00                           the            Medical



                                                  morning.           [facility]

 

     Colestipol      2023-0 2023- No             1g        Take 1           [facility]

ers



     HCl 1 gram      5-11 06-26                          tablet by           [facility]

 of



     tablet      00:00: 00:00                          mouth in           [place]



               00   :00                           the            Medical



                                                  morning.           [facility]anch

 

     HYDROcodone      2023- No        [id number] 1{tbl}      Take 1     

      [facility]



     -acetaminop      5-09 05-17           05             tablet by           [facility]

[facility]



     hen ([place])      00:00: 04:59                          mouth           



      MG      00   :00                           every 4           



     tablet                                         (four)           



                                                  hours if           



                                                  needed for           



                                                  severe           



                                                  pain for           



                                                  up to 7           



                                                  days.           

 

     meloxicam      -0      Yes       [id number]           TAKE 1          

 [facility]



     (Mobic) 15      4-30                9109           TABLET (15           [facility]



     MG tablet      00:00:                               MG TOTAL)           



               00                                 BY MOUTH           



                                                  ONE TIME           



                                                  EACH DAY.           

 

     meloxicam      -0      Yes       [id number]           TAKE 1          

 [facility]



     (Mobic) 15      4-30                9109           TABLET (15           [facility]



     MG tablet      00:00:                               MG TOTAL)           



               00                                 BY MOUTH           



                                                  ONE TIME           



                                                  EACH DAY.           

 

     meloxicam      -0      Yes       [id number]           TAKE 1          

 [facility]



     (Mobic) 15      4-30                9109           TABLET (15           [facility]



     MG tablet      00:00:                               MG TOTAL)           



               00                                 BY MOUTH           



                                                  ONE TIME           



                                                  EACH DAY.           

 

     meloxicam      -0      Yes       [id number]           TAKE 1          

 [facility]



     (Mobic) 15      4-30                9109           TABLET (15           [facility]



     MG tablet      00:00:                               MG TOTAL)           



               00                                 BY MOUTH           



                                                  ONE TIME           



                                                  EACH DAY.           

 

     meloxicam      -0      Yes       [id number]           TAKE 1          

 [facility]



     (Mobic) 15      4-30                9109           TABLET (15           [facility]



     MG tablet      00:00:                               MG TOTAL)           



               00                                 BY MOUTH           



                                                  ONE TIME           



                                                  EACH DAY.           

 

     foLIC acid            Yes                      TAKE 2           [facility]

[facility]



     1 mg tablet      4-27                               TABLETS BY           it

y of



               00:00:                               MOUTH           [place]



               00                                 EVERY DAY           Medical



                                                  FOR 90           [facility]



                                                  DAYS           

 

     foLIC acid      -0      Yes                      TAKE 2           [facility]

[facility]



     1 mg tablet      4-27                               TABLETS BY           [facility]

y of



               00:00:                               MOUTH           [place]



               00                                 EVERY DAY           Medical



                                                  FOR 90           [facility]



                                                  DAYS           

 

     foLIC acid      -0      Yes                      TAKE 2           [facility]

[facility]



     1 mg tablet      4-27                               TABLETS BY           it

y of



               00:00:                               MOUTH           [place]



               00                                 EVERY DAY           Medical



                                                  FOR 90           [facility]



                                                  DAYS           

 

     foLIC acid      -0      Yes                      TAKE 2           [facility]

[facility]



     1 mg tablet      4-27                               TABLETS BY           it

y of



               00:00:                               MOUTH           [place]



               00                                 EVERY DAY           Medical



                                                  FOR 90           [facility]



                                                  DAYS           

 

     foLIC acid      -0      Yes                      TAKE 2           [facility]

[facility]



     1 mg tablet      4-27                               TABLETS BY           it

y of



               00:00:                               MOUTH           [place]



               00                                 EVERY DAY           Medical



                                                  FOR 90           [facility]



                                                  DAYS           

 

     foLIC acid      -0      Yes                      TAKE 2           [facility]

[facility]



     1 mg tablet      4-27                               TABLETS BY           it

y of



               00:00:                               MOUTH           [place]



               00                                 EVERY DAY           Medical



                                                  FOR 90           [facility]



                                                  DAYS           

 

     foLIC acid      -0      Yes                      TAKE 2           [facility]

rs



     1 mg tablet      4-27                               TABLETS BY           it

y of



               00:00:                               MOUTH           [place]



               00                                 EVERY DAY           Medical



                                                  FOR 90           [facility]anch



                                                  DAYS           

 

     gabapentin      -0      Yes       [id number] 100mg Q.[id number] Take 1  

         [facility]



     (Neurontin)      4-18                9109      [id number] capsule          

 [facility]



     100 MG      00:00:                          3D   (100 mg           



     capsule      00                                 total) by           



                                                  mouth in           



                                                  the            



                                                  morning           



                                                  and 1           



                                                  capsule           



                                                  (100 mg           



                                                  total) at           



                                                  noon and 1           



                                                  capsule           



                                                  (100 mg           



                                                  total) in           



                                                  the            



                                                  evening.           



                                                  To be           



                                                  added to           



                                                  current           



                                                  dose of           



                                                  600mg TID.           

 

     gabapentin      -0      Yes       [id number] 100mg Q.[id number] Take 1  

         [facility]



     (Neurontin)      4-18                9109      [id number] capsule          

 [facility]



     100 MG      00:00:                          3D   (100 mg           



     capsule      00                                 total) by           



                                                  mouth in           



                                                  the            



                                                  morning           



                                                  and 1           



                                                  capsule           



                                                  (100 mg           



                                                  total) at           



                                                  noon and 1           



                                                  capsule           



                                                  (100 mg           



                                                  total) in           



                                                  the            



                                                  evening.           



                                                  To be           



                                                  added to           



                                                  current           



                                                  dose of           



                                                  600mg TID.           

 

     gabapentin      -0      Yes       [id number] 100mg Q.[id number] Take 1  

         [facility]



     (Neurontin)      4-18                9109      [id number] capsule          

 [facility]



     100 MG      00:00:                          3D   (100 mg           



     capsule      00                                 total) by           



                                                  mouth in           



                                                  the            



                                                  morning           



                                                  and 1           



                                                  capsule           



                                                  (100 mg           



                                                  total) at           



                                                  noon and 1           



                                                  capsule           



                                                  (100 mg           



                                                  total) in           



                                                  the            



                                                  evening.           



                                                  To be           



                                                  added to           



                                                  current           



                                                  dose of           



                                                  600mg TID.           

 

     gabapentin      -0      Yes       [id number] 100mg Q.[id number] Take 1  

         [facility]



     (Neurontin)      4-18                9109      [id number] capsule          

 [facility]



     100 MG      00:00:                          3D   (100 mg           



     capsule      00                                 total) by           



                                                  mouth in           



                                                  the            



                                                  morning           



                                                  and 1           



                                                  capsule           



                                                  (100 mg           



                                                  total) at           



                                                  noon and 1           



                                                  capsule           



                                                  (100 mg           



                                                  total) in           



                                                  the            



                                                  evening.           



                                                  To be           



                                                  added to           



                                                  current           



                                                  dose of           



                                                  600mg TID.           

 

     gabapentin      -0 2023- No        [id number] 100mg Q.[id number] Take 1 

          [facility]



     (Neurontin)                 9109      [id number] capsule         

  [facility]



     100 MG      00:00: 04:59                     3D   (100 mg           



     capsule      00   :00                           total) by           



                                                  mouth in           



                                                  the            



                                                  morning           



                                                  and 1           



                                                  capsule           



                                                  (100 mg           



                                                  total) at           



                                                  noon and 1           



                                                  capsule           



                                                  (100 mg           



                                                  total) in           



                                                  the            



                                                  evening.           



                                                  To be           



                                                  added to           



                                                  current           



                                                  dose of           



                                                  600mg TID.           

 

     gabapentin      0 - No        [id number] 100mg Q.[id number] Take 1 

          [facility]



     (Neurontin)                 9109      [id number] capsule         

  Health



     100 MG      00:00: 04:59                     3D   (100 mg           



     capsule      00   :00                           total) by           



                                                  mouth in           



                                                  the            



                                                  morning           



                                                  and 1           



                                                  capsule           



                                                  (100 mg           



                                                  total) at           



                                                  noon and 1           



                                                  capsule           



                                                  (100 mg           



                                                  total) in           



                                                  the            



                                                  evening.           



                                                  To be           



                                                  added to           



                                                  current           



                                                  dose of           



                                                  600mg TID.           

 

     DULoxetine      -0      Yes       [id number]           TAKE 1           [facility]

[facility]



     (Cymbalta)      4-04                               CAPSULE BY           [facility]



     30 MG DR      00:00:                               MOUTH           



     capsule      00                                 EVERY DAY           

 

     DULoxetine      -0      Yes       [id number]           TAKE 1           [facility]

[facility]



     (Cymbalta)      4-04                               CAPSULE BY           [facility]



     30 MG DR      00:00:                               MOUTH           



     capsule      00                                 EVERY DAY           

 

     DULoxetine      -0      Yes       [id number]           TAKE 1           [facility]

T



     (Cymbalta)      4-04                               CAPSULE BY           [facility]



     30 MG DR      00:00:                               MOUTH           



     capsule      00                                 EVERY DAY           

 

     DULoxetine      -0      Yes       [id number]           TAKE 1           [facility]

T



     (Cymbalta)      4-04                               CAPSULE BY           [facility]



     30 MG DR      00:00:                               MOUTH           



     capsule      00                                 EVERY DAY           

 

     DULoxetine      -0      Yes       [id number]           TAKE 1           [facility]

T



     (Cymbalta)      4-04                               CAPSULE BY           [facility]



     30 MG DR      00:00:                               MOUTH           



     capsule      00                                 EVERY DAY           

 

     DULoxetine      -0      Yes       [id number]           TAKE 1           [facility]

T



     (Cymbalta)      4-04                               CAPSULE BY           [facility]



     30 MG DR      00:00:                               MOUTH           



     capsule      00                                 EVERY DAY           

 

     DULoxetine      -0      Yes            30mg      Take 1           [facility]

[facility]



     30 mg      4-04                               capsule by           ity of



     capsule      00:00:                               mouth in           [place]



               00                                 the            Medical



                                                  morning.           [facility]

 

     DULoxetine      -0      Yes            30mg      Take 1           [facility]

rs



     30 mg      4-04                               capsule by           ity of



     capsule      00:00:                               mouth in           [place]



                                                the            Medical



                                                  morning.           [facility]

 

     DULoxetine      2023-0      Yes            30mg      Take 1           [facility]

rs



     30 mg      4-04                               capsule by           ity of



     capsule      00:00:                               mouth in           [place]



                                                the            Medical



                                                  morning.           [facility]

 

     DULoxetine      2023-0      Yes            30mg      Take 1           [facility]

rs



     30 mg      4-04                               capsule by           ity of



     capsule      00:00:                               mouth in           [place]



                                                the            Medical



                                                  morning.           [facility]

 

     DULoxetine      2023-0      Yes            30mg      Take 1           [facility]

rs



     30 mg      4-04                               capsule by           ity of



     capsule      00:00:                               mouth in           [place]



                                                the            Medical



                                                  morning.           [facility]

 

     DULoxetine      2023-0      Yes            30mg      Take 1           [facility]

rs



     30 mg      4-04                               capsule by           ity of



     capsule      00:00:                               mouth in           [place]



                                                the            Medical



                                                  morning.           [facility]

 

     DULoxetine      2023-0      Yes            30mg      Take 1           [facility]

rs



     30 mg      4-04                               capsule by           ity of



     capsule      00:00:                               mouth in           [place]



                                                the            Medical



                                                  morning.           [facility]

 

     meloxicam      2023- No        1068 15mg QD   Take 1           [facility]



     (Mobic) 15      3-31 05-01                          tablet (15           [facility]

[facility]



     MG tablet      00:00: 04:59                          mg total)           



               00   :00                           by mouth 1           



                                                  (one) time           



                                                  each day.           

 

     acetaminoph      2023- No        1617 1{tbl} Q6H  Take 1           U

T



     en-codeine      3-28 04-08                          tablet by           [facility]



     (Tylenol      00:00: 04:59                          mouth           



     #3) 300-30      00   :00                           every 6           



     MG tablet                                         (six)           



                                                  hours if           



                                                  needed for           



                                                  moderate           



                                                  pain or           



                                                  severe           



                                                  pain for           



                                                  up to 10           



                                                  days.           

 

     acetaminoph      2023- No        1617 1{tbl} Q6H  Take 1           U

T



     en-codeine      3-28 04-08                          tablet by           [facility]



     (Tylenol      00:00: 04:59                          mouth           



     #3) 300-30      00   :00                           every 6           



     MG tablet                                         (six)           



                                                  hours if           



                                                  needed for           



                                                  moderate           



                                                  pain or           



                                                  severe           



                                                  pain for           



                                                  up to 10           



                                                  days.           

 

     gabapentin      2023- No        [id number] 100mg Q.[id number] Take 1 

          [facility]



     (Neurontin)      3- 04-21           9109      [id number] capsule         

  Health



     100 MG      00:00: 04:59                     3D   (100 mg           



     capsule      00   :00                           total) by           



                                                  mouth in           



                                                  the            



                                                  morning           



                                                  and 1           



                                                  capsule           



                                                  (100 mg           



                                                  total) at           



                                                  noon and 1           



                                                  capsule           



                                                  (100 mg           



                                                  total) in           



                                                  the            



                                                  evening.           



                                                  To be           



                                                  added to           



                                                  current           



                                                  dose of           



                                                  600mg TID.           

 

     gabapentin       No        [id number] 100mg Q.[id number] Take 1 

          [facility]



     (Neurontin)      3-21 04-21           9109      [id number] capsule         

  Health



     100 MG      00:00: :59                     3D   (100 mg           



     capsule      00   :00                           total) by           



                                                  mouth in           



                                                  the            



                                                  morning           



                                                  and 1           



                                                  capsule           



                                                  (100 mg           



                                                  total) at           



                                                  noon and 1           



                                                  capsule           



                                                  (100 mg           



                                                  total) in           



                                                  the            



                                                  evening.           



                                                  To be           



                                                  added to           



                                                  current           



                                                  dose of           



                                                  600mg TID.           

 

     gabapentin       No        [id number] 100mg [id number] Take 1 

          [facility]



     (Neurontin)      3-21 04-21           9109      [id number] capsule         

  [facility]



     100 MG      00:00: :59                     3D   (100 mg           



     capsule      00   :00                           total) by           



                                                  mouth in           



                                                  the            



                                                  morning           



                                                  and 1           



                                                  capsule           



                                                  (100 mg           



                                                  total) at           



                                                  noon and 1           



                                                  capsule           



                                                  (100 mg           



                                                  total) in           



                                                  the            



                                                  evening.           



                                                  To be           



                                                  added to           



                                                  current           



                                                  dose of           



                                                  600mg TID.           

 

     gabapentin       No        [id number] 100mg [id number] Take 1 

          [facility]



     (Neurontin)      3-21 04-18           9109      [id number] capsule         

  Health



     100 MG      00:00: 00:00                     3D   (100 mg           



     capsule      00   :00                           total) by           



                                                  mouth in           



                                                  the            



                                                  morning           



                                                  and 1           



                                                  capsule           



                                                  (100 mg           



                                                  total) at           



                                                  noon and 1           



                                                  capsule           



                                                  (100 mg           



                                                  total) in           



                                                  the            



                                                  evening.           



                                                  To be           



                                                  added to           



                                                  current           



                                                  dose of           



                                                  600mg TID.           

 

     acetaminoph       No        161 1{tbl} Q6H  Take 1           U

T



     en-codeine      3-21 04-01                          tablet by           [facility]



     (Tylenol      00:00: 04:59                          mouth           



     #3) 300-30      00   :00                           every 6           



     MG tablet                                         (six)           



                                                  hours if           



                                                  needed for           



                                                  moderate           



                                                  pain or           



                                                  severe           



                                                  pain for           



                                                  up to 10           



                                                  days.           

 

     acetaminoph       No         1{tbl} Q6H  Take 1           U

T



     en-codeine      3-21 04-01                          tablet by           [facility]



     (Tylenol      00:00: 04:59                          mouth           



     #3) 300-30      00   :00                           every 6           



     MG tablet                                         (six)           



                                                  hours if           



                                                  needed for           



                                                  moderate           



                                                  pain or           



                                                  severe           



                                                  pain for           



                                                  up to 10           



                                                  days.           

 

     acetaminoph      -0 - No        1617 1{tbl} Q6H  Take 1           U

T



     en-codeine      3--                          tablet by           [facility]



     (Tylenol      00:00: 04:59                          mouth           



     #3) 300-30      00   :00                           every 6           



     MG tablet                                         (six)           



                                                  hours if           



                                                  needed for           



                                                  moderate           



                                                  pain or           



                                                  severe           



                                                  pain for           



                                                  up to 10           



                                                  days.           

 

     acetaminoph      -0 - No        1617 1{tbl} Q6H  Take 1           U

T



     en-codeine      3--                          tablet by           [facility]



     (Tylenol      00:00: 04:59                          mouth           



     #3) 300-30      00   :00                           every 6           



     MG tablet                                         (six)           



                                                  hours if           



                                                  needed for           



                                                  moderate           



                                                  pain or           



                                                  severe           



                                                  pain for           



                                                  up to 10           



                                                  days.           

 

     acetaminoph      -0 - No        1617 1{tbl} Q6H  Take 1           U

T



     en-codeine      3-21 04-                          tablet by           [facility]



     (Tylenol      00:00: 04:59                          mouth           



     #3) 300-30      00   :00                           every 6           



     MG tablet                                         (six)           



                                                  hours if           



                                                  needed for           



                                                  moderate           



                                                  pain or           



                                                  severe           



                                                  pain for           



                                                  up to 10           



                                                  days.           

 

     hydrOXYzine      2023-0      Yes       [id number] 25mg Q6H  Take 1        

   [facility]



     HCl       3-14                9109           tablet (25           Health



     (Atarax) 25      00:00:                               mg total)           



     MG tablet      00                                 by mouth           



                                                  every 6           



                                                  (six)           



                                                  hours if           



                                                  needed for           



                                                  itching           



                                                  for up to           



                                                  7 days.           

 

     hydrOXYzine      2023-0      Yes       [id number] 25mg Q6H  Take 1        

   [facility]



     HCl       3-14                9109           tablet (25           [facility]



     (Atarax) 25      00:00:                               mg total)           



     MG tablet      00                                 by mouth           



                                                  every 6           



                                                  (six)           



                                                  hours if           



                                                  needed for           



                                                  itching           



                                                  for up to           



                                                  7 days.           

 

     hydrOXYzine      3-0      Yes       [id number] 25mg Q6H  Take 1        

   [facility]



     HCl       3-14                9109           tablet (25           [facility]



     (Atarax) 25      00:00:                               mg total)           



     MG tablet      00                                 by mouth           



                                                  every 6           



                                                  (six)           



                                                  hours if           



                                                  needed for           



                                                  itching           



                                                  for up to           



                                                  7 days.           

 

     hydrOXYzine      2023-0      Yes       [id number] 25mg Q6H  Take 1        

   [facility]



     HCl       3-14                9109           tablet (25           Health



     (Atarax) 25      00:00:                               mg total)           



     MG tablet      00                                 by mouth           



                                                  every 6           



                                                  (six)           



                                                  hours if           



                                                  needed for           



                                                  itching           



                                                  for up to           



                                                  7 days.           

 

     hydrOXYzine      2023-0      Yes       [id number] 25mg Q6H  Take 1        

   [facility]



     HCl       3-14                9109           tablet (25           Health



     (Atarax) 25      00:00:                               mg total)           



     MG tablet      00                                 by mouth           



                                                  every 6           



                                                  (six)           



                                                  hours if           



                                                  needed for           



                                                  itching           



                                                  for up to           



                                                  7 days.           

 

     hydrOXYzine      2023-0      Yes       [id number] 25mg Q6H  Take 1        

   [facility]



     HCl       3-14                9109           tablet (25           Health



     (Atarax) 25      00:00:                               mg total)           



     MG tablet      00                                 by mouth           



                                                  every 6           



                                                  (six)           



                                                  hours if           



                                                  needed for           



                                                  itching           



                                                  for up to           



                                                  7 days.           

 

     hydrOXYzine      2023-0      Yes       [id number] 25mg Q6H  Take 1        

   [facility]



     HCl       3-14                9109           tablet (25           Health



     (Atarax) 25      00:00:                               mg total)           



     MG tablet      00                                 by mouth           



                                                  every 6           



                                                  (six)           



                                                  hours if           



                                                  needed for           



                                                  itching           



                                                  for up to           



                                                  7 days.           

 

     hydrOXYzine      2023-0      Yes       [id number] 25mg Q6H  Take 1        

   [facility]



     HCl       3-14                9109           tablet (25           Health



     (Atarax) 25      00:00:                               mg total)           



     MG tablet      00                                 by mouth           



                                                  every 6           



                                                  (six)           



                                                  hours if           



                                                  needed for           



                                                  itching           



                                                  for up to           



                                                  7 days.           

 

     hydrOXYzine      2023-0      Yes       [id number] 25mg Q6H  Take 1        

   [facility]



     HCl       3-14                9109           tablet (25           Health



     (Atarax) 25      00:00:                               mg total)           



     MG tablet      00                                 by mouth           



                                                  every 6           



                                                  (six)           



                                                  hours if           



                                                  needed for           



                                                  itching           



                                                  for up to           



                                                  7 days.           

 

     hydrOXYzine      2023-0 2023- No             25mg      Take 1           [facility]

[facility]



     25 mg      3-14 08-14                          tablet by           [facility]



     tablet      00:00: 00:00                          mouth           [place]



               00   :00                           every 6           [facility]



                                                  (six)           [facility]



                                                  hours as           



                                                  needed for           



                                                  Itching.           

 

     hydrOXYzine      2023-0 2023- No             25mg      Take 1           [facility]

[facility]



     25 mg      3-14 08-14                          tablet by           [facility]



     tablet      00:00: 00:00                          mouth           [place]



               00   :00                           every 6           [facility]



                                                  (six)           Branch



                                                  hours as           



                                                  needed for           



                                                  Itching.           

 

     hydrOXYzine      2023-0 2023- No        [id number] 25mg Q6H  Take 1       

    [facility]



     HCl       3-14 03-22           9109           tablet (25           Health



     (Atarax) 25      00:00: 04:59                          mg total)           



     MG tablet      00   :00                           by mouth           



                                                  every 6           



                                                  (six)           



                                                  hours if           



                                                  needed for           



                                                  itching           



                                                  for up to           



                                                  7 days.           

 

     HYDROcodone      2023- No        [id number] 1{tbl}      Take 1     

      [facility]



     -acetaminop      3-14 03-22           05             tablet by           [facility]

[facility]



     hen ([place])      00:00: 04:59                          mouth           



      MG      00   :00                           every 4           



     tablet                                         (four)           



                                                  hours if           



                                                  needed for           



                                                  severe           



                                                  pain for           



                                                  up to 7           



                                                  days.           

 

     predniSONE            Yes       [id number]           Take one         

  [facility]



     (Deltasone)      3-12                               tab PO BID           [facility]

[facility]



     5 MG tablet      00:00:                               x 5 days           



               00                                 and then           



                                                  one tab PO           



                                                  Q day           



                                                  thereafter           

 

     predniSONE            Yes       [id number]           Take one         

  [facility]



     (Deltasone)      3-12                               tab PO BID           [facility]

[facility]



     5 MG tablet      00:00:                               x 5 days           



               00                                 and then           



                                                  one tab PO           



                                                  Q day           



                                                  thereafter           

 

     predniSONE      0      Yes       [id number]           Take one         

  [facility]



     (Deltasone)      3-12                               tab PO BID           [facility]

[facility]



     5 MG tablet      00:00:                               x 5 days           



               00                                 and then           



                                                  one tab PO           



                                                  Q day           



                                                  thereafter           

 

     predniSONE      0      Yes       [id number]           Take one         

  [facility]



     (Deltasone)      3-12                               tab PO BID           [facility]

[facility]



     5 MG tablet      00:00:                               x 5 days           



               00                                 and then           



                                                  one tab PO           



                                                  Q day           



                                                  thereafter           

 

     predniSONE      0      Yes       [id number]           Take one         

  [facility]



     (Deltasone)      3-12                               tab PO BID           [facility]

[facility]



     5 MG tablet      00:00:                               x 5 days           



               00                                 and then           



                                                  one tab PO           



                                                  Q day           



                                                  thereafter           

 

     predniSONE      0      Yes       [id number]           Take one         

  [facility]



     (Deltasone)      3-12                               tab PO BID           [facility]

[facility]



     5 MG tablet      00:00:                               x 5 days           



               00                                 and then           



                                                  one tab PO           



                                                  Q day           



                                                  thereafter           

 

     predniSONE      0      Yes       [id number]           Take one         

  [facility]



     (Deltasone)      3-12                               tab PO BID           [facility]

[facility]



     5 MG tablet      00:00:                               x 5 days           



               00                                 and then           



                                                  one tab PO           



                                                  Q day           



                                                  thereafter           

 

     predniSONE      0      Yes       [id number]           Take one         

  [facility]



     (Deltasone)      3-12                               tab PO BID           [facility]

[facility]



     5 MG tablet      00:00:                               x 5 days           



               00                                 and then           



                                                  one tab PO           



                                                  Q day           



                                                  thereafter           

 

     predniSONE      -0      Yes       [id number]           Take one         

  [facility]



     (Deltasone)      3-12                               tab PO BID           [facility]

[facility]



     5 MG tablet      00:00:                               x 5 days           



               00                                 and then           



                                                  one tab PO           



                                                  Q day           



                                                  thereafter           

 

     predniSONE            Yes       [id number]           Take one         

  [facility]



     (Deltasone)      3-12                               tab PO BID           [facility]

[facility]



     5 MG tablet      00:00:                               x 5 days           



               00                                 and then           



                                                  one tab PO           



                                                  Q day           



                                                  thereafter           

 

     rivaroxaban            Yes       [id number] 10mg QD   Take 1        

   [facility]



     (Xarelto)      2-17                05             tablet (10           [facility]

[facility]



     10 MG      00:00:                               mg total)           



     tablet      00                                 by mouth 1           



                                                  (one) time           



                                                  each day.           



                                                  DVT            



                                                  prophylaxi           



                                                  s s/p           



                                                  joint           



                                                  replacemen           



                                                  t              

 

     rivaroxaban            Yes       [id number] 10mg QD   Take 1        

   [facility]



     (Xarelto)      2-17                05             tablet (10           [facility]

[facility]



     10 MG      00:00:                               mg total)           



     tablet      00                                 by mouth 1           



                                                  (one) time           



                                                  each day.           



                                                  DVT            



                                                  prophylaxi           



                                                  s s/p           



                                                  joint           



                                                  replacemen           



                                                  t              

 

     rivaroxaban            Yes       [id number] 10mg QD   Take 1        

   [facility]



     (Xarelto)      2-17                05             tablet (10           [facility]

[facility]



     10 MG      00:00:                               mg total)           



     tablet      00                                 by mouth 1           



                                                  (one) time           



                                                  each day.           



                                                  DVT            



                                                  prophylaxi           



                                                  s s/p           



                                                  joint           



                                                  replacemen           



                                                  t              

 

     rivaroxaban            Yes       [id number] 10mg QD   Take 1        

   [facility]



     (Xarelto)      2-17                05             tablet (10           [facility]

[facility]



     10 MG      00:00:                               mg total)           



     tablet      00                                 by mouth 1           



                                                  (one) time           



                                                  each day.           



                                                  DVT            



                                                  prophylaxi           



                                                  s s/p           



                                                  joint           



                                                  replacemen           



                                                  t              

 

     rivaroxaban            Yes       [id number] 10mg QD   Take 1        

   [facility]



     (Xarelto)      2-17                05             tablet (10           [facility]

[facility]



     10 MG      00:00:                               mg total)           



     tablet      00                                 by mouth 1           



                                                  (one) time           



                                                  each day.           



                                                  DVT            



                                                  prophylaxi           



                                                  s s/p           



                                                  joint           



                                                  replacemen           



                                                  t              

 

     rivaroxaban            Yes       [id number] 10mg QD   Take 1        

   [facility]



     (Xarelto)      2-17                05             tablet (10           [facility]

[facility]



     10 MG      00:00:                               mg total)           



     tablet      00                                 by mouth 1           



                                                  (one) time           



                                                  each day.           



                                                  DVT            



                                                  prophylaxi           



                                                  s s/p           



                                                  joint           



                                                  replacemen           



                                                  t              

 

     rivaroxaban            Yes       [id number] 10mg QD   Take 1        

   [facility]



     (Xarelto)      2-17                05             tablet (10           [facility]

[facility]



     10 MG      00:00:                               mg total)           



     tablet      00                                 by mouth 1           



                                                  (one) time           



                                                  each day.           



                                                  DVT            



                                                  prophylaxi           



                                                  s s/p           



                                                  joint           



                                                  replacemen           



                                                  t              

 

     rivaroxaban      0      Yes       [id number] 10mg QD   Take 1        

   [facility]



     (Xarelto)      2-17                05             tablet (10           [facility]

[facility]



     10 MG      00:00:                               mg total)           



     tablet      00                                 by mouth 1           



                                                  (one) time           



                                                  each day.           



                                                  DVT            



                                                  prophylaxi           



                                                  s s/p           



                                                  joint           



                                                  replacemen           



                                                  t              

 

     rivaroxaban      0      Yes       [id number] 10mg QD   Take 1        

   [facility]



     (Xarelto)      2-17                05             tablet (10           [facility]

[facility]



     10 MG      00:00:                               mg total)           



     tablet      00                                 by mouth 1           



                                                  (one) time           



                                                  each day.           



                                                  DVT            



                                                  prophylaxi           



                                                  s s/p           



                                                  joint           



                                                  replacemen           



                                                  t              

 

     rivaroxaban      0      Yes            10mg      Take 1           [facility]

[facility]



     10 mg      2-17                               tablet by           [facility]



     tablet      00:00:                               mouth in           [place]



                                                the            [facility]



                                                  morning.           [facility]

 

     rivaroxaban      0      Yes            10mg      Take 1           [facility]

[facility]



     10 mg      2-17                               tablet by           [facility]



     tablet      00:00:                               mouth in           [place]



                                                the            [facility]



                                                  morning.           [facility]

 

     rivaroxaban      -0      Yes            10mg      Take 1           [facility]

[facility]



     10 mg      2-17                               tablet by           [facility]



     tablet      00:00:                               mouth in           [place]



                                                the            [facility]



                                                  morning.           [facility]

 

     rivaroxaban      -0 - No        [id number] 10mg QD   Take 1       

    [facility]



     (Xarelto)      2-17 03-20           05             tablet (10           [facility]

[facility]



     10 MG      00:00: 04:59                          mg total)           



     tablet      00   :00                           by mouth 1           



                                                  (one) time           



                                                  each day.           



                                                  DVT            



                                                  prophylaxi           



                                                  s s/p           



                                                  joint           



                                                  replacemen           



                                                  t              

 

     rivaroxaban      -0 - No        [id number] 10mg QD   Take 1       

    [facility]



     (Xarelto)      2-17 03-20           05             tablet (10           [facility]

[facility]



     10 MG      00:00: 04:59                          mg total)           



     tablet      00   :00                           by mouth 1           



                                                  (one) time           



                                                  each day.           



                                                  DVT            



                                                  prophylaxi           



                                                  s s/p           



                                                  joint           



                                                  replacemen           



                                                  t              

 

     aspirin 81      -0 - No             81mg      Take 81 mg           

[facility]



     MG EC      16 -16                          by mouth.           [facility]



     tablet      10:33: 00:00                                         



               40   :00                                          

 

     Golimumab      -0      Yes                                     [facility]



     (SIMPONI      2-08                                              [facility]



     ARIA IV)      10:07:                                              



               28                                                

 

     Golimumab      -0      Yes                                     [facility]



     (SIMPONI      2-08                                              [facility]



     ARIA IV)      10:07:                                              



               28                                                

 

     Golimumab      2023-0      Yes                                     [facility]



     ([facility]      2-08                                              [facility]



     ARIA IV)      10:07:                                              



               28                                                

 

     Golimumab      2023-0      Yes                                     [facility]



     ([facility]I      2-08                                              [facility]



     ARIA IV)      10:07:                                              



               28                                                

 

     Golimumab      2023-0      Yes                                     [facility]



     ([facility]I      2-08                                              Health



     ARIA IV)      10:07:                                              



               28                                                

 

     Golimumab      2023-0      Yes                                     [facility]



     ([facility]PONI      2-08                                              Health



     ARIA IV)      10:07:                                              



               28                                                

 

     Golimumab      2023-0      Yes                                     [facility]



     ([facility]PONI      2-08                                              Health



     ARIA IV)      10:07:                                              



               28                                                

 

     Golimumab      2023-0      Yes                                     [facility]



     ([facility]PONI      2-08                                              Health



     ARIA IV)      10:07:                                              



               28                                                

 

     Golimumab      2023-0      Yes                                     [facility]



     ([facility]PONI      2-08                                              Health



     ARIA IV)      10:07:                                              



               28                                                

 

     Golimumab      2023-0      Yes                                     [facility]



     ([facility]I      2-08                                              Health



     ARIA IV)      10:07:                                              



               28                                                

 

     Golimumab      2023-0      Yes                                     [facility]



     ([facility]PONI      2-08                                              Health



     ARIA IV)      10:07:                                              



               28                                                

 

     Golimumab      2023-0      Yes                                     [facility]



     ([facility]      2-08                                              Health



     ARIA IV)      10:07:                                              



               28                                                

 

     acetaminoph      -0      Yes       [id number] 1{tbl}      Take 1        

   [facility]



     en-codeine      2-08                               tablet by           [facility]



     (TYLENOL/CO      00:00:                               mouth 1           



     DEINE #3)      00                                 (one) time           



     300-30 MG                                         each day           



     tablet                                         if needed           



                                                  for severe           



                                                  pain for           



                                                  up to 15           



                                                  doses.           

 

     acetaminoph      -0      Yes       [id number] 1{tbl}      Take 1        

   [facility]



     en-codeine      2-08                               tablet by           [facility]



     (TYLENOL/CO      00:00:                               mouth 1           



     DEINE #3)      00                                 (one) time           



     300-30 MG                                         each day           



     tablet                                         if needed           



                                                  for severe           



                                                  pain for           



                                                  up to 15           



                                                  doses.           

 

     acetaminoph      -0      Yes       [id number] 1{tbl}      Take 1        

   [facility]



     en-codeine      2-08                               tablet by           [facility]



     (TYLENOL/CO      00:00:                               mouth 1           



     DEINE #3)      00                                 (one) time           



     300-30 MG                                         each day           



     tablet                                         if needed           



                                                  for severe           



                                                  pain for           



                                                  up to 15           



                                                  doses.           

 

     acetaminoph      -0 - No        [id number] 1{tbl}      Take 1       

    [facility]



     en-codeine      2-08 03-21                          tablet by           [facility]

[facility]



     (TYLENOL/CO      00:00: 00:00                          mouth 1           



     DEINE #3)      00   :00                           (one) time           



     300-30 MG                                         each day           



     tablet                                         if needed           



                                                  for severe           



                                                  pain for           



                                                  up to 15           



                                                  doses.           

 

     Diclofenac      0 - No        [id number] 1{patch      Apply 1      

     [facility]



     Epolamine                      }         patch           Health



     (Licart)      00:00: 05:59                          topically           



     1.3 % patch      00   :00                           1 (one)           



     24 hour                                         time each           



                                                  day if           



                                                  needed           



                                                  (pain).           

 

     Diclofenac      0 - No        [id number] 1{patch      Apply 1      

     [facility]



     Epolamine                      }         patch           Health



     (Licart)      00:00: 05:59                          topically           



     1.3 % patch      00   :00                           1 (one)           



     24 hour                                         time each           



                                                  day if           



                                                  needed           



                                                  (pain).           

 

     COLESTIPOL      -0      Yes                                     [facility]



     HCL PO                                                    Health



               00:00:                                              



               00                                                

 

     COLESTIPOL      -0      Yes                                     [facility]



     HCL PO                                                    Health



               00:00:                                              



               00                                                

 

     COLESTIPOL      -0      Yes                                     [facility]



     HCL PO                                                    [facility]



               00:00:                                              



               00                                                

 

     COLESTIPOL      -0      Yes                                     [facility]



     HCL PO                                                    Health



               00:00:                                              



               00                                                

 

     COLESTIPOL      3-0      Yes                                     [facility]



     HCL PO                                                    Health



               00:00:                                              



               00                                                

 

     COLESTIPOL      3-0      Yes                                     [facility]



     HCL PO                                                    Health



               00:00:                                              



               00                                                

 

     COLESTIPOL      3-0      Yes                                     [facility]



     HCL PO                                                    Health



               00:00:                                              



               00                                                

 

     COLESTIPOL      3-0      Yes                                     [facility]



     HCL PO                                                    Health



               00:00:                                              



               00                                                

 

     COLESTIPOL      -0      Yes                                     [facility]



     HCL PO                                                    Health



               00:00:                                              



               00                                                

 

     COLESTIPOL      3-0      Yes                                     [facility]



     HCL PO                                                    Health



               00:00:                                              



               00                                                

 

     COLESTIPOL      3-0      Yes                                     [facility]



     HCL PO                                                    Health



               00:00:                                              



               00                                                

 

     COLESTIPOL      3-0      Yes                                     [facility]



     HCL PO                                                    Health



               00:00:                                              



               00                                                

 

     Temazepam Temazepam 2022      No                       Temazepam         

  



     15 MG 15 MG 1-14                               15 MG           



               00:00:                                              



               00                                                

 

     Temazepam Temazepam 2022      No                       Temazepam         

  



     15 MG 15 MG 1-14                               15 MG           



               00:00:                                              



               00                                                

 

     Temazepam Temazepam 2022      No                       Temazepam         

  



     15 MG 15 MG 1-14                               15 MG           



               00:00:                                              



               00                                                

 

     Temazepam Temazepam -1      No                       Temazepam         

  



     15 MG 15 MG 1-14                               15 MG           



               00:00:                                              



               00                                                

 

     Temazepam Temazepam -1      No                       Temazepam         

  



     15 MG 15 MG 1-14                               15 MG           



               00:00:                                              



               00                                                

 

     Temazepam Temazepam -1      No                       Temazepam         

  



     15 MG 15 MG 1-14                               15 MG           



               00:00:                                              



               00                                                

 

     Temazepam Temazepam -0      No                       Temazepam         

  



     15 MG 15 MG 8-17                               15 MG           



               00:00:                                              



               00                                                

 

     levothyroxi      2022-0      Yes                                     [facility]



     ne        7-26                                              [facility]



     (Synthroid,      00:00:                                              



     Levoxyl)      00                                                



     175 MCG                                                        



     tablet                                                        

 

     levothyroxi      -0      Yes                                     [facility]



     ne        7-26                                              [facility]



     (Synthroid,      00:00:                                              



     Levoxyl)      00                                                



     175 MCG                                                        



     tablet                                                        

 

     levothyroxi      -0      Yes                                     [facility]



     ne        7-26                                              [facility]



     (Synthroid,      00:00:                                              



     Levoxyl)      00                                                



     175 MCG                                                        



     tablet                                                        

 

     levothyroxi      -0      Yes                                     [facility]



     ne        7-26                                              [facility]



     (Synthroid,      00:00:                                              



     Levoxyl)      00                                                



     175 MCG                                                        



     tablet                                                        

 

     levothyroxi      -0      Yes                                     [facility]



     ne        7-26                                              [facility]



     (Synthroid,      00:00:                                              



     Levoxyl)      00                                                



     175 MCG                                                        



     tablet                                                        

 

     levothyroxi      -0      Yes                                     [facility]



     ne        7-26                                              [facility]



     (Synthroid,      00:00:                                              



     Levoxyl)      00                                                



     175 MCG                                                        



     tablet                                                        

 

     levothyroxi      -0      Yes                                     [facility]



     ne        7-26                                              [facility]



     (Synthroid,      00:00:                                              



     Levoxyl)      00                                                



     175 MCG                                                        



     tablet                                                        

 

     levothyroxi      -0      Yes                                     [facility]



     ne        7-26                                              [facility]



     (Synthroid,      00:00:                                              



     Levoxyl)      00                                                



     175 MCG                                                        



     tablet                                                        

 

     levothyroxi      -0      Yes                                     [facility]



     ne        7-26                                              [facility]



     (Synthroid,      00:00:                                              



     Levoxyl)      00                                                



     175 MCG                                                        



     tablet                                                        

 

     levothyroxi      -0      Yes                                     [facility]



     ne        7-26                                              [facility]



     (Synthroid,      00:00:                                              



     Levoxyl)      00                                                



     175 MCG                                                        



     tablet                                                        

 

     levothyroxi      -0      Yes                                     [facility]



     ne        7-26                                              [facility]



     (Synthroid,      00:00:                                              



     Levoxyl)      00                                                



     175 MCG                                                        



     tablet                                                        

 

     levothyroxi      -0      Yes                                     [facility]



     ne        7-26                                              [facility]



     (Synthroid,      00:00:                                              



     Levoxyl)      00                                                



     175 MCG                                                        



     tablet                                                        

 

     Temazepam Temazepam -0      No                       Temazepam         

  



     15 MG 15 MG 5-11                               15 MG           



               00:00:                                              



               00                                                

 

     Temazepam Temazepam 0      No                       Temazepam         

  



     15 MG 15 MG 5-11                               15 MG           



               00:00:                                              



               00                                                

 

     Temazepam Temazepam 0      No                       Temazepam         

  



     15 MG 15 MG 5-11                               15 MG           



               00:00:                                              



               00                                                

 

     Temazepam Temazepam 0      No                       Temazepam         

  



     15 MG 15 MG 5-11                               15 MG           



               00:00:                                              



               00                                                

 

     Temazepam Temazepam -0      No                       Temazepam         

  



     15 MG 15 MG 5-11                               15 MG           



               00:00:                                              



               00                                                

 

     ergocalcife      2022- No             400U QD   Take 400           U

T



     rol       -12 -12                          Units by           Health



     (Vitamin      11:34: 00:00                          mouth 1           



     D-2) 10 MCG      31   :00                           (one) time           



     (400 UNIT)                                         each day.           



     tablet                                                        

 

     ergocalcife      2022- No             400U QD   Take 400           U

T



     rol       4-12 -12                          Units by           Health



     (Vitamin      11:34: 00:00                          mouth 1           



     D-2) 10 MCG      31   :00                           (one) time           



     (400 UNIT)                                         each day.           



     tablet                                                        

 

     DULoxetine            Yes       [id number]           TAKE 1           U

T



     (Cymbalta)      4-12                               CAPSULE BY           [facility]

[facility]



     30 MG DR      00:00:                               MOUTH           



     capsule      00                                 EVERY DAY           

 

     DULoxetine      0      Yes       [id number]           TAKE 1           U

T



     (Cymbalta)      4-12                               CAPSULE BY           [facility]

[facility]



     30 MG DR      00:00:                               MOUTH           



     capsule      00                                 EVERY DAY           

 

     DULoxetine      0      Yes       [id number]           TAKE 1           U

T



     (Cymbalta)      4-12                               CAPSULE BY           [facility]

[facility]



     30 MG DR      00:00:                               MOUTH           



     capsule      00                                 EVERY DAY           

 

     DULoxetine      0      Yes       [id number]           TAKE 1           U

T



     (Cymbalta)      4-12                               CAPSULE BY           [facility]

[facility]



     30 MG DR      00:00:                               MOUTH           



     capsule      00                                 EVERY DAY           

 

     DULoxetine      0      Yes       [id number]           TAKE 1           U

T



     (Cymbalta)      4-12                               CAPSULE BY           [facility]

[facility]



     30 MG DR      00:00:                               MOUTH           



     capsule      00                                 EVERY DAY           

 

     DULoxetine      -0      Yes       [id number]           TAKE 1           U

T



     (Cymbalta)      4-12                               CAPSULE BY           [facility]

[facility]



     30 MG DR      00:00:                               MOUTH           



     capsule      00                                 EVERY DAY           

 

     DULoxetine      -0      Yes       [id number]           TAKE 1           U

T



     (Cymbalta)      4-12                               CAPSULE BY           [facility]

[facility]



     30 MG DR      00:00:                               MOUTH           



     capsule      00                                 EVERY DAY           

 

     DULoxetine      -0      Yes       [id number]           TAKE 1           U

T



     (Cymbalta)      4-12                               CAPSULE BY           [facility]

[facility]



     30 MG DR      00:00:                               MOUTH           



     capsule      00                                 EVERY DAY           

 

     DULoxetine      -0      Yes       [id number]           TAKE 1           U

T



     (Cymbalta)      4-12                               CAPSULE BY           [facility]

[facility]



     30 MG DR      00:00:                               MOUTH           



     capsule      00                                 EVERY DAY           

 

     gabapentin      -0      Yes                      gabapentin           U

T



     (Neurontin)      3-09                               600 mg           [facility]



     600 MG      09:24:                               tablet           



     tablet      05                                                

 

     sulfaSALAzi            Yes                      sulfasalaz           

[facility]



     ne        3-09                               ine 500 mg           [facility]



     (Azulfidine      09:24:                               tablet           



     ) 500 MG      05                                 TAKE 4           



     tablet                                         TABLETS BY           



                                                  MOUTH           



                                                  EVERY DAY           

 

     azaTHIOprin            Yes                      See            [facility]



     e (Imuran)      3-09                               administra           [facility]

[facility]



     50 MG      09:24:                               tion           



     tablet      05                                 instructio           



                                                  ns.            

 

     Golimumab      0      Yes                                     [facility]



     (SIMPONI      3-09                                              Health



     ARIA IV)      09:24:                                              



               05                                                

 

     aspirin 81      -0      Yes            81mg      Take 81 mg           U

T



     MG EC      3-09                               by mouth.           Health



     tablet      09:24:                                              



               05                                                

 

     gabapentin      -0      Yes                      gabapentin           U

T



     (Neurontin)      3-                               600 mg           [facility]



     600 MG      09:24:                               tablet           



     tablet      05                                                

 

     sulfaSALAzi            Yes                      sulfasalaz           

[facility]



     ne        3-09                               ine 500 mg           [facility]



     (Azulfidine      09:24:                               tablet           



     ) 500 MG      05                                 TAKE 4           



     tablet                                         TABLETS BY           



                                                  MOUTH           



                                                  EVERY DAY           

 

     azaTHIOprin      0      Yes                      See            [facility]



     e (Imuran)      3-09                               administrphilip [facility]

lth



     50 MG      09:24:                               tion           



     tablet      05                                 instructio           



                                                  ns.            

 

     Golimumab      -0      Yes                                     [facility]



     (SIMPONI      3-09                                              [facility]



     ARIA IV)      09:24:                                              



               05                                                

 

     aspirin 81      -0      Yes            81mg      Take 81 mg           U

T



     MG EC      309                               by mouth.           Health



     tablet      09:24:                                              



               05                                                

 

     gabapentin      -0      Yes                      gabapentin           U

T



     (Neurontin)      3-                               600 mg           [facility]



     600 MG      09:24:                               tablet           



     tablet      05                                                

 

     sulfaSALAzi      0      Yes                      sulfasalaz           

[facility]



     ne        3-09                               ine 500 mg           [facility]



     (Azulfidine      09:24:                               tablet           



     ) 500 MG      05                                 TAKE 4           



     tablet                                         TABLETS BY           



                                                  MOUTH           



                                                  EVERY DAY           

 

     azaTHIOprin      0      Yes                      See            [facility]



     e (Imuran)      3-09                               administra           [facility]

[facility]



     50 MG      09:24:                               tion           



     tablet      05                                 instructio           



                                                  ns.            

 

     Golimumab      0      Yes                                     [facility]



     (SIMPONI      3-09                                              [facility]



     ARIA IV)      09:24:                                              



               05                                                

 

     aspirin 81      -0      Yes            81mg      Take 81 mg           U

T



     MG EC      3-09                               by mouth.           Health



     tablet      09:24:                                              



               05                                                

 

     gabapentin      -0      Yes                      gabapentin           U

T



     (Neurontin)      3-09                               600 mg           [facility]



     600 MG      09:24:                               tablet           



     tablet      05                                                

 

     sulfaSALAzi            Yes                      sulfasalaz           

[facility]



     ne        3-09                               ine 500 mg           [facility]



     (Azulfidine      09:24:                               tablet           



     ) 500 MG      05                                 TAKE 4           



     tablet                                         TABLETS BY           



                                                  MOUTH           



                                                  EVERY DAY           

 

     azaTHIOprin      0      Yes                      See            [facility]



     e (Imuran)      3-09                               administra           [facility]

[facility]



     50 MG      09:24:                               tion           



     tablet      05                                 instructio           



                                                  ns.            

 

     Golimumab      0      Yes                                     [facility]



     (SIMPONI      3-09                                              [facility]



     ARIA IV)      09:24:                                              



               05                                                

 

     aspirin 81      -0      Yes            81mg      Take 81 mg           U

T



     MG EC      3-09                               by mouth.           Health



     tablet      09:24:                                              



               05                                                

 

     gabapentin      -0      Yes                      gabapentin           U

T



     (Neurontin)      3-09                               600 mg           Health



     600 MG      09:24:                               tablet           



     tablet      05                                                

 

     sulfaSALAzi            Yes                      sulfasalaz           

[facility]



     ne        3-09                               ine 500 mg           [facility]



     (Azulfidine      09:24:                               tablet           



     ) 500 MG      05                                 TAKE 4           



     tablet                                         TABLETS BY           



                                                  MOUTH           



                                                  EVERY DAY           

 

     azaTHIOprin      0      Yes                      See            [facility]



     e (Imuran)      3-09                               administra           [facility]

[facility]



     50 MG      09:24:                               tion           



     tablet      05                                 instructio           



                                                  ns.            

 

     Golimumab      -0      Yes                                     [facility]



     (SIMPONI      3-09                                              [facility]



     ARIA IV)      09:24:                                              



               05                                                

 

     aspirin 81      -0      Yes            81mg      Take 81 mg           U

T



     MG EC      3-09                               by mouth.           Health



     tablet      09:24:                                              



               05                                                

 

     gabapentin      -0      Yes                      gabapentin           U

T



     (Neurontin)      3-09                               600 mg           Health



     600 MG      09:24:                               tablet           



     tablet      05                                                

 

     sulfaSALAzi      0      Yes                      sulfasalaz           

[facility]



     ne        3-09                               ine 500 mg           [facility]



     (Azulfidine      09:24:                               tablet           



     ) 500 MG      05                                 TAKE 4           



     tablet                                         TABLETS BY           



                                                  MOUTH           



                                                  EVERY DAY           

 

     azaTHIOprin      -0      Yes                      See            [facility]



     e (Imuran)      3-09                               administra           [facility]

[facility]



     50 MG      09:24:                               tion           



     tablet      05                                 instructio           



                                                  ns.            

 

     aspirin 81            Yes            81mg      Take 81 mg           U

T



     MG EC      3-09                               by mouth.           Health



     tablet      09:24:                                              



               05                                                

 

     gabapentin            Yes                      gabapentin           U

T



     (Neurontin)      3-09                               600 mg           Health



     600 MG      09:24:                               tablet           



     tablet      05                                                

 

     sulfaSALAzi            Yes                      sulfasalaz           

[facility]



     ne        3-09                               ine 500 mg           [facility]



     (Azulfidine      09:24:                               tablet           



     ) 500 MG      05                                 TAKE 4           



     tablet                                         TABLETS BY           



                                                  MOUTH           



                                                  EVERY DAY           

 

     azaTHIOprin            Yes                      See            [facility]



     e (Imuran)      3-09                               administra           [facility]

[facility]



     50 MG      09:24:                               tion           



     tablet      05                                 instructio           



                                                  ns.            

 

     gabapentin            Yes                      gabapentin           U

T



     (Neurontin)      3-09                               600 mg           Health



     600 MG      09:24:                               tablet           



     tablet      05                                                

 

     sulfaSALAzi            Yes                      sulfasalaz           

[facility]



     ne        3                               ine 500 mg           [facility]



     (Azulfidine      09:24:                               tablet           



     ) 500 MG      05                                 TAKE 4           



     tablet                                         TABLETS BY           



                                                  MOUTH           



                                                  EVERY DAY           

 

     azaTHIOprin            Yes                      See            [facility]



     e (Imuran)      3-09                               administra           [facility]

[facility]



     50 MG      09:24:                               tion           



     tablet      05                                 instructio           



                                                  ns.            

 

     gabapentin            Yes                      gabapentin           U

T



     (Neurontin)      3-09                               600 mg           [facility]



     600 MG      09:24:                               tablet           



     tablet      05                                                

 

     sulfaSALAzi            Yes                      sulfasalaz           

[facility]



     ne        3-09                               ine 500 mg           [facility]



     (Azulfidine      09:24:                               tablet           



     ) 500 MG      05                                 TAKE 4           



     tablet                                         TABLETS BY           



                                                  MOUTH           



                                                  EVERY DAY           

 

     azaTHIOprin            Yes                      See            [facility]



     e (Imuran)      3-09                               administra           [facility]

[facility]



     50 MG      09:24:                               tion           



     tablet                                       instructio           



                                                  ns.            

 

     gabapentin            Yes                      gabapentin           U

T



     (Neurontin)      3-09                               600 mg           [facility]



     600 MG      09:24:                               tablet           



     tablet      05                                                

 

     sulfaSALAzi            Yes                      sulfasalaz           

[facility]



     ne        3-09                               ine 500 mg           [facility]



     (Azulfidine      09:24:                               tablet           



     ) 500 MG      05                                 TAKE 4           



     tablet                                         TABLETS BY           



                                                  MOUTH           



                                                  EVERY DAY           

 

     azaTHIOprin            Yes                      See            [facility]



     e (Imuran)      3-09                               administra           [facility]

[facility]



     50 MG      09:24:                               tion           



     tablet      05                                 instructio           



                                                  ns.            

 

     gabapentin            Yes                      gabapentin           U

T



     (Neurontin)      3-09                               600 mg           [facility]



     600 MG      09:24:                               tablet           



     tablet      05                                                

 

     sulfaSALAzi      2022-0      Yes                      sulfasalaz           

[facility]



     ne        3-09                               ine 500 mg           [facility]



     (Azulfidine      09:24:                               tablet           



     ) 500 MG      05                                 TAKE 4           



     tablet                                         TABLETS BY           



                                                  MOUTH           



                                                  EVERY DAY           

 

     azaTHIOprin      -0      Yes                      See            [facility]



     e (Imuran)      3-09                               administra           [facility]

[facility]



     50 MG      09:24:                               tion           



     tablet      05                                 instructio           



                                                  ns.            

 

     gabapentin            Yes                      gabapentin           U

T



     (Neurontin)      3                               600 mg           [facility]



     600 MG      09:24:                               tablet           



     tablet      05                                                

 

     sulfaSALAzi            Yes                      sulfasalaz           

[facility]



     ne        3-09                               ine 500 mg           [facility]



     (Azulfidine      09:24:                               tablet           



     ) 500 MG      05                                 TAKE 4           



     tablet                                         TABLETS BY           



                                                  MOUTH           



                                                  EVERY DAY           

 

     azaTHIOprin            Yes                      See            [facility]



     e (Imuran)      3-09                               administra           [facility]

[facility]



     50 MG      09:24:                               tion           



     tablet      05                                 instructio           



                                                  ns.            

 

     gabapentin            Yes                      gabapentin           U

T



     (Neurontin)      3-09                               600 mg           [facility]



     600 MG      09:24:                               tablet           



     tablet      05                                                

 

     sulfaSALAzi            Yes                      sulfasalaz           

[facility]



     ne        3-09                               ine 500 mg           [facility]



     (Azulfidine      09:24:                               tablet           



     ) 500 MG      05                                 TAKE 4           



     tablet                                         TABLETS BY           



                                                  MOUTH           



                                                  EVERY DAY           

 

     azaTHIOprin      0      Yes                      See            UT



     e (Imuran)      3-09                               administra           [facility]

[facility]



     50 MG      09:24:                               tion           



     tablet      05                                 instructio           



                                                  ns.            

 

     gabapentin            Yes                      gabapentin           U

T



     (Neurontin)      3-09                               600 mg           [facility]



     600 MG      09:24:                               tablet           



     tablet      05                                                

 

     sulfaSALAzi            Yes                      sulfasalaz           

[facility]



     ne        3-09                               ine 500 mg           [facility]



     (Azulfidine      09:24:                               tablet           



     ) 500 MG      05                                 TAKE 4           



     tablet                                         TABLETS BY           



                                                  MOUTH           



                                                  EVERY DAY           

 

     azaTHIOprin      0      Yes                      See            [facility]



     e (Imuran)      3-09                               administra           [facility]

[facility]



     50 MG      09:24:                               tion           



     tablet      05                                 instructio           



                                                  ns.            

 

     gabapentin            Yes                      gabapentin           U

T



     (Neurontin)      3-09                               600 mg           [facility]



     600 MG      09:24:                               tablet           



     tablet      05                                                

 

     sulfaSALAzi            Yes                      sulfasalaz           

[facility]



     ne        3-09                               ine 500 mg           [facility]



     (Azulfidine      09:24:                               tablet           



     ) 500 MG      05                                 TAKE 4           



     tablet                                         TABLETS BY           



                                                  MOUTH           



                                                  EVERY DAY           

 

     azaTHIOprin      -0      Yes                      See            UT



     e (Imuran)      3-09                               administra           [facility]

[facility]



     50 MG      09:24:                               tion           



     tablet      05                                 instructio           



                                                  ns.            

 

     gabapentin      2022-0      Yes                      gabapentin           U

T



     (Neurontin)      3-09                               600 mg           [facility]



     600 MG      09:24:                               tablet           



     tablet      05                                                

 

     sulfaSALAzi            Yes                      sulfasalaz           

[facility]



     ne        3-09                               ine 500 mg           [facility]



     (Azulfidine      09:24:                               tablet           



     ) 500 MG      05                                 TAKE 4           



     tablet                                         TABLETS BY           



                                                  MOUTH           



                                                  EVERY DAY           

 

     azaTHIOprin            Yes                      See            UT



     e (Imuran)      3-09                               administra           [facility]

[facility]



     50 MG      09:24:                               tion           



     tablet      05                                 instructio           



                                                  ns.            

 

     gabapentin            Yes                      gabapentin           U

T



     (Neurontin)      3-                               600 mg           [facility]



     600 MG      09:24:                               tablet           



     tablet      05                                                

 

     sulfaSALAzi            Yes                      sulfasalaz           

[facility]



     ne        3-09                               ine 500 mg           [facility]



     (Azulfidine      09:24:                               tablet           



     ) 500 MG      05                                 TAKE 4           



     tablet                                         TABLETS BY           



                                                  MOUTH           



                                                  EVERY DAY           

 

     azaTHIOprin            Yes                      See            [facility]



     e (Imuran)      3-09                               administra           [facility]

[facility]



     50 MG      09:24:                               tion           



     tablet      05                                 instructio           



                                                  ns.            

 

     acetaminoph            Yes       [id number] 1{tbl} Q.5D Take 1         

  [facility]



     en-codeine      3-09                               tablet by           [facility]

[facility]



     (Tylenol w/      00:00:                               mouth 2           



     Codeine #3)      00                                 (two)           



     300-30 MG                                         times a           



     tablet                                         day if           



                                                  needed for           



                                                  moderate           



                                                  pain or           



                                                  severe           



                                                  pain.           

 

     acetaminoph      2022- No        [id number] 1{tbl} Q.5D Take 1        

   [facility]



     en-codeine      3-09 04-20                          tablet by           [facility]



     (Tylenol w/      00:00: 00:00                          mouth 2           



     Codeine #3)      00   :00                           (two)           



     300-30 MG                                         times a           



     tablet                                         day if           



                                                  needed for           



                                                  moderate           



                                                  pain or           



                                                  severe           



                                                  pain.           

 

     Restoril 15 Restoril 15       No             1{capsu QD   Restoril   

        



     MG   MG   1-25                     le_at_b      15 MG           



               00:00:                     edtime_                     



               00                       as_need                     



                                        ed}                      

 

     Restoril 15 Restoril 15       No             1{capsu QD   Restoril   

        



     MG   MG   1-25                     le_at_b      15 MG           



               00:00:                     edtime_                     



               00                       as_need                     



                                        ed}                      

 

     Macrobid Macrobid 2022- No             1{capsu BID  Macrobid        

   



     100  MG                 le_with      100 MG           



               00:00: 00:00                _food}                     



               00   :00                                          

 

     temazepam      2021      Yes                                     [facility]



     (Restoril)      2-06                                              [facility]



     15 MG      00:00:                                              



     capsule      00                                                

 

     temazepam      2021      Yes                                     [facility]



     (Restoril)      2-06                                              [facility]



     15 MG      00:00:                                              



     capsule      00                                                

 

     temazepam      2021      Yes                                     [facility]



     (Restoril)      2-06                                              [facility]



     15 MG      00:00:                                              



     capsule      00                                                

 

     temazepam      2021      Yes                                     [facility]



     (Restoril)      2-06                                              [facility]



     15 MG      00:00:                                              



     capsule      00                                                

 

     temazepam      2021      Yes                                     [facility]



     (Restoril)      2-06                                              [facility]



     15 MG      00:00:                                              



     capsule      00                                                

 

     temazepam      2021      Yes                                     [facility]



     (Restoril)      2-06                                              [facility]



     15 MG      00:00:                                              



     capsule      00                                                

 

     temazepam      2021      Yes                                     [facility]



     (Restoril)      2-06                                              [facility]



     15 MG      00:00:                                              



     capsule      00                                                

 

     temazepam      2021      Yes                                     [facility]



     (Restoril)      2-06                                              [facility]



     15 MG      00:00:                                              



     capsule      00                                                

 

     temazepam      2021      Yes                                     [facility]



     (Restoril)      2-06                                              [facility]



     15 MG      00:00:                                              



     capsule      00                                                

 

     temazepam      2021      Yes                                     [facility]



     (Restoril)      2-06                                              [facility]



     15 MG      00:00:                                              



     capsule      00                                                

 

     temazepam      2021      Yes                                     [facility]



     (Restoril)      2-06                                              [facility]



     15 MG      00:00:                                              



     capsule      00                                                

 

     temazepam      2021      Yes                                     [facility]



     (Restoril)      2-06                                              [facility]



     15 MG      00:00:                                              



     capsule      00                                                

 

     temazepam      2021      Yes                                     [facility]



     (Restoril)      2-06                                              [facility]



     15 MG      00:00:                                              



     capsule      00                                                

 

     temazepam      2021      Yes                                     [facility]



     (Restoril)      2-06                                              [facility]



     15 MG      00:00:                                              



     capsule      00                                                

 

     temazepam      2021      Yes                                     [facility]



     (Restoril)      2-06                                              [facility]



     15 MG      00:00:                                              



     capsule      00                                                

 

     temazepam      2021      Yes                                     [facility]



     (Restoril)      2-06                                              [facility]



     15 MG      00:00:                                              



     capsule      00                                                

 

     temazepam      2021      Yes                                     [facility]



     (Restoril)      2-06                                              [facility]



     15 MG      00:00:                                              



     capsule      00                                                

 

     hydroxychlo      2021      Yes                                     [facility]



     roquine      2-03                                              [facility]



     (Plaquenil)      00:00:                                              



     200 MG      00                                                



     tablet                                                        

 

     hydroxychlo      2021      Yes                                     [facility]



     roquine      2-03                                              [facility]



     (Plaquenil)      00:00:                                              



     200 MG      00                                                



     tablet                                                        

 

     hydroxychlo      2021      Yes                                     [facility]



     roquine      2-03                                              [facility]



     (Plaquenil)      00:00:                                              



     200 MG      00                                                



     tablet                                                        

 

     hydroxychlo      2021      Yes                                     [facility]



     roquine      2-03                                              [facility]



     (Plaquenil)      00:00:                                              



     200 MG      00                                                



     tablet                                                        

 

     hydroxychlo      -      Yes                                     [facility]



     roquine      2-03                                              [facility]



     (Plaquenil)      00:00:                                              



     200 MG      00                                                



     tablet                                                        

 

     hydroxychlo      -      Yes                                     [facility]



     roquine      2-03                                              [facility]



     (Plaquenil)      00:00:                                              



     200 MG      00                                                



     tablet                                                        

 

     hydroxychlo      -      Yes                                     [facility]



     roquine      2-03                                              [facility]



     (Plaquenil)      00:00:                                              



     200 MG      00                                                



     tablet                                                        

 

     hydroxychlo      -      Yes                                     [facility]



     roquine      2-03                                              [facility]



     (Plaquenil)      00:00:                                              



     200 MG      00                                                



     tablet                                                        

 

     hydroxychlo      -      Yes                                     [facility]



     roquine      2-03                                              [facility]



     (Plaquenil)      00:00:                                              



     200 MG      00                                                



     tablet                                                        

 

     hydroxychlo      2021      Yes                                     [facility]



     roquine      2-03                                              [facility]



     (Plaquenil)      00:00:                                              



     200 MG      00                                                



     tablet                                                        

 

     hydroxychlo      -      Yes                                     [facility]



     roquine      2-03                                              [facility]



     (Plaquenil)      00:00:                                              



     200 MG      00                                                



     tablet                                                        

 

     hydroxychlo      2021      Yes                                     [facility]



     roquine      2-03                                              [facility]



     (Plaquenil)      00:00:                                              



     200 MG      00                                                



     tablet                                                        

 

     hydroxychlo      2021      Yes                                     [facility]



     roquine      2-03                                              [facility]



     (Plaquenil)      00:00:                                              



     200 MG      00                                                



     tablet                                                        

 

     hydroxychlo      2021      Yes                                     [facility]



     roquine      2-03                                              [facility]



     (Plaquenil)      00:00:                                              



     200 MG      00                                                



     tablet                                                        

 

     hydroxychlo      -      Yes                                     [facility]



     roquine      2-03                                              [facility]



     (Plaquenil)      00:00:                                              



     200 MG      00                                                



     tablet                                                        

 

     hydroxychlo      2021      Yes                                     [facility]



     roquine      2-03                                              [facility]



     (Plaquenil)      00:00:                                              



     200 MG      00                                                



     tablet                                                        

 

     hydroxychlo      2021      Yes                                     [facility]



     roquine      2-03                                              [facility]



     (Plaquenil)      00:00:                                              



     200 MG      00                                                



     tablet                                                        

 

     rosuvastati      2021      Yes                                     [facility]



     n ([facility])      1-04                                              Health



     5 MG tablet      00:00:                                              



               00                                                

 

     rosuvastati      2021      Yes                                     [facility]



     n ([facility])      1-04                                              [facility]



     5 MG tablet      00:00:                                              



               00                                                

 

     rosuvastati      2021      Yes                                     [facility]



     n ([facility])      1-04                                              [facility]



     5 MG tablet      00:00:                                              



               00                                                

 

     rosuvastati      2021      Yes                                     [facility]



     n ([facility])      1-04                                              [facility]



     5 MG tablet      00:00:                                              



               00                                                

 

     rosuvastati      2021      Yes                                     [facility]



     n ([facility])      1-04                                              [facility]



     5 MG tablet      00:00:                                              



               00                                                

 

     rosuvastati      2021      Yes                                     [facility]



     n ([facility])      1-04                                              [facility]



     5 MG tablet      00:00:                                              



               00                                                

 

     rosuvastati      2021      Yes                                     [facility]



     n ([facility])      1-04                                              [facility]



     5 MG tablet      00:00:                                              



               00                                                

 

     rosuvastati      2021      Yes                                     [facility]



     n ([facility])      1-04                                              [facility]



     5 MG tablet      00:00:                                              



               00                                                

 

     rosuvastati      2021      Yes                                     [facility]



     n ([facility])      1-04                                              [facility]



     5 MG tablet      00:00:                                              



               00                                                

 

     rosuvastati      2021      Yes                                     [facility]



     n ([facility])      1-04                                              [facility]



     5 MG tablet      00:00:                                              



               00                                                

 

     rosuvastati      2021      Yes                                     [facility]



     n ([facility])      1-04                                              [facility]



     5 MG tablet      00:00:                                              



               00                                                

 

     rosuvastati      2021      Yes                                     [facility]



     n ([facility])      1-04                                              [facility]



     5 MG tablet      00:00:                                              



               00                                                

 

     rosuvastati      2021      Yes                                     [facility]



     n ([facility])      1-04                                              [facility]



     5 MG tablet      00:00:                                              



               00                                                

 

     rosuvastati      2021      Yes                                     [facility]



     n ([facility])      1-04                                              [facility]



     5 MG tablet      00:00:                                              



               00                                                

 

     rosuvastati      2021      Yes                                     [facility]



     n ([facility])      1-04                                              [facility]



     5 MG tablet      00:00:                                              



               00                                                

 

     rosuvastati      2021      Yes                                     [facility]



     n ([facility])      1-04                                              [facility]



     5 MG tablet      00:00:                                              



               00                                                

 

     rosuvastati      2021      Yes                                     [facility]



     n ([facility])      1-04                                              [facility]



     5 MG tablet      00:00:                                              



               00                                                

 

     levothyroxi      2021      Yes                                     [facility]



     ne        0-21                                              [facility]



     (Synthroid,      00:00:                                              



     Levoxyl)      00                                                



     150 MCG                                                        



     tablet                                                        

 

     levothyroxi      2021      Yes                                     [facility]



     ne        0-21                                              [facility]



     (Synthroid,      00:00:                                              



     Levoxyl)      00                                                



     150 MCG                                                        



     tablet                                                        

 

     levothyroxi      2021      Yes                                     [facility]



     ne        0-21                                              [facility]



     (Synthroid,      00:00:                                              



     Levoxyl)      00                                                



     150 MCG                                                        



     tablet                                                        

 

     levothyroxi      2021      Yes                                     [facility]



     ne        0-21                                              [facility]



     (Synthroid,      00:00:                                              



     Levoxyl)      00                                                



     150 MCG                                                        



     tablet                                                        

 

     levothyroxi      2021      Yes                                     [facility]



     ne        0-21                                              [facility]



     (Synthroid,      00:00:                                              



     Levoxyl)      00                                                



     150 MCG                                                        



     tablet                                                        

 

     levothyroxi      2021      Yes                                     [facility]



     ne        0-21                                              [facility]



     (Synthroid,      00:00:                                              



     Levoxyl)      00                                                



     150 MCG                                                        



     tablet                                                        

 

     levothyroxi      2021      Yes                                     [facility]



     ne        0-21                                              [facility]



     (Synthroid,      00:00:                                              



     Levoxyl)      00                                                



     150 MCG                                                        



     tablet                                                        

 

     levothyroxi      2021      Yes                                     [facility]



     ne        0-21                                              [facility]



     (Synthroid,      00:00:                                              



     Levoxyl)      00                                                



     150 MCG                                                        



     tablet                                                        

 

     levothyroxi      2021      Yes                                     [facility]



     ne        0-21                                              [facility]



     (Synthroid,      00:00:                                              



     Levoxyl)      00                                                



     150 MCG                                                        



     tablet                                                        

 

     levothyroxi      2021      Yes                                     [facility]



     ne        0-21                                              [facility]



     (Synthroid,      00:00:                                              



     Levoxyl)      00                                                



     150 MCG                                                        



     tablet                                                        

 

     levothyroxi      2021      Yes                                     [facility]



     ne        0-21                                              [facility]



     (Synthroid,      00:00:                                              



     Levoxyl)      00                                                



     150 MCG                                                        



     tablet                                                        

 

     levothyroxi      2021      Yes                                     [facility]



     ne        0-21                                              [facility]



     (Synthroid,      00:00:                                              



     Levoxyl)      00                                                



     150 MCG                                                        



     tablet                                                        

 

     levothyroxi      2021      Yes                                     [facility]



     ne        0-21                                              [facility]



     (Synthroid,      00:00:                                              



     Levoxyl)      00                                                



     150 MCG                                                        



     tablet                                                        

 

     levothyroxi      2021      Yes                                     [facility]



     ne        0-21                                              [facility]



     (Synthroid,      00:00:                                              



     Levoxyl)      00                                                



     150 MCG                                                        



     tablet                                                        

 

     levothyroxi      2021      Yes                                     [facility]



     ne        0-21                                              [facility]



     (Synthroid,      00:00:                                              



     Levoxyl)      00                                                



     150 MCG                                                        



     tablet                                                        

 

     levothyroxi      2021      Yes                                     [facility]



     ne        0-21                                              [facility]



     (Synthroid,      00:00:                                              



     Levoxyl)      00                                                



     150 MCG                                                        



     tablet                                                        

 

     levothyroxi      2021      Yes                                     [facility]



     ne        0-21                                              [facility]



     (Synthroid,      00:00:                                              



     Levoxyl)      00                                                



     150 MCG                                                        



     tablet                                                        

 

     folic acid      2021      Yes                                     [facility]



     (Folvite) 1      0-17                                              [facility]



     MG tablet      00:00:                                              



               00                                                

 

     folic acid      2021      Yes                                     [facility]



     (Folvite) 1      0-17                                              [facility]



     MG tablet      00:00:                                              



               00                                                

 

     folic acid      2021      Yes                                     [facility]



     (Folvite) 1      0-17                                              [facility]



     MG tablet      00:00:                                              



               00                                                

 

     folic acid      2021      Yes                                     [facility]



     (Folvite) 1      0-17                                              [facility]



     MG tablet      00:00:                                              



               00                                                

 

     folic acid      2021      Yes                                     [facility]



     (Folvite) 1      0-17                                              [facility]



     MG tablet      00:00:                                              



               00                                                

 

     folic acid      2021      Yes                                     [facility]



     (Folvite) 1      0-17                                              [facility]



     MG tablet      00:00:                                              



               00                                                

 

     folic acid      2021      Yes                                     [facility]



     (Folvite) 1      0-17                                              [facility]



     MG tablet      00:00:                                              



               00                                                

 

     folic acid      2021      Yes                                     [facility]



     (Folvite) 1      0-17                                              [facility]



     MG tablet      00:00:                                              



               00                                                

 

     folic acid      2021      Yes                                     [facility]



     (Folvite) 1      0-17                                              [facility]



     MG tablet      00:00:                                              



               00                                                

 

     folic acid      2021      Yes                                     [facility]



     (Folvite) 1      0-17                                              [facility]



     MG tablet      00:00:                                              



               00                                                

 

     folic acid      2021      Yes                                     [facility]



     (Folvite) 1      0-17                                              [facility]



     MG tablet      00:00:                                              



               00                                                

 

     folic acid      2021      Yes                                     [facility]



     (Folvite) 1      0-17                                              [facility]



     MG tablet      00:00:                                              



               00                                                

 

     folic acid      2021      Yes                                     [facility]



     (Folvite) 1      0-17                                              [facility]



     MG tablet      00:00:                                              



               00                                                

 

     folic acid      2021      Yes                                     [facility]



     (Folvite) 1      0-17                                              [facility]



     MG tablet      00:00:                                              



               00                                                

 

     folic acid      2021      Yes                                     [facility]



     (Folvite) 1      0-17                                              [facility]



     MG tablet      00:00:                                              



               00                                                

 

     folic acid      2021      Yes                                     [facility]



     (Folvite) 1      0-17                                              [facility]



     MG tablet      00:00:                                              



               00                                                

 

     folic acid      2021      Yes                                     [facility]



     (Folvite) 1      0-17                                              Health



     MG tablet      00:00:                                              



               00                                                

 

     DULoxetine      2021- No                                      [facility]



     (Cymbalta)      0-12 04-12                                         Health



     30 MG DR      00:00: 00:00                                         



     capsule      00   :00                                          

 

     DULoxetine      2021- No                                      [facility]



     (Cymbalta)      0-12 04-12                                         Health



     30 MG DR      00:00: 00:00                                         



     capsule      00   :00                                          

 

     methocarbam      -0      Yes                                     [facility]



     ol        9-28                                              [facility]



     (Robaxin)      00:00:                                              



     750 MG      00                                                



     tablet                                                        

 

     methocarbam      -0      Yes                                     [facility]



     ol        9-28                                              [facility]



     (Robaxin)      00:00:                                              



     750 MG      00                                                



     tablet                                                        

 

     methocarbam      -0      Yes                                     [facility]



     ol        9-28                                              [facility]



     (Robaxin)      00:00:                                              



     750 MG      00                                                



     tablet                                                        

 

     methocarbam      -0      Yes                                     [facility]



     ol        9-28                                              [facility]



     (Robaxin)      00:00:                                              



     750 MG      00                                                



     tablet                                                        

 

     methocarbam      -0      Yes                                     [facility]



     ol        9-28                                              [facility]



     (Robaxin)      00:00:                                              



     750 MG      00                                                



     tablet                                                        

 

     methocarbam      2021-0      Yes                                     [facility]



     ol        9-28                                              [facility]



     (Robaxin)      00:00:                                              



     750 MG      00                                                



     tablet                                                        

 

     methocarbam      1-0      Yes                                     [facility]



     ol        9-28                                              [facility]



     (Robaxin)      00:00:                                              



     750 MG      00                                                



     tablet                                                        

 

     methocarbam      1-0      Yes                                     [facility]



     ol        9-28                                              [facility]



     (Robaxin)      00:00:                                              



     750 MG      00                                                



     tablet                                                        

 

     methocarbam      1-0      Yes                                     [facility]



     ol        9-28                                              [facility]



     (Robaxin)      00:00:                                              



     750 MG      00                                                



     tablet                                                        

 

     methocarbam      1-0      Yes                                     [facility]



     ol        9-28                                              [facility]



     (Robaxin)      00:00:                                              



     750 MG      00                                                



     tablet                                                        

 

     methocarbam      1-0      Yes                                     [facility]



     ol        9-28                                              Health



     (Robaxin)      00:00:                                              



     750 MG      00                                                



     tablet                                                        

 

     methocarbam      1-0      Yes                                     [facility]



     ol        9-28                                              [facility]



     (Robaxin)      00:00:                                              



     750 MG      00                                                



     tablet                                                        

 

     methocarbam      1-0      Yes                                     [facility]



     ol        9-28                                              [facility]



     (Robaxin)      00:00:                                              



     750 MG      00                                                



     tablet                                                        

 

     methocarbam      1-0      Yes                                     [facility]



     ol        9-28                                              [facility]



     (Robaxin)      00:00:                                              



     750 MG      00                                                



     tablet                                                        

 

     methocarbam      1-0      Yes                                     [facility]



     ol        9-28                                              [facility]



     (Robaxin)      00:00:                                              



     750 MG      00                                                



     tablet                                                        

 

     methocarbam      1-0      Yes                                     [facility]



     ol        9-28                                              [facility]



     (Robaxin)      00:00:                                              



     750 MG      00                                                



     tablet                                                        

 

     methocarbam      1-0      Yes                                     [facility]



     ol        9-28                                              [facility]



     (Robaxin)      00:00:                                              



     750 MG      00                                                



     tablet                                                        

 

     metFORMIN      1-0      Yes                                     [facility]



     (Glucophage      9-19                                              Health



     ) 1000 MG      00:00:                                              



     tablet      00                                                

 

     metFORMIN      1-0      Yes                                     [facility]



     (Glucophage      9-19                                              [facility]



     ) 1000 MG      00:00:                                              



     tablet      00                                                

 

     metFORMIN      1-0      Yes                                     [facility]



     (Glucophage      9-19                                              [facility]



     ) 1000 MG      00:00:                                              



     tablet      00                                                

 

     metFORMIN      1-0      Yes                                     [facility]



     (Glucophage      9-19                                              Health



     ) 1000 MG      00:00:                                              



     tablet      00                                                

 

     metFORMIN      1-0      Yes                                     [facility]



     (Glucophage      9-19                                              [facility]



     ) 1000 MG      00:00:                                              



     tablet      00                                                

 

     metFORMIN      1-0      Yes                                     UT



     (Glucophage      9-19                                              Health



     ) 1000 MG      00:00:                                              



     tablet      00                                                

 

     metFORMIN      1-0      Yes                                     [facility]



     (Glucophage      9-19                                              Health



     ) 1000 MG      00:00:                                              



     tablet      00                                                

 

     metFORMIN      1-0      Yes                                     [facility]



     (Glucophage      9-19                                              Health



     ) 1000 MG      00:00:                                              



     tablet      00                                                

 

     metFORMIN      2021-0      Yes                                     [facility]



     (Glucophage      9-19                                              Health



     ) 1000 MG      00:00:                                              



     tablet      00                                                

 

     metFORMIN      -0      Yes                                     [facility]



     (Glucophage      9-19                                              [facility]



     ) 1000 MG      00:00:                                              



     tablet      00                                                

 

     metFORMIN      -0      Yes                                     UT



     (Glucophage      9-19                                              Health



     ) 1000 MG      00:00:                                              



     tablet      00                                                

 

     metFORMIN      -0      Yes                                     [facility]



     (Glucophage      9-19                                              Health



     ) 1000 MG      00:00:                                              



     tablet      00                                                

 

     metFORMIN      -0      Yes                                     UT



     (Glucophage      9-19                                              Health



     ) 1000 MG      00:00:                                              



     tablet      00                                                

 

     metFORMIN      -0      Yes                                     UT



     (Glucophage      9-19                                              Health



     ) 1000 MG      00:00:                                              



     tablet      00                                                

 

     metFORMIN      -0      Yes                                     UT



     (Glucophage      9-19                                              Health



     ) 1000 MG      00:00:                                              



     tablet      00                                                

 

     metFORMIN      -0      Yes                                     UT



     (Glucophage      9-19                                              Health



     ) 1000 MG      00:00:                                              



     tablet      00                                                

 

     metFORMIN      -0      Yes                                     UT



     (Glucophage      9-19                                              Health



     ) 1000 MG      00:00:                                              



     tablet      00                                                

 

     glimepiride      0      Yes                      glimepirid           

UT



     (Amaryl) 2      1-20                               e 2 mg           Health



     MG tablet      00:00:                               tablet           



               00                                                

 

     glimepiride      0      Yes                      glimepirid           

UT



     (Amaryl) 2      1-20                               e 2 mg           [facility]



     MG tablet      00:00:                               tablet           



               00                                                

 

     glimepiride      0      Yes                      glimepirid           

UT



     (Amaryl) 2      1-20                               e 2 mg           Health



     MG tablet      00:00:                               tablet           



               00                                                

 

     glimepiride      0      Yes                      glimepirid           

UT



     (Amaryl) 2      1-20                               e 2 mg           Health



     MG tablet      00:00:                               tablet           



               00                                                

 

     glimepiride      0      Yes                      glimepirid           

UT



     (Amaryl) 2      1-20                               e 2 mg           Health



     MG tablet      00:00:                               tablet           



               00                                                

 

     glimepiride      0      Yes                      glimepirid           

UT



     (Amaryl) 2      1-20                               e 2 mg           Health



     MG tablet      00:00:                               tablet           



               00                                                

 

     glimepiride      0 - No                       glimepirid          

 [facility]



     (Amaryl) 2      1-20 02-16                          e 2 mg           Health



     MG tablet      00:00: 00:00                          tablet           



               00   :00                                          

 

     One Touch One Touch 2020- No                  QD   One Touch        

   



     Verio n/s Verio n/s 2-31 12-26                          Verio n/s          

 



               00:00: 00:00                                         



               00   :00                                          

 

     One Touch One Touch 2020- No                  QD                  



     Verio n/s Verio n/s                                          



               00:00: 00:00                                         



               00   :00                                          

 

     One Touch One Touch 2020- No                  QD   One Touch        

   



     Verio n/s Verio n/s -                          Verio n/s          

 



               00:00: 00:00                                         



               00   :00                                          

 

     One Touch One Touch 2020- No                  QD   One Touch        

   



     Verio n/s Verio n/s --                          Verio n/s          

 



               00:00: 00:00                                         



               00   :00                                          

 

     One Touch One Touch 2020- No                  QD   One Touch        

   



     Verio n/s Verio n/s --                          Verio n/s          

 



               00:00: 00:00                                         



               00   :00                                          

 

     One Touch One Touch 2020- No                  QD                  



     Verio n/s Verio n/s                                          



               00:00: 00:00                                         



               00   :00                                          

 

     Celebrex Celebrex 2020- No   Na Strickland                1 capsule       

    Common



               24                          with food           [facility]



               00:00: 00:00                                         - [facility]



               00   :00                                          [facility]

 

     Alpha Alpha 2018      Yes  Na Strickland                as             Common



     Lipoic Acid Lipoic Acid 2-14                               directed        

   [facility]



               00:00:                                              [facility]



               00                                                [facility]

 

     Aspirin Aspirin 2018      Yes  Na Strickland                1 tablet           

Common



     Adult Low Adult Low 2-14                                              [facility]

t



     Dose Dose 00:00:                                              - [facility]



               00                                                [facility]

 

     Zofran Zofran 2018      Yes  Na Strickland                1 tablet           Co

mmon



               2-14                                              [facility]



               00:00:                                              [facility]



               00                                                [facility]

 

     Aspirin Aspirin 2018      No             1{table QD   Aspirin           



     Adult Low Adult Low 2-14                     t}        Adult Low           



     Dose 81 MG Dose 81 MG 00:00:                               Dose 81 MG      

     



               00                                                

 

     Zofran 8 MG Zofran 8 MG 2018      No             1{table                 

    



               2-14                     t}                       



               00:00:                                              



               00                                                

 

     Aspirin Aspirin -      No             1{table QD                  



     Adult Low Adult Low 2-14                     t}                       



     Dose 81 MG Dose 81 MG 00:00:                                              



               00                                                

 

     Zofran 8 MG Zofran 8 MG 2018      No             1{table      Zofran 8   

        



               2-14                     t}        MG             



               00:00:                                              



               00                                                

 

     Aspirin Aspirin 2018      No             1{table QD   Aspirin           



     Adult Low Adult Low 2-14                     t}        Adult Low           



     Dose 81 MG Dose 81 MG 00:00:                               Dose 81 MG      

     



               00                                                

 

     Zofran 8 MG Zofran 8 MG 2018      No             1{table      Zofran 8   

        



               2-14                     t}        MG             



               00:00:                                              



               00                                                

 

     Aspirin Aspirin 2018      No             1{table QD   Aspirin           



     Adult Low Adult Low 2-14                     t}        Adult Low           



     Dose 81 MG Dose 81 MG 00:00:                               Dose 81 MG      

     



               00                                                

 

     Zofran 8 MG Zofran 8 MG 2018      No             1{table      Zofran 8   

        



               2-14                     t}        MG             



               00:00:                                              



               00                                                

 

     Aspirin Aspirin 2018      No             1{table QD   Aspirin           



     Adult Low Adult Low 2-14                     t}        Adult Low           



     Dose 81 MG Dose 81 MG 00:00:                               Dose 81 MG      

     



               00                                                

 

     Zofran 8 MG Zofran 8 MG 2018      No             1{table                 

    



               2-14                     t}                       



               00:00:                                              



               00                                                

 

     Aspirin Aspirin 2018      No             1{table QD                  



     Adult Low Adult Low 2-14                     t}                       



     Dose 81 MG Dose 81 MG 00:00:                                              



               00                                                

 

     Aspirin Aspirin 2018      No             1{table QD   Aspirin           



     Adult Low Adult Low 2-14                     t}        Adult Low           



     Dose 81 MG Dose 81 MG 00:00:                               Dose 81 MG      

     



               00                                                

 

     Zofran 8 MG Zofran 8 MG 2018      No             1{table      Zofran 8   

        



               2-14                     t}        MG             



               00:00:                                              



               00                                                

 

     Aspirin Aspirin 2018      No             1{table QD   Aspirin           



     Adult Low Adult Low 2-14                     t}        Adult Low           



     Dose 81 MG Dose 81 MG 00:00:                               Dose 81 MG      

     



               00                                                

 

     Zofran 8 MG Zofran 8 MG 2018      No             1{table      Zofran 8   

        



               2-14                     t}        MG             



               00:00:                                              



               00                                                

 

     Aspirin Aspirin 2018      No             1{table QD   Aspirin           



     Adult Low Adult Low 2-14                     t}        Adult Low           



     Dose 81 MG Dose 81 MG 00:00:                               Dose 81 MG      

     



               00                                                

 

     Zofran 8 MG Zofran 8 MG 2018      No             1{table      Zofran 8   

        



               2-14                     t}        MG             



               00:00:                                              



               00                                                

 

     Aspirin Aspirin 2018      No             1{table QD   Aspirin           



     Adult Low Adult Low 2-14                     t}        Adult Low           



     Dose 81 MG Dose 81 MG 00:00:                               Dose 81 MG      

     



               00                                                

 

     Zofran 8 MG Zofran 8 MG 2018      No             1{table      Zofran 8   

        



               2-14                     t}        MG             



               00:00:                                              



               00                                                

 

     Zofran 8 MG Zofran 8 MG 2018      No             1{table      Zofran 8   

        



               2-14                     t}        MG             



               00:00:                                              



               00                                                

 

     Aspirin Aspirin 2018      No             1{table QD   Aspirin           



     Adult Low Adult Low 2-14                     t}        Adult Low           



     Dose 81 MG Dose 81 MG 00:00:                               Dose 81 MG      

     



               00                                                

 

     Aspirin Aspirin 2018      No             1{table QD   Aspirin           



     Adult Low Adult Low 2-14                     t}        Adult Low           



     Dose 81 MG Dose 81 MG 00:00:                               Dose 81 MG      

     



               00                                                

 

     Zofran 8 MG Zofran 8 MG 2018      No             1{table      Zofran 8   

        



               2-14                     t}        MG             



               00:00:                                              



               00                                                

 

     Zofran 8 MG Zofran 8 MG 2018      No             1{table      Zofran 8   

        



               2-14                     t}        MG             



               00:00:                                              



               00                                                

 

     Aspirin Aspirin 2018      No             1{table QD   Aspirin           



     Adult Low Adult Low 2-14                     t}        Adult Low           



     Dose 81 MG Dose 81 MG 00:00:                               Dose 81 MG      

     



               00                                                

 

     Zofran 8 MG Zofran 8 MG 2018      No             1{table      Zofran 8   

        



               2-14                     t}        MG             



               00:00:                                              



               00                                                

 

     Aspirin Aspirin 2018      No             1{table QD   Aspirin           



     Adult Low Adult Low 2-14                     t}        Adult Low           



     Dose 81 MG Dose 81 MG 00:00:                               Dose 81 MG      

     



               00                                                

 

     Zofran 8 MG Zofran 8 MG 2018      No             1{table      Zofran 8   

        



               2-14                     t}        MG             



               00:00:                                              



               00                                                

 

     Aspirin Aspirin 2018      No             1{table QD   Aspirin           



     Adult Low Adult Low 2-14                     t}        Adult Low           



     Dose 81 MG Dose 81 MG 00:00:                               Dose 81 MG      

     



               00                                                

 

     Zofran 8 MG Zofran 8 MG 2018      No             1{table      Zofran 8   

        



               2-14                     t}        MG             



               00:00:                                              



               00                                                

 

     Aspirin Aspirin 2018      No             1{table QD   Aspirin           



     Adult Low Adult Low 2-14                     t}        Adult Low           



     Dose 81 MG Dose 81 MG 00:00:                               Dose 81 MG      

     



               00                                                

 

     Zofran 8 MG Zofran 8 MG 2018      No             1{table      Zofran 8   

        



               2-14                     t}        MG             



               00:00:                                              



               00                                                

 

     Aspirin Aspirin 2018      No             1{table QD   Aspirin           



     Adult Low Adult Low 2-14                     t}        Adult Low           



     Dose 81 MG Dose 81 MG 00:00:                               Dose 81 MG      

     



               00                                                

 

     Zofran 8 MG Zofran 8 MG 2018      No             1{table      Zofran 8   

        



               2-14                     t}        MG             



               00:00:                                              



               00                                                

 

     Aspirin Aspirin 2018      No             1{table QD   Aspirin           



     Adult Low Adult Low 2-14                     t}        Adult Low           



     Dose 81 MG Dose 81 MG 00:00:                               Dose 81 MG      

     



               00                                                

 

     Zofran 8 MG Zofran 8 MG 2018      No             1{table      Zofran 8   

        



               2-14                     t}        MG             



               00:00:                                              



               00                                                

 

     Aspirin Aspirin 2018      No             1{table QD   Aspirin           



     Adult Low Adult Low 2-14                     t}        Adult Low           



     Dose 81 MG Dose 81 MG 00:00:                               Dose 81 MG      

     



               00                                                

 

     Zofran 8 MG Zofran 8 MG 2018      No             1{table      Zofran 8   

        



               2-14                     t}        MG             



               00:00:                                              



               00                                                

 

     Aspirin Aspirin 2018      No             1{table QD   Aspirin           



     Adult Low Adult Low 2-14                     t}        Adult Low           



     Dose 81 MG Dose 81 MG 00:00:                               Dose 81 MG      

     



               00                                                

 

     Zofran 8 MG Zofran 8 MG 2018      No             1{table      Zofran 8   

        



               2-14                     t}        MG             



               00:00:                                              



               00                                                

 

     Aspirin Aspirin 2018      No             1{table QD   Aspirin           



     Adult Low Adult Low 2-14                     t}        Adult Low           



     Dose 81 MG Dose 81 MG 00:00:                               Dose 81 MG      

     



               00                                                

 

     Zofran 8 MG Zofran 8 MG 2018      No             1{table      Zofran 8   

        



               2-14                     t}        MG             



               00:00:                                              



               00                                                

 

     Aspirin Aspirin 2018      No             1{table QD   Aspirin           



     Adult Low Adult Low 2-14                     t}        Adult Low           



     Dose 81 MG Dose 81 MG 00:00:                               Dose 81 MG      

     



               00                                                

 

     Aspirin Aspirin 2018      No             1{table QD   Aspirin           



     Adult Low Adult Low 2-14                     t}        Adult Low           



     Dose 81 MG Dose 81 MG 00:00:                               Dose 81 MG      

     



               00                                                

 

     Zofran 8 MG Zofran 8 MG 2018      No             1{table      Zofran 8   

        



               2-14                     t}        MG             



               00:00:                                              



               00                                                

 

     Zofran 8 MG Zofran 8 MG 2018      No             1{table      Zofran 8   

        



               2-14                     t}        MG             



               00:00:                                              



               00                                                

 

     Vitamin B12 Vitamin B12 2018      No             1000ug                  

   Common



     (Cyanocobal (Cyanocobal 0-18                                              S

pirit



     amin) amin) 00:00:                                              - [facility]



               00                                                [facility]

 

     Vitamin B12 Vitamin B12 2018      No             1000ug                  

   Common



     (Cyanocobal (Cyanocobal 0-18                                              S

pirit



     amin) amin) 00:00:                                              - [facility]



               00                                                [facility]

 

     Vitamin B12 Vitamin B12 2018      No             1000ug                  

   Common



     (Cyanocobal (Cyanocobal 0-18                                              S

pirit



     amin) amin) 00:00:                                              - [facility]



               00                                                [facility]

 

     Vitamin B12 Vitamin B12 2018-      No             1000ug                  

   Common



     (Cyanocobal (Cyanocobal 0-18                                              S

pirit



     amin) amin) 00:00:                                              - [facility]



               00                                                [facility]

 

     Vitamin B12 Vitamin B12 2018-      No             1000ug                  

   Common



     (Cyanocobal (Cyanocobal 0-18                                              S

pirit



     amin) amin) 00:00:                                              - [facility]



               00                                                [facility]

 

     Vitamin B12 Vitamin B12 -      No             1000ug                  

   Common



     (Cyanocobal (Cyanocobal 0-18                                              S

pirit



     amin) amin) 00:00:                                              - [facility]



               00                                                [facility]

 

     Vitamin B12 Vitamin B12 -      No             1000ug                  

   Common



     (Cyanocobal (Cyanocobal 0-18                                              S

pirit



     amin) amin) 00:00:                                              - [facility]



               00                                                [facility]

 

     Vitamin B12 Vitamin B12 -      No             1000ug                  

   Common



     (Cyanocobal (Cyanocobal 0-18                                              S

pirit



     amin) amin) 00:00:                                              - [facility]



               00                                                [facility]

 

     Vitamin B12 Vitamin B12 -      No             1000ug                  

   Common



     (Cyanocobal (Cyanocobal 0-18                                              S

pirit



     amin) amin) 00:00:                                              - [facility]



               00                                                [facility]

 

     Vitamin B12 Vitamin B12 -      No             1000ug                  

   Common



     (Cyanocobal (Cyanocobal 0-18                                              S

pirit



     amin) amin) 00:00:                                              - [facility]



               00                                                [facility]

 

     Vitamin B12 Vitamin B12 2018-      No             1000ug                  

   Common



     (Cyanocobal (Cyanocobal 0-18                                              S

pirit



     amin) amin) 00:00:                                              - [facility]



               00                                                [facility]

 

     Vitamin B12 Vitamin B12 2018-      No             1000ug                  

   Common



     (Cyanocobal (Cyanocobal 0-18                                              S

pirit



     amin) amin) 00:00:                                              - [facility]



               00                                                [facility]

 

     Vitamin B12 Vitamin B12 2018-      No             1000ug                  

   Common



     (Cyanocobal (Cyanocobal 0-18                                              S

pirit



     amin) amin) 00:00:                                              - [facility]



               00                                                [facility]

 

     Vitamin B12 Vitamin B12 2018-      No             1000ug                  

   Common



     (Cyanocobal (Cyanocobal 0-18                                              S

pirit



     amin) amin) 00:00:                                              - [facility]



               00                                                [facility]

 

     Vitamin B12 Vitamin B12 -      No             1000ug                  

   Common



     (Cyanocobal (Cyanocobal 0-18                                              S

pirit



     amin) amin) 00:00:                                              - [facility]



               00                                                [facility]

 

     [facility] 2017      Yes                      TAKE 1           Univ

ers



     mg tablet      1-27                               TABLET           ity of



               00:00:                               DAILY           [place]



                                                               [facility]

 

     LEVOTHYROXI      2017      Yes       [id number]           TAKE 1          

 [facility]



      mcg      1-27                               TABLET           ity of



     tablet      00:00:                               DAILY           [place]



                                                               [place] [address]      2017      Yes                      TAKE 1           Univ

ers



     mg tablet      1-27                               TABLET           ity of



               00:00:                               DAILY           [place]



                                                               [facility]

 

     LEVOTHYROXI      2017      Yes       [id number]           TAKE 1          

 [facility]



      mcg      1-27                               TABLET           ity of



     tablet      00:00:                               DAILY           [place]



                                                               [place] [address]      2017      Yes                      TAKE 1           Univ

ers



     mg tablet      1-27                               TABLET           ity of



               00:00:                               DAILY           [place]



                                                               [facility]

 

     LEVOTHYROXI      2017      Yes       [id number]           TAKE 1          

 [facility]



      mcg      1-27                               TABLET           ity of



     tablet      00:00:                               DAILY           [place]



                                                               [place] [address]      2017      Yes                      TAKE 1           Univ

ers



     mg tablet      1-27                               TABLET           ity of



               00:00:                               DAILY           [place]



                                                               [facility]

 

     LEVOTHYROXI      2017      Yes       [id number]           TAKE 1          

 [facility]



      mcg      1-27                               TABLET           ity of



     tablet      00:00:                               DAILY           [place]



                                                               [place] [address]      2017      Yes                      TAKE 1           Univ

ers



     mg tablet      1-27                               TABLET           ity of



               00:00:                               DAILY           [place]



                                                               [facility]

 

     LEVOTHYROXI      2017      Yes       [id number]           TAKE 1          

 [facility]



      mcg      1-27                               TABLET           ity of



     tablet      00:00:                               DAILY           [place]



                                                               [place] [address]      2017      Yes                      TAKE 1           Univ

ers



     mg tablet      1-27                               TABLET           ity of



               00:00:                               DAILY           [place]



                                                               [facility]

 

     LEVOTHYROXI      2017      Yes       [id number]           TAKE 1          

 [facility]



      mcg      1-27                               TABLET           ity of



     tablet      00:00:                               DAILY           [place]



                                                               [facility]

 

     LEVOTHYROXI      2017      Yes       [id number]           TAKE 1          

 [facility]



      mcg      1-27                               TABLET           ity of



     tablet      00:00:                               DAILY           [place]



                                                               [facility]

 

     LEVOTHYROXI      2017      Yes       [id number]           TAKE 1          

 [facility]



      mcg      1-27                               TABLET           ity of



     tablet      00:00:                               DAILY           [place]



                                                               [place] [address]      2017      Yes                      TAKE 1           Univ

ers



     mg tablet      1-27                               TABLET           ity of



               00:00:                               DAILY           [place]



               00                                                [facility]

 

     LEVOTHYROXI      2017      Yes       [id number]           TAKE 1          

 [facility]



      mcg      1-27                               TABLET           ity of



     tablet      00:00:                               DAILY           [place]



               00                                                [place] [address]      2017      Yes                      TAKE 1           Univ

ers



     mg tablet      1-27                               TABLET           ity of



               00:00:                               DAILY           [place]



               00                                                [facility]

 

     LEVOTHYROXI      2017      Yes       [id number]           TAKE 1          

 [facility]



      mcg      1-27                               TABLET           ity of



     tablet      00:00:                               DAILY           [place]



               00                                                [place] [address]      2017      Yes                      TAKE 1           Univ

ers



     mg tablet      1-27                               TABLET           ity of



               00:00:                               DAILY           [place]



               00                                                [facility]

 

     LEVOTHYROXI      2017      Yes       [id number]           TAKE 1          

 [facility]



      mcg      1-27                               TABLET           ity of



     tablet      00:00:                               DAILY           [place]



               00                                                [facility]

 

     LEVOTHYROX      2017- No        [id number]           TAKE 1         

  [facility]



      mcg      1-27 07-13                          TABLET           ity of



     tablet      00:00: 00:00                          DAILY           [place]



               00   :00                                          [place] [address]      2017- No                       TAKE 1           Uni

vers



     mg tablet      -                          TABLET           ity of



               00:00: 00:00                          DAILY           [place]



               00   :00                                          [place] [address]      2017- No                       TAKE 1           Uni

vers



     mg tablet      -26                          TABLET           ity of



               00:00: 00:00                          DAILY           [place]



               00   :00                                          [facility]

 

     acetaminoph      2016      Yes            1{tbl}      Take 1           Un

[facility]lin



     en-codeine      0-19                               tablet by           ity 

of



     (TYLENOL-CO      00:00:                               mouth           [place]



     DEINE #3)      00                                 every 4           Medical



     300-30 mg                                         (four)           Branch



     tablet                                         hours as           



                                                  needed for           



                                                  Pain           



                                                  (scale           



                                                  4-6) or           



                                                  Pain           



                                                  (scale           



                                                  7-10).           

 

     acetaminoph      2016      Yes            1{tbl}      Take 1           Un

[facility]



     en-codeine      0-19                               tablet by           ity 

of



     (TYLENOL-CO      00:00:                               mouth           [place]



     DEINE #3)      00                                 every 4           Medical



     300-30 mg                                         (four)           Branch



     tablet                                         hours as           



                                                  needed for           



                                                  Pain           



                                                  (scale           



                                                  4-6) or           



                                                  Pain           



                                                  (scale           



                                                  7-10).           

 

     acetaminoph      2016      Yes            1{tbl}      Take 1           Un

[facility]



     en-codeine      0-19                               tablet by           ity 

of



     (TYLENOL-CO      00:00:                               mouth           [place]



     DEINE #3)      00                                 every 4           Medical



     300-30 mg                                         (four)           Branch



     tablet                                         hours as           



                                                  needed for           



                                                  Pain           



                                                  (scale           



                                                  4-6) or           



                                                  Pain           



                                                  (scale           



                                                  7-10).           

 

     acetaminoph      2016      Yes            1{tbl}      Take 1           Un

[facility]



     en-codeine      0-19                               tablet by           ity 

of



     (TYLENOL-CO      00:00:                               mouth           [place]



     DEINE #3)      00                                 every 4           Medical



     300-30 mg                                         (four)           Branch



     tablet                                         hours as           



                                                  needed for           



                                                  Pain           



                                                  (scale           



                                                  4-6) or           



                                                  Pain           



                                                  (scale           



                                                  7-10).           

 

     acetaminoph      2016      Yes            1{tbl}      Take 1           Un

[facility]



     en-codeine      0-19                               tablet by           ity 

of



     (TYLENOL-CO      00:00:                               mouth           [place]



     DEINE #3)      00                                 every 4           Medical



     300-30 mg                                         (four)           Branch



     tablet                                         hours as           



                                                  needed for           



                                                  Pain           



                                                  (scale           



                                                  4-6) or           



                                                  Pain           



                                                  (scale           



                                                  7-10).           

 

     acetaminoph      2016      Yes            1{tbl}      Take 1           Un

[facility]



     en-codeine      0-19                               tablet by           ity 

of



     (TYLENOL-CO      00:00:                               mouth           [place]



     DEINE #3)      00                                 every 4           Medical



     300-30 mg                                         (four)           Branch



     tablet                                         hours as           



                                                  needed for           



                                                  Pain           



                                                  (scale           



                                                  4-6) or           



                                                  Pain           



                                                  (scale           



                                                  7-10).           

 

     acetaminoph      2016      Yes            1{tbl}      Take 1           Un

[facility]



     en-codeine      0-19                               tablet by           ity 

of



     (TYLENOL-CO      00:00:                               mouth           [place]



     DEINE #3)      00                                 every 4           Medical



     300-30 mg                                         (four)           Branch



     tablet                                         hours as           



                                                  needed for           



                                                  Pain           



                                                  (scale           



                                                  4-6) or           



                                                  Pain           



                                                  (scale           



                                                  7-10).           

 

     acetaminoph      2016      Yes            1{tbl}      Take 1           Un

[facility]



     en-codeine      0-19                               tablet by           ity 

of



     (TYLENOL-CO      00:00:                               mouth           [place]



     DEINE #3)      00                                 every 4           Medical



     300-30 mg                                         (four)           Branch



     tablet                                         hours as           



                                                  needed for           



                                                  Pain           



                                                  (scale           



                                                  4-6) or           



                                                  Pain           



                                                  (scale           



                                                  7-10).           

 

     acetaminoph      2016      Yes            1{tbl}      Take 1           Un

[facility]



     en-codeine      0-19                               tablet by           ity 

of



     (TYLENOL-CO      00:00:                               mouth           [place]



     DEINE #3)      00                                 every 4           Medical



     300-30 mg                                         (four)           Branch



     tablet                                         hours as           



                                                  needed for           



                                                  Pain           



                                                  (scale           



                                                  4-6) or           



                                                  Pain           



                                                  (scale           



                                                  7-10).           

 

     acetaminoph      2016- No             1{tbl}      Take 1           U

nivers



     en-codeine      0--                          tablet by           ity

 of



     (TYLENOL-CO      00:00: 00:00                          mouth           [place]

s



     DEINE #3)      00   :00                           every 4           Medical



     300-30 mg                                         (four)           [facility]



     tablet                                         hours as           



                                                  needed for           



                                                  Pain           



                                                  (scale           



                                                  4-6) or           



                                                  Pain           



                                                  (scale           



                                                  7-10).           

 

     acetaminoph      2016- No             1{tbl}      Take 1           U

nivers



     en-codeine      0--                          tablet by           ity

 of



     (TYLENOL-CO      00:00: 00:00                          mouth           [place]

s



     DEINE #3)      00   :00                           every 4           Medical



     300-30 mg                                         (four)           [facility]



     tablet                                         hours as           



                                                  needed for           



                                                  Pain           



                                                  (scale           



                                                  4-6) or           



                                                  Pain           



                                                  (scale           



                                                  7-10).           

 

     blood sugar            Yes                      Test once           [facility]

nivers



     diagnostic      -                               daily           ity of



     (ONETOUCH      00:00:                                              [place]



     VERIO)      00                                                Medical



     strip                                                        [facility]anch

 

     blood sugar      2016-0      Yes                      Test once           [facility]

nivers



     diagnostic      -                               daily           ity of



     (ONETOUCH      00:00:                                              [place]



     VERIO)      00                                                Medical



     strip                                                        Branch

 

     blood sugar      -0      Yes                      Test once           U

nivers



     diagnostic      -                               daily           ity of



     (ONETOUCH      00:00:                                              [place]



     VERIO)      00                                                Medical



     strip                                                        Branch

 

     blood sugar      2016-0      Yes                      Test once           U

nivers



     diagnostic      -                               daily           ity of



     (ONETOUCH      00:00:                                              [place]



     VERIO)      00                                                Medical



     strip                                                        Branch

 

     blood sugar      2016-0      Yes                      Test once           U

nivers



     diagnostic      -                               daily           ity of



     (ONETOUCH      00:00:                                              [place]



     VERIO)      00                                                Medical



     strip                                                        Branch

 

     blood sugar      2016-0      Yes                      Test once           U

nivers



     diagnostic      9-                               daily           ity of



     (ONETOUCH      00:00:                                              [place]



     VERIO)      00                                                Medical



     strip                                                        Branch

 

     blood sugar      2016-0      Yes                      Test once           U

nivers



     diagnostic      9-                               daily           ity of



     (ONETOUCH      00:00:                                              [place]



     VERIO)                                                      Medical



     strip                                                        Branch

 

     blood sugar      2016-0      Yes                      Test once           U

nivers



     diagnostic      9-                               daily           ity of



     (ONETOUCH      00:00:                                              [place]



     VERIO)      00                                                Medical



     strip                                                        Branch

 

     blood sugar      2016-0      Yes                      Test once           U

nivers



     diagnostic      -                               daily           ity of



     (ONETOUCH      00:00:                                              [place]



     VERIO)      00                                                Medical



     strip                                                        Branch

 

     blood sugar      2023- No                       Test once           

[facility]



     diagnostic                                daily           [facility] of



     (ONETOUCH      00:00: 00:00                                         [place]



     VERIO)      00   :00                                          Medical



     strip                                                        Branch

 

     blood sugar      2023- No                       Test once           

[facility]



     diagnostic                                daily           ity of



     (ONETOUCH      00:00: 00:00                                         [place]



     VERIO)      00   :00                                          Medical



     strip                                                        Branch

 

     inFLIXimab            Yes                      Inject           Unive

rs



     (REMICADE)      7-                               intravenou           ity

 of



     100 mg      20:05:                               sly once           [place]



     injection      27                                 now. Every           Medi

maryan



                                                  6 weeks           Branch

 

     aspirin 81            Yes            81mg      Take 81 mg           U

nivers



     mg EC      7-                               by mouth           ity of



     tablet      20:05:                               daily.           [address]

 

     MULTIVITAMI            Yes                      Take by           Uni

vers



     NS WITH      7-26                               mouth.           ity of



     FLUORIDE      20:05:                                              [place]



     (MULTI-RAJESH      27                                                Medical



     MIN ORAL)                                                        Branch

 

     inFLIXimab            Yes                      Inject           Unive

rs



     (REMICADE)      7-                               intravenou           ity

 of



     100 mg      20:05:                               sly once           [place]



     injection      27                                 now. Every           Medi

maryan



                                                  6 weeks           Branch

 

     aspirin 81            Yes            81mg      Take 81 mg           U

nivers



     mg EC      7-                               by mouth           ity of



     tablet      20:05:                               daily.           [address]

 

     MULTIVITAMI            Yes                      Take by           Uni

vers



     NS WITH      7-26                               mouth.           ity of



     FLUORIDE      20:05:                                              [place]



     (MULTI-RAJESH      27                                                Medical



     MIN ORAL)                                                        Branch

 

     inFLIXimab            Yes                      Inject           Unive

rs



     (REMICADE)      7-26                               intravenou           ity

 of



     100 mg      20:05:                               sly once           [place]



     injection      27                                 now. Every           Medi

maryan



                                                  6 weeks           Branch

 

     aspirin 81      2016      Yes            81mg      Take 81 mg           U

nivers



     mg EC      7-26                               by mouth           ity of



     tablet      20:05:                               daily.           [address]

 

     MULTIVITAMI            Yes                      Take by           Uni

vers



     NS WITH      7-26                               mouth.           ity of



     FLUORIDE      20:05:                                              [place]



     (MULTI-RAJESH      27                                                Medical



     MIN ORAL)                                                        Branch

 

     inFLIXimab            Yes                      Inject           Unive

rs



     (REMICADE)      7-26                               intravenou           ity

 of



     100 mg      20:05:                               sly once           [place]



     injection      27                                 now. Every           Medi

[facility]



                                                  6 weeks           Branch

 

     aspirin 81      2016-      Yes            81mg      Take 81 mg           U

nivers



     mg EC      7-26                               by mouth           ity of



     tablet      20:05:                               daily.           [address]

 

     MULTIVITAMI      2016-      Yes                      Take by           Uni

vers



     NS WITH      7-26                               mouth.           ity of



     FLUORIDE      20:05:                                              [place]



     (MULTI-RAJESH      27                                                Medical



     MIN ORAL)                                                        Branch

 

     inFLIXimab      2016-0      Yes                      Inject           Unive

rs



     (REMICADE)      7-26                               intravenou           ity

 of



     100 mg      20:05:                               sly once           [place]



     injection      27                                 now. Every           Medi

[facility]an



                                                  6 weeks           Branch

 

     aspirin 81      2016-      Yes            81mg      Take 81 mg           U

nivers



     mg EC      7-26                               by mouth           ity of



     tablet      20:05:                               daily.           [address]

 

     MULTIVITAMI            Yes                      Take by           Uni

vers



     NS WITH      7-26                               mouth.           ity of



     FLUORIDE      20:05:                                              [place]



     (MULTI-RAJESH      27                                                Medical



     MIN ORAL)                                                        [facility]anch

 

     inFLIXimab      2016-      Yes                      Inject           Unive

rs



     (REMICADE)      7-                               intravenou           ity

 of



     100 mg      20:05:                               sly once           [place]



     injection      27                                 now. Every           Medi

[facility]



                                                  6 weeks           Branch

 

     aspirin 81      2016-      Yes            81mg      Take 81 mg           U

nivers



     mg EC      7-26                               by mouth           ity of



     tablet      20:05:                               daily.           [address]

 

     MULTIVITAMI            Yes                      Take by           Uni

vers



     NS WITH      7-26                               mouth.           ity of



     FLUORIDE      20:05:                                              [place]



     (MULTI-RAJESH      27                                                Medical



     MIN ORAL)                                                        Branch

 

     inFLIXimab      2016-0      Yes                      Inject           Unive

rs



     (REMICADE)      7-                               intravenou           ity

 of



     100 mg      20:05:                               sly once           [place]



     injection      27                                 now. Every           Medi

maryan



                                                  6 weeks           Branch

 

     aspirin 81      2016-      Yes            81mg      Take 81 mg           U

nivers



     mg EC      7-26                               by mouth           ity of



     tablet      20:05:                               daily.           [address]

 

     MULTIVITAMI      2016      Yes                      Take by           Uni

vers



     NS WITH      7-26                               mouth.           ity of



     FLUORIDE      20:05:                                              [place]



     (MULTI-RAJESH      27                                                Medical



     MIN ORAL)                                                        Branch

 

     inFLIXimab      2016-0      Yes                      Inject           Unive

rs



     (REMICADE)      7-26                               intravenou           ity

 of



     100 mg      15:05:                               sly once           [place]



     injection      27                                 now. Every           Medi

[facility]



                                                  6 weeks           Branch

 

     aspirin 81      2016-0      Yes            81mg      Take 81 mg           U

nivers



     mg EC      7-26                               by mouth           ity of



     tablet      15:05:                               daily.           [address]

 

     MULTIVITAMI      2016      Yes                      Take by           Uni

vers



     NS WITH      7-26                               mouth.           ity of



     FLUORIDE      15:05:                                              [place]



     (MULTI-RAJESH      27                                                Medical



     MIN ORAL)                                                        Branch

 

     inFLIXimab            Yes                      Inject           Unive

rs



     (REMICADE)                                     intravenou           ity

 of



     100 mg      15:05:                               sly once           [place]



     injection      27                                 now. Every           Medi

maryan



                                                  6 weeks           Branch

 

     aspirin 81            Yes            81mg      Take 81 mg           U

nivers



     mg EC                                     by mouth           ity of



     tablet      15:05:                               daily.           [address]

 

     MULTIVITAMI            Yes                      Take by           Uni

vers



     NS WITH                                     mouth.           ity of



     FLUORIDE      15:05:                                              [place]



     (MULTI-RAJESH      27                                                Medical



     MIN ORAL)                                                        [place]

 

     meloxicam            Yes                                     [facility]



     (MOBIC) 15      7-21                                              ity of



     mg tablet      00:00:                                              [place]



               00                                                [facility]

 

     meloxicam            Yes                                     [facility]



     (MOBIC) 15      7-21                                              ity of



     mg tablet      00:00:                                              [place]



               00                                                [facility]

 

     meloxicam            Yes                                     [facility]



     (MOBIC) 15      7-21                                              ity of



     mg tablet      00:00:                                              [place]



               00                                                [facility]

 

     meloxicam            Yes                                     [facility]



     (MOBIC) 15      7-21                                              ity of



     mg tablet      00:00:                                              [place]



                                                               [facility]

 

     meloxicam            Yes                                     [facility]



     (MOBIC) 15      7-21                                              ity of



     mg tablet      00:00:                                              [place]



               00                                                [facility]

 

     meloxicam            Yes                                     [facility]



     (MOBIC) 15      7-21                                              ity of



     mg tablet      00:00:                                              [place]



                                                               [facility]

 

     meloxicam            Yes                                     [facility]



     (MOBIC) 15      7-21                                              ity of



     mg tablet      00:00:                                              [place]



                                                               [facility]

 

     meloxicam            Yes                                     [facility]



     (MOBIC) 15      7-21                                              ity of



     mg tablet      00:00:                                              [place]



                                                               [facility]

 

     meloxicam            Yes                                     [facility]



     (MOBIC) 15      7-21                                              ity of



     mg tablet      00:00:                                              [place]



                                                               [facility]

 

     meloxicam      2023- No                                      [facility]



     (MOBIC) 15      --                                         ity of



     mg tablet      00:00: 00:00                                         [place]



               00   :00                                          [facility]

 

     meloxicam      2023- No                                      [facility]



     (MOBIC) 15      --                                         ity of



     mg tablet      00:00: 00:00                                         [place]



               00   :00                                          [facility]

 

     acetaminoph            Yes                      TK 1 TO 2           U

nivers



     en-codeine      7-13                               T PO EVERY           ity

 of



     (TYLENOL      00:00:                               4 T 6           [place]



     #3) 300-30      00                                 HOURS PRN           Medi

[facility]



     mg tablet                                         P              [facility]

 

     acetaminoph            Yes                      TK 1 TO 2           U

nivers



     en-codeine      7-13                               T PO EVERY           ity

 of



     (TYLENOL      00:00:                               4 T 6           [place]



     #3) 300-30      00                                 HOURS PRN           Medi

[facility]



     mg tablet                                         P              [facility]

 

     acetaminoph            Yes                      TK 1 TO 2           U

nivers



     en-codeine      7-13                               T PO EVERY           ity

 of



     (TYLENOL      00:00:                               4 T 6           [place]



     #3) 300-30      00                                 HOURS PRN           Medi

[facility]



     mg tablet                                         P              [facility]

 

     acetaminoph            Yes                      TK 1 TO 2           U

nivers



     en-codeine      7-13                               T PO EVERY           ity

 of



     (TYLENOL      00:00:                               4 T 6           [place]



     #3) 300-30      00                                 HOURS PRN           Medi

[facility]



     mg tablet                                         P              [facility]

 

     acetaminoph            Yes                      TK 1 TO 2           U

nivers



     en-codeine      7-13                               T PO EVERY           ity

 of



     (TYLENOL      00:00:                               4 T 6           [place]



     #3) 300-30      00                                 HOURS PRN           Medi

[facility]



     mg tablet                                         P              [facility]

 

     acetaminoph            Yes                      TK 1 TO 2           U

nivers



     en-codeine      7-13                               T PO EVERY           ity

 of



     (TYLENOL      00:00:                               4 T 6           [place]



     #3) 300-30      00                                 HOURS PRN           Medi

[facility]



     mg tablet                                         P              [facility]

 

     acetaminoph            Yes                      TK 1 TO 2           U

nivers



     en-codeine      7-13                               T PO EVERY           ity

 of



     (TYLENOL      00:00:                               4 T 6           [place]



     #3) 300-30      00                                 HOURS PRN           Medi

[facility]



     mg tablet                                         P              [facility]

 

     acetaminoph            Yes                      TK 1 TO 2           U

nivers



     en-codeine      7-13                               T PO EVERY           ity

 of



     (TYLENOL      00:00:                               4 T 6           [place]



     #3) 300-30      00                                 HOURS PRN           Medi

[facility]



     mg tablet                                         P              [facility]

 

     acetaminoph            Yes                      TK 1 TO 2           U

nivers



     en-codeine      7-13                               T PO EVERY           ity

 of



     (TYLENOL      00:00:                               4 T 6           [place]



     #3) 300-30      00                                 HOURS PRN           Medi

[facility]



     mg tablet                                         P              [facility]

 

     acetaminoph      3- No                       TK 1 TO 2           

[facility]



     en-codeine      7-13 06-26                          T PO EVERY           it

y of



     (TYLENOL      00:00: 00:00                          4 T 6           [place]



     #3) 300-30      00   :00                           HOURS PRN           Medi

[facility]



     mg tablet                                         P              [facility]

 

     acetaminoph      2023- No                       TK 1 TO 2           

[facility]



     en-codeine       06-                          T PO EVERY           it

y of



     (TYLENOL      00:00: 00:00                          4 T 6           [place]



     #3) 300-30      00   :00                           HOURS PRN           Medi

[facility]



     mg tablet                                         P              Branch

 

     sulfaSALAzi      2015      Yes                                     Un[facility]

s



     ne        0-29                                              ity of



     (AZULFIDINE      00:00:                                              [place]



     ) 500 mg      00                                                Medical



     tablet                                                        Branch

 

     sulfaSALAzi      2015      Yes                                     Univer

s



     ne        0-29                                              ity of



     (AZULFIDINE      00:00:                                              [place]



     ) 500 mg      00                                                Medical



     tablet                                                        Branch

 

     sulfaSALAzi      2015      Yes                                     Univer

s



     ne        0-29                                              ity of



     (AZULFIDINE      00:00:                                              [place]



     ) 500 mg      00                                                Medical



     tablet                                                        Branch

 

     sulfaSALAzi      2015      Yes                                     Univer

s



     ne        0-29                                              ity of



     (AZULFIDINE      00:00:                                              [place]



     ) 500 mg      00                                                Medical



     tablet                                                        Branch

 

     sulfaSALAzi      2015      Yes                                     Univer

s



     ne        0-29                                              ity of



     (AZULFIDINE      00:00:                                              [place]



     ) 500 mg      00                                                Medical



     tablet                                                        Branch

 

     sulfaSALAzi      2015      Yes                                     Univer

s



     ne        0-29                                              ity of



     (AZULFIDINE      00:00:                                              [place]



     ) 500 mg      00                                                Medical



     tablet                                                        Branch

 

     sulfaSALAzi      2015      Yes                                     Univer

s



     ne        0-29                                              ity of



     (AZULFIDINE      00:00:                                              [place]



     ) 500 mg      00                                                Medical



     tablet                                                        Branch

 

     sulfaSALAzi      2015      Yes                                     Univer

s



     ne        0-29                                              ity of



     (AZULFIDINE      00:00:                                              [place]



     ) 500 mg      00                                                Medical



     tablet                                                        Branch

 

     sulfaSALAzi      2015      Yes                                     Univer

s



     ne        0-29                                              ity of



     (AZULFIDINE      00:00:                                              [place]



     ) 500 mg      00                                                Medical



     tablet                                                        Branch

 

     sulfaSALAzi      2015- No                                      Unive

rs



     ne        0-29 -                                         ity of



     (AZULFIDINE      00:00: 00:00                                         [place]



     ) 500 mg      00   :00                                          Medical



     tablet                                                        Branch

 

     sulfaSALAzi      2015- No                                      Unive

rs



     ne        0-29 -26                                         ity of



     (AZULFIDINE      00:00: 00:00                                         [place]



     ) 500 mg      00   :00                                          Medical



     tablet                                                        Branch

 

     gabapentin      2015      Yes            300mg      300 mg 3           Un

[facility]



     (NEURONTIN)      0-18                               (three)           ity o

f



     300 mg      00:00:                               times           [place]



     capsule      00                                 daily.           Medical



                                                                 Branch

 

     gabapentin      2015      Yes            300mg      300 mg 3           Un

[facility]



     (NEURONTIN)      0-18                               (three)           ity o

f



     300 mg      00:00:                               times           [place]



     capsule      00                                 daily.           Medical



                                                                 Branch

 

     gabapentin      2015      Yes            300mg      300 mg 3           Un

[facility]



     (NEURONTIN)      0-18                               (three)           ity o

f



     300 mg      00:00:                               times           [place]



     capsule      00                                 daily.           Medical



                                                                 Branch

 

     gabapentin      2015      Yes            300mg      300 mg 3           Un

[facility]



     (NEURONTIN)      0-18                               (three)           ity o

f



     300 mg      00:00:                               times           [place]



     capsule      00                                 daily.           Medical



                                                                 Branch

 

     gabapentin      2015      Yes            300mg      300 mg 3           Un

[facility]



     (NEURONTIN)      0-18                               (three)           ity o

f



     300 mg      00:00:                               times           [place]



     capsule      00                                 daily.           Medical



                                                                 Branch

 

     gabapentin      2015      Yes            300mg      300 mg 3           Un

[facility]



     (NEURONTIN)      0-18                               (three)           ity o

f



     300 mg      00:00:                               times           [place]



     capsule      00                                 daily.           Medical



                                                                 Branch

 

     gabapentin      2015      Yes            300mg      300 mg 3           Un

[facility]



     (NEURONTIN)      0-18                               (three)           ity o

f



     300 mg      00:00:                               times           [place]



     capsule      00                                 daily.           Medical



                                                                 Branch

 

     gabapentin      2015      Yes            300mg      300 mg 3           Un

[facility]



     (NEURONTIN)      0-18                               (three)           ity o

f



     300 mg      00:00:                               times           [place]



     capsule      00                                 daily.           Medical



                                                                 Branch

 

     gabapentin      2015      Yes            300mg      300 mg 3           Un

[facility]



     (NEURONTIN)      0-18                               (three)           ity o

f



     300 mg      00:00:                               times           [place]



     capsule      00                                 daily.           Medical



                                                                 Branch

 

     gabapentin      2015- No             300mg      300 mg 3           U

nivers



     (NEURONTIN)      0--                          (three)           ity 

of



     300 mg      00:00: 00:00                          times           [place]



     capsule      00   :00                           daily.           Medical



                                                                 Branch

 

     gabapentin      2015- No             300mg      300 mg 3           U

nivers



     (NEURONTIN)      0-                          (three)           ity 

of



     300 mg      00:00: 00:00                          times           [place]



     capsule      00   :00                           daily.           Medical



                                                                 Branch

 

     Restoril Restoril           Yes  [facility]                1 capsule          

 [facility]



                                                  at bedtime           [facility]



                                                  as needed           - [facility]

 

     Azathioprin Azathioprin           Yes  [facility]                as           

  Common



     e    e                                       directed           [facility]



                                                                 - [facility]

 

     Iron Iron           Yes  Na [facility]                1 tablet           Common



                                                                 [facility]



                                                                 - [facility]

 

     Gabapentin Gabapentin           Yes  Na [facility]                1 capsule      

     Common



                                                  before           [facility]



                                                  bedtime           [facility]

 

     Sulfasalazi Sulfasalazi           Yes  Na [facility]                3 tablet     

      Common



     ne   ne                                                     [facility]



                                                                 [facility]

 

     Nateglinide Nateglinide           Yes  Na [facility]                1 tablet     

      Common



                                                  before           [facility]



                                                  meals           [facility]

 

     Glucosamine Glucosamine           Yes  Na [facility]                not          

  Common



     Chondro Chondro                                    defined           [facility]



     Complex Complex                                                   - [facility]

 

     Hydroxychlo Hydroxychlo           Yes  Na [facility]                1 tablet     

      Common



     roquine roquine                                                   [facility]



     Sulfate Sulfate                                                   - [facility]

 

     Etodolac Etodolac           Yes  [facility]                1 tablet           

Common



                                                  with food           [facility]



                                                                 [facility]

 

     Synthroid Synthroid           Yes  Na [facility]                1 tablet         

  Common



                                                  on an           [facility]



                                                  empty           - [facility]



                                                  stomach in           [facility]

 

     Rosuvastati Rosuvastati           Yes  Na [facility]                1 tablet     

      Common



     n Calcium n Calcium                                                   [facility]

t



                                                                 - [facility]

 

     Humira Pen Humira Pen           Yes  Na Strickland                as             

Common



                                                  directed           [facility]

 

     Duloxetine Duloxetine           Yes  Na Strickland                1 capsule      

     Common



     HCl  HCl                                                    [facility]

 

     Methocarbam Methocarbam           Yes  Na [facility]                1 tablet     

      Common



     ol   ol                                                     [facility]

 

     Turmeric Turmeric           Yes  Na [facility]                not            Comm

on



                                                  defined           [facility]

 

     Remicade Remicade           Yes  Na [facility]                as             Comm

on



                                                  directed           [facility]

 

     Folic Acid Folic Acid           Yes  Na Strickland                2 tablet       

    Common



                                                                 [facility]

 

     Plaquenil Plaquenil           Yes  Na [facility]                1 tablet         

  Common



                                                  with food           [facility]



                                                  or milk           - [facility]

 

     Levothyroxi Levothyroxi           No                       Levothyrox      

     



     ne Sodium ne Sodium                                    ine Sodium          

 



     150  MCG                                    150 MCG           

 

     Iron 65 MG Iron 65 MG           No             1{table QD   Iron 65 MG     

      



                                        t}                       

 

     sulfaSALAzi sulfaSALAzi           No             2{table BID  sulfaSALAz   

        



     ne 500 MG ne 500 MG                          t}        ine 500 MG          

 

 

     Temazepam Temazepam           No             1{capsu QD   Temazepam        

   



     15 MG 15 MG                          le_at_b      15 MG           



                                        edtime_                     



                                        as_need                     



                                        ed}                      

 

     Restoril 15 Restoril 15           No             1{capsu QD   Restoril     

      



     MG   MG                            le_at_b      15 MG           



                                        edtime_                     



                                        as_need                     



                                        ed}                      

 

     Rosuvastati Rosuvastati           No             1{table QD   Rosuvastat   

        



     n Calcium 5 n Calcium 5                          t}        in Calcium      

     



     MG   MG                                      5 MG           

 

     Simponi Simponi           No                       Simponi           



     Aria 50 Aria 50                                    Aria 50           



     MG/4ML MG/4ML                                    MG/4ML           

 

     Diclofenac Diclofenac           No             1{appli BID  Diclofenac     

      



     Sodium 3 % Sodium 3 %                          cation}      Sodium 3 %     

      

 

     azaTHIOprin azaTHIOprin           No             1{table QD   azaTHIOpri   

        



     e 50 MG e 50 MG                          t}        ne 50 MG           

 

     Glimepiride Glimepiride           No             1{table      Glimepirid   

        



     1 MG 1 MG                          t}        e 1 MG           

 

     metFORMIN metFORMIN           No             1{table BID  metFORMIN        

   



     HCl 1000 MG HCl 1000 MG                          t_with_      HCl 1000     

      



                                        a_meal}      MG             

 

     Turmeric Turmeric           No                                      

 

     DULoxetine DULoxetine           No             1{capsu QD                  



     HCl 30 MG HCl 30 MG                          le}                      

 

     Temazepam Temazepam           No             1{capsu QD                  



     15 MG 15 MG                          le_at_b                     



                                        edtime_                     



                                        as_need                     



                                        ed}                      

 

     Hydroxychlo Hydroxychlo           No             1{table BID               

  



     roquine roquine                          t}                       



     Sulfate 200 Sulfate 200                                                   



     MG   MG                                                     

 

     Restoril 15 Restoril 15           No             1{capsu QD                

  



     MG   MG                            le_at_b                     



                                        edtime_                     



                                        as_need                     



                                        ed}                      

 

     Rosuvastati Rosuvastati           No                                      



     n Calcium 5 n Calcium 5                                                   



     MG   MG                                                     

 

     Levothyroxi Levothyroxi           No                                      



     ne Sodium ne Sodium                                                   



     150  MCG                                                   

 

     Folic Acid Folic Acid           No             2{table QD                  



     1 MG 1 MG                          t}                       

 

     Methocarbam Methocarbam           No             1{table TID               

  



     ol 750 MG ol 750 MG                          t}                       

 

     sulfaSALAzi sulfaSALAzi           No             2{table BID               

  



     ne 500 MG ne 500 MG                          t}                       

 

     Synthroid Synthroid           No                  QD                  



     150  MCG                                                   

 

     Simponi Simponi           No                                      



     Aria 50 Aria 50                                                   



     MG/4ML MG/4ML                                                   

 

     Gabapentin Gabapentin           No             1{capsu TID                 



     600  MG                          le_befo                     



                                        re_bedt                     



                                        ivette}                     

 

     metFORMIN metFORMIN           No             1{table BID                 



     HCl 1000 MG HCl 1000 MG                          t_with_                   

  



                                        a_meal}                     

 

     azaTHIOprin azaTHIOprin           No             1{table QD                

  



     e 50 MG e 50 MG                          t}                       

 

     Iron 65 MG Iron 65 MG           No             1{table QD                  



                                        t}                       

 

     Diclofenac Diclofenac           No             1{appli BID                 



     Sodium 3 % Sodium 3 %                          cation}                     

 

     Glimepiride Glimepiride           No                                      



     2 MG 2 MG                                                   

 

     sulfaSALAzi sulfaSALAzi           No             2{table BID  sulfaSALAz   

        



     ne 500 MG ne 500 MG                          t}        ine 500 MG          

 

 

     Glimepiride Glimepiride           No                       Glimepirid      

     



     2 MG 2 MG                                    e 2 MG           

 

     Folic Acid Folic Acid           No             2{table QD   Folic Acid     

      



     1 MG 1 MG                          t}        1 MG           

 

     Methocarbam Methocarbam           No             1{table TID  Methocarba   

        



     ol 750 MG ol 750 MG                          t}        mol 750 MG          

 

 

     Rosuvastati Rosuvastati           No                       Rosuvastat      

     



     n Calcium 5 n Calcium 5                                    in Calcium      

     



     MG   MG                                      5 MG           

 

     Temazepam Temazepam           No             1{capsu QD   Temazepam        

   



     15 MG 15 MG                          le_at_b      15 MG           



                                        edtime_                     



                                        as_need                     



                                        ed}                      

 

     Restoril 15 Restoril 15           No             1{capsu QD   Restoril     

      



     MG   MG                            le_at_b      15 MG           



                                        edtime_                     



                                        as_need                     



                                        ed}                      

 

     Diclofenac Diclofenac           No             1{appli BID  Diclofenac     

      



     Sodium 3 % Sodium 3 %                          cation}      Sodium 3 %     

      

 

     Simponi Simponi           No                       Simponi           



     Aria 50 Aria 50                                    Aria 50           



     MG/4ML MG/4ML                                    MG/4ML           

 

     Levothyroxi Levothyroxi           No                       Levothyrox      

     



     ne Sodium ne Sodium                                    ine Sodium          

 



     150  MCG                                    150 MCG           

 

     DULoxetine DULoxetine           No             1{capsu QD   DULoxetine     

      



     HCl 30 MG HCl 30 MG                          le}       HCl 30 MG           

 

     Hydroxychlo Hydroxychlo           No             1{table BID  Hydroxychl   

        



     roquine roquine                          t}        oroquine           



     Sulfate 200 Sulfate 200                                    Sulfate         

  



     MG   MG                                      200 MG           

 

     Synthroid Synthroid           No                  QD   Synthroid           



     150  MCG                                    150 MCG           

 

     metFORMIN metFORMIN           No             1{table BID  metFORMIN        

   



     HCl 1000 MG HCl 1000 MG                          t_with_      HCl 1000     

      



                                        a_meal}      MG             

 

     Iron 65 MG Iron 65 MG           No             1{table QD   Iron 65 MG     

      



                                        t}                       

 

     Turmeric Turmeric           No                       Turmeric           

 

     azaTHIOprin azaTHIOprin           No             1{table QD   azaTHIOpri   

        



     e 50 MG e 50 MG                          t}        ne 50 MG           

 

     Gabapentin Gabapentin           No             1{capsu TID  Gabapentin     

      



     600  MG                          le_befo      600 MG           



                                        re_bedt                     



                                        ivette}                     

 

     sulfaSALAzi sulfaSALAzi           No             2{table BID  sulfaSALAz   

        



     ne 500 MG ne 500 MG                          t}        ine 500 MG          

 

 

     Glimepiride Glimepiride           No                       Glimepirid      

     



     2 MG 2 MG                                    e 2 MG           

 

     Folic Acid Folic Acid           No             2{table QD   Folic Acid     

      



     1 MG 1 MG                          t}        1 MG           

 

     Methocarbam Methocarbam           No             1{table TID  Methocarba   

        



     ol 750 MG ol 750 MG                          t}        mol 750 MG          

 

 

     Rosuvastati Rosuvastati           No                       Rosuvastat      

     



     n Calcium 5 n Calcium 5                                    in Calcium      

     



     MG   MG                                      5 MG           

 

     Temazepam Temazepam           No             1{capsu QD   Temazepam        

   



     15 MG 15 MG                          le_at_b      15 MG           



                                        edtime_                     



                                        as_need                     



                                        ed}                      

 

     Restoril 15 Restoril 15           No             1{capsu QD   Restoril     

      



     MG   MG                            le_at_b      15 MG           



                                        edtime_                     



                                        as_need                     



                                        ed}                      

 

     Diclofenac Diclofenac           No             1{appli BID  Diclofenac     

      



     Sodium 3 % Sodium 3 %                          cation}      Sodium 3 %     

      

 

     Simponi Simponi           No                       Simponi           



     Aria 50 Aria 50                                    Aria 50           



     MG/4ML MG/4ML                                    MG/4ML           

 

     Levothyroxi Levothyroxi           No                       Levothyrox      

     



     ne Sodium ne Sodium                                    ine Sodium          

 



     150  MCG                                    150 MCG           

 

     DULoxetine DULoxetine           No             1{capsu QD   DULoxetine     

      



     HCl 30 MG HCl 30 MG                          le}       HCl 30 MG           

 

     Hydroxychlo Hydroxychlo           No             1{table BID  Hydroxychl   

        



     roquine roquine                          t}        oroquine           



     Sulfate 200 Sulfate 200                                    Sulfate         

  



     MG   MG                                      200 MG           

 

     Synthroid Synthroid           No                  QD   Synthroid           



     150  MCG                                    150 MCG           

 

     metFORMIN metFORMIN           No             1{table BID  metFORMIN        

   



     HCl 1000 MG HCl 1000 MG                          t_with_      HCl 1000     

      



                                        a_meal}      MG             

 

     Iron 65 MG Iron 65 MG           No             1{table QD   Iron 65 MG     

      



                                        t}                       

 

     Turmeric Turmeric           No                       Turmeric           

 

     azaTHIOprin azaTHIOprin           No             1{table QD   azaTHIOpri   

        



     e 50 MG e 50 MG                          t}        ne 50 MG           

 

     Gabapentin Gabapentin           No             1{capsu TID  Gabapentin     

      



     600  MG                          le_befo      600 MG           



                                        re_bedt                     



                                        ivette}                     

 

     Iron 65 MG Iron 65 MG           No             1{table QD   Iron 65 MG     

      



                                        t}                       

 

     Multivitami Multivitami           No                       Multivitam      

     



     n    n                                       in             

 

     Turmeric Turmeric           No                       Turmeric           

 

     Cholestyram Cholestyram           No             1{packe BID  Cholestyra   

        



     ine 4 GM ine 4 GM                          t_mixed      mine 4 GM          

 



                                        _with_w                     



                                        ater_or                     



                                        _non-ca                     



                                        rbonate                     



                                        d_drink                     



                                        }                        

 

     Rosuvastati Rosuvastati           No                       Rosuvastat      

     



     n Calcium 5 n Calcium 5                                    in Calcium      

     



     MG   MG                                      5 MG           

 

     Hydroxychlo Hydroxychlo           No             1{table BID  Hydroxychl   

        



     roquine roquine                          t}        oroquine           



     Sulfate 200 Sulfate 200                                    Sulfate         

  



     MG   MG                                      200 MG           

 

     Restoril 15 Restoril 15           No             1{capsu QD   Restoril     

      



     MG   MG                            le_at_b      15 MG           



                                        edtime_                     



                                        as_need                     



                                        ed}                      

 

     sulfaSALAzi sulfaSALAzi           No             2{table BID  sulfaSALAz   

        



     ne 500 MG ne 500 MG                          t}        ine 500 MG          

 

 

     Folic Acid Folic Acid           No             2{table QD   Folic Acid     

      



     1 MG 1 MG                          t}        1 MG           

 

     DULoxetine DULoxetine           No             1{capsu QD   DULoxetine     

      



     HCl 30 MG HCl 30 MG                          le}       HCl 30 MG           

 

     Diclofenac Diclofenac           No             1{appli BID  Diclofenac     

      



     Sodium 3 % Sodium 3 %                          cation}      Sodium 3 %     

      

 

     Gabapentin Gabapentin           No             1{capsu TID  Gabapentin     

      



     600  MG                          le_befo      600 MG           



                                        re_bedt                     



                                        ivette}                     

 

     Simponi Simponi           No                       Simponi           



     Aria 50 Aria 50                                    Aria 50           



     MG/4ML MG/4ML                                    MG/4ML           

 

     azaTHIOprin azaTHIOprin           No             1{table QD   azaTHIOpri   

        



     e 50 MG e 50 MG                          t}        ne 50 MG           

 

     Methocarbam Methocarbam           No             1{table TID  Methocarba   

        



     ol 750 MG ol 750 MG                          t}        mol 750 MG          

 

 

     Temazepam Temazepam           No             1{capsu QD   Temazepam        

   



     15 MG 15 MG                          le_at_b      15 MG           



                                        edtime_                     



                                        as_need                     



                                        ed}                      

 

     metFORMIN metFORMIN           No             1{table BID  metFORMIN        

   



     HCl 1000 MG HCl 1000 MG                          t_with_      HCl 1000     

      



                                        a_meal}      MG             

 

     Levothyroxi Levothyroxi           No                       Levothyrox      

     



     ne Sodium ne Sodium                                    ine Sodium          

 



     150  MCG                                    150 MCG           

 

     Glimepiride Glimepiride           No                       Glimepirid      

     



     2 MG 2 MG                                    e 2 MG           

 

     Iron 65 MG Iron 65 MG           No             1{table QD                  



                                        t}                       

 

     Multivitami Multivitami           No                                      



     n    n                                                      

 

     Turmeric Turmeric           No                                      

 

     Cholestyram Cholestyram           No             1{packe BID               

  



     ine 4 GM ine 4 GM                          t_mixed                     



                                        _with_w                     



                                        ater_or                     



                                        _non-ca                     



                                        rbonate                     



                                        d_drink                     



                                        }                        

 

     Rosuvastati Rosuvastati           No                                      



     n Calcium 5 n Calcium 5                                                   



     MG   MG                                                     

 

     Hydroxychlo Hydroxychlo           No             1{table BID               

  



     roquine roquine                          t}                       



     Sulfate 200 Sulfate 200                                                   



     MG   MG                                                     

 

     Restoril 15 Restoril 15           No             1{capsu QD                

  



     MG   MG                            le_at_b                     



                                        edtime_                     



                                        as_need                     



                                        ed}                      

 

     sulfaSALAzi sulfaSALAzi           No             2{table BID               

  



     ne 500 MG ne 500 MG                          t}                       

 

     Folic Acid Folic Acid           No             2{table QD                  



     1 MG 1 MG                          t}                       

 

     DULoxetine DULoxetine           No             1{capsu QD                  



     HCl 30 MG HCl 30 MG                          le}                      

 

     Diclofenac Diclofenac           No             1{appli BID                 



     Sodium 3 % Sodium 3 %                          cation}                     

 

     Gabapentin Gabapentin           No             1{capsu TID                 



     600  MG                          le_befo                     



                                        re_bedt                     



                                        ivette}                     

 

     Simponi Simponi           No                                      



     Aria 50 Aria 50                                                   



     MG/4ML MG/4ML                                                   

 

     azaTHIOprin azaTHIOprin           No             1{table QD                

  



     e 50 MG e 50 MG                          t}                       

 

     Methocarbam Methocarbam           No             1{table TID               

  



     ol 750 MG ol 750 MG                          t}                       

 

     Temazepam Temazepam           No             1{capsu QD                  



     15 MG 15 MG                          le_at_b                     



                                        edtime_                     



                                        as_need                     



                                        ed}                      

 

     metFORMIN metFORMIN           No             1{table BID                 



     HCl 1000 MG HCl 1000 MG                          t_with_                   

  



                                        a_meal}                     

 

     Levothyroxi Levothyroxi           No                                      



     ne Sodium ne Sodium                                                   



     150  MCG                                                   

 

     Glimepiride Glimepiride           No                                      



     2 MG 2 MG                                                   

 

     sulfaSALAzi sulfaSALAzi           No             2{table BID  sulfaSALAz   

        



     ne 500 MG ne 500 MG                          t}        ine 500 MG          

 

 

     Hydroxychlo Hydroxychlo           No             1{table BID  Hydroxychl   

        



     roquine roquine                          t}        oroquine           



     Sulfate 200 Sulfate 200                                    Sulfate         

  



     MG   MG                                      200 MG           

 

     Gabapentin Gabapentin           No             1{capsu TID  Gabapentin     

      



     600  MG                          le_befo      600 MG           



                                        re_bedt                     



                                        ivette}                     

 

     Multivitami Multivitami           No                       Multivitam      

     



     n    n                                       in             

 

     Iron 65 MG Iron 65 MG           No             1{table QD   Iron 65 MG     

      



                                        t}                       

 

     Diclofenac Diclofenac           No             1{appli BID  Diclofenac     

      



     Sodium 3 % Sodium 3 %                          cation}      Sodium 3 %     

      

 

     Methocarbam Methocarbam           No             1{table TID  Methocarba   

        



     ol 750 MG ol 750 MG                          t}        mol 750 MG          

 

 

     Restoril 15 Restoril 15           No             1{capsu QD   Restoril     

      



     MG   MG                            le_at_b      15 MG           



                                        edtime_                     



                                        as_need                     



                                        ed}                      

 

     metFORMIN metFORMIN           No             1{table BID  metFORMIN        

   



     HCl 1000 MG HCl 1000 MG                          t_with_      HCl 1000     

      



                                        a_meal}      MG             

 

     Temazepam Temazepam           No             1{capsu QD   Temazepam        

   



     15 MG 15 MG                          le_at_b      15 MG           



                                        edtime_                     



                                        as_need                     



                                        ed}                      

 

     Simponi Simponi           No                       Simponi           



     Aria 50 Aria 50                                    Aria 50           



     MG/4ML MG/4ML                                    MG/4ML           

 

     Turmeric Turmeric           No                       Turmeric           

 

     Cholestyram Cholestyram           No                       Cholestyra      

     



     ine 4 GM ine 4 GM                                    mine 4 GM           

 

     Folic Acid Folic Acid           No             2{table QD   Folic Acid     

      



     1 MG 1 MG                          t}        1 MG           

 

     Levothyroxi Levothyroxi           No                       Levothyrox      

     



     ne Sodium ne Sodium                                    ine Sodium          

 



     150  MCG                                    150 MCG           

 

     Rosuvastati Rosuvastati           No                       Rosuvastat      

     



     n Calcium 5 n Calcium 5                                    in Calcium      

     



     MG   MG                                      5 MG           

 

     Glimepiride Glimepiride           No                       Glimepirid      

     



     2 MG 2 MG                                    e 2 MG           

 

     DULoxetine DULoxetine           No             1{capsu QD   DULoxetine     

      



     HCl 30 MG HCl 30 MG                          le}       HCl 30 MG           

 

     azaTHIOprin azaTHIOprin           No             1{table QD   azaTHIOpri   

        



     e 50 MG e 50 MG                          t}        ne 50 MG           

 

     Simponi Simponi           No                       Simponi           



     Aria 50 Aria 50                                    Aria 50           



     MG/4ML MG/4ML                                    MG/4ML           

 

     Cholestyram Cholestyram           No                       Cholestyra      

     



     ine 4 GM ine 4 GM                                    mine 4 GM           

 

     Glimepiride Glimepiride           No             1{table      Glimepirid   

        



     1 MG 1 MG                          t}        e 1 MG           

 

     sulfaSALAzi sulfaSALAzi           No             2{table BID  sulfaSALAz   

        



     ne 500 MG ne 500 MG                          t}        ine 500 MG          

 

 

     Hydroxychlo Hydroxychlo           No             1{table BID  Hydroxychl   

        



     roquine roquine                          t}        oroquine           



     Sulfate 200 Sulfate 200                                    Sulfate         

  



     MG   MG                                      200 MG           

 

     Diclofenac Diclofenac           No             1{appli BID  Diclofenac     

      



     Sodium 3 % Sodium 3 %                          cation}      Sodium 3 %     

      

 

     metFORMIN metFORMIN           No             1{table BID  metFORMIN        

   



     HCl 1000 MG HCl 1000 MG                          t_with_      HCl 1000     

      



                                        a_meal}      MG             

 

     Methocarbam Methocarbam           No             1{table TID  Methocarba   

        



     ol 750 MG ol 750 MG                          t}        mol 750 MG          

 

 

     Synthroid Synthroid           No                  QD   Synthroid           



     150  MCG                                    150 MCG           

 

     Glimepiride Glimepiride           No                       Glimepirid      

     



     2 MG 2 MG                                    e 2 MG           

 

     Iron 65 MG Iron 65 MG           No             1{table QD   Iron 65 MG     

      



                                        t}                       

 

     Multivitami Multivitami           No                       Multivitam      

     



     n    n                                       in             

 

     Temazepam Temazepam           No             1{capsu QD   Temazepam        

   



     15 MG 15 MG                          le_at_b      15 MG           



                                        edtime_                     



                                        as_need                     



                                        ed}                      

 

     Rosuvastati Rosuvastati           No                       Rosuvastat      

     



     n Calcium 5 n Calcium 5                                    in Calcium      

     



     MG   MG                                      5 MG           

 

     azaTHIOprin azaTHIOprin           No             1{table QD   azaTHIOpri   

        



     e 50 MG e 50 MG                          t}        ne 50 MG           

 

     Turmeric Turmeric           No                       Turmeric           

 

     Gabapentin Gabapentin           No             1{capsu TID  Gabapentin     

      



     600  MG                          le_befo      600 MG           



                                        re_bedt                     



                                        ivette}                     

 

     Rosuvastati Rosuvastati           No             1{table QD   Rosuvastat   

        



     n Calcium 5 n Calcium 5                          t}        in Calcium      

     



     MG   MG                                      5 MG           

 

     Folic Acid Folic Acid           No             2{table QD   Folic Acid     

      



     1 MG 1 MG                          t}        1 MG           

 

     DULoxetine DULoxetine           No             1{capsu QD   DULoxetine     

      



     HCl 30 MG HCl 30 MG                          le}       HCl 30 MG           

 

     Levothyroxi Levothyroxi           No                       Levothyrox      

     



     ne Sodium ne Sodium                                    ine Sodium          

 



     150  MCG                                    150 MCG           

 

     Simponi Simponi           No                       Simponi           



     Aria 50 Aria 50                                    Aria 50           



     MG/4ML MG/4ML                                    MG/4ML           

 

     Cholestyram Cholestyram           No                       Cholestyra      

     



     ine 4 GM ine 4 GM                                    mine 4 GM           

 

     Glimepiride Glimepiride           No             1{table      Glimepirid   

        



     1 MG 1 MG                          t}        e 1 MG           

 

     sulfaSALAzi sulfaSALAzi           No             2{table BID  sulfaSALAz   

        



     ne 500 MG ne 500 MG                          t}        ine 500 MG          

 

 

     Synthroid Synthroid           No                  QD   Synthroid           



     150  MCG                                    150 MCG           

 

     Diclofenac Diclofenac           No             1{appli BID  Diclofenac     

      



     Sodium 3 % Sodium 3 %                          cation}      Sodium 3 %     

      

 

     Hydroxychlo Hydroxychlo           No             1{table BID  Hydroxychl   

        



     roquine roquine                          t}        oroquine           



     Sulfate 200 Sulfate 200                                    Sulfate         

  



     MG   MG                                      200 MG           

 

     Methocarbam Methocarbam           No             1{table TID  Methocarba   

        



     ol 750 MG ol 750 MG                          t}        mol 750 MG          

 

 

     metFORMIN metFORMIN           No                       metFORMIN           



     HCl 1000 MG HCl 1000 MG                                    HCl 1000        

   



                                                  MG             

 

     Glimepiride Glimepiride           No                       Glimepirid      

     



     2 MG 2 MG                                    e 2 MG           

 

     Iron 65 MG Iron 65 MG           No             1{table QD   Iron 65 MG     

      



                                        t}                       

 

     Multivitami Multivitami           No                       Multivitam      

     



     n    n                                       in             

 

     Temazepam Temazepam           No             1{capsu QD   Temazepam        

   



     15 MG 15 MG                          le_at_b      15 MG           



                                        edtime_                     



                                        as_need                     



                                        ed}                      

 

     Rosuvastati Rosuvastati           No                       Rosuvastat      

     



     n Calcium 5 n Calcium 5                                    in Calcium      

     



     MG   MG                                      5 MG           

 

     azaTHIOprin azaTHIOprin           No             1{table QD   azaTHIOpri   

        



     e 50 MG e 50 MG                          t}        ne 50 MG           

 

     Turmeric Turmeric           No                       Turmeric           

 

     Gabapentin Gabapentin           No             1{capsu TID  Gabapentin     

      



     600  MG                          le_befo      600 MG           



                                        re_bedt                     



                                        ivette}                     

 

     Rosuvastati Rosuvastati           No             1{table QD   Rosuvastat   

        



     n Calcium 5 n Calcium 5                          t}        in Calcium      

     



     MG   MG                                      5 MG           

 

     Folic Acid Folic Acid           No             2{table QD   Folic Acid     

      



     1 MG 1 MG                          t}        1 MG           

 

     DULoxetine DULoxetine           No             1{capsu QD   DULoxetine     

      



     HCl 30 MG HCl 30 MG                          le}       HCl 30 MG           

 

     Levothyroxi Levothyroxi           No                       Levothyrox      

     



     ne Sodium ne Sodium                                    ine Sodium          

 



     150  MCG                                    150 MCG           

 

     metFORMIN metFORMIN           No             1{table QD   metFORMIN        

   



     HCl 1000 MG HCl 1000 MG                          t_with_      HCl 1000     

      



                                        a_meal}      MG             

 

     Rosuvastati Rosuvastati           No                       Rosuvastat      

     



     n Calcium 5 n Calcium 5                                    in Calcium      

     



     MG   MG                                      5 MG           

 

     Methocarbam Methocarbam           No             1{table TID  Methocarba   

        



     ol 750 MG ol 750 MG                          t}        mol 750 MG          

 

 

     Diclofenac Diclofenac           No             1{appli BID  Diclofenac     

      



     Sodium 3 % Sodium 3 %                          cation}      Sodium 3 %     

      

 

     azaTHIOprin azaTHIOprin           No             1{table QD   azaTHIOpri   

        



     e 50 MG e 50 MG                          t}        ne 50 MG           

 

     Gabapentin Gabapentin           No             1{capsu TID  Gabapentin     

      



     600  MG                          le_befo      600 MG           



                                        re_bedt                     



                                        ivette}                     

 

     Restoril 15 Restoril 15           No             1{capsu QD   Restoril     

      



     MG   MG                            le_at_b      15 MG           



                                        edtime_                     



                                        as_need                     



                                        ed}                      

 

     Synthroid Synthroid           No                  QD   Synthroid           



     150  MCG                                    150 MCG           

 

     Turmeric Turmeric           No                       Turmeric           

 

     Folic Acid Folic Acid           No             2{table QD   Folic Acid     

      



     1 MG 1 MG                          t}        1 MG           

 

     Glimepiride Glimepiride           No                       Glimepirid      

     



     2 MG 2 MG                                    e 2 MG           

 

     Temazepam Temazepam           No             1{capsu QD   Temazepam        

   



     15 MG 15 MG                          le_at_b      15 MG           



                                        edtime_                     



                                        as_need                     



                                        ed}                      

 

     Cholestyram Cholestyram           No                       Cholestyra      

     



     ine 4 GM ine 4 GM                                    mine 4 GM           

 

     Simponi Simponi           No                       Simponi           



     Aria 50 Aria 50                                    Aria 50           



     MG/4ML MG/4ML                                    MG/4ML           

 

     Levothyroxi Levothyroxi           No                       Levothyrox      

     



     ne Sodium ne Sodium                                    ine Sodium          

 



     150  MCG                                    150 MCG           

 

     metFORMIN metFORMIN           No                       metFORMIN           



     HCl 1000 MG HCl 1000 MG                                    HCl 1000        

   



                                                  MG             

 

     Multivitami Multivitami           No                       Multivitam      

     



     n    n                                       in             

 

     sulfaSALAzi sulfaSALAzi           No             2{table BID  sulfaSALAz   

        



     ne 500 MG ne 500 MG                          t}        ine 500 MG          

 

 

     Rosuvastati Rosuvastati           No             1{table QD   Rosuvastat   

        



     n Calcium 5 n Calcium 5                          t}        in Calcium      

     



     MG   MG                                      5 MG           

 

     Iron 65 MG Iron 65 MG           No             1{table QD   Iron 65 MG     

      



                                        t}                       

 

     DULoxetine DULoxetine           No             1{capsu QD   DULoxetine     

      



     HCl 30 MG HCl 30 MG                          le}       HCl 30 MG           

 

     Hydroxychlo Hydroxychlo           No             1{table BID  Hydroxychl   

        



     roquine roquine                          t}        oroquine           



     Sulfate 200 Sulfate 200                                    Sulfate         

  



     MG   MG                                      200 MG           

 

     Gabapentin Gabapentin           No             1{capsu TID  Gabapentin     

      



     600  MG                          le_befo      600 MG           



                                        re_bedt                     



                                        ivette}                     

 

     metFORMIN metFORMIN           No             1{table QD   metFORMIN        

   



     HCl 1000 MG HCl 1000 MG                          t_with_      HCl 1000     

      



                                        a_meal}      MG             

 

     Rosuvastati Rosuvastati           No                       Rosuvastat      

     



     n Calcium 5 n Calcium 5                                    in Calcium      

     



     MG   MG                                      5 MG           

 

     Synthroid Synthroid           No                  QD   Synthroid           



     150  MCG                                    150 MCG           

 

     metFORMIN metFORMIN           No                       metFORMIN           



     HCl 1000 MG HCl 1000 MG                                    HCl 1000        

   



                                                  MG             

 

     Diclofenac Diclofenac           No             1{appli BID  Diclofenac     

      



     Sodium 3 % Sodium 3 %                          cation}      Sodium 3 %     

      

 

     Turmeric Turmeric           No                       Turmeric           

 

     sulfaSALAzi sulfaSALAzi           No             2{table BID  sulfaSALAz   

        



     ne 500 MG ne 500 MG                          t}        ine 500 MG          

 

 

     Cholestyram Cholestyram           No                       Cholestyra      

     



     ine 4 GM ine 4 GM                                    mine 4 GM           

 

     Glimepiride Glimepiride           No                       Glimepirid      

     



     2 MG 2 MG                                    e 2 MG           

 

     Iron 65 MG Iron 65 MG           No             1{table QD   Iron 65 MG     

      



                                        t}                       

 

     Multivitami Multivitami           No                       Multivitam      

     



     n    n                                       in             

 

     azaTHIOprin azaTHIOprin           No             1{table QD   azaTHIOpri   

        



     e 50 MG e 50 MG                          t}        ne 50 MG           

 

     Folic Acid Folic Acid           No             2{table QD   Folic Acid     

      



     1 MG 1 MG                          t}        1 MG           

 

     Hydroxychlo Hydroxychlo           No             1{table BID  Hydroxychl   

        



     roquine roquine                          t}        oroquine           



     Sulfate 200 Sulfate 200                                    Sulfate         

  



     MG   MG                                      200 MG           

 

     Rosuvastati Rosuvastati           No             1{table QD   Rosuvastat   

        



     n Calcium 5 n Calcium 5                          t}        in Calcium      

     



     MG   MG                                      5 MG           

 

     DULoxetine DULoxetine           No             1{capsu QD   DULoxetine     

      



     HCl 30 MG HCl 30 MG                          le}       HCl 30 MG           

 

     Levothyroxi Levothyroxi           No                       Levothyrox      

     



     ne Sodium ne Sodium                                    ine Sodium          

 



     150  MCG                                    150 MCG           

 

     Methocarbam Methocarbam           No             1{table TID  Methocarba   

        



     ol 750 MG ol 750 MG                          t}        mol 750 MG          

 

 

     Simponi Simponi           No                       Simponi           



     Aria 50 Aria 50                                    Aria 50           



     MG/4ML MG/4ML                                    MG/4ML           

 

     metFORMIN metFORMIN           No             1{table QD   metFORMIN        

   



     HCl 1000 MG HCl 1000 MG                          t_with_      HCl 1000     

      



                                        a_meal}      MG             

 

     Rosuvastati Rosuvastati           No                       Rosuvastat      

     



     n Calcium 5 n Calcium 5                                    in Calcium      

     



     MG   MG                                      5 MG           

 

     Diclofenac Diclofenac           No             1{appli BID  Diclofenac     

      



     Sodium 3 % Sodium 3 %                          cation}      Sodium 3 %     

      

 

     DULoxetine DULoxetine           No             1{capsu QD   DULoxetine     

      



     HCl 30 MG HCl 30 MG                          le}       HCl 30 MG           

 

     Gabapentin Gabapentin           No             1{capsu TID  Gabapentin     

      



     600  MG                          le_befo      600 MG           



                                        re_bedt                     



                                        ivette}                     

 

     Glimepiride Glimepiride           No                       Glimepirid      

     



     2 MG 2 MG                                    e 2 MG           

 

     Synthroid Synthroid           No                  QD   Synthroid           



     150  MCG                                    150 MCG           

 

     Iron 65 MG Iron 65 MG           No             1{table QD   Iron 65 MG     

      



                                        t}                       

 

     Cholestyram Cholestyram           No                       Cholestyra      

     



     ine 4 GM ine 4 GM                                    mine 4 GM           

 

     Levothyroxi Levothyroxi           No                       Levothyrox      

     



     ne Sodium ne Sodium                                    ine Sodium          

 



     150  MCG                                    150 MCG           

 

     azaTHIOprin azaTHIOprin           No             1{table QD   azaTHIOpri   

        



     e 50 MG e 50 MG                          t}        ne 50 MG           

 

     metFORMIN metFORMIN           No                       metFORMIN           



     HCl 1000 MG HCl 1000 MG                                    HCl 1000        

   



                                                  MG             

 

     Turmeric Turmeric           No                       Turmeric           

 

     Rosuvastati Rosuvastati           No             1{table QD   Rosuvastat   

        



     n Calcium 5 n Calcium 5                          t}        in Calcium      

     



     MG   MG                                      5 MG           

 

     Simponi Simponi           No                       Simponi           



     Aria 50 Aria 50                                    Aria 50           



     MG/4ML MG/4ML                                    MG/4ML           

 

     Multivitami Multivitami           No                       Multivitam      

     



     n    n                                       in             

 

     sulfaSALAzi sulfaSALAzi           No             2{table BID  sulfaSALAz   

        



     ne 500 MG ne 500 MG                          t}        ine 500 MG          

 

 

     Methocarbam Methocarbam           No             1{table TID  Methocarba   

        



     ol 750 MG ol 750 MG                          t}        mol 750 MG          

 

 

     Folic Acid Folic Acid           No             2{table QD   Folic Acid     

      



     1 MG 1 MG                          t}        1 MG           

 

     Hydroxychlo Hydroxychlo           No             1{table BID  Hydroxychl   

        



     roquine roquine                          t}        oroquine           



     Sulfate 200 Sulfate 200                                    Sulfate         

  



     MG   MG                                      200 MG           

 

     DULoxetine DULoxetine           No             1{capsu QD   DULoxetine     

      



     HCl 30 MG HCl 30 MG                          le}       HCl 30 MG           

 

     OneTouch OneTouch           No                  QD   OneTouch           



     Verio - Verio -                                    Verio -           

 

     Hydroxychlo Hydroxychlo           No             1{table BID  Hydroxychl   

        



     roquine roquine                          t}        oroquine           



     Sulfate 200 Sulfate 200                                    Sulfate         

  



     MG   MG                                      200 MG           

 

     Diclofenac Diclofenac           No             1{appli BID  Diclofenac     

      



     Sodium 3 % Sodium 3 %                          cation}      Sodium 3 %     

      

 

     Turmeric Turmeric           No                       Turmeric           

 

     Simponi Simponi           No                       Simponi           



     Aria 50 Aria 50                                    Aria 50           



     MG/4ML MG/4ML                                    MG/4ML           

 

     Synthroid Synthroid           No                  QD   Synthroid           



     175  MCG                                    175 MCG           

 

     Glimepiride Glimepiride           No                       Glimepirid      

     



     2 MG 2 MG                                    e 2 MG           

 

     azaTHIOprin azaTHIOprin           No             1.5{tab QD   azaTHIOpri   

        



     e 50 MG e 50 MG                          let}      ne 50 MG           

 

     Iron 65 MG Iron 65 MG           No             1{table QD   Iron 65 MG     

      



                                        t}                       

 

     metFORMIN metFORMIN           No             1{table BID  metFORMIN        

   



     HCl 1000 MG HCl 1000 MG                          t_with_      HCl 1000     

      



                                        a_meal}      MG             

 

     Restoril 15 Restoril 15           No             1{capsu QD   Restoril     

      



     MG   MG                            le_at_b      15 MG           



                                        edtime_                     



                                        as_need                     



                                        ed}                      

 

     Folic Acid Folic Acid           No             2{table QD   Folic Acid     

      



     1 MG 1 MG                          t}        1 MG           

 

     Cholestyram Cholestyram           No                       Cholestyra      

     



     ine 4 GM ine 4 GM                                    mine 4 GM           

 

     Gabapentin Gabapentin           No             1{capsu TID  Gabapentin     

      



     600  MG                          le_befo      600 MG           



                                        re_bedt                     



                                        ivette}                     

 

     Rosuvastati Rosuvastati           No                       Rosuvastat      

     



     n Calcium 5 n Calcium 5                                    in Calcium      

     



     MG   MG                                      5 MG           

 

     Methocarbam Methocarbam           No             1{table TID  Methocarba   

        



     ol 750 MG ol 750 MG                          t}        mol 750 MG          

 

 

     Levothyroxi Levothyroxi           No                       Levothyrox      

     



     ne Sodium ne Sodium                                    ine Sodium          

 



     150  MCG                                    150 MCG           

 

     Multivitami Multivitami           No                       Multivitam      

     



     n    n                                       in             

 

     Rosuvastati Rosuvastati           No             1{table QD   Rosuvastat   

        



     n Calcium 5 n Calcium 5                          t}        in Calcium      

     



     MG   MG                                      5 MG           

 

     sulfaSALAzi sulfaSALAzi           No             2{table BID  sulfaSALAz   

        



     ne 500 MG ne 500 MG                          t}        ine 500 MG          

 

 

     DULoxetine DULoxetine           No             1{capsu QD   DULoxetine     

      



     HCl 30 MG HCl 30 MG                          le}       HCl 30 MG           

 

     OneTouch OneTouch           No                  QD   OneTouch           



     Verio - Verio -                                    Verio -           

 

     Hydroxychlo Hydroxychlo           No             1{table BID  Hydroxychl   

        



     roquine roquine                          t}        oroquine           



     Sulfate 200 Sulfate 200                                    Sulfate         

  



     MG   MG                                      200 MG           

 

     Diclofenac Diclofenac           No             1{appli BID  Diclofenac     

      



     Sodium 3 % Sodium 3 %                          cation}      Sodium 3 %     

      

 

     Turmeric Turmeric           No                       Turmeric           

 

     Simponi Simponi           No                       Simponi           



     Aria 50 Aria 50                                    Aria 50           



     MG/4ML MG/4ML                                    MG/4ML           

 

     Synthroid Synthroid           No                  QD   Synthroid           



     175  MCG                                    175 MCG           

 

     Glimepiride Glimepiride           No                       Glimepirid      

     



     2 MG 2 MG                                    e 2 MG           

 

     azaTHIOprin azaTHIOprin           No             1.5{tab QD   azaTHIOpri   

        



     e 50 MG e 50 MG                          let}      ne 50 MG           

 

     Iron 65 MG Iron 65 MG           No             1{table QD   Iron 65 MG     

      



                                        t}                       

 

     metFORMIN metFORMIN           No             1{table BID  metFORMIN        

   



     HCl 1000 MG HCl 1000 MG                          t_with_      HCl 1000     

      



                                        a_meal}      MG             

 

     Restoril 15 Restoril 15           No             1{capsu QD   Restoril     

      



     MG   MG                            le_at_b      15 MG           



                                        edtime_                     



                                        as_need                     



                                        ed}                      

 

     Folic Acid Folic Acid           No             2{table QD   Folic Acid     

      



     1 MG 1 MG                          t}        1 MG           

 

     Cholestyram Cholestyram           No                       Cholestyra      

     



     ine 4 GM ine 4 GM                                    mine 4 GM           

 

     Gabapentin Gabapentin           No             1{capsu TID  Gabapentin     

      



     600  MG                          le_befo      600 MG           



                                        re_bedt                     



                                        ivette}                     

 

     Rosuvastati Rosuvastati           No                       Rosuvastat      

     



     n Calcium 5 n Calcium 5                                    in Calcium      

     



     MG   MG                                      5 MG           

 

     Methocarbam Methocarbam           No             1{table TID  Methocarba   

        



     ol 750 MG ol 750 MG                          t}        mol 750 MG          

 

 

     Levothyroxi Levothyroxi           No                       Levothyrox      

     



     ne Sodium ne Sodium                                    ine Sodium          

 



     150  MCG                                    150 MCG           

 

     Multivitami Multivitami           No                       Multivitam      

     



     n    n                                       in             

 

     Rosuvastati Rosuvastati           No             1{table QD   Rosuvastat   

        



     n Calcium 5 n Calcium 5                          t}        in Calcium      

     



     MG   MG                                      5 MG           

 

     sulfaSALAzi sulfaSALAzi           No             2{table BID  sulfaSALAz   

        



     ne 500 MG ne 500 MG                          t}        ine 500 MG          

 

 

     DULoxetine DULoxetine           No             1{capsu QD   DULoxetine     

      



     HCl 30 MG HCl 30 MG                          le}       HCl 30 MG           

 

     OneTouch OneTouch           No                  QD   OneTouch           



     Verio - Verio -                                    Verio -           

 

     Hydroxychlo Hydroxychlo           No             1{table BID  Hydroxychl   

        



     roquine roquine                          t}        oroquine           



     Sulfate 200 Sulfate 200                                    Sulfate         

  



     MG   MG                                      200 MG           

 

     Diclofenac Diclofenac           No             1{appli BID  Diclofenac     

      



     Sodium 3 % Sodium 3 %                          cation}      Sodium 3 %     

      

 

     Turmeric Turmeric           No                       Turmeric           

 

     Simponi Simponi           No                       Simponi           



     Aria 50 Aria 50                                    Aria 50           



     MG/4ML MG/4ML                                    MG/4ML           

 

     Synthroid Synthroid           No                  QD   Synthroid           



     175  MCG                                    175 MCG           

 

     Glimepiride Glimepiride           No                       Glimepirid      

     



     2 MG 2 MG                                    e 2 MG           

 

     azaTHIOprin azaTHIOprin           No             1.5{tab QD   azaTHIOpri   

        



     e 50 MG e 50 MG                          let}      ne 50 MG           

 

     Iron 65 MG Iron 65 MG           No             1{table QD   Iron 65 MG     

      



                                        t}                       

 

     metFORMIN metFORMIN           No             1{table BID  metFORMIN        

   



     HCl 1000 MG HCl 1000 MG                          t_with_      HCl 1000     

      



                                        a_meal}      MG             

 

     Restoril 15 Restoril 15           No             1{capsu QD   Restoril     

      



     MG   MG                            le_at_b      15 MG           



                                        edtime_                     



                                        as_need                     



                                        ed}                      

 

     Folic Acid Folic Acid           No             2{table QD   Folic Acid     

      



     1 MG 1 MG                          t}        1 MG           

 

     Cholestyram Cholestyram           No                       Cholestyra      

     



     ine 4 GM ine 4 GM                                    mine 4 GM           

 

     Gabapentin Gabapentin           No             1{capsu TID  Gabapentin     

      



     600  MG                          le_befo      600 MG           



                                        re_bedt                     



                                        ivette}                     

 

     Rosuvastati Rosuvastati           No                       Rosuvastat      

     



     n Calcium 5 n Calcium 5                                    in Calcium      

     



     MG   MG                                      5 MG           

 

     Methocarbam Methocarbam           No             1{table TID  Methocarba   

        



     ol 750 MG ol 750 MG                          t}        mol 750 MG          

 

 

     Levothyroxi Levothyroxi           No                       Levothyrox      

     



     ne Sodium ne Sodium                                    ine Sodium          

 



     150  MCG                                    150 MCG           

 

     Multivitami Multivitami           No                       Multivitam      

     



     n    n                                       in             

 

     Rosuvastati Rosuvastati           No             1{table QD   Rosuvastat   

        



     n Calcium 5 n Calcium 5                          t}        in Calcium      

     



     MG   MG                                      5 MG           

 

     sulfaSALAzi sulfaSALAzi           No             2{table BID  sulfaSALAz   

        



     ne 500 MG ne 500 MG                          t}        ine 500 MG          

 

 

     Levothyroxi Levothyroxi           No                       Levothyrox      

     



     ne Sodium ne Sodium                                    ine Sodium          

 



     150  MCG                                    150 MCG           

 

     OneTouch OneTouch           No                  QD   OneTouch           



     Verio - Verio -                                    Verio -           

 

     Glimepiride Glimepiride           No                       Glimepirid      

     



     2 MG 2 MG                                    e 2 MG           

 

     Cholestyram Cholestyram           No                       Cholestyra      

     



     ine 4 GM ine 4 GM                                    mine 4 GM           

 

     Multivitami Multivitami           No                       Multivitam      

     



     n    n                                       in             

 

     Rosuvastati Rosuvastati           No             1{table QD   Rosuvastat   

        



     n Calcium 5 n Calcium 5                          t}        in Calcium      

     



     MG   MG                                      5 MG           

 

     DULoxetine DULoxetine           No             1{capsu QD   DULoxetine     

      



     HCl 30 MG HCl 30 MG                          le}       HCl 30 MG           

 

     Diclofenac Diclofenac           No             1{appli BID  Diclofenac     

      



     Sodium 3 % Sodium 3 %                          cation}      Sodium 3 %     

      

 

     Gabapentin Gabapentin           No             1{capsu TID  Gabapentin     

      



     600  MG                          le_befo      600 MG           



                                        re_bedt                     



                                        ivette}                     

 

     Iron 65 MG Iron 65 MG           No             1{table QD   Iron 65 MG     

      



                                        t}                       

 

     Synthroid Synthroid           No                  QD   Synthroid           



     175  MCG                                    175 MCG           

 

     Hydroxychlo Hydroxychlo           No             1{table BID  Hydroxychl   

        



     roquine roquine                          t}        oroquine           



     Sulfate 200 Sulfate 200                                    Sulfate         

  



     MG   MG                                      200 MG           

 

     Folic Acid Folic Acid           No             2{table QD   Folic Acid     

      



     1 MG 1 MG                          t}        1 MG           

 

     Simponi Simponi           No                       Simponi           



     Aria 50 Aria 50                                    Aria 50           



     MG/4ML MG/4ML                                    MG/4ML           

 

     azaTHIOprin azaTHIOprin           No             1.5{tab QD   azaTHIOpri   

        



     e 50 MG e 50 MG                          let}      ne 50 MG           

 

     Turmeric Turmeric           No                       Turmeric           

 

     metFORMIN metFORMIN           No             1{table BID  metFORMIN        

   



     HCl 1000 MG HCl 1000 MG                          t_with_      HCl 1000     

      



                                        a_meal}      MG             

 

     Restoril 15 Restoril 15           No             1{capsu QD   Restoril     

      



     MG   MG                            le_at_b      15 MG           



                                        edtime_                     



                                        as_need                     



                                        ed}                      

 

     sulfaSALAzi sulfaSALAzi           No             2{table BID  sulfaSALAz   

        



     ne 500 MG ne 500 MG                          t}        ine 500 MG          

 

 

     Methocarbam Methocarbam           No             1{table TID  Methocarba   

        



     ol 750 MG ol 750 MG                          t}        mol 750 MG          

 

 

     Rosuvastati Rosuvastati           No                       Rosuvastat      

     



     n Calcium 5 n Calcium 5                                    in Calcium      

     



     MG   MG                                      5 MG           

 

     Levothyroxi Levothyroxi           No                       Levothyrox      

     



     ne Sodium ne Sodium                                    ine Sodium          

 



     150  MCG                                    150 MCG           

 

     OneTouch OneTouch           No                  QD   OneTouch           



     Verio - Verio -                                    Verio -           

 

     Glimepiride Glimepiride           No                       Glimepirid      

     



     2 MG 2 MG                                    e 2 MG           

 

     Cholestyram Cholestyram           No                       Cholestyra      

     



     ine 4 GM ine 4 GM                                    mine 4 GM           

 

     Multivitami Multivitami           No                       Multivitam      

     



     n    n                                       in             

 

     Rosuvastati Rosuvastati           No             1{table QD   Rosuvastat   

        



     n Calcium 5 n Calcium 5                          t}        in Calcium      

     



     MG   MG                                      5 MG           

 

     DULoxetine DULoxetine           No             1{capsu QD   DULoxetine     

      



     HCl 30 MG HCl 30 MG                          le}       HCl 30 MG           

 

     Diclofenac Diclofenac           No             1{appli BID  Diclofenac     

      



     Sodium 3 % Sodium 3 %                          cation}      Sodium 3 %     

      

 

     Gabapentin Gabapentin           No             1{capsu TID  Gabapentin     

      



     600  MG                          le_befo      600 MG           



                                        re_bedt                     



                                        ivette}                     

 

     Iron 65 MG Iron 65 MG           No             1{table QD   Iron 65 MG     

      



                                        t}                       

 

     Synthroid Synthroid           No                  QD   Synthroid           



     175  MCG                                    175 MCG           

 

     Hydroxychlo Hydroxychlo           No             1{table BID  Hydroxychl   

        



     roquine roquine                          t}        oroquine           



     Sulfate 200 Sulfate 200                                    Sulfate         

  



     MG   MG                                      200 MG           

 

     Folic Acid Folic Acid           No             2{table QD   Folic Acid     

      



     1 MG 1 MG                          t}        1 MG           

 

     Simponi Simponi           No                       Simponi           



     Aria 50 Aria 50                                    Aria 50           



     MG/4ML MG/4ML                                    MG/4ML           

 

     azaTHIOprin azaTHIOprin           No             1.5{tab QD   azaTHIOpri   

        



     e 50 MG e 50 MG                          let}      ne 50 MG           

 

     Turmeric Turmeric           No                       Turmeric           

 

     metFORMIN metFORMIN           No             1{table BID  metFORMIN        

   



     HCl 1000 MG HCl 1000 MG                          t_with_      HCl 1000     

      



                                        a_meal}      MG             

 

     Restoril 15 Restoril 15           No             1{capsu QD   Restoril     

      



     MG   MG                            le_at_b      15 MG           



                                        edtime_                     



                                        as_need                     



                                        ed}                      

 

     sulfaSALAzi sulfaSALAzi           No             2{table BID  sulfaSALAz   

        



     ne 500 MG ne 500 MG                          t}        ine 500 MG          

 

 

     Methocarbam Methocarbam           No             1{table TID  Methocarba   

        



     ol 750 MG ol 750 MG                          t}        mol 750 MG          

 

 

     Rosuvastati Rosuvastati           No                       Rosuvastat      

     



     n Calcium 5 n Calcium 5                                    in Calcium      

     



     MG   MG                                      5 MG           

 

     Levothyroxi Levothyroxi           No                       Levothyrox      

     



     ne Sodium ne Sodium                                    ine Sodium          

 



     150  MCG                                    150 MCG           

 

     OneTouch OneTouch           No                  QD   OneTouch           



     Verio - Verio -                                    Verio -           

 

     Glimepiride Glimepiride           No                       Glimepirid      

     



     2 MG 2 MG                                    e 2 MG           

 

     Cholestyram Cholestyram           No                       Cholestyra      

     



     ine 4 GM ine 4 GM                                    mine 4 GM           

 

     Multivitami Multivitami           No                       Multivitam      

     



     n    n                                       in             

 

     Rosuvastati Rosuvastati           No             1{table QD   Rosuvastat   

        



     n Calcium 5 n Calcium 5                          t}        in Calcium      

     



     MG   MG                                      5 MG           

 

     DULoxetine DULoxetine           No             1{capsu QD   DULoxetine     

      



     HCl 30 MG HCl 30 MG                          le}       HCl 30 MG           

 

     Diclofenac Diclofenac           No             1{appli BID  Diclofenac     

      



     Sodium 3 % Sodium 3 %                          cation}      Sodium 3 %     

      

 

     Gabapentin Gabapentin           No             1{capsu TID  Gabapentin     

      



     600  MG                          le_befo      600 MG           



                                        re_bedt                     



                                        ivette}                     

 

     Iron 65 MG Iron 65 MG           No             1{table QD   Iron 65 MG     

      



                                        t}                       

 

     Synthroid Synthroid           No                  QD   Synthroid           



     175  MCG                                    175 MCG           

 

     Hydroxychlo Hydroxychlo           No             1{table BID  Hydroxychl   

        



     roquine roquine                          t}        oroquine           



     Sulfate 200 Sulfate 200                                    Sulfate         

  



     MG   MG                                      200 MG           

 

     Folic Acid Folic Acid           No             2{table QD   Folic Acid     

      



     1 MG 1 MG                          t}        1 MG           

 

     Simponi Simponi           No                       Simponi           



     Aria 50 Aria 50                                    Aria 50           



     MG/4ML MG/4ML                                    MG/4ML           

 

     azaTHIOprin azaTHIOprin           No             1.5{tab QD   azaTHIOpri   

        



     e 50 MG e 50 MG                          let}      ne 50 MG           

 

     Turmeric Turmeric           No                       Turmeric           

 

     metFORMIN metFORMIN           No             1{table BID  metFORMIN        

   



     HCl 1000 MG HCl 1000 MG                          t_with_      HCl 1000     

      



                                        a_meal}      MG             

 

     Restoril 15 Restoril 15           No             1{capsu QD   Restoril     

      



     MG   MG                            le_at_b      15 MG           



                                        edtime_                     



                                        as_need                     



                                        ed}                      

 

     sulfaSALAzi sulfaSALAzi           No             2{table BID  sulfaSALAz   

        



     ne 500 MG ne 500 MG                          t}        ine 500 MG          

 

 

     Methocarbam Methocarbam           No             1{table TID  Methocarba   

        



     ol 750 MG ol 750 MG                          t}        mol 750 MG          

 

 

     Rosuvastati Rosuvastati           No                       Rosuvastat      

     



     n Calcium 5 n Calcium 5                                    in Calcium      

     



     MG   MG                                      5 MG           

 

     Levothyroxi Levothyroxi           No                       Levothyrox      

     



     ne Sodium ne Sodium                                    ine Sodium          

 



     150  MCG                                    150 MCG           

 

     OneTouch OneTouch           No                  QD   OneTouch           



     Verio - Verio -                                    Verio -           

 

     Glimepiride Glimepiride           No                       Glimepirid      

     



     2 MG 2 MG                                    e 2 MG           

 

     Cholestyram Cholestyram           No                       Cholestyra      

     



     ine 4 GM ine 4 GM                                    mine 4 GM           

 

     Multivitami Multivitami           No                       Multivitam      

     



     n    n                                       in             

 

     Rosuvastati Rosuvastati           No             1{table QD   Rosuvastat   

        



     n Calcium 5 n Calcium 5                          t}        in Calcium      

     



     MG   MG                                      5 MG           

 

     DULoxetine DULoxetine           No             1{capsu QD   DULoxetine     

      



     HCl 30 MG HCl 30 MG                          le}       HCl 30 MG           

 

     Diclofenac Diclofenac           No             1{appli BID  Diclofenac     

      



     Sodium 3 % Sodium 3 %                          cation}      Sodium 3 %     

      

 

     Gabapentin Gabapentin           No             1{capsu TID  Gabapentin     

      



     600  MG                          le_befo      600 MG           



                                        re_bedt                     



                                        ivette}                     

 

     Iron 65 MG Iron 65 MG           No             1{table QD   Iron 65 MG     

      



                                        t}                       

 

     Synthroid Synthroid           No                  QD   Synthroid           



     175  MCG                                    175 MCG           

 

     Hydroxychlo Hydroxychlo           No             1{table BID  Hydroxychl   

        



     roquine roquine                          t}        oroquine           



     Sulfate 200 Sulfate 200                                    Sulfate         

  



     MG   MG                                      200 MG           

 

     Folic Acid Folic Acid           No             2{table QD   Folic Acid     

      



     1 MG 1 MG                          t}        1 MG           

 

     Simponi Simponi           No                       Simponi           



     Aria 50 Aria 50                                    Aria 50           



     MG/4ML MG/4ML                                    MG/4ML           

 

     azaTHIOprin azaTHIOprin           No             1.5{tab QD   azaTHIOpri   

        



     e 50 MG e 50 MG                          let}      ne 50 MG           

 

     Turmeric Turmeric           No                       Turmeric           

 

     metFORMIN metFORMIN           No             1{table BID  metFORMIN        

   



     HCl 1000 MG HCl 1000 MG                          t_with_      HCl 1000     

      



                                        a_meal}      MG             

 

     Restoril 15 Restoril 15           No             1{capsu QD   Restoril     

      



     MG   MG                            le_at_b      15 MG           



                                        edtime_                     



                                        as_need                     



                                        ed}                      

 

     sulfaSALAzi sulfaSALAzi           No             2{table BID  sulfaSALAz   

        



     ne 500 MG ne 500 MG                          t}        ine 500 MG          

 

 

     Methocarbam Methocarbam           No             1{table TID  Methocarba   

        



     ol 750 MG ol 750 MG                          t}        mol 750 MG          

 

 

     Rosuvastati Rosuvastati           No                       Rosuvastat      

     



     n Calcium 5 n Calcium 5                                    in Calcium      

     



     MG   MG                                      5 MG           

 

     Levothyroxi Levothyroxi           No                       Levothyrox      

     



     ne Sodium ne Sodium                                    ine Sodium          

 



     150  MCG                                    150 MCG           

 

     OneTouch OneTouch           No                  QD   OneTouch           



     Verio - Verio -                                    Verio -           

 

     Glimepiride Glimepiride           No                       Glimepirid      

     



     2 MG 2 MG                                    e 2 MG           

 

     Cholestyram Cholestyram           No                       Cholestyra      

     



     ine 4 GM ine 4 GM                                    mine 4 GM           

 

     Multivitami Multivitami           No                       Multivitam      

     



     n    n                                       in             

 

     Rosuvastati Rosuvastati           No             1{table QD   Rosuvastat   

        



     n Calcium 5 n Calcium 5                          t}        in Calcium      

     



     MG   MG                                      5 MG           

 

     DULoxetine DULoxetine           No             1{capsu QD   DULoxetine     

      



     HCl 30 MG HCl 30 MG                          le}       HCl 30 MG           

 

     Diclofenac Diclofenac           No             1{appli BID  Diclofenac     

      



     Sodium 3 % Sodium 3 %                          cation}      Sodium 3 %     

      

 

     Gabapentin Gabapentin           No             1{capsu TID  Gabapentin     

      



     600  MG                          le_befo      600 MG           



                                        re_bedt                     



                                        ivette}                     

 

     Iron 65 MG Iron 65 MG           No             1{table QD   Iron 65 MG     

      



                                        t}                       

 

     Synthroid Synthroid           No                  QD   Synthroid           



     175  MCG                                    175 MCG           

 

     Hydroxychlo Hydroxychlo           No             1{table BID  Hydroxychl   

        



     roquine roquine                          t}        oroquine           



     Sulfate 200 Sulfate 200                                    Sulfate         

  



     MG   MG                                      200 MG           

 

     Folic Acid Folic Acid           No             2{table QD   Folic Acid     

      



     1 MG 1 MG                          t}        1 MG           

 

     Simponi Simponi           No                       Simponi           



     Aria 50 Aria 50                                    Aria 50           



     MG/4ML MG/4ML                                    MG/4ML           

 

     azaTHIOprin azaTHIOprin           No             1.5{tab QD   azaTHIOpri   

        



     e 50 MG e 50 MG                          let}      ne 50 MG           

 

     Turmeric Turmeric           No                       Turmeric           

 

     metFORMIN metFORMIN           No             1{table BID  metFORMIN        

   



     HCl 1000 MG HCl 1000 MG                          t_with_      HCl 1000     

      



                                        a_meal}      MG             

 

     Restoril 15 Restoril 15           No             1{capsu QD   Restoril     

      



     MG   MG                            le_at_b      15 MG           



                                        edtime_                     



                                        as_need                     



                                        ed}                      

 

     sulfaSALAzi sulfaSALAzi           No             2{table BID  sulfaSALAz   

        



     ne 500 MG ne 500 MG                          t}        ine 500 MG          

 

 

     Methocarbam Methocarbam           No             1{table TID  Methocarba   

        



     ol 750 MG ol 750 MG                          t}        mol 750 MG          

 

 

     Rosuvastati Rosuvastati           No                       Rosuvastat      

     



     n Calcium 5 n Calcium 5                                    in Calcium      

     



     MG   MG                                      5 MG           

 

     Glimepiride Glimepiride           No                       Glimepirid      

     



     2 MG 2 MG                                    e 2 MG           

 

     OneTouch OneTouch           No                  QD   OneTouch           



     Verio - Verio -                                    Verio -           

 

     Diclofenac Diclofenac           No             1{appli BID  Diclofenac     

      



     Sodium 3 % Sodium 3 %                          cation}      Sodium 3 %     

      

 

     Turmeric Turmeric           No                       Turmeric           

 

     Multivitami Multivitami           No                       Multivitam      

     



     n    n                                       in             

 

     DULoxetine DULoxetine           No             1{capsu QD   DULoxetine     

      



     HCl 30 MG HCl 30 MG                          le}       HCl 30 MG           

 

     Restoril 15 Restoril 15           No             1{capsu QD   Restoril     

      



     MG   MG                            le_at_b      15 MG           



                                        edtime_                     



                                        as_need                     



                                        ed}                      

 

     Gabapentin Gabapentin           No             1{capsu TID  Gabapentin     

      



     600  MG                          le_befo      600 MG           



                                        re_bedt                     



                                        ivette}                     

 

     Iron 65 MG Iron 65 MG           No             1{table QD   Iron 65 MG     

      



                                        t}                       

 

     Hydroxychlo Hydroxychlo           No             1{table BID  Hydroxychl   

        



     roquine roquine                          t}        oroquine           



     Sulfate 200 Sulfate 200                                    Sulfate         

  



     MG   MG                                      200 MG           

 

     Rosuvastati Rosuvastati           No             1{table QD   Rosuvastat   

        



     n Calcium 5 n Calcium 5                          t}        in Calcium      

     



     MG   MG                                      5 MG           

 

     Folic Acid Folic Acid           No             2{table QD   Folic Acid     

      



     1 MG 1 MG                          t}        1 MG           

 

     Simponi Simponi           No                       Simponi           



     Aria 50 Aria 50                                    Aria 50           



     MG/4ML MG/4ML                                    MG/4ML           

 

     Methocarbam Methocarbam           No             1{table TID  Methocarba   

        



     ol 750 MG ol 750 MG                          t}        mol 750 MG          

 

 

     metFORMIN metFORMIN           No             1{table BID  metFORMIN        

   



     HCl 1000 MG HCl 1000 MG                          t_with_      HCl 1000     

      



                                        a_meal}      MG             

 

     azaTHIOprin azaTHIOprin           No             1.5{tab QD   azaTHIOpri   

        



     e 50 MG e 50 MG                          let}      ne 50 MG           

 

     Cholestyram Cholestyram           No                       Cholestyra      

     



     ine 4 GM ine 4 GM                                    mine 4 GM           

 

     sulfaSALAzi sulfaSALAzi           No             2{table BID  sulfaSALAz   

        



     ne 500 MG ne 500 MG                          t}        ine 500 MG          

 

 

     Levothyroxi Levothyroxi           No                  QD   Levothyrox      

     



     ne Sodium ne Sodium                                    ine Sodium          

 



     175  MCG                                    175 MCG           

 

     Rosuvastati Rosuvastati           No                       Rosuvastat      

     



     n Calcium 5 n Calcium 5                                    in Calcium      

     



     MG   MG                                      5 MG           

 

     Glimepiride Glimepiride           No                       Glimepirid      

     



     2 MG 2 MG                                    e 2 MG           

 

     Restoril 15 Restoril 15           No             1{capsu QD   Restoril     

      



     MG   MG                            le_at_b      15 MG           



                                        edtime_                     



                                        as_need                     



                                        ed}                      

 

     Gabapentin Gabapentin           No             1{capsu TID  Gabapentin     

      



     600  MG                          le_befo      600 MG           



                                        re_bedt                     



                                        ivette}                     

 

     Diclofenac Diclofenac           No             1{appli BID  Diclofenac     

      



     Sodium 3 % Sodium 3 %                          cation}      Sodium 3 %     

      

 

     OneTouch OneTouch           No                  QD   OneTouch           



     Verio - Verio -                                    Verio -           

 

     Hydroxychlo Hydroxychlo           No             1{table BID  Hydroxychl   

        



     roquine roquine                          t}        oroquine           



     Sulfate 200 Sulfate 200                                    Sulfate         

  



     MG   MG                                      200 MG           

 

     Simponi Simponi           No                       Simponi           



     Aria 50 Aria 50                                    Aria 50           



     MG/4ML MG/4ML                                    MG/4ML           

 

     Multivitami Multivitami           No                       Multivitam      

     



     n    n                                       in             

 

     Rosuvastati Rosuvastati           No             1{table QD   Rosuvastat   

        



     n Calcium 5 n Calcium 5                          t}        in Calcium      

     



     MG   MG                                      5 MG           

 

     Iron 65 MG Iron 65 MG           No             1{table QD   Iron 65 MG     

      



                                        t}                       

 

     Methocarbam Methocarbam           No             1{table TID  Methocarba   

        



     ol 750 MG ol 750 MG                          t}        mol 750 MG          

 

 

     Turmeric Turmeric           No                       Turmeric           

 

     Cholestyram Cholestyram           No                       Cholestyra      

     



     ine 4 GM ine 4 GM                                    mine 4 GM           

 

     metFORMIN metFORMIN           No             1{table BID  metFORMIN        

   



     HCl 1000 MG HCl 1000 MG                          t_with_      HCl 1000     

      



                                        a_meal}      MG             

 

     sulfaSALAzi sulfaSALAzi           No             2{table BID  sulfaSALAz   

        



     ne 500 MG ne 500 MG                          t}        ine 500 MG          

 

 

     Vitamin D3 Vitamin D3           No                       Vitamin D3        

   



     125  MCG                                    125 MCG           



     (5000 UT) (5000 UT)                                    (5000 UT)           

 

     azaTHIOprin azaTHIOprin           No             1.5{tab QD   azaTHIOpri   

        



     e 50 MG e 50 MG                          let}      ne 50 MG           

 

     Levothyroxi Levothyroxi           No                  QD   Levothyrox      

     



     ne Sodium ne Sodium                                    ine Sodium          

 



     175  MCG                                    175 MCG           

 

     Cyanocobala Cyanocobala           No                       Cyanocobal      

     



     min 1000 min 1000                                    alonzo 1000           



     MCG/ML MCG/ML                                    MCG/ML           

 

     Folic Acid Folic Acid           No             2{table QD   Folic Acid     

      



     1 MG 1 MG                          t}        1 MG           

 

     DULoxetine DULoxetine           No             1{capsu QD   DULoxetine     

      



     HCl 30 MG HCl 30 MG                          le}       HCl 30 MG           

 

     metFORMIN metFORMIN           No             1{table BID  metFORMIN        

   



     HCl 1000 MG HCl 1000 MG                          t_with_      HCl 1000     

      



                                        a_meal}      MG             

 

     Restoril 15 Restoril 15           No             1{capsu QD   Restoril     

      



     MG   MG                            le_at_b      15 MG           



                                        edtime_                     



                                        as_need                     



                                        ed}                      

 

     Methocarbam Methocarbam           No             1{table TID  Methocarba   

        



     ol 750 MG ol 750 MG                          t}        mol 750 MG          

 

 

     azaTHIOprin azaTHIOprin           No             1.5{tab QD   azaTHIOpri   

        



     e 50 MG e 50 MG                          let}      ne 50 MG           

 

     Folic Acid Folic Acid           No             2{table QD   Folic Acid     

      



     1 MG 1 MG                          t}        1 MG           

 

     sulfaSALAzi sulfaSALAzi           No             2{table BID  sulfaSALAz   

        



     ne 500 MG ne 500 MG                          t}        ine 500 MG          

 

 

     Simponi Simponi           No                       Simponi           



     Aria 50 Aria 50                                    Aria 50           



     MG/4ML MG/4ML                                    MG/4ML           

 

     Rosuvastati Rosuvastati           No                       Rosuvastat      

     



     n Calcium 5 n Calcium 5                                    in Calcium      

     



     MG   MG                                      5 MG           

 

     Cyanocobala Cyanocobala           No                       Cyanocobal      

     



     min 1000 min 1000                                    alonzo 1000           



     MCG/ML MCG/ML                                    MCG/ML           

 

     Turmeric Turmeric           No                       Turmeric           

 

     Iron 65 MG Iron 65 MG           No             1{table QD   Iron 65 MG     

      



                                        t}                       

 

     Gabapentin Gabapentin           No             1{capsu TID  Gabapentin     

      



     600  MG                          le_befo      600 MG           



                                        re_bedt                     



                                        ivette}                     

 

     Diclofenac Diclofenac           No             1{appli BID  Diclofenac     

      



     Sodium 3 % Sodium 3 %                          cation}      Sodium 3 %     

      

 

     Glimepiride Glimepiride           No                       Glimepirid      

     



     2 MG 2 MG                                    e 2 MG           

 

     Levothyroxi Levothyroxi           No                  QD   Levothyrox      

     



     ne Sodium ne Sodium                                    ine Sodium          

 



     175  MCG                                    175 MCG           

 

     Cholestyram Cholestyram           No                       Cholestyra      

     



     ine 4 GM ine 4 GM                                    mine 4 GM           

 

     Hydroxychlo Hydroxychlo           No             1{table BID  Hydroxychl   

        



     roquine roquine                          t}        oroquine           



     Sulfate 200 Sulfate 200                                    Sulfate         

  



     MG   MG                                      200 MG           

 

     DULoxetine DULoxetine           No             1{capsu QD   DULoxetine     

      



     HCl 30 MG HCl 30 MG                          le}       HCl 30 MG           

 

     Multivitami Multivitami           No                       Multivitam      

     



     n    n                                       in             

 

     OneTouch OneTouch           No                  QD   OneTouch           



     Verio - Verio -                                    Verio -           

 

     Vitamin D3 Vitamin D3           No                       Vitamin D3        

   



     125  MCG                                    125 MCG           



     (5000 UT) (5000 UT)                                    (5000 UT)           

 

     Turmeric Turmeric           No                       Turmeric           

 

     Synthroid Synthroid           No                  QD   Synthroid           



     150  MCG                                    150 MCG           

 

     Hydroxychlo Hydroxychlo           No             1{table BID  Hydroxychl   

        



     roquine roquine                          t}        oroquine           



     Sulfate 200 Sulfate 200                                    Sulfate         

  



     MG   MG                                      200 MG           

 

     Gabapentin Gabapentin           No             1{capsu TID  Gabapentin     

      



     600  MG                          le_befo      600 MG           



                                        re_bedt                     



                                        ivette}                     

 

     Folic Acid Folic Acid           No             2{table QD   Folic Acid     

      



     1 MG 1 MG                          t}        1 MG           

 

     DULoxetine DULoxetine           No             1{capsu QD   DULoxetine     

      



     HCl 30 MG HCl 30 MG                          le}       HCl 30 MG           

 

     Methocarbam Methocarbam           No             1{table TID  Methocarba   

        



     ol 750 MG ol 750 MG                          t}        mol 750 MG          

 







Immunizations







           Ordered    Filled Immunization Date       Status     Comments   Sourc

e



           Immunization Name Name                                        

 

           COVID-19 Pfizer 12            2021-04-10 Completed             [facility]



           & Over Vaccination            00:00:00                         

 

           COVID-19 Pfizer 12            2021-04-10 Completed             [facility]



           & Over Vaccination            00:00:00                         



           ([facility])                                             

 

           COVID-19 Pfizer 12            2021-04-10 Completed             [facility]



           & Over Vaccination            00:00:00                         



           ([facility])                                             

 

           COVID-19 Pfizer 12            2021-04-10 Completed             [facility]



           & Over Vaccination            00:00:00                         



           ([facility])                                             

 

           COVID-19 Pfizer 12            2021-04-10 Completed             [facility]



           & Over Vaccination            00:00:00                         



           ([facility])                                             

 

           COVID-19 Pfizer 12            2021-04-10 Completed             [facility]



           & Over Vaccination            00:00:00                         



           ([facility])                                             

 

           COVID-19 Pfizer 12            2021-04-10 Completed             [facility]



           & Over Vaccination            00:00:00                         



           ([facility])                                             

 

           COVID-19 Pfizer 12            2021-04-10 Completed             [facility]



           & Over Vaccination            00:00:00                         



           ([facility])                                             

 

           COVID-19 Pfizer 12            2021-04-10 Completed             [facility]



           & Over Vaccination            00:00:00                         



           ([facility])                                             

 

           COVID-19 Pfizer 12            2021-04-10 Completed             [facility]



           & Over Vaccination            00:00:00                         



           ([facility])                                             

 

           COVID-19 Pfizer 12            2021-04-10 Completed             [facility]



           & Over Vaccination            00:00:00                         



           ([facility])                                             

 

           COVID-19 Pfizer 12            2021-04-10 Completed             [facility]



           & Over Vaccination            00:00:00                         



           ([facility])                                             

 

           COVID-19 Pfizer 12            2021-04-10 Completed             [facility]



           & Over Vaccination            00:00:00                         



           ([facility])                                             

 

           COVID-19 Pfizer 12            2021-04-10 Completed             [facility]



           & Over Vaccination            00:00:00                         



           ([facility])                                             

 

           COVID-19 Pfizer 12            2021-04-10 Completed             [facility]



           & Over Vaccination            00:00:00                         



           ([facility])                                             

 

           COVID-19 Pfizer 12            2021-04-10 Completed             [facility]



           & Over Vaccination            00:00:00                         



           ([facility])                                             

 

           COVID-19 Pfizer 12            2021-04-10 Completed             [facility]



           & Over Vaccination            00:00:00                         



           ([facility])                                             

 

           SARS-COV-2 COVID-19            2021-04-10 Completed             [facility]



           PFIZER VACCINE            00:00:00                         [facility]

 

           SARS-COV-2 COVID-19            2021-04-10 Completed             [facility]



           PFIZER VACCINE            00:00:00                         [facility]

 

           SARS-COV-2 COVID-19            2021-04-10 Completed             [facility]



           PFIZER VACCINE            00:00:00                         [facility]

 

           SARS-COV-2 COVID-19            2021-04-10 Completed             [facility]



           PFIZER VACCINE            00:00:00                         [facility]

 

           SARS-COV-2 COVID-19            2021-04-10 Completed             [facility]



           PFIZER VACCINE            00:00:00                         [facility]

 

           SARS-COV-2 COVID-19            2021-04-10 Completed             [facility]



           PFIZER VACCINE            00:00:00                         [facility]

 

           SARS-COV-2 COVID-19            2021-04-10 Completed             [facility]



           PFIZER VACCINE            00:00:00                         [facility]

 

           SARS-COV-2 COVID-19            2021-04-10 Completed             [facility]



           PFIZER VACCINE            00:00:00                         [facility]

 

           SARS-COV-2 COVID-19            2021-04-10 Completed             [facility]



           PFIZER VACCINE            00:00:00                         [facility]

 

           SARS-COV-2 COVID-19            2021-04-10 Completed             [facility]



           PFIZER VACCINE            00:00:00                         [facility]

 

           SARS-COV-2 COVID-19            2021-04-10 Completed             [facility]



           PFIZER VACCINE            00:00:00                         [facility]

 

           SARS-COV-2 COVID-19            2021-04-10 Completed             [facility]



           PFIZER VACCINE            00:00:00                         [facility]

 

           SARS-COV-2 COVID-19            2021-04-10 Completed             [facility]



           PFIZER VACCINE            00:00:00                         [facility]

 

           SARS-COV-2 COVID-19            2021-04-10 Completed             [facility]



           PFIZER VACCINE            00:00:00                         [facility]

 

           SARS-COV-2 COVID-19            2021-04-10 Completed             [facility]



           PFIZER VACCINE            00:00:00                         [facility]

 

           SARS-COV-2 COVID-19            2021-04-10 Completed             [facility]



           PFIZER VACCINE            00:00:00                         [facility]

 

           COVID-19 Pfizer 12            2021 Completed             [facility]



           & Over Vaccination            00:00:00                         

 

           COVID-19 Pfizer 2021 Completed             [facility]



           & Over Vaccination            00:00:00                         



           ([facility])                                             

 

           COVID-19 Pfizer 2021 Completed             [facility]



           & Over Vaccination            00:00:00                         



           ([facility])                                             

 

           COVID-19 Pfizer 2021 Completed             [facility]



           & Over Vaccination            00:00:00                         



           ([facility])                                             

 

           COVID-19 Pfizer 2021 Completed             [facility]



           & Over Vaccination            00:00:00                         



           (PUR[facility]-DILUTE)                                             

 

           COVID-19 Pfizer 2021 Completed             [facility]

[facility]



           & Over Vaccination            00:00:00                         



           ([facility]IL[facility])                                             

 

           COVID-19 Pfizer 2021 Completed             [facility]



           & Over Vaccination            00:00:00                         



           ([facility]IL[facility])                                             

 

           COVID-19 Pfizer 2021 Completed             [facility]



           & Over Vaccination            00:00:00                         



           ([facility]IL[facility])                                             

 

           COVID-19 Pfizer 2021 Completed             [facility]



           & Over Vaccination            00:00:00                         



           ([facility]IL[facility])                                             

 

           COVID-19 Pfizer 2021 Completed             [facility]



           & Over Vaccination            00:00:00                         



           ([facility])                                             

 

           COVID-19 Pfizer 2021 Completed             [facility]



           & Over Vaccination            00:00:00                         



           ([facility]IL[facility])                                             

 

           COVID-19 Pfizer 2021 Completed             [facility]



           & Over Vaccination            00:00:00                         



           ([facility]IL[facility])                                             

 

           COVID-19 Pfizer 2021 Completed             [facility]



           & Over Vaccination            00:00:00                         



           ([facility]IL[facility])                                             

 

           COVID-19 Pfizer 2021 Completed             [facility]



           & Over Vaccination            00:00:00                         



           ([facility])                                             

 

           COVID-19 Pfizer 2021 Completed             [facility]



           & Over Vaccination            00:00:00                         



           ([facility])                                             

 

           COVID-19 Pfizer 2021 Completed             [facility]



           & Over Vaccination            00:00:00                         



           ([facility])                                             

 

           COVID-19 Pfizer 2021 Completed             [facility]



           & Over Vaccination            00:00:00                         



           ([facility]IL[facility])                                             

 

           SARS-COV-2 COVID-19            2021 Completed             [facility]



           PFIZER VACCINE            00:00:00                         [facility]

 

           SARS-COV-2 COVID-19            2021 Completed             [facility]

[facility]



           PFIZER VACCINE            00:00:00                         [facility]

 

           SARS-COV-2 COVID-19            2021 Completed             [facility]

[facility]



           PFIZER VACCINE            00:00:00                         [facility]

 

           SARS-COV-2 COVID-19            2021 Completed             [facility]

[facility]



           PFIZER VACCINE            00:00:00                         [facility]

 

           SARS-COV-2 COVID-19            2021 Completed             [facility]

[facility]



           PFIZER VACCINE            00:00:00                         [facility]

 

           SARS-COV-2 COVID-19            2021 Completed             [facility]

[facility]



           PFIZER VACCINE            00:00:00                         [facility]

 

           SARS-COV-2 COVID-19            2021 Completed             [facility]

[facility]



           PFIZER VACCINE            00:00:00                         [facility]

 

           SARS-COV-2 COVID-19            2021 Completed             [facility]

[facility]



           PFIZER VACCINE            00:00:00                         [facility]

 

           SARS-COV-2 COVID-19            2021 Completed             [facility]

[facility]



           PFIZER VACCINE            00:00:00                         [facility]

 

           SARS-COV-2 COVID-19            2021 Completed             [facility]

[facility]



           PFIZER VACCINE            00:00:00                         [facility]

 

           SARS-COV-2 COVID-19            2021 Completed             [facility]

[facility]



           PFIZER VACCINE            00:00:00                         [facility]

 

           SARS-COV-2 COVID-19            2021 Completed             [facility]

[facility]



           PFIZER VACCINE            00:00:00                         [facility]

 

           SARS-COV-2 COVID-19            2021 Completed             [facility]

[facility]



           PFIZER VACCINE            00:00:00                         [facility]

 

           SARS-COV-2 COVID-19            2021 Completed             [facility]

[facility]



           PFIZER VACCINE            00:00:00                         [facility]

 

           SARS-COV-2 COVID-19            2021 Completed             [facility]

[facility]



           PFIZER VACCINE            00:00:00                         [facility]

 

           SARS-COV-2 COVID-19            2021 Completed             [facility]

[facility]



           PFIZER VACCINE            00:00:00                         [facility]

 

           Influenza,            2020 Completed             [facility]



           injectable,            00:00:00                         



           quadrivalent                                             



           (afluria, fluzone)                                             

 

           Influenza,            2020 Completed             [facility]



           injectable,            00:00:00                         



           quadrivalent                                             



           (afluria, fluzone)                                             

 

           Influenza,            2020 Completed             [facility]



           injectable,            00:00:00                         



           quadrivalent                                             



           (afluria, fluzone)                                             

 

           Influenza,            2020 Completed             [facility]



           injectable,            00:00:00                         



           quadrivalent                                             



           (afluria, fluzone)                                             

 

           Influenza,            2020 Completed             [facility]



           injectable,            00:00:00                         



           quadrivalent                                             



           (afluria, fluzone)                                             

 

           Influenza,            2020 Completed             [facility]



           injectable,            00:00:00                         



           quadrivalent                                             



           (afluria, fluzone)                                             

 

           Influenza,            2020 Completed             [facility]



           injectable,            00:00:00                         



           quadrivalent                                             



           (afluria, fluzone)                                             

 

           Influenza,            2020 Completed             [facility]



           injectable,            00:00:00                         



           quadrivalent                                             



           (afluria, fluzone)                                             

 

           Influenza,            2020 Completed             [facility]



           injectable,            00:00:00                         



           quadrivalent                                             



           (afluria, fluzone)                                             

 

           Influenza,            2020 Completed             [facility]



           injectable,            00:00:00                         



           quadrivalent                                             



           (afluria, fluzone)                                             

 

           Influenza,            2020 Completed             [facility]



           injectable,            00:00:00                         



           quadrivalent                                             



           (afluria, fluzone)                                             

 

           Influenza,            2020 Completed             [facility]



           injectable,            00:00:00                         



           quadrivalent                                             



           (afluria, fluzone)                                             

 

           Influenza,            2020 Completed             [facility]



           injectable,            00:00:00                         



           quadrivalent                                             



           (afluria, fluzone)                                             

 

           Influenza,            2020 Completed             [facility]



           injectable,            00:00:00                         



           quadrivalent                                             



           (afluria, fluzone)                                             

 

           Influenza,            2020 Completed             [facility]



           injectable,            00:00:00                         



           quadrivalent                                             



           (afluria, fluzone)                                             

 

           Influenza,            2020 Completed             [facility]



           injectable,            00:00:00                         



           quadrivalent                                             



           (afluria, fluzone)                                             

 

           Influenza,            2020 Completed             [facility]



           injectable,            00:00:00                         



           quadrivalent                                             



           (afluria, fluzone)                                             

 

           Influenza Virus            2020 Completed             [facility]



           Vaccine Quad IM            00:00:00                         [facility]

[facility]



           Multi-dose 6+ MO                                             [facility]

 

           Influenza Virus            2020 Completed             [facility]

[facility]



           Vaccine Quad IM            00:00:00                         [facility]

[facility]



           Multi-dose 6+ MO                                             [facility]

 

           Influenza Virus            2020 Completed             [facility]

[facility]



           Vaccine Quad IM            00:00:00                         [facility]

[facility]



           Multi-dose 6+ MO                                             [facility]

 

           Influenza,            2019-10-01 Completed             [facility]



           injectable,            00:00:00                         



           quadrivalent                                             



           (afluria, fluzone)                                             

 

           Influenza,            2019-10-01 Completed             [facility]



           injectable,            00:00:00                         



           quadrivalent                                             



           (afluria, fluzone)                                             

 

           Influenza,            2019-10-01 Completed             [facility]



           injectable,            00:00:00                         



           quadrivalent                                             



           (afluria, fluzone)                                             

 

           Influenza,            2019-10-01 Completed             [facility]



           injectable,            00:00:00                         



           quadrivalent                                             



           (afluria, fluzone)                                             

 

           Influenza,            2019-10-01 Completed             [facility]



           injectable,            00:00:00                         



           quadrivalent                                             



           (afluria, fluzone)                                             

 

           Influenza,            2019-10-01 Completed             [facility]



           injectable,            00:00:00                         



           quadrivalent                                             



           (afluria, fluzone)                                             

 

           Influenza,            2019-10-01 Completed             [facility]



           injectable,            00:00:00                         



           quadrivalent                                             



           (afluria, fluzone)                                             

 

           Influenza,            2019-10-01 Completed             [facility]



           injectable,            00:00:00                         



           quadrivalent                                             



           (afluria, fluzone)                                             

 

           Influenza,            2019-10-01 Completed             [facility]



           injectable,            00:00:00                         



           quadrivalent                                             



           (afluria, fluzone)                                             

 

           Influenza,            2019-10-01 Completed             [facility]



           injectable,            00:00:00                         



           quadrivalent                                             



           (afluria, fluzone)                                             

 

           Influenza,            2019-10-01 Completed             [facility]



           injectable,            00:00:00                         



           quadrivalent                                             



           (afluria, fluzone)                                             

 

           Influenza,            2019-10-01 Completed             [facility]



           injectable,            00:00:00                         



           quadrivalent                                             



           (afluria, fluzone)                                             

 

           Influenza,            2019-10-01 Completed             [facility]



           injectable,            00:00:00                         



           quadrivalent                                             



           (afluria, fluzone)                                             

 

           Influenza,            2019-10-01 Completed             [facility]



           injectable,            00:00:00                         



           quadrivalent                                             



           (afluria, fluzone)                                             

 

           Influenza,            2019-10-01 Completed             [facility]



           injectable,            00:00:00                         



           quadrivalent                                             



           (afluria, fluzone)                                             

 

           Influenza,            2019-10-01 Completed             [facility]



           injectable,            00:00:00                         



           quadrivalent                                             



           (afluria, fluzone)                                             

 

           Influenza,            2019-10-01 Completed             [facility]



           injectable,            00:00:00                         



           quadrivalent                                             



           (afluria, fluzone)                                             

 

           Influenza Virus            2019-10-01 Completed             [facility]

[facility]



           Vaccine Quad IM            00:00:00                         [facility]

[facility]



           Multi-dose 6+ MO                                             [facility]

 

           Influenza Virus            2019-10-01 Completed             [facility]

[facility]



           Vaccine Quad IM            00:00:00                         [facility]

[facility]



           Multi-dose 6+ MO                                             [facility]

 

           Influenza Virus            2019-10-01 Completed             [facility]

[facility]



           Vaccine Quad IM            00:00:00                         [facility]

[facility]



           Multi-dose 6+ MO                                             [facility]

 

           Vitamin B12 Vitamin B12 2018-10-18 Completed             [facility]

t -



           (Cyanocobalamin) (Cyanocobalamin) 14:52:00                         [facility]

[facility]







Vital Signs







             Vital Name   Observation Time Observation Value Comments     Source

 

             Systolic blood 2023-08-15 22:58:09 119 mm[Hg]                [facility]

[facility]



             pressure                                            [facility]



                                                                 [facility]

 

             Diastolic blood 2023-08-15 22:58:09 75 mm[Hg]                 [facility]

[facility]



             pressure                                            [facility]



                                                                 [facility]

 

             Heart rate   2023-08-15 22:58:09 75 /min                   [facility]

[facility]



                                                                 [facility]



                                                                 [facility]

 

             Respiratory rate 2023-08-15 22:58:09 16 /min                   [facility]

[facility]



                                                                 [facility]



                                                                 [facility]

 

             Oxygen saturation in 2023-08-15 22:58:09 99 /min                   

[facility]



             Arterial blood by                                        [facility]

maryan



             Pulse oximetry                                        [facility]anch

 

             Body temperature 2023-08-15 20:30:00 37.17 Arlene                 [facility]

[facility]



                                                                 [facility]



                                                                 [facility]

 

             Body weight  2023-08-15 20:30:00 79.379 kg                 [facility]

[facility]



                                                                 [facility]



                                                                 [facility]

 

             BMI          2023-08-15 20:30:00 26.61 kg/m2               [facility]

[facility]



                                                                 [facility]



                                                                 [facility]

 

             Systolic blood 2023 19:38:00 88 mm[Hg]                 [facility]

[facility]



             pressure                                            [facility]



                                                                 [facility]

 

             Diastolic blood 2023 19:38:00 60 mm[Hg]                 [facility]

[facility]



             pressure                                            [facility]



                                                                 [facility]

 

             Heart rate   2023 19:38:00 98 /min                   [facility]

[facility]



                                                                 [facility]



                                                                 [facility]

 

             Body temperature 2023 19:38:00 36.56 Arlene                 [facility]

ers[facility] of



                                                                 [facility]



                                                                 [facility]

 

             Respiratory rate 2023 19:38:00 18 /min                   [facility]

ers[facility] of



                                                                 [facility]



                                                                 [facility]anch

 

             Body height  2023 19:38:00 172.7 cm                  [facility]

[facility]



                                                                 [facility]



                                                                 [facility]

 

             Body weight  2023 19:38:00 79.652 kg                 [facility]

[facility]



                                                                 [facility]



                                                                 [facility]

 

             BMI          2023 19:38:00 26.70 kg/m2               [facility]

[facility]



                                                                 [facility]



                                                                 [facility]

 

             Oxygen saturation in 2023 19:38:00 96 /min                   

[facility]



             Arterial blood by                                        [facility]

maryan



             Pulse oximetry                                        Branch

 

             Systolic blood 2023 18:01:00 104 mm[Hg]                [facility]

[facility] of



             pressure                                            [facility]

 

             Diastolic blood 2023 18:01:00 68 mm[Hg]                 [facility]

rsity of



             [facility]

 

             Heart rate   2023 18:01:00 106 /min                  [facility]

[facility]

 

             Body temperature 2023 18:01:00 36.56 Arlene                 Un[facility]

ersity of



                                                                 [facility]

 

             Body height  2023 18:01:00 172.7 cm                  [facility]

 

             Systolic blood 2023 16:05:00 105 mm[Hg]                [facility]

[facility]



             pressure                                            

 

             Diastolic blood 2023 16:05:00 71 mm[Hg]                 [facility]



             pressure                                            

 

             Heart rate   2023 16:05:00 93 /min                   [facility]

[facility]

 

             Body height  2023 16:05:00 172.7 cm                  [facility]

[facility]

 

             Body weight  2023 16:05:00 85.412 kg                 [facility]

[facility]

 

             BMI          2023 16:05:00 28.63 kg/m2               [facility]

h

 

             height       2022-10-27 13:40:00 67.5 [in_i]               [facility]

 

             weight       2022-10-27 13:40:00 195 [lb_av]               [facility]

 

             bmi          2022-10-27 13:40:00 30.09 kg/m2               [facility]

 

             Systolic blood 2022 16:16:00 112 mm[Hg]                [facility]



             pressure                                            

 

             Diastolic blood 2022 16:16:00 73 mm[Hg]                 [facility]



             pressure                                            

 

             Heart rate   2022 16:16:00 97 /min                   [facility]

[facility]

 

             Body height  2022 16:16:00 172.7 cm                  [facility]

[facility]

 

             Body weight  2022 16:16:00 88.95 kg                  [facility] [facility]

h

 

             BMI          2022 16:16:00 29.82 kg/m2               [facility]

[facility]

 

             height       2022 11:00:00 68.50 [in_i]              [facility]

 

             weight       2022 11:00:00 197.0 [lb_av]              Common 

[facility]

 

             temperature  2022 11:00:00 96.6 [degF]               Common S

[facility]

 

             bmi          2022 11:00:00 29.51 kg/m2               Common S

[facility]

 

             oximetry     2022 11:00:00 95 %                      Common S

[facility]

 

             respiratory rate 2022 11:00:00 15 /min                   Comm

on [facility]

 

             blood pressure 2022 11:00:00 119 mm[Hg]                Common

 [facility] -



             systolic                                            [facility]

 

             blood pressure 2022 11:00:00 75 mm[Hg]                 Common

 [facility] -



             diastolic                                           [facility]

 

             height       2022 11:40:00 67.50 [in_i]              Common [facility]

 

             weight       2022 11:40:00 197.0 [lb_av]              Common 

[facility]

 

             temperature  2022 11:40:00 96.6 [degF]               Common S

[facility]

 

             bmi          2022 11:40:00 30.40 kg/m2               Common S

[facility]

 

             oximetry     2022 11:40:00 95 %                      Common S

[facility]

 

             respiratory rate 2022 11:40:00 15 /min                   Comm

on [facility]

 

             blood pressure 2022 11:40:00 119 mm[Hg]                Common

 [facility] -



             systolic                                            [facility]

 

             blood pressure 2022 11:40:00 75 mm[Hg]                 Common

 [facility] -



             diastolic                                           [facility]

 

             Body height  2022 15:29:00 172.7 cm                  [facility]

[facility]

 

             Body weight  2022 15:29:00 94.802 kg                 [facility]

[facility]

 

             BMI          2022 15:29:00 31.78 kg/m2               [facility]

[facility]

 

             height       2022 08:20:00 68.50 [in_i]              [facility]

 

             weight       2022 08:20:00 204.2 [lb_av]              [facility]

 

             temperature  2022 08:20:00 97.3 [degF]               [facility]

 

             bmi          2022 08:20:00 30.59 kg/m2               [facility]

 

             oximetry     2022 08:20:00 99 %                      [facility]

 

             respiratory rate 2022 08:20:00 16 /min                   Comm

on [facility]

 

             blood pressure 2022 08:20:00 125 mm[Hg]                [facility]



             systolic                                            [facility]

 

             blood pressure 2022 08:20:00 82 mm[Hg]                 [facility]



             diastolic                                           [facility]

 

             Body height  2022 16:14:00 172.7 cm                  [facility]

h

 

             Body weight  2022 16:14:00 94.802 kg                 [facility]

h

 

             BMI          2022 16:14:00 31.78 kg/m2               [facility]

h

 

             Body height  2022 16:14:00 172.7 cm                  [facility]

h

 

             Body weight  2022 16:14:00 94.802 kg                 [facility]

[facility]

 

             BMI          2022 16:14:00 31.78 kg/m2               [facility]

[facility]

 

             height       2022 08:00:00 68.50 [in_i]              [facility]

 

             weight       2022 08:00:00 210.0 [lb_av]              [facility]

 

             temperature  2022 08:00:00 97.5 [degF]               [facility]

 

             bmi          2022 08:00:00 31.46 kg/m2               [facility]

 

             oximetry     2022 08:00:00 95 %                      [facility]

 

             respiratory rate 2022 08:00:00 15 /min                   Comm

on [facility]

 

             blood pressure 2022 08:00:00 112 mm[Hg]                [facility]

 [facility]



             systolic                                            [facility]

 

             blood pressure 2022 08:00:00 67 mm[Hg]                 [facility]

 [facility]



             diastolic                                           [facility]

 

             height       2021 09:00:00 68.50 [in_i]              Common [facility]

 

             weight       2021 09:00:00 207 [lb_av]               Common S

[facility]

 

             temperature  2021 09:00:00 97.7 [degF]               Common [facility]

 

             bmi          2021 09:00:00 31.01 kg/m2               [facility]

 

             oximetry     2021 09:00:00 97 %                      Common [facility]

 

             blood pressure 2021 09:00:00 119 mm[Hg]                Common

 [facility] -



             systolic                                            [facility]

 

             blood pressure 2021 09:00:00 73 mm[Hg]                 Common

 [facility] -



             diastolic                                           [facility]

 

             height       2021-10-25 10:00:00 68.50 [in_i]              Common [facility]

 

             weight       2021-10-25 10:00:00 217 [lb_av]               Common [facility]

 

             temperature  2021-10-25 10:00:00 97.3 [degF]               Common S

[facility]

 

             bmi          2021-10-25 10:00:00 32.51 kg/m2               [facility]

 

             oximetry     2021-10-25 10:00:00 96 %                      [facility]

 

             respiratory rate 2021-10-25 10:00:00 17 /min                   Comm

on [facility]

 

             blood pressure 2021-10-25 10:00:00 120 mm[Hg]                Common

 [facility] -



             systolic                                            [facility]

 

             blood pressure 2021-10-25 10:00:00 67 mm[Hg]                 Common

 [facility] -



             diastolic                                           [facility]

 

             Systolic blood 2021 18:28:00 111 mm[Hg]                Univer

sity of



             pressure                                            [facility]

 

             Diastolic blood 2021 18:28:00 72 mm[Hg]                 Unive

rsity of



             pressure                                            [facility]

 

             Heart rate   2021 18:28:00 87 /min                   Universi

ty of



                                                                 [facility]



                                                                 [place]

 

             Body height  2021 18:28:00 175.3 cm                  Universi

ty [facility]

 

             Body weight  2021 18:28:00 95.709 kg                 Universi

ty [facility]

 

             BMI          2021 18:28:00 31.16 kg/m2               Universi

ty [facility]

 

             Oxygen saturation in 2021 18:28:00 97 /min                   

[facility]



             Arterial blood by                                        [place] [facility]

maryan



             Pulse oximetry                                        Branch







Procedures







                Procedure       Date / Time Performed Performing Clinician [facility]

e

 

                CONSENT/REFUSAL FOR 2023-08-15 20:24:06 Doctor Unassigned, No [facility]

[facility] 

[place]



                DIAGNOSIS AND                   Name            [facility]



                TREATMENT                                       

 

                EMG             2022 15:12:31 Philip Mcarthur [facility]

 

                MR BRAIN WO CONTRAST 2021 16:56:21 Armando Fair [facility]

iversity of 

[facility]

 

                XR ABDOMEN 2 VW 2021 15:50:22 Ashley Cai  [facility]

 

                XR PELVIS <3 VW 2021 15:50:22 Ashley Cai  [facility]

 

                REFERRAL-       2021 05:01:00 Doctor Unassigned, No [facility]

[facility] [place]



                REQUEST/RESPONSE                 Name            [facility]

 

                REFERRAL-       2021 05:01:00 Doctor Unassigned, No [facility]

[facility] [place]



                REQUEST/RESPONSE                 Name            [facility]







Encounters







        Start   End     Encounter Admission Attending Care    Care    Encounter 

Source



        Date/Time Date/Time Type    Type    Clinicians Facility Department ID   

   

 

        2023         Outpatient                 [facility]     [id number]

 [place]



        12:58:35                                                 [id number]  [facility]

 

        2023         Outpatient                 [facility]     [id number]

 [place]



        10:44:50                                                 [id number]  [facility]

 

        2023         Outpatient                 [facility]     [id number]

 [facility]



        09:47:16                                                 [id number]  [facility]

 

        2023         Outpatient                 [facility]     [facility]     [id number]

 [facility]



        15:28:12                                                 [id number]  [facility]

 

        2023-07-10         Outpatient                 [facility]     [facility]     [id number]

 [facility]



        09:23:05                                                 [id number]  [facility]

 

        2023         Outpatient                 [facility]     [facility]     [id number]

 [facility]



        12:38:38                                                 [id number]  [facility]

 

        2023         Outpatient                 [facility]     [facility]     [id number]

 [facility]



        08:40:50                                                 [id number]  [facility]

 

        2023         Outpatient                 [facility]     [facility]     [id number]

 [facility]



        08:28:12                                                 [id number]  [facility]

 

        2023         Outpatient                 [facility]     [facility]     [id number]

 [facility]



        11:35:26                                                 [id number]  [facility]

 

        2023-05-15         Outpatient                 [facility]     [facility]     [id number]

 [facility]



        08:10:23                                                 [id number]  [facility]

 

        2023-05-10         Outpatient                 [facility]     [facility]     [id number]

 [facility]



        10:24:56                                                 [id number]  [facility]

 

        2023         Outpatient                 [facility]     [facility]     [id number]

 [facility]



        10:58:35                                                 [id number]  [facility]

 

        2023         Outpatient                 [facility]  [facility]  [id number]

 Common



        15:08:00                                                 [id number]   [facility]

 

        2023         Outpatient                 [facility]     [facility]     [id number]

 [facility]



        12:06:02                                                 [id number]  [facility]

 

        2023         Outpatient                 [facility]     [facility]     [id number]

 [facility]



        11:42:22                                                 [id number]  [facility]

 

        2023         Outpatient                 [facility]     [facility]     [id number]

 [facility]



        10:00:38                                                 [id number]  [facility]

 

        2023         Outpatient                 UT     UT     [id number]

 [facility]



        16:18:32                                                 [id number]  [facility]

 

        2023         Outpatient                 [facility]     UT     [id number]

 [facility]



        12:02:12                                                 [id number]  [facility]

 

        2023         Outpatient                 UT     UT     [id number]

 [facility]



        11:55:08                                                 [id number]  [facility]

 

        2023         Outpatient                 [facility]     UT     [id number]

 [facility]



        09:31:51                                                 [id number]  [facility]

 

        2023         Outpatient                 UT     UT     [id number]

 [facility]



        14:39:25                                                 [id number]  [facility]

 

        2023         Outpatient                 [facility]     UT     [id number]

 [facility]



        08:25:16                                                 [id number]  [facility]

 

        2023         Outpatient                 [facility]     [id number]

 [place]



        08:26:44                                                 [id number]  [facility]

 

        2023         Outpatient                 [facility]     [id number]

 [place]



        14:16:36                                                 [id number]  [facility]

 

        2022         Outpatient                 [facility]     [id number]

 [place]



        14:32:35                                                 [id number]  [facility]

 

        2022-10-25         Outpatient         [place], Na STLMLC  STLMLC  [id number]

2 Common



        15:27:00                                                    [facility]

 

        2022         Outpatient         [place], Na STLMLC  ST[facility]  [id number]

2 Common



        13:18:00                                                    [facility]

 

        2022         Outpatient         Srtickland, Na STLMLC  STL[facility]  [id number]

2 Common



        10:35:01                                                    [facility]

 

        2022         Outpatient         [place], Na [facility]  ST[facility]  [id number]

2 Common



        08:51:01                                                    [facility]

 

        2022         Outpatient         [place], Na STLMLC  STLMLC  [id number]

2 Common



        11:58:00                                                    [facility]

 

        2022         Outpatient         [place], Na STL[facility]  ST[facility]  [id number]

2 Common



        14:39:41                                                    [facility]

 

        2022         Outpatient         [place], Na STLMLC  STLMLC  [id number]

2 Common



        14:39:15                                                    [facility]

 

        2022         Outpatient         [place], Na STLMLC  STL[facility]C  [id number]

2 Common



        14:38:51                                                    [facility]

 

        2022         Outpatient         [place], Na STLMLC  STLMLC  [id number]

2 Common



        14:32:05                                                    [facility]

 

        2022         Outpatient         [place], Na STL[facility]C  STLMLC  [id number]

2 Common



        14:25:05                                                    [facility]

 

        2022         Outpatient         [place], Na STLMLC  STLMLC  [id number]

2 Common



        14:24:00                                                 [id number]   [facility]

 

        2022         Outpatient         [place], Na STLMLC  STLMLC  [id number]

2 Common



        14:15:29                                                 [id number]   [facility]

 

        2022         Outpatient         [place], Na STLMLC  STL[facility]C  [id number]

2 Common



        13:18:56                                                 [id number]   [facility]

 

        2022         Outpatient         [place], Na [facility]  STLMLC  [id number]

2 Common



        12:45:24                                                 [id number]   [facility]

 

        2022         Outpatient         [place], Na STLMLC  STLMLC  [id number]

2 Common



        12:44:51                                                 [id number]   [facility]

 

        2022         Outpatient         [place], Na STL[facility]C  STLMLC  [id number]

2 Common



        12:16:18                                                 [id number]   [facility]

 

        2022         Outpatient         [place], Na STLMLC  STLMLC  [id number]

2 Common



        12:14:31                                                 [id number]   [facility]

 

        2022         Outpatient         [place], Na STL[facility]C  STLMLC  [id number]

2 Common



        11:48:13                                                 [id number]   [facility]

 

        2022         Outpatient         [place], Na STLMLC  STLMLC  [id number]

2 Common



        11:15:39                                                 [id number]   [facility]

 

        2022         Outpatient         [place], Na STLMLC  STLMLC  [id number]

2 Common



        11:15:32                                                 [id number]   [facility]

 

        2022         Outpatient         [place], Na [facility]  STLMLC  [id number]

2 Common



        11:09:46                                                 [id number]   [facility]

 

        2021-12-10         Outpatient                 [facility]     [id number] 

[place]



        08:54:56                                                         Health

 

        2021         Outpatient                 [facility]     [id number] 

[place]



        14:23:40                                                         [facility]

 

        2021         Outpatient         RIC, [facility]     [id number]

 [place]



        13:07:59                         MICHAEL                          [facility]

 

        2023 Outpatient TALIB GOODE [facility]    [id number]

[id number] Univers



        09:00:00 09:00:00                 JOVANA bautista [facility]

 

        2023 Outpatient         YUSRA,   [facility]     [id number]

23 [place]



        13:10:00 13:10:00                 [facility]

 

        2023 Outpatient         IBETH, [facility]     [id number]

[id number] [place]



        09:30:00 09:30:00                 [facility]

 

        2023-08-15 2023-08-15 Emergency [facility]       [facility]     [id number]

[id number] Univers



        15:37:00 18:15:00                 KAYLEE bautista [facility]

 

        2023-08-15 2023-08-15 Emergency         [facility]    [id number]

[id number] Univers



        15:37:00 18:15:00                 Kaylee VILLAGRAN [id number]         i

ty of



                                                [place] [id number]         [place]

[facility]  [phone]         [address]

 

        2023 Outpatient TALIB GREENWOOD  [facility]    [id number]

[address] [facility]



        14:30:00 15:43:49                 LUTHER bautista [facility]

 

        2023 Office          [facility]    [id number]

[address] [facility]



        14:30:00 15:43:49 Visit           [facility]  [id number]         it

y of



                                                [place] [id number]         [place]

as



                                                ROJELIO?BLEA [phone]         Me

andrew LICEA    [address] [place] [address]



                                                MEDICAL                 



                                                OFFICE                  



                                                BUILDING                 

 

        2023 Outpatient         CAMERON [facility]     [id number]

[id number] [place]



        11:00:00 11:00:00                 JOVANA                           [facility]

 

        2023 Office          C[facility]on [facility]     [id number]

[address] [place]



        09:30:00 09:30:00 Visit           Jovana BAIRES [id number]         

[facility]  [id number]         



                                                Thomas .[id number]         



                                                [address]                 

 

        2023 Outpatient         CAMERON [facility]     [id number]

[id number] [place]



        13:00:00 13:00:00                 [facility]

 

        2023 Clinical         Andreina,  CATIE     [id number]

0806 [place]



        08:30:00 08:58:57 Support         Aleksandersang BAIRES 350...[id number]         

Health



                                                T CARE  [id number]         



                                                CENTER .[id number]         



                                                [address]                 

 

        [facility]    [id number]

[address] [facility]



        00:00:00 00:00:00                 [facility]  [id number]         it

y of



                                        Edward  [place] [id number]         [place]

as



                                                ROJELIO?BLEA [phone]         [address]                 



                                                OFFICE                  



                                                [facility]                 

 

        2023 Outpatient         FARZANA BARNES    [facility]    [id number] 

   [place]



        07:15:00 13:25:00                 [facility]                           

 

        [facility]    [id number]

[address] [facility]



        00:00:00 00:00:00                 [facility]  350.1.13.10         it

y of



                                        Edward  ANGLETON 4.2.7.2.686         [place]

as



                                                ROJELIO?BLEA [phone]         [address]                 



                                                OFFICE                  



                                                [facility]                 

 

        2023 Office          CATIE Barnes     1.2.980.680 [id number]

[address] [place]



        09:15:00 09:49:14 Visit           Jovana   VIRY 350...58         

Health



                                                T CARE  9.2.7.2.686         



                                                CENTER .[id number]         



                                                [address]                 

 

        2023 Office          [facility]    1.2.840.114 [id number]

[address] [facility]



        13:00:00 13:30:00 Visit           [place] [address].1.13.10         it

y of



                                        Edward  ANGLETON 4.2.7.2.686         [place]

as



                                                ROJELIO?BLEA [phone]         [address]                 



                                                OFFICE                  



                                                [facility]                 

 

        2023 Outpatient TALIB GOODE [facility]    [id number]

[address] [facility]



        13:00:00 13:29:11                 JOVANA bautista [facility]

 

        2023 Office          CATIE Barnes     1.2.981.594 1138

[address] [place]



        10:00:00 11:03:15 Visit           Jovana BAIRES 350.1.13.58         

Health



                                                T CARE  9.2.7.2.686         



                                                CENTER .[id number]         



                                                [address]                 

 

        2023 Telephone         Aneljosue [facility]    1.2.840.114 [id number]

499577 [facility]



        00:00:00 00:00:00                 Jovana GARCIA  350.1.13.10         it

y of



                                        Jonathan VILLAGRAN 4.2.7.2.686         [place]

as



                                                ROJELIO?BLEA [phone]         [address]



                                                MEDICAL                 



                                                OFFICE                  



                                                [facility]                 

 

        2023 Office          CATIE Barnes     1.2.645.280 [id number]

[address] [place]



        14:00:00 14:13:21 Visit           Jovana BAIRES 350.1.13.58         

Health



                                                T CARE  9.2.7.2.686         



                                                CENTER .[id number]         



                                                [address]                 

 

        2023 Clinical         CATIE Hdz     1.2.840.114 [id number]

7423 [place]



        13:30:00 14:08:59 Support         Aleksander BAIRES 350.1.13.58         

Health



                                                T CARE  9.2.7.2.686         



                                                CENTER .[id number]         



                                                [address]                 

 

        2023 Clinical         CATIE Hdz     1.2.840.114 [id number]

8799 [place]



        13:30:00 14:08:05 Support         Aleksander BAIRES 350.1.13.58         

Health



                                                T CARE  9.2.7.2.686         



                                                CENTER .[id number]         



                                                [address]                 

 

        2023 Office          CATIE CRISTINA     1.2.840.114 [id number]

871 [place]



        10:30:00 13:04:45 Visit           GWENDOLYN BAIRES 350.1.13.58         

Health



                                                T CARE  9.2.7.2.686         



                                                CENTER .[id number]         



                                                [address]                 

 

        2023 Clinical         CATIE Hdz     1.2.840.114 [id number]

3515 [place]



        10:30:00 12:03:01 Support         Aleksander BAIRES 350.1.13.58         

Health



                                                [facility]  9.2.7.2.686         



                                                Thomas .[id number]         



                                                [address]                 

 

        2023 Outpatient         CAMERON, MHPIYUSH    [facility]    7508 

   [facility]



        12:26:00 13:25:00                 JOVANA                           

 

        2023 Outpatient         CAMERON, [facility]     [id number]

5196 [place]



        08:45:00 08:45:00                 [facility]

 

        2023 Outpatient         IBETH, [facility]     [id number]

41 [place]



        14:15:00 14:15:00                 [facility]

 

        2023 Emergency E       ADY, [facility]    [facility]    7509  

  [facility]



        08:35:00 11:55:00                 EDUARDO                           

 

        2023 Telemedici         Ibeth [facility] 1.2.840.114 [id number]

[id number] [place]



        10:30:00 10:45:00 ne              [facility] 350.1.13.58         H

[facility] 9.2.7.2.686         



                                                [facility]     [phone]         



                                                        1               

 

        2023 Office          YUSRA   [facility] 1.2.840.114 [id number]

408 [place]



        09:50:00 09:50:00 Visit           TARAS FITCH    350.1.13.58         He

alth



                                                MEDICAL 9.2.7.2.686         



                                                PLAZA 2 [phone]         



                                                        5               

 

        2023 (TEL)                   STLMLC  STLMLC  [id number] Co

mmon



        00:00:00 00:00:00                                                 [facility]

 

        2023 (TEL)                   STLMLC  STLMLC  [id number] Co

mmon



        00:00:00 00:00:00                                                 [facility]

 

        2022-12-15 2022-12-15 (WEB)                   STLMLC  STLMLC  [id number] Co

mmon



        00:00:00 00:00:00                                                 [facility]

 

        2022-12-15 2022-12-15 (WEB)                   STLMLC  STLMLC  [id number] Co

mmon



        00:00:00 00:00:00                                                 [facility]

 

        2022 (WEB)                   STLMLC  STLMLC  [id number] Co

mmon



        00:00:00 00:00:00                                                 [facility]

 

        2022 (TEL)                   STLMLC  STLMLC  [id number] Co

mmon



        00:00:00 00:00:00                                                 [facility]

 

        2022-10-27 2022-10-27 OFFICE                  STLMLC  STLMLC  [id number] Co

mmon



        00:00:00 00:00:00 VISIT                                           Spirit



                        ESTAB PT                                         - [facility]



                        LEVEL 4                                         [facility]

 

        2022-10-14 2022-10-14 Office          CATIE Barnes     1.2.375.987 [id number]

[address] [place]



        12:30:00 13:29:28 Visit           Jovana BAIRES 350.1.13.58         

[facility]  9.2.7.2.686         



                                                CENTER .[id number]         



                                                [address]                 

 

        2022 Outpatient         YUSRA,   [facility]     [id number]

34 [place]



        04:15:00 04:15:00                 TARAS                          [facility]

 

        2022 Office          YUSRA,   [facility] 1.2.840.114 [id number]

104 [place]



        11:00:00 11:00:00 Visit           TARAS FITCH    350.1.13.58         He

alth



                                                MEDICAL 9.2.7.2.686         



                                                [place] [address] [phone]         



                                                        5               

 

        2022-08-10 2022-08-10 (TEL)                   STLMLC  STLMLC  [id number] Co

mmon



        00:00:00 00:00:00                                                 [facility]

 

        2022 WELCOME TO                 STLMLC  STLMLC  [id number]

 Common



        00:00:00 00:00:00 MEDICARE                                         Spiri

t



                        PREV PHY                                         - [facility]



                        EXAM                                            [facility]

 

        2022 OFFICE                  STLMLC  STLMLC  [id number] Co

mmon



        00:00:00 00:00:00 VISIT EST                                         Spir

it



                        PT LEVEL 3                                         - [facility]



                                                                        [facility]

 

        2022 (TEL)                   STLMLC  STLMLC  [id number] Co

mmon



        00:00:00 00:00:00                                                 [facility]

 

        2022 (TEL)                   STLMLC  STLMLC  [id number] Co

mmon



        00:00:00 00:00:00                                                 Spirit



                                                                        [facility]

 

        2022 Office          CATIE Fuentes [facility] 1.2.840.114 [id number]

424 [place]



        10:30:00 11:17:20 Visit           Jerrica  ORTHO .1.13.58         

Health



                                                SPINE   9.2.7.2.686         



                                                MEDICAL [phone]         



                                                PLAZ[facility]   2               

 

        2022 (TEL)                   STLMLC  STLMLC  [id number] Co

mmon



        00:00:00 00:00:00                                                 [facility]

 

        2022 OFFICE                  STLMLC  STLMLC  [id number] Co

mmon



        00:00:00 00:00:00 VISIT                                           Spirit



                        ESTAB PT                                         - [facility]



                        LEVEL 4                                         [facility]

 

        2022 Outpatient         ABY THORNTON   [facility]   7507  

  



        09:37:00 23:59:00                 W[facility]AMS                            Orthop

e



                                                                        dic and



                                                                        Spine



                                                                        Hospita



                                                                        l

 

        2022 Office          CATIE Thornton 6400 1.2.968.548 [id number]

[address] [place]



        11:00:00 12:09:41 Visit           Williamstan RODRIGUEZ .1.13.58         

Health



                                                        9.2.7.2.686         



                                                        [phone]         



                                                        5               

 

        2022 Office          CATIE Thornton 6400 1.2.691.863 7328

[address] [place]



        11:00:00 12:09:41 Visit           Williams LEGGETTNIN .1.13.58         

Health



                                                        9.2.7.2.686         



                                                        [phone]         



                                                        5               

 

        2022 (TEL)                   STLMLC  STLMLC  [id number] Co

mmon



        00:00:00 00:00:00                                                 [facility]

 

        2022 Ancillary         CATIE Boone 6410 1.2.840.114 135

[id number] [place]



        13:00:00 14:11:14 Procedure         Pam RODRIGUEZ .1.13.58       

  Health



                                                        9.2.7.2.686         



                                                        [phone]         



                                                        8               

 

        2022 Office          KATEY FERNANDEZ [facility] 6400 1.2.840.114 [id number]

[id number] [place]



        09:00:00 09:15:00 Visit                   JENNIFER .1.13.58         

Health



                                                        9.2.7.2.686         



                                                        [phone]         



                                                        7               

 

        2022 Outpatient         BUYS,   [facility]   MED     7506   

 



        13:46:00 23:59:00                 PHILIP                         Ortho

pe



                                                                        dic and



                                                                        Spine



                                                                        Hospita



                                                                        l

 

        2022 Outpatient         [facility]YS[facility]   [facility]   [facility]     [id number]   

 



        12:28:00 23:59:00                 PHILIP                         Ortho

pe



                                                                        dic and



                                                                        Spine



                                                                        Hospita



                                                                        l

 

        2022 Telephone         Katey Fernandez [facility] 6400 1.2.840.114

 [id number] [place]



        00:00:00 00:00:00                 Guy RODRIGUEZ .1.13.58     

    Health



                                                        9.2.7.2.686         



                                                        [phone]         



                                                        7               

 

        2022 OFFICE                  STLMLC  STLMLC  [id number] Co

mmon



        00:00:00 00:00:00 VISIT                                           Spirit



                        [facility] PT                                         - [facility]



                        LEVEL 4                                         [facility]

 

        2022 (TEL)                   STLMLC  STLMLC  [id number] Co

mmon



        00:00:00 00:00:00                                                 Spirit



                                                                        - [facility]



                                                                        [facility]

 

        2021 (TEL)                   STLMLC  STLMLC  [id number] Co

mmon



        00:00:00 00:00:00                                                 Spirit



                                                                        - [facility]



                                                                        [facility]

 

        2021 PREV VISIT                 STLMLC  STLMLC  [id number]

 Common



        00:00:00 00:00:00 EST AGE                                         Spirit



                        40-64                                           - [facility]



                                                                        [facility]

 

        2021-12-10 2021-12-10 Office          CATIE Gomez 6400 1.2.102.545 4346

[address] [place]



        09:00:00 09:42:21 Visit           Michael BRASWELL 350.1.13.58         

Health



                                                        9.2.7.2.686         



                                                        [phone]         



                                                        5               

 

        2021 (TEL)                   STLMLC  STLMLC  [id number] Co

mmon



        00:00:00 00:00:00                                                 [facility]

 

        2021 OFFICE                  STLMLC  STLMLC  [id number] Co

mmon



        00:00:00 00:00:00 VISIT EST                                         Spir

it



                        PT LEVEL 3                                         - [facility]

 

        2021 (TEL)                   STLMLC  STLMLC  [id number] Co

mmon



        00:00:00 00:00:00                                                 [facility]

 

        2021-10-25 2021-10-25 OFFICE                  STLMLC  STLMLC  [id number] Co

mmon



        00:00:00 00:00:00 VISIT                                           Spirit



                        ESTAB PT                                         - [facility]



                        LEVEL 4                                         [facility]

 

        2021 Outpatient                 STLMLC  STLMLC  [id number]

 Common



        00:00:00 00:00:00                                                 [facility]

 

        2021 Outpatient                 STLMLC  STLMLC  [id number]

 Common



        00:00:00 00:00:00                                                 [facility]

 

        2021 Telephone         [facility]    1.2.840.114 [id number]

[id number] Univers



        00:00:00 00:00:00                 Armando Rivera McRoberts 350.1.13.10      

   ity of



                                                [place] 4.2.7.2.686         [place]

[place]



                                                [place] [phone]         [facility]     092             Branch



                                                [facility]                 

 

        2021 [facility]    1.2.138.010 [id number]

5165 Univers



        10:36:40 23:59:00 Encounter         Armando Rivera [facility]  350.1.13.10     

    ity of



                                                [place]   4.2.7.2.686         [place]

[place]



                                                [place]    [phone]         [facility] 804             Branch



                                                (CLC)                   

 

        2021 Hospital         [facility]    1.2.840.114 [id number]

178 Univers



        10:30:00 10:35:00 Encounter         Ashley GOEL  [facility]  350.1.13.10         

ity of



                                                [place]   4.2.7.2.686         [place]

[place]



                                                [place]    [phone]         [facility] 807             Branch



                                                ([facility])                   

 

        2021 Outpatient TALIB CAI [facility]    [id number]

480 Univers



        10:30:00 10:30:00                 ASHLEY                            [facility]

 

        2021 Outpatient                 STLMLC  STLC  [id number]

 Common



        00:00:00 00:00:00                                                 [facility]

 

        2021 Office          [facility]    1.2.840.114 [id number]

713 Univers



        13:04:06 14:04:34 Visit           Armando Villagran 350.1.13.10      

   ity of



                                                [place] 4.2.7.2.686         [place]

[place]



                                                [place] [phone]         Me

dical



                                                [facility]             [facility]                 

 

        2021 Outpatient ARMANDO RODRIGUEZ [facility]   

 [id number] Univers



        13:00:00 13:00:00                 ARMANDO FAIR                       

  [facility]

 

        2021 Orders          Doctor KATEY Barrios2.840.114 [id number]

27 Univers



        00:00:00 00:00:00 Only            Unassigned, BASILIO   350.1.13.10       

  ity of



                                        [place] [facility] 4.2.7.2.686         [place]

as



                                                        [phone]         Medi

maryan



                                                        009             Branch

 

        2021 Outpatient                 STLMLC  STLMLC  [id number]

 Common



        00:00:00 00:00:00                                                 [facility]

 

        2021 Orders          Doctor DEL TORO    1Valorie2.840.114 [id number]

84 Univers



        00:00:00 00:00:00 Only            Unassigned, BASILIO   350.1.13.10       

  ity of



                                        [place] [facility] 4.2.7.2.686         [place]

as



                                                        [phone]         [address]

 

        2021 Outpatient                 STLMLC  STLMLC  [id number]

 Common



        00:00:00 00:00:00                                                 [facility]

 

        2021 Outpatient                 STLMLC  STLMLC  [id number]

 Common



        00:00:00 00:00:00                                                 [facility]

 

        2021-04-10 2021-04-10 Outpatient                 [facility]    [id number]

914 Univers



        09:15:00 09:15:00                                                 [facility]

 

        2021 Outpatient                 STLMLC  STLMLC  [id number]

 Common



        00:00:00 00:00:00                                                 [facility]

 

        2021 Outpatient                 [facility]    [id number]

371 Univers



        09:10:00 09:10:00                                                 [facility]

 

        2021 Outpatient                 STLMLC  STLMLC  [id number]

 Common



        00:00:00 00:00:00                                                 [facility]

 

        2021 Outpatient                 STLMLC  STLMLC  [id number]

 Common



        00:00:00 00:00:00                                                 [facility]

 

        2020 Outpatient                 STLMLC  STLMLC  [id number]

 Common



        00:00:00 00:00:00                                                 [facility]

 

        2020 Outpatient                 STLMLC  STLMLC  [id number]

 Common



        00:00:00 00:00:00                                                 [facility]

 

        2020 Outpatient                 STLMLC  STLMLC  [id number]

 Common



        00:00:00 00:00:00                                                 [facility]

 

        2020 Outpatient                 STLMLC  STLMLC  [id number]

 Common



        00:00:00 00:00:00                                                 [facility]

 

        2020 Outpatient                 STLMLC  STLMLC  [id number]

 Common



        00:00:00 00:00:00                                                 [facility]

 

        2020 Outpatient                 [facility]

[id number] [address]



        10:00:00 10:00:00                         t [address]   [address]         Spir

it



                                                Drive   [facility]

 

        2020 Outpatient                 [facility] [address]

[id number] [address]



        15:42:00 15:42:00                         t [place]   [address]         Spir

it



                                                Drive   [facility]

 

        2020 Outpatient                 [facility] [address]

[id number] [address]



        10:20:00 10:20:00                         t [place]   [address]         Spir

it



                                                Drive   [facility]

 

        2020 Outpatient                 [facility] [address]

[id number] Common



        11:37:00 11:37:00                         t [place]   [address]         Spir

it



                                                Drive   [facility]

 

        2020 Outpatient                 [facility] [address]

[id number] [address]



        11:00:00 11:00:00                         t [place]   [address]         Spir

it



                                                Drive   [facility]

 

        2019 Outpatient                 [facility] [address]

[id number] Common



        08:40:00 08:40:00                         t [place]   [address]         Spir

it



                                                Drive   [facility]

 

        2019 Outpatient                 [facility] [address]

[id number] Common



        11:04:00 11:04:00                         t [place]   [address]         Spir

it



                                                Drive   [facility]

 

        2019 Outpatient                 [facility] [address]

[id number] Common



        17:00:00 17:00:00                         [facility]         S

pirit



                                                Care    [facility]

 

        2019 Outpatient                 [facility] [address]

[id number] Common



        09:15:00 09:15:00                         t [place]   [address]         Spir

it



                                                Drive   [facility]

 

        2018 Outpatient                 [facility]  [id number]

 Common



        00:00:00 00:00:00                                                 [facility]







Results







           Test       Test       Test       Results    Result     Source



           Description Time       Comments              Comments   

 

           MR BRAIN WO 2021-               No acute intracranial            U

[employer]



           CONTRAST   19                    abnormality. Preliminary            

[facility]



                      19:18:14              Report Dictated by            Branch



                                            Resident: Kurt Patino MD., have reviewed this            



                                            study and agree with            



                                            theabove report.MR BRAIN            



                                            WO CONTRAST COMPARISON:            



                                            None. HISTORY: see above            



                                            history RA, had sxs of            



                                            ?TIA, left arm/face            



                                            paresthesia. TECHNIQUE:            



                                            1.5 Drea multiplanar            



                                            multiweighted MRI of brain          

  



                                            withoutintravenous            



                                            contrast administration.            



                                            FINDINGS: The ventricles            



                                            and cerebral sulci are            



                                            normal in caliber and            



                                            configuration.No midline            



                                            shift, hydrocephalus or            



                                            pathological extra-axial            



                                            fluidcollection is            



                                            present. The basal            



                                            cisterns are unremarkable.          

  



                                            No restricted diffusion is          

  



                                            present to suggest acute            



                                            infarct. A fewscattered            



                                            nonspecific T2/FLAIR            



                                            hyperintensities are            



                                            demonstrated in thedeep            



                                            and subcortical            



                                            frontoparietal white            



                                            matter, likely            



                                            reflectingmicrovascular            



                                            ischemic changes. Focus of          

  



                                            SWI hypointensity            



                                            demonstrated inthe right            



                                            parietal subcortical white          

  



                                            matter, likely reflecting           

 



                                            amicrohemorrhagic deposit           

 



                                            or mineralization. The T2           

 



                                            flow voids for the major            



                                            intracranial vessels are            



                                            unremarkable. Noabnormal            



                                            fluid signal is present in          

  



                                            the mastoid air cells or            



                                            paranasal airsinuses.            



                                            [facility], Radiant Results Inft          

  



                                            User - 2021 2:19 PM           

 



                                            CDTFormatting of this note          

  



                                            might be different from            



                                            the original.MR BRAIN WO            



                                            CONTRASTCOMPARISON:            



                                            None.HISTORY: see above            



                                            history RA, had sxs of            



                                            ?TIA, left arm/face            



                                            paresthesia. TECHNIQUE:            



                                            1.5 Drea multiplanar            



                                            multiweighted MRI of brain          

  



                                            withoutintravenous            



                                            contrast              



                                            administration.FINDINGS:Th          

  



                                            e ventricles and cerebral           

 



                                            sulci are normal in            



                                            caliber and            



                                            configuration.No midline            



                                            shift, hydrocephalus or            



                                            pathological extra-axial            



                                            fluidcollection is            



                                            present. The basal            



                                            cisterns are            



                                            unremarkable.No restricted          

  



                                            diffusion is present to            



                                            suggest acute infarct. A            



                                            fewscattered nonspecific            



                                            T2/FLAIR hyperintensities           

 



                                            are demonstrated in            



                                            thedeep and subcortical            



                                            frontoparietal white            



                                            matter, likely            



                                            reflectingmicrovascular            



                                            ischemic changes. Focus of          

  



                                            SWI hypointensity            



                                            demonstrated inthe right            



                                            parietal subcortical white          

  



                                            matter, likely reflecting           

 



                                            amicrohemorrhagic deposit           

 



                                            or mineralization.The T2            



                                            flow voids for the major            



                                            intracranial vessels are            



                                            unremarkable. Noabnormal            



                                            fluid signal is present in          

  



                                            the mastoid air cells or            



                                            paranasal             



                                            airsinuses.IMPRESSIONNo            



                                            acute intracranial            



                                            abnormality.Preliminary            



                                            Report Dictated by            



                                            Resident: Kurt Boltonlova, MD., have            



                                            reviewed this study and            



                                            agree with theabove            



                                            report.               

 

           XR PELVIS <3 VW 2021. Spinal stimulator           

 [employer]



                      19                    device superimposed over            

[employer] [facility]



                      15:59:21              the right side of the            Bra

nch



                                            pelvis2. Spinal fixation            



                                            hardware superimposed over          

  



                                            the lower lumbar spine            



                                            andpelvis EXAM: Pelvis 1            



                                            view HISTORY: mri            



                                            clearance Encounter for            



                                            imaging to screen for            



                                            metal prior toMRI            



                                            TECHNIQUE:An AP view of            



                                            the pelvis is obtained.            



                                            FINDINGS:Spinal fixation            



                                            hardware is seen extending          

  



                                            to the level of theiliac            



                                            bones. Spinal stimulator            



                                            device is seen            



                                            superimposed over the            



                                            rightiliac            



                                            bone/acetabulum.            



                                            Degenerative changes are            



                                            seen in the hip joints.            



                                            [facility], Radiant Results Inft          

  



                                            User - 2021 11:00 AM          

  



                                            CDTFormatting of this note          

  



                                            might be different from            



                                            the original.EXAM: Pelvis           

 



                                            1 viewHISTORY: mri            



                                            clearance Encounter for            



                                            imaging to screen for            



                                            metal prior            



                                            toMRITECHNIQUE:An AP view           

 



                                            of the pelvis is            



                                            obtained.FINDINGS:Spinal            



                                            fixation hardware is seen           

 



                                            extending to the level of           

 



                                            theiliac bones. Spinal            



                                            stimulator device is seen           

 



                                            superimposed over the            



                                            rightiliac            



                                            bone/acetabulum.Degenerati          

  



                                            ve changes are seen in the          

  



                                            hip joints.IMPRESSION1.            



                                            Spinal stimulator device            



                                            superimposed over the            



                                            right side of the pelvis2.          

  



                                            Spinal fixation hardware            



                                            superimposed over the            



                                            lower lumbar spine            



                                            andpelvis             

 

           XR ABDOMEN 2 VW 2021. Spinal stimulator           

 [employer]



                      19                    superimposed over the            [facility]

[facility]



                      15:52:36              right iliac bone with            Bra

nch



                                            leads mostlikely above            



                                            T92. Spinal fixation            



                                            hardware EXAM: Abdomen 2            



                                            views HISTORY: mri            



                                            clearance Encounter for            



                                            imaging to screen for            



                                            metal prior toMRI            



                                            TECHNIQUE:2 images of the           

 



                                            abdomen are obtained.            



                                            FINDINGS:Heart rate            



                                            fixation of the lumbar            



                                            spine as well as SI joints          

  



                                            isnoted. Disc prostheses            



                                            are noted at L3-L4 and            



                                            L4-L5. A spinal stimulator          

  



                                            device is seen            



                                            superimposed over the            



                                            right iliac bonewith leads          

  



                                            terminating most likely            



                                            above T9 . Moderate amount          

  



                                            of stool is seen in the            



                                            colon. [facility], Radiant            



                                            Results Inft User -            



                                            2021 10:53 AM            



                                            CDTFormatting of this note          

  



                                            might be different from            



                                            the original.EXAM: Abdomen          

  



                                            2 viewsHISTORY: mri            



                                            clearance Encounter for            



                                            imaging to screen for            



                                            metal prior            



                                            toMRITECHNIQUE:2 images of          

  



                                            the abdomen are            



                                            obtained.FINDINGS:Heart            



                                            rate fixation of the            



                                            lumbar spine as well as SI          

  



                                            joints isnoted. Disc            



                                            prostheses are noted at            



                                            L3-L4 and L4-L5.A spinal            



                                            stimulator device is seen           

 



                                            superimposed over the            



                                            right iliac bonewith leads          

  



                                            terminating most likely            



                                            above T9 .Moderate amount           

 



                                            of stool is seen in the            



                                            colon.IMPRESSION1. Spinal           

 



                                            stimulator superimposed            



                                            over the right iliac bone           

 



                                            with leads mostlikely            



                                            above T92. Spinal fixation          

  



                                            hardware              







Notes







                Date/Time       Note            Provider        Source

 

                    2023-08-15          Formatting of this note might be differe

nt from the original. 

Shazia Maciel RN                    [facility]



                          18:12:27-00:00            Pt given printed and verbal 

discharge instructions regarding 

trigeminal neuralgia, encouraged hydration.                           



                                                                



                                Prescriptions provided.                 



                                                                



                                Discussed toradol and to take with food to avoid

 GI distress.                 



                                                                



                                                    Pt verbalized understanding 

of instructions, pt awake alert oriented, resp reg 

unlabored, skin w/d, color appropriate for race, moves all ext well, pt 
encouraged to follow up with pcp.                           



                                                                



                                Advised to seek medical attention for new/prolon

ged/worsening of symptoms.                 



                                                                



                                Symptoms addressed.                 



                                                                



                                No adverse reaction to meds given in ER noted up

on discharge.                 



                                                                



                                PIV d'cd, dressing to site, catheter intact.    

             



                                                                



                                Pt leaving amb with steady gait, in no apparent 

distress. Left with .                 



                                Electronically signed by Shazia Maciel RN 

at 08/15/2023 6:15 PM CDT                 

 

                    2023-08-15          Formatting of this note might be differe

nt from the original. Jose Elias Plunkett                           [facility]



                          15:27:58-00:00            Patient to ED for headache r

adiating to right side of face. This 

is her 5th visit for this pain. She was discharged from [facility] this 
morning and diagnosed with trigeminal neuralgia. She received RN                

        



                                                     IV Pain meds and CT scan. S

he saw her PCP yesterday and was given tramadol. 

EMS gave 80 mcg of fentanyl and 4 mg of Zofran both IVP.                        

   



                                Electronically signed by Jose Elias Plunkett RN at 08/15/2023 3:30 PM CDT                 

 

                2023-08-15      Formatting of this note is different from the or

iginal.                 [facility]



                15:23:00-00:00  [facility] Emergency Department Note                 



                                                                



                                Patient Name: Essence Merchant               

  



                                YOB: 1955 68 year old female      

           



                                Treatment Room: [facility] FT01/EUVQ66-11              

   



                                Medical Record Number: [id number]                 



                                Primary Care Physician: Nara Strickland              

   



                                                                



                                Patient Escorted by: Family [5]                 



                                Mode of Arrival: EMS - [place] [51]        

         



                                EMS Treatment Prior to ED Arrival:              

   



                                PTA treatment: Medication (comment);Saline lock 

                



                                                                



                                                                



                                Travel and Exposure Screening:                 



                                                                



                                Symptoms                        



                                Does patient have any of these symptoms?: (not r

ecorded)                 



                                                                



                                Exposure Screening                 



                                                    Has patient had contact with

 someone with a communicable disease in the last 

month?: (not recorded)                              



                                Diseases exposed to:: (not recorded)            

     



                                Is Patient Pregnant?: (not recorded)            

     



                                Exposure Date: (not recorded)                 



                                                                



                                Chief Complaint:                 



                                Chief Complaint                 



                                Patient presents with                 



                                 Headache                       



                                                                



                                                                



                                History of Present Illness:                 



                                HPI                             



                                                                



                                                    67yo WF with h/o RA and DM p

resents today with  after being discharged 

yesterday from [facility] with diagnosis workup for right half of face pain. She states
 they tried all kind of medications and sent                           



                                                     her home on tramadol and ca

rbamezipine but it doesn't seem to help. She can't 

eat or sleep the pain is so bad. She denies n/v/d or fevers.                    

       



                                                                



                                Past Medical History/Immunizations:             

    



                                Past Medical History:                 



                                Diagnosis Date                  



                                 Diabetes mellitus                 



                                 RA (rheumatoid arthritis)                 



                                 Spinal stenosis                 



                                 had surgery                  



                                 Thyroid disease                 



                                                                



                                Tetanus received in last 5 years: Unknown       

          



                                                                



                                                                



                                Allergies:                      



                                No Known Allergies                 



                                                                



                                Past Social History:                 



                                Tobacco Use                     



                                                                



                                 Never smoked or used smokeless tobacco.        

         



                                                                



                                                                



                                                                



                                Alcohol Use                     



                                                                



                                 No.                            



                                                                



                                                                



                                                                



                                Drug Use                        



                                                                



                                 No.                            



                                                                



                                                                



                                                                



                                Sexual Activity                 



                                                                



                                 Sexually active; Partners: Male; Birth Control/

Protection: Surgical.                 



                                                                



                                                                



                                                                



                                Past Surgical History:                 



                                Past Surgical History:                 



                                Procedure Laterality Date                 



                                 ARTHROSCOPIC SHOULDER ROTATOR CUFF REPAIR Right

 2016                 



                                 BUNIONECTOMY Left                 



                                 CHOLECYSTECTOMY                 



                                 EYE SURGERY                    



                                 HYSTERECTOMY                   



                                 KNEE ARTHROSCOPY Right                 



                                 LAPAROSCOP GASTRIC BYPASS                 



                                 OPEN KNEE DEBRIDEMENT Left                 



                                 twice                          



                                 SLING OPER STRES INCONTINENCE                 



                                 at time of hysterectomy                 



                                 SPINE SURGERY                  



                                 spinal stenosis, titanium plate in neck        

         



                                                                



                                Review of Systems:                 



                                Review of Systems                 



                                                    Constitutional: Negative for

 activity change, diaphoresis, fatigue, fever and 

weight gain.                                        



                                                    HENT: Negative for congestio

n, ear pain, rhinorrhea, sore throat, tinnitus and 

trouble swallowing.                                 



                                 Right half of face hurts                 



                                Eyes: Negative for discharge and visual disturba

nce.                 



                                Respiratory: Negative for cough and chest tightn

ess.                 



                                Breasts: Negative for pain.                 



                                Cardiovascular: Negative for chest pain and palp

itations.                 



                                Gastrointestinal: Negative for abdominal pain, n

ausea and vomiting.                 



                                Genitourinary: Negative for dysuria, hematuria a

nd difficulty urinating.                 



                                Musculoskeletal: Negative for joint swelling.   

              



                                Skin: Negative for rash and wound.              

   



                                Neurological: Positive for headaches. Negative f

or dizziness.                 



                                                    Psychiatric/Behavioral: Nega

tive for agitation and confusion. The patient is 

not nervous/anxious.                                



                                Hematological: Does not bruise/bleed easily.    

             



                                Endocrine: Negative for weight gain.            

     



                                                                



                                Physical Exam:                  



                                ED Triage Vitals [08/15/23 1530]                

 



                                Weight 79.4 kg (175 lb)                 



                                Actual or estimated                 



                                Height                          



                                /90                       



                                Pulse 66                        



                                Resp 18                         



                                Temp 37.2 ?C (98.9 ?F)                 



                                Temp src                        



                                SpO2 99 %                       



                                Measured on                     



                                                                



                                Physical Exam                   



                                Vitals reviewed.                 



                                Constitutional:                 



                                 Appearance: She is well-developed.             

    



                                 Comments: Sitting in dark room                 



                                HENT:                           



                                 Head: Normocephalic and atraumatic.            

     



                                Eyes:                           



                                 Conjunctiva/sclera: Conjunctivae normal.       

          



                                                     Comments: Right eye tearing

, no rashes, no redness, no dental pain, no ear 

pain                                                



                                Cardiovascular:                 



                                 Rate and Rhythm: Normal rate and regular rhythm

.                 



                                 Heart sounds: Normal heart sounds. No murmur he

jesus.                 



                                Pulmonary:                      



                                 Effort: Pulmonary effort is normal.            

     



                                 Breath sounds: Normal breath sounds. No stridor

.                 



                                Abdominal:                      



                                 General: Bowel sounds are normal.              

   



                                 Palpations: Abdomen is soft.                 



                                 Tenderness: There is no abdominal tenderness.  

               



                                Musculoskeletal:                 



                                 General: Normal range of motion.               

  



                                 Cervical back: Neck supple.                 



                                Skin:                           



                                 General: Skin is warm and dry.                 



                                 Capillary Refill: Capillary refill takes less t

maurice 2 seconds.                 



                                Neurological:                   



                                 Mental Status: She is alert and oriented to per

son, place, and time.                 



                                 Cranial Nerves: No cranial nerve deficit.      

           



                                Psychiatric:                    



                                 Behavior: Behavior normal.                 



                                                                



                                Radiology:                      



                                No orders to display                 



                                                                



                                Lab Results:                    



                                Lab Results - No data to display                

 



                                                                



                                EKG:                            



                                If EKG completed, see Procedure Note.           

      



                                                                



                                Orders and Treatments:                 



                                No orders of the defined types were placed in th

is encounter.                 



                                                                



                                Orders Placed This Encounter                 



                                Medications                     



                                 FENTanyl PF (SUBLIMAZE (PF)) injection 100 mcg 

                



                                 ketorolac (TORADOL) injection 30 mg            

     



                                 carBAMazepine (TEGRETOL) tablet 400 mg         

        



                                 ketorolac 10 mg tablet                 



                                                                



                                First Provider Eval:                 



                                ED Events                       



                                                                



                                 Date/Time Event User Comments                 



                                 08/15/23 154 Medical Screening Begins KAYLEE ZHOU MD --                 



                                 08/15/23 1541 First Provider Evaluation KAYLEE RONQUILLO MD --                 



                                                                



                                                                



                                                                



                                No notes of EC Admission Criteria type on file. 

                



                                                                



                                ED COURSE                       



                                Diagnosis/Impression as of 08/15/23 6011        

         



                                Trigeminal neuralgia                 



                                                                



                                Procedures:                     



                                Procedures                      



                                                                



                                MDM:                            



                                Medical Decision Making                 



                                                    Educated patient and spouse,

 recommended going to pain management for nerve 

block                                               



                                                                



                                Given 100mcg fentanyl and toradol 30mg          

       



                                Patient feeling much better                 



                                                                



                                                    Discharged with recommendati

on to increase carbamezipine and see pain 

management.                                         



                                                                



                                Problems Addressed:                 



                                Trigeminal neuralgia: acute illness or injury   

              



                                                                



                                Amount and/or Complexity of Data Reviewed       

          



                                Independent Historian: spouse                 



                                                                



                                Risk                            



                                Prescription drug management.                 



                                Parenteral controlled substances.               

  



                                                                



                                                                



                                Flowsheet Documentation:                 



                                                                



                                                                



                                                                



                                                                



                                                                



                                                                



                                                                



                                                                



                                                                



                                                                



                                                                



                                                                



                                                                



                                                                



                                                                



                                                                



                                                                



                                                                



                                                                



                                                                



                                                                



                                                                



                                                                



                                                                



                                                                



                                                                



                                                                



                                                                



                                                                



                                                                



                                                                



                                                                



                                                                



                                                                



                                                                



                                                                



                                                                



                                                                



                                                                



                                                                



                                                                



                                                                



                                                                



                                                                



                                                                



                                                                



                                                                



                                                                



                                                                



                                                                



                                                                



                                                                



                                                                



                                                                



                                Scoring Tools:                  



                                                                



                                                                



                                                                



                                                                



                                No data recorded                 



                                                                



                                                                



                                                                



                                                                



                                                                



                                                                



                                                                



                                                                



                                                                



                                                                



                                                                



                                Disposition/Condition:                 



                                ED Disposition                  



                                                                



                                 ED Disposition                 



                                Disch - Home                    



                                 Condition                      



                                Stable                          



                                 Comment                        



                                --                              



                                                                



                                                                



                                                                



                                                                



                                Discharge Medications:                 



                                Patient's Medications                 



                                START taking these medications                 



                                                     KETOROLAC 10 MG TABLET Take

 1 tablet by mouth every 6 (six) hours as needed 

for Pain (scale 7-10).                              



                                CONTINUE taking these medications which have NOT

 CHANGED                 



                                                     AMOXICILLIN-CLAVULANATE 875

-125 MG PER TABLET Take 1 tablet by mouth in the 

morning and 1 tablet in the evening. x10 days                           



                                 AZATHIOPRINE 50 MG TABLET See administration in

structions.                 



                                 CHLORHEXIDINE 0.12 % MOUTHWASH Swish and spit o

ut 2 (two) times daily.                 



                                                     COLESTIPOL HCL 1 GRAM TABLE

T Take 1 tablet by mouth in the morning and 1 

tablet in the evening.                              



                                                     CYANOCOBALAMIN 1,000 MCG/ML

 INJECTION INJECT INTRAMUSCULARLY 1000MCG (1 ML ) 

ONCE A MONTH                                        



                                                     DICLOFENAC 75 MG EC TABLET 

Take 1 tablet by mouth 2 (two) times daily as 

needed.                                             



                                 DULOXETINE 30 MG CAPSULE Take 1 capsule by mout

h in the morning.                 



                                 FOLIC ACID 1 MG TABLET TAKE 2 TABLETS BY MOUTH 

EVERY DAY FOR 90 DAYS                 



                                                     GABAPENTIN 800 MG TABLET Ta

ke 1 tablet by mouth in the morning and 1 tablet at

noon and 1 tablet in the evening.                           



                                                     HYDROCODONE-ACETAMINOPHEN 1

0-325 MG TABLET TAKE 1 TABLET BY MOUTH EVERY 4 

HOURS AS NEEDED FOR SEVERE PAIN FOR UP TO 7 DAYS                           



                                                     HYDROXYCHLOROQUINE 200 MG T

ABLET TAKE 1 TABLET BY MOUTH TWICE A DAY WITH FOOD 

OR MILK                                             



                                                     LEVOTHYROXINE 175 MCG TABLE

T TAKE 1 TABLET BY MOUTH EVERY DAY IN THE MORNING 

ON EMPTY STOMACH                                    



                                 METFORMIN 1,000 MG TABLET Take 1 tablet by mout

h daily with breakfast.                 



                                                     MULTIVITAMINS WITH FLUORIDE

 (MULTI-VITAMIN ORAL) Take 1 tablet by mouth in the

morning.                                            



                                                     ONDANSETRON 4 MG DISINTEGRA

TING TABLET Take 1 tablet by mouth every 6 (six) 

hours as needed for Nausea and Vomiting (N/V).                           



                                                     PREDNISONE 5 MG TABLET Take

 6 tablets by mouth in the morning and 6 tablets in

the evening.                                        



                                 RIVAROXABAN 10 MG TABLET Take 1 tablet by mouth

 in the morning.                 



                                 ROSUVASTATIN 5 MG TABLET Take 1 tablet by mouth

 in the morning.                 



                                 SULFASALAZINE 500 MG EC TABLET Take 4 tablets b

y mouth in the morning.                 



                                                     TEMAZEPAM 15 MG CAPSULE 1 c

apsule at bedtime as needed Orally Once a day for 

30 days                                             



                                START taking Modified Medications as Prescribed 

                



                                 No medications on file                 



                                STOP taking these medications                 



                                 No medications on file                 



                                                                



                                Follow-up:                      



                                Contact information for follow-up               

  



                                                                



                                 Nara Strickland                       



                                Specialty: FM-FAMILY MEDICINE                 



                                 [address]                         



                                [facility] [address]                 



                                Phone: [phone]                 



                                                                



                                                                



                                                                



                                                                



                                Electronically signed by:                 



                                                                



                                                                



                                Kaylee Arguelles DO                 



                                08/15/23 174                   



                                                                



                                Electronically signed by Kaylee Arguelles DO 

at 08/15/2023 5:42 PM CDT                 

 

                    2023          Formatting of this note is different jose r goel the original. Nancy Valentine LVN                              [facility]



                15:47:26-00:00  Images from the original note were not included.

                 



                                Requested Renewals                 



                                                                



                                 temazepam 15 mg capsule                 



                                                                



                                 Sig: N/A                       



                                 Disp: Not specified Refills:                 



                                 Start: 2023                 



                                 Class: eRX                     



                                 Non-formulary                  



                                 Last ordered: 3 weeks ago (2023) by No Nam

e Doctor Unassigned                 



                                 Provider Review Required Failed 2023 03:2

6 PM                 



                                Protocol Details This refill cannot be delegated

                 



                                 Valid encounter within last 12 months          

       



                                                                



                                To be filled at: [facility] [id number] [facility] [place] [place] [facility] [address] [facility] [address]                 



                                                                



                                                                



                                Recent Visits                   



                                Date Type Provider Dept                 



                                23 Office Visit Jovana Goode MD 

[facility]                 



                                                    Showing recent visits within

 past 540 days with a meds authorizing provider and

meeting all other requirements                           



                                Future Appointments                 



                                No visits were found meeting these conditions.  

               



                                                    Showing future appointments 

within next 150 days with a meds authorizing 

provider and meeting all other requirements                           



                                                                



                                                                



                                Electronically signed by Nancy Valentine LVN 

at 2023 3:47 PM CDT

## 2023-08-17 LAB
BUN BLD-MCNC: 16 MG/DL (ref 7–18)
CRP SERPL-MCNC: 65.1 MG/L (ref ?–3)
GLUCOSE SERPLBLD-MCNC: 306 MG/DL (ref 74–106)
HCT VFR BLD CALC: 36.8 % (ref 36–45)
LYMPHOCYTES # SPEC AUTO: 0.4 K/UL (ref 0.7–4.9)
MCV RBC: 94.4 FL (ref 80–100)
MORPHOLOGY BLD-IMP: (no result)
PMV BLD: 6.8 FL (ref 7.6–11.3)
POTASSIUM SERPL-SCNC: 4.2 MEQ/L (ref 3.5–5.1)
RBC # BLD: 3.9 M/UL (ref 3.86–4.86)
WBC # BLD AUTO: 11.6 THOU/UL (ref 4.3–10.9)

## 2023-08-17 RX ADMIN — ENOXAPARIN SODIUM SCH MG: 40 INJECTION SUBCUTANEOUS at 09:03

## 2023-08-17 RX ADMIN — BACLOFEN SCH MG: 10 TABLET ORAL at 14:35

## 2023-08-17 RX ADMIN — GABAPENTIN SCH MG: 100 CAPSULE ORAL at 21:45

## 2023-08-17 RX ADMIN — ONDANSETRON PRN MG: 2 INJECTION INTRAMUSCULAR; INTRAVENOUS at 11:02

## 2023-08-17 RX ADMIN — HYDROCODONE BITARTRATE AND ACETAMINOPHEN PRN TAB: 10; 325 TABLET ORAL at 12:01

## 2023-08-17 RX ADMIN — MORPHINE SULFATE PRN MG: 2 INJECTION, SOLUTION INTRAMUSCULAR; INTRAVENOUS at 09:03

## 2023-08-17 RX ADMIN — BACLOFEN SCH MG: 10 TABLET ORAL at 09:05

## 2023-08-17 RX ADMIN — HYDROCODONE BITARTRATE AND ACETAMINOPHEN PRN TAB: 10; 325 TABLET ORAL at 04:47

## 2023-08-17 RX ADMIN — ONDANSETRON PRN MG: 2 INJECTION INTRAMUSCULAR; INTRAVENOUS at 02:50

## 2023-08-17 RX ADMIN — METHYLPREDNISOLONE SODIUM SUCCINATE ONE: 125 INJECTION, POWDER, FOR SOLUTION INTRAMUSCULAR; INTRAVENOUS at 18:41

## 2023-08-17 RX ADMIN — HUMAN INSULIN SCH UNIT: 100 INJECTION, SOLUTION SUBCUTANEOUS at 09:03

## 2023-08-17 RX ADMIN — MORPHINE SULFATE PRN MG: 2 INJECTION, SOLUTION INTRAMUSCULAR; INTRAVENOUS at 02:49

## 2023-08-17 RX ADMIN — Medication SCH ML: at 21:46

## 2023-08-17 RX ADMIN — NORTRIPTYLINE HYDROCHLORIDE SCH MG: 10 CAPSULE ORAL at 21:45

## 2023-08-17 RX ADMIN — HYDROCODONE BITARTRATE AND ACETAMINOPHEN PRN TAB: 10; 325 TABLET ORAL at 18:10

## 2023-08-17 RX ADMIN — SODIUM CHLORIDE SCH MLS: 0.9 INJECTION, SOLUTION INTRAVENOUS at 02:54

## 2023-08-17 RX ADMIN — Medication SCH ML: at 09:05

## 2023-08-17 RX ADMIN — BACLOFEN SCH MG: 10 TABLET ORAL at 21:45

## 2023-08-17 RX ADMIN — NORTRIPTYLINE HYDROCHLORIDE SCH MG: 10 CAPSULE ORAL at 12:23

## 2023-08-17 RX ADMIN — HUMAN INSULIN SCH UNIT: 100 INJECTION, SOLUTION SUBCUTANEOUS at 16:47

## 2023-08-17 RX ADMIN — GABAPENTIN SCH MG: 100 CAPSULE ORAL at 14:35

## 2023-08-17 RX ADMIN — MORPHINE SULFATE PRN MG: 2 INJECTION, SOLUTION INTRAMUSCULAR; INTRAVENOUS at 23:13

## 2023-08-17 RX ADMIN — METHYLPREDNISOLONE SODIUM SUCCINATE ONE MG: 125 INJECTION, POWDER, FOR SOLUTION INTRAMUSCULAR; INTRAVENOUS at 18:10

## 2023-08-17 RX ADMIN — HUMAN INSULIN SCH UNIT: 100 INJECTION, SOLUTION SUBCUTANEOUS at 12:01

## 2023-08-17 RX ADMIN — GABAPENTIN SCH MG: 100 CAPSULE ORAL at 09:05

## 2023-08-17 RX ADMIN — SODIUM CHLORIDE SCH MLS: 0.9 INJECTION, SOLUTION INTRAVENOUS at 09:02

## 2023-08-17 RX ADMIN — ONDANSETRON PRN MG: 2 INJECTION INTRAMUSCULAR; INTRAVENOUS at 23:13

## 2023-08-17 RX ADMIN — HUMAN INSULIN SCH UNIT: 100 INJECTION, SOLUTION SUBCUTANEOUS at 21:46

## 2023-08-17 NOTE — P.HP
Certification for Inpatient


Patient admitted to: Observation


With expected LOS: <2 Midnights


Patient will require the following post-hospital care: None


Practitioner: I am a practitioner with admitting privileges, knowledge of 

patient current condition, hospital course, and medical plan of care.


Services: Services provided to patient in accordance with Admission requirements

found in Title 42 Section 412.3 of the Code of Federal Regulations





Patient History


Date of Service: 08/17/23


Reason for admission: Intractable pain, trigeminal neuralgia


History of Present Illness: 





68-year-old female history of rheumatoid Tritus, non-insulin-dependent diabetes 

presents emergency department right-sided facial pain.  Her pain began earlier 

this week and has been severe requiring multiple ER visits.  At home she has 

been on prednisone, carbamazepine, Toradol, tramadol, as needed Norco without 

relief, she is actively vomiting and reporting severe pain.  ED provider wishes 

to admit under observation for intractable pain, trigeminal neuralgia.


Allergies





No Known Allergies Allergy (Verified 11/08/21 22:12)


   





Home Medications: 








Aspirin 81 mg PO DAILY 11/09/21 


Duloxetine HCl 30 mg PO DAILY 11/09/21 


Folic Acid 2 tab PO DAILY 11/09/21 


Gabapentin 1.5 tab PO BID 11/09/21 


Glimepiride 2 mg PO BID 11/09/21 


Golimumab [Simponi Aria] 50 mg IV SEECOM 11/09/21 


Hydroxychloroquine [Plaquenil*] 1 tab PO BID 11/09/21 


Levothyroxine Sodium 1 tab PO DAILY 11/09/21 


Metformin HCl 1,000 mg PO BID 11/09/21 


Rosuvastatin Calcium 5 mg PO DAILY 11/09/21 


Temazepam 1 tab PO BEDTIME 11/09/21 


azaTHIOprine [Azathioprine] 1 tab PO DAILY 11/09/21 


sulfaSALAzine [Sulfasalazine] 2 tab PO DAILY 11/09/21 








- Past Medical/Surgical History


Diabetic: Yes


-: DM


-: Rheumatoid Arthritis


-: Back Pain


-: Thyroid


-: Back stimulator


-: Back surgery


Psychosocial/ Personal History: Patient lives at home with her 





- Family History


Family History: Reviewed- Non-Contributory





- Social History


Smoking Status: Never smoker


Alcohol use: No


CD- Drugs: No


Place of Residence: Home





Review of Systems


10-point ROS is otherwise unremarkable


ENT: Other (Right-sided facial pain)


Gastrointestinal: Nausea, Vomiting





Physical Examination





- Physical Exam


General: Alert, In no apparent distress, Oriented x3


HEENT: Atraumatic, PERRLA, Other (Tenderness with light touch, right face)


Neck: Supple, 2+ carotid pulse no bruit, No LAD, Without JVD or thyroid 

abnormality


Respiratory: Clear to auscultation bilaterally, Normal air movement


Cardiovascular: No edema, Regular rate/rhythm, Normal S1 S2


Capillary refill: <2 Seconds


Gastrointestinal: Normal bowel sounds, No tenderness


Musculoskeletal: No tenderness


Integumentary: No rashes


Neurological: Normal gait, Normal speech, Normal strength at 5/5 x4 extr, Normal

 tone, Normal affect


Lymphatics: No axilla or inguinal lymphadenopathy





Assessment and Plan





- Plan


Assessment:


Intractable paintrigeminal neuralgia


Diabetes mellitus type 2non-insulin-dependent


Rheumatoid arthritis








Plan:


Intractable paintrigeminal neuralgia


Was on carbamazepine, tramadol, Norco, prednisone at home.  Continue 

carbamazepine at gabapentin, baclofen.  Neurology consult.  Family attempting to

 get appointment with neuro/pain specialist for further evaluation.


Diabetes mellitus type 2non-insulin-dependent


ACHS Accu-Chek, sliding scale insulin.


Rheumatoid arthritis


Continue home medication including prednisone once dose is verified.





DVT PPX: Lovenox


Code status: Full


Discharge Plan: Home


Plan to discharge in: 24 Hours





- Advance Directives


Does patient have a Living Will: No


Does patient have a Durable POA for Healthcare: No





- Code Status/Comfort Care


Code Status Assessed: Yes (Full code)


Critical Care: No


Time Spent Managing Pts Care (In Minutes): 55

## 2023-08-17 NOTE — EDPHYS
Physician Documentation                                                                           

 [facility]                                                                 

Name: Samara Hawley                                                                               

Age: 68 yrs                                                                                       

Sex: Female                                                                                       

: 1955                                                                                   

MRN: [id number]                                                                                   

Arrival Date: 2023                                                                          

Time: 22:15                                                                                       

Account#: [id number]                                                                            

Bed 8                                                                                             

Private MD: Jerrod Null                                                                        

ED Physician Oral Strickland                                                                       

HPI:                                                                                              

                                                                                             

23:37 This 68 yrs old Female presents to ER via Wheelchair with complaints of Headache,       kdr 

      Nausea.                                                                                     

23:37 Patient presents with right facial pain that has been ongoing and persistent for about  kdr 

      a week. She has had multiple ED visits, twice here and once at [facility] and also I believe      

      with her primary care physician. The ultimate diagnosis has been trigeminal neuralgia       

      and she has been on put on Tegretol and pain medication however it is not remedying her     

      discomfort. She had a facial CT last time she was here and that was read as negative.       

      Patient continues to have pain with some nausea but no vomiting. Patient appears            

      moderately uncomfortable on initial presentation. Onset: The symptoms/episode               

      began/occurred gradually, 10 day(s) ago. Severity of symptoms: At their worst the           

      symptoms were mild moderate just prior to arrival, in the emergency department the          

      symptoms are unchanged. The patient has not experienced similar symptoms in the past.       

      The patient has not recently seen a physician.                                              

                                                                                                  

Historical:                                                                                       

- Allergies:                                                                                      

22:31 No Known Allergies;                                                                     hb  

- PMHx:                                                                                           

22:31 back problem; diabetes mellitus; ibs; RA;                                               hb  

- PSHx:                                                                                           

22:31 Back Stimulator; Cholecystectomy; L knee; neck; partial hysterectomy;                   hb  

                                                                                                  

- Immunization history:: Adult Immunizations up to date.                                          

- Social history:: Smoking status: Patient denies any tobacco usage or history of.                

  Patient/guardian denies using alcohol, street drugs.                                            

                                                                                                  

                                                                                                  

ROS:                                                                                              

23:37 Constitutional: Negative for fever, chills, and weight loss, Eyes: Negative for injury, kdr 

      pain, redness, and discharge, Neck: Negative for injury, pain, and swelling,                

      Cardiovascular: Negative for chest pain, palpitations, and edema, Respiratory: Negative     

      for shortness of breath, cough, wheezing, and pleuritic chest pain, Abdomen/GI:             

      Negative for abdominal pain, nausea, vomiting, diarrhea, and constipation, Back:            

      Negative for injury and pain, : Negative for injury, bleeding, discharge, and             

      swelling, MS/Extremity: Negative for injury and deformity, Skin: Negative for injury,       

      rash, and discoloration, Neuro: Negative for headache, weakness, numbness, tingling,        

      and seizure activity. Psych: Negative for depression, anxiety, suicide ideation,            

      homicidal ideation, and hallucinations, Allergy/Immunology: Negative for hives, rash,       

      and allergies, Endocrine: Negative for neck swelling, polydipsia, polyuria, polyphagia,     

      and marked weight changes, Hematologic/Lymphatic: Negative for swollen nodes, abnormal      

      bleeding, and unusual bruising.                                                             

23:37 Neuro: Positive for Right facial pain.                                                      

                                                                                                  

Exam:                                                                                             

23:40 Constitutional:  This is a well developed, well nourished patient who is awake, alert,  kdr 

      and in mild distress. Head/Face:  Normocephalic, atraumatic. Eyes:  Pupils equal round      

      and reactive to light, extra-ocular motions intact.  Lids and lashes normal.                

      Conjunctiva and sclera are non-icteric and not injected.  Cornea within normal limits.      

      Periorbital areas with no swelling, redness, or edema. Neck:  Trachea midline, no           

      thyromegaly or masses palpated, and no cervical lymphadenopathy.  Supple, full range of     

      motion without nuchal rigidity, or vertebral point tenderness.  No Meningismus.             

      Chest/axilla:  Normal chest wall appearance and motion.  Nontender with no deformity.       

      No lesions are appreciated. Cardiovascular:  Regular rate and rhythm with a normal S1       

      and S2.  No gallops, murmurs, or rubs.  Normal PMI, no JVD.  No pulse deficits.             

      Respiratory:  Lungs have equal breath sounds bilaterally, clear to auscultation and         

      percussion.  No rales, rhonchi or wheezes noted.  No increased work of breathing, no        

      retractions or nasal flaring. Abdomen/GI:  Soft, non-tender, with normal bowel sounds.      

      No distension or tympany.  No guarding or rebound.  No evidence of tenderness               

      throughout. Back:  No spinal tenderness.  No costovertebral tenderness.  Full range of      

      motion. Skin:  Warm, dry with normal turgor.  Normal color with no rashes, no lesions,      

      and no evidence of cellulitis. MS/ Extremity:  Pulses equal, no cyanosis.                   

      Neurovascular intact.  Full, normal range of motion. Neuro:  Awake and alert, GCS 15,       

      oriented to person, place, time, and situation.  Cranial nerves II-XII grossly intact.      

      Motor strength 5/5 in all extremities.  Sensory grossly intact.  Cerebellar exam            

      normal.  Normal gait. Psych:  Awake, alert, with orientation to person, place and time.     

       Behavior, mood, and affect are within normal limits.                                       

                                                                                                  

Vital Signs:                                                                                      

22:28  / 210; Pulse 97; Resp 16; Temp 98.2(A); Pulse Ox 98% on R/A; Weight 79.38 kg     hb  

      (R); Height 5 ft. 7 in. ; Pain 9/10;                                                        

23:03  / 88; Pulse 94; Resp 15 S; Pulse Ox 98% ;                                        lg3 

                                                                                             

00:05  / 93; Pulse 77; Resp 16 S; Pulse Ox 98% on R/A;                                  lg3 

00:11  / 90; Pulse 80; Resp 16; Pulse Ox 98% on R/A;                                    kd3 

01:24  / 89; Pulse 77; Resp 16 S; Pulse Ox 97% on R/A;                                  lg3 

                                                                                             

22:28 Body Mass Index 27.41 (79.38 kg, 170.18 cm)                                             hb  

                                                                                             

22:28 Pain Scale: Adult                                                                       hb  

                                                                                                  

MDM:                                                                                              

                                                                                             

23:40 Data reviewed: vital signs, nurses notes, lab test result(s), radiologic studies.       kdr 

                                                                                             

01:11 Patient medically screened.                                                             kdr 

                                                                                                  

                                                                                             

01:14 Order name: CBC with Diff                                                               la1 

                                                                                             

01:14 Order name: BMP                                                                         la1 

                                                                                             

01:14 Order name: CRP                                                                         la1 

                                                                                             

01:50 Order name: Manual Differential                                                         EDMS

                                                                                             

22:41 Order name: IV Start; Complete Time: 23:02                                              [facility] 

                                                                                                  

Administered Medications:                                                                         

                                                                                             

23:02 Drug: morphine IVP or IV 4 mg Route: IVP; Infused Over: 4 mins; Site: right forearm;    3 

                                                                                             

01:24 Follow up: Response: No adverse reaction; Marked relief of symptoms                     3 

                                                                                             

23:02 Drug: Ondansetron IVP 4 mg Route: IVP; Site: right forearm;                             3 

                                                                                             

01:24 Follow up: Response: No adverse reaction; Marked relief of symptoms                     [facility] 

                                                                                             

23:02 Drug: NS 0.9%  ml Route: IV; Rate: bolus; Site: right forearm;                    3 

                                                                                             

01:24 Follow up: IV Status: Completed infusion; IV Intake: 500ml                              3 

                                                                                             

23:02 Drug: NS 0.9% IV 1000 ml Route: IV; Rate: 125 ml/hr; Site: right forearm;               3 

23:02 Drug: MethylPrednisoLONE  mg Route: IVP; Site: right forearm;                    3 

                                                                                             

01:24 Follow up: Response: No adverse reaction                                                lg3 

                                                                                                  

                                                                                                  

Disposition Summary:                                                                              

23 01:11                                                                                    

Hospitalization Ordered                                                                           

      Hospitalization Status: Observation                                                     kdr 

      Location: Telemetry/MedSurg (observation)                                               kdr 

      Condition: Fair                                                                         kdr 

      Problem: an ongoing problem                                                             kdr 

      Symptoms: are unchanged                                                                 kdr 

      Bed/Room Type: Standard                                                                 kdr 

      Provider: Kilo Hill(23 01:12)                                               kdr 

      Room Assignment: [facility](23 02:09)                                                    kd3 

      Diagnosis                                                                                   

        - Trigeminal neuralgia                                                                kdr 

        - Atypical facial pain                                                                kdr 

        - Intractable pain                                                                    kdr 

      Forms:                                                                                      

        - Medication Reconciliation Form                                                      kdr 

        - SBAR form                                                                           kdr 

        - Leadership Thank You Letter                                                         kdr 

Signatures:                                                                                       

Dispatcher [employer]                           EDOral Shrestha MD MD   kdr                                                  

Cheryl Bejarano RN RN                                                      

Lorie Paul RN RN   lg3                                                  

Angela Dumont RN                      RN   kd3                                                  

                                                                                                  

Corrections: (The following items were deleted from the chart)                                    

01:12 01:11 Edson Hdz kdr                                                                kdr 

02:09 01:11 kdr                                                                               kd3 

                                                                                                  

**************************************************************************************************

## 2023-08-17 NOTE — ER
Nurse's Notes                                                                                     

 [facility]                                                                 

Name: Samara Hawley                                                                               

Age: 68 yrs                                                                                       

Sex: Female                                                                                       

: 1955                                                                                   

MRN: [id number]                                                                                   

Arrival Date: 2023                                                                          

Time: 22:15                                                                                       

Account#: [id number]                                                                            

Bed 8                                                                                             

Private MD: Jerrod Null                                                                        

Diagnosis: Trigeminal neuralgia;Atypical facial pain;Intractable pain                             

                                                                                                  

Presentation:                                                                                     

                                                                                             

22:28 Chief complaint: Right sided face/head pain x 1 week. Seen in ED yesterday for same     hb  

      s/s, on carbamazepine and tramadol for trigeminal neuralgia. Reports pain is worse          

      today, actively vomiting in triage. Coronavirus screen: At this time, the client does       

      not indicate any symptoms associated with coronavirus-19. Ebola Screen: No symptoms or      

      risks identified at this time. Initial Sepsis Screen: Does the patient meet any 2           

      criteria? No. Patient's initial sepsis screen is negative. Does the patient have a          

      suspected source of infection? No. Patient's initial sepsis screen is negative. Risk        

      Assessment: Do you want to hurt yourself or someone else? Patient reports no desire to      

      harm self or others. Onset of symptoms was August 10, 2023.                                 

22:28 Method Of Arrival: Wheelchair                                                           hb  

22:28 Acuity: LANDON 3                                                                           hb  

                                                                                                  

Triage Assessment:                                                                                

                                                                                             

02:26 Headache History: Denies prior headaches. General: Appears uncomfortable, Behavior is   kd3 

      calm, cooperative. Pain: Complains of pain in right cheek Pain began gradually.             

                                                                                                  

Historical:                                                                                       

- Allergies:                                                                                      

                                                                                             

22:31 No Known Allergies;                                                                     hb  

- PMHx:                                                                                           

22:31 back problem; diabetes mellitus; ibs; RA;                                               hb  

- PSHx:                                                                                           

22:31 Back Stimulator; Cholecystectomy; L knee; neck; partial hysterectomy;                   hb  

                                                                                                  

- Immunization history:: Adult Immunizations up to date.                                          

- Social history:: Smoking status: Patient denies any tobacco usage or history of.                

  Patient/guardian denies using alcohol, street drugs.                                            

                                                                                                  

                                                                                                  

Screenin:36 [facility] ED Fall Risk Assessment (Adult) History of falling in the last 3 months,       lg3 

      including since admission No falls in past 3 months (0 pts). Abuse screen: Denies           

      threats or abuse. Denies injuries from another. Nutritional screening: No deficits          

      noted. Tuberculosis screening: No symptoms or risk factors identified.                      

                                                                                                  

Assessment:                                                                                       

22:36 General: Appears in no apparent distress. uncomfortable, Behavior is anxious, crying,   lg3 

      fussy, restless. Pain: Complains of pain in right side of face Pain does not radiate.       

      Pain currently is 10 out of 10 on a pain scale. Noted to be crying, guarding, moaning,      

      resistant to movement, restless, Also complains of nausea. Neuro: Melgar                  

      Agitation-Sedation Scale (RASS): +1 Restless Level of Consciousness is awake, alert,        

      obeys commands, Oriented to person, place, time, situation. Cardiovascular: No deficits     

      noted. Denies chest pain, shortness of breath, Capillary refill < 3 seconds Clubbing of     

      nail beds is absent JVD is absent Patient's skin is warm and dry. Respiratory: No           

      deficits noted. Airway is patent Respiratory effort is even, unlabored, Respiratory         

      pattern is regular, symmetrical. GI: Abdomen is round non-distended, Reports nausea,        

      vomiting. : No deficits noted. No signs and/or symptoms were reported regarding the       

      genitourinary system. EENT: No deficits noted. Reports pain in right cheek and right        

      jaw. Derm: No deficits noted. No signs and/or symptoms reported regarding the               

      dermatologic system. Skin is intact, is healthy with good turgor, Skin is dry, Skin is      

      normal, Skin temperature is warm. Musculoskeletal: No deficits noted. No signs and/or       

      symptoms reported regarding the musculoskeletal system. Circulation, motion, and            

      sensation intact. Range of motion: intact in all extremities.                               

                                                                                             

00:05 Reassessment: Patient appears in no apparent distress at this time. No changes from     lg3 

      previously documented assessment. Patient and/or family updated on plan of care and         

      expected duration. Pain level reassessed. Patient is alert, oriented x 3, equal             

      unlabored respirations, skin warm/dry/pink.                                                 

00:11 General: Appears uncomfortable, Behavior is anxious. Neuro: Level of Consciousness is   kd3 

      awake, alert, obeys commands, Oriented to person, place, time, situation. Respiratory:      

      Airway is patent Trachea midline Respiratory effort is even, unlabored, Respiratory         

      pattern is regular, symmetrical.                                                            

                                                                                                  

Vital Signs:                                                                                      

                                                                                             

22:28  / 210; Pulse 97; Resp 16; Temp 98.2(A); Pulse Ox 98% on R/A; Weight 79.38 kg     hb  

      (R); Height 5 ft. 7 in. ; Pain 9/10;                                                        

23:03  / 88; Pulse 94; Resp 15 S; Pulse Ox 98% ;                                        lg3 

                                                                                             

00:05  / 93; Pulse 77; Resp 16 S; Pulse Ox 98% on R/A;                                  lg3 

00:11  / 90; Pulse 80; Resp 16; Pulse Ox 98% on R/A;                                    kd3 

01:24  / 89; Pulse 77; Resp 16 S; Pulse Ox 97% on R/A;                                  lg3 

                                                                                             

22:28 Body Mass Index 27.41 (79.38 kg, 170.18 cm)                                             hb  

                                                                                             

22:28 Pain Scale: Adult                                                                       hb  

                                                                                                  

ED Course:                                                                                        

                                                                                             

22:19 Patient arrived in ED.                                                                  mr  

22:19 Jerrod Null MD is Private Physician.                                                mr  

22:24 Oral Strickland MD is Attending Physician.                                              kdr 

22:31 Triage completed.                                                                       hb  

22:36 Lorie Paul, RN is Primary Nurse.                                                     lg3 

22:36 Patient has correct armband on for positive identification. Placed in gown. Bed in low  lg3 

      position. Call light in reach. Side rails up X 1. Client placed on continuous cardiac       

      and pulse oximetry monitoring. NIBP monitoring applied. Cardiac monitor on. Door            

      closed. Noise minimized. Warm blanket given. Family accompanied patient.                    

22:36 Patient maintains SpO2 saturation greater than 95% on room air.                         lg3 

23:01 Inserted saline lock: 20 gauge in right forearm, using aseptic technique.               lg3 

                                                                                             

00:11 Arm band placed on right wrist.                                                         kd3 

01:10 Edson Hdz MD is Hospitalizing Provider.                                           kdr 

01:12 Kilo Hill MD is Hospitalizing Provider.                                          kdr 

01:24 CRP Sent.                                                                               lg3 

01:24 BMP Sent.                                                                               lg3 

01:24 CBC with Diff Sent.                                                                     lg3 

02:25 No provider procedures requiring assistance completed. Patient admitted, IV remains in  kd3 

      place.                                                                                      

02:26 Provided Education on: .                                                                kd3 

                                                                                                  

Administered Medications:                                                                         

                                                                                             

23:02 Drug: morphine IVP or IV 4 mg Route: IVP; Infused Over: 4 mins; Site: right forearm;    lg3 

                                                                                             

01:24 Follow up: Response: No adverse reaction; Marked relief of symptoms                     lg3 

                                                                                             

23:02 Drug: Ondansetron IVP 4 mg Route: IVP; Site: right forearm;                             3 

                                                                                             

01:24 Follow up: Response: No adverse reaction; Marked relief of symptoms                     3 

                                                                                             

23:02 Drug: NS 0.9%  ml Route: IV; Rate: bolus; Site: right forearm;                    3 

                                                                                             

01:24 Follow up: IV Status: Completed infusion; IV Intake: 500ml                              3 

                                                                                             

23:02 Drug: NS 0.9% IV 1000 ml Route: IV; Rate: 125 ml/hr; Site: right forearm;               lg3 

23:02 Drug: MethylPrednisoLONE  mg Route: IVP; Site: right forearm;                    3 

                                                                                             

01:24 Follow up: Response: No adverse reaction                                                lg3 

                                                                                                  

                                                                                                  

Medication:                                                                                       

00:12 VIS not applicable for this client.                                                     kd3 

                                                                                                  

Intake:                                                                                           

01:24 IV: 500ml; Total: 500ml.                                                                lg3 

                                                                                                  

Outcome:                                                                                          

01:11 Decision to Hospitalize by Provider.                                                    kdr 

02:26 Admitted to                                                                             kd3 

02:26 Condition: stable                                                                           

02:26 Discharge instructions given to patient, Instructed on the need for admit, Demonstrated     

      understanding of instructions.                                                              

02:26 Patient left the ED.                                                                    kd3 

                                                                                                  

Signatures:                                                                                       

Oral Strickland MD MD kdr Rivera, Mary mr Baxter, Heather, RN RN hb Gibson, Lacie, RN RN   3                                                  

Angela Dumont RN RN   3                                                  

                                                                                                  

**************************************************************************************************

## 2023-08-18 LAB
BUN BLD-MCNC: 10 MG/DL (ref 7–18)
GLUCOSE SERPLBLD-MCNC: 245 MG/DL (ref 74–106)
HCT VFR BLD CALC: 39.6 % (ref 36–45)
LYMPHOCYTES # SPEC AUTO: 0.8 K/UL (ref 0.7–4.9)
MCV RBC: 95 FL (ref 80–100)
PMV BLD: 7 FL (ref 7.6–11.3)
POTASSIUM SERPL-SCNC: 4.1 MEQ/L (ref 3.5–5.1)
RBC # BLD: 4.17 M/UL (ref 3.86–4.86)
WBC # BLD AUTO: 9.1 THOU/UL (ref 4.3–10.9)

## 2023-08-18 RX ADMIN — HUMAN INSULIN SCH UNIT: 100 INJECTION, SOLUTION SUBCUTANEOUS at 11:30

## 2023-08-18 RX ADMIN — GABAPENTIN SCH MG: 100 CAPSULE ORAL at 13:56

## 2023-08-18 RX ADMIN — HYDROMORPHONE HYDROCHLORIDE PRN MG: 1 INJECTION, SOLUTION INTRAMUSCULAR; INTRAVENOUS; SUBCUTANEOUS at 20:43

## 2023-08-18 RX ADMIN — HYDRALAZINE HYDROCHLORIDE PRN MG: 20 INJECTION INTRAMUSCULAR; INTRAVENOUS at 12:16

## 2023-08-18 RX ADMIN — HYDRALAZINE HYDROCHLORIDE PRN MG: 20 INJECTION INTRAMUSCULAR; INTRAVENOUS at 05:57

## 2023-08-18 RX ADMIN — HUMAN INSULIN SCH UNIT: 100 INJECTION, SOLUTION SUBCUTANEOUS at 16:30

## 2023-08-18 RX ADMIN — HYDROCODONE BITARTRATE AND ACETAMINOPHEN PRN TAB: 10; 325 TABLET ORAL at 01:01

## 2023-08-18 RX ADMIN — HYDROCODONE BITARTRATE AND ACETAMINOPHEN PRN TAB: 10; 325 TABLET ORAL at 18:21

## 2023-08-18 RX ADMIN — TEMAZEPAM SCH MG: 15 CAPSULE ORAL at 20:42

## 2023-08-18 RX ADMIN — HUMAN INSULIN SCH UNIT: 100 INJECTION, SOLUTION SUBCUTANEOUS at 20:56

## 2023-08-18 RX ADMIN — GLIMEPIRIDE SCH MG: 2 TABLET ORAL at 16:51

## 2023-08-18 RX ADMIN — BACLOFEN SCH MG: 10 TABLET ORAL at 09:22

## 2023-08-18 RX ADMIN — ONDANSETRON PRN MG: 2 INJECTION INTRAMUSCULAR; INTRAVENOUS at 05:15

## 2023-08-18 RX ADMIN — HYDROMORPHONE HYDROCHLORIDE PRN MG: 1 INJECTION, SOLUTION INTRAMUSCULAR; INTRAVENOUS; SUBCUTANEOUS at 14:50

## 2023-08-18 RX ADMIN — SODIUM CHLORIDE SCH MLS: 0.9 INJECTION, SOLUTION INTRAVENOUS at 21:17

## 2023-08-18 RX ADMIN — HYDROXYCHLOROQUINE SULFATE SCH MG: 200 TABLET, FILM COATED ORAL at 18:21

## 2023-08-18 RX ADMIN — HYDROMORPHONE HYDROCHLORIDE PRN MG: 1 INJECTION, SOLUTION INTRAMUSCULAR; INTRAVENOUS; SUBCUTANEOUS at 09:32

## 2023-08-18 RX ADMIN — ENOXAPARIN SODIUM SCH MG: 40 INJECTION SUBCUTANEOUS at 09:22

## 2023-08-18 RX ADMIN — SODIUM CHLORIDE SCH: 0.9 INJECTION, SOLUTION INTRAVENOUS at 08:16

## 2023-08-18 RX ADMIN — Medication SCH ML: at 20:44

## 2023-08-18 RX ADMIN — Medication SCH ML: at 09:23

## 2023-08-18 RX ADMIN — ONDANSETRON PRN MG: 2 INJECTION INTRAMUSCULAR; INTRAVENOUS at 18:31

## 2023-08-18 RX ADMIN — METFORMIN HYDROCHLORIDE SCH MG: 500 TABLET, FILM COATED ORAL at 16:51

## 2023-08-18 RX ADMIN — HUMAN INSULIN SCH UNIT: 100 INJECTION, SOLUTION SUBCUTANEOUS at 07:30

## 2023-08-18 RX ADMIN — NORTRIPTYLINE HYDROCHLORIDE SCH MG: 10 CAPSULE ORAL at 20:56

## 2023-08-18 RX ADMIN — SODIUM CHLORIDE SCH MLS: 0.9 INJECTION, SOLUTION INTRAVENOUS at 12:18

## 2023-08-18 RX ADMIN — SODIUM CHLORIDE SCH MLS: 0.9 INJECTION, SOLUTION INTRAVENOUS at 01:03

## 2023-08-18 RX ADMIN — MORPHINE SULFATE PRN MG: 2 INJECTION, SOLUTION INTRAMUSCULAR; INTRAVENOUS at 05:16

## 2023-08-18 RX ADMIN — GABAPENTIN SCH MG: 100 CAPSULE ORAL at 09:22

## 2023-08-18 RX ADMIN — GABAPENTIN SCH MG: 300 CAPSULE ORAL at 20:41

## 2023-08-18 RX ADMIN — HYDROCODONE BITARTRATE AND ACETAMINOPHEN PRN TAB: 10; 325 TABLET ORAL at 12:16

## 2023-08-18 NOTE — P.PN
Subjective


Date of Service: 08/18/23


Chief Complaint: Intractable pain, trigeminal neuralgia


Subjective: No new changes (still has headaches.)





Physical Examination





- Vital Signs


Temperature: 97.1 F


Blood Pressure: 178/90


Pulse: 84


Respirations: 16


Pulse Ox (%): 98





- Physical Exam


General: Alert, Moderate distress (due to headaches.)


HEENT: Atraumatic, Normocephalic


Neck: Supple


Respiratory: Normal air movement


Cardiovascular: Regular rate/rhythm, Normal S1 S2


Gastrointestinal: Soft and benign


Musculoskeletal: No swelling


Neurological: Normal speech, Normal strength at 5/5 x4 extr





- Studies


Laboratory Data (last 24 hrs)











  08/18/23 08/18/23





  02:52 02:52


 


WBC   9.10


 


Hgb   12.7


 


Hct   39.6


 


Plt Count   263


 


Sodium  131 L 


 


Potassium  4.1 


 


BUN  10 


 


Creatinine  0.72 


 


Glucose  245 H 











Assessment And Plan





- Plan


Intractable headache: Still deemed secondary to possible trigeminal neuralgia.


IV Dilaudid as needed to be continued.


Other neuromodulators such as Tegretol, gabapentin, pregabalin to be continued


Neurology evaluating patient








Hypertension: We will monitor vital signs per unit protocol and continue home 

antihypertensive medications








Type 2 diabetes: We will continue sliding scale insulin for glucose control on 

oral hypoglycemic agents.  Carb restricted diet to be continued.








Hyperlipidemia: We will continue statin therapy








Prophylaxis: Lovenox VTE prophylaxis








CODE STATUS: Full code








Disposition: For discharge in next 24 to 48 hours.

## 2023-08-18 NOTE — CON
Consultation called because of intractable right facial pain and headache.



History Of Present Illness:  Ms. Hawley is a 68-year-old patient with non-insulin-dependent diabetes m
ellitus and rheumatoid arthritis, who comes to [facility] with a week's worth of severe intr
actable right facial pain.  Initially, last Thursday, it was an apparently a tooth area.  She was see
n by a dentist, given amoxicillin, but that did not help the pain.  She came to the emergency room, r
eceived medications, which temporarily relieved the pain and she was sent home.  The pain came back s
ignificantly and she came back to the emergency room, received prednisone, Tegretol, tramadol, Norco 
without significant relief.  Pain worse to the point that she became nauseated.  She has light sensit
ivity, sound sensitivity, and the pain in the right face might temporarily ease if she holds onto her
 forehead.  She denied any loss of vision, any blurred vision, any weakness in the face, arm, or leg.
  She was evaluated by imaging of head with facial bone evaluation and sinus evaluation, which showed
 no significant abnormalities.  Her complete blood count with differential essentially normal.  Chemi
stries did show moderately elevated blood sugars with levels of 300.  She also had a C-reactive prote
in very elevated to 65.1.  While in the emergency room, she did receive Solu-Medrol 40 mg and another
 125 mg without significant improvement in her pain.



Past Medical History:  Diabetes mellitus, rheumatoid arthritis, chronic back pain, hypothyroidism.  S
he has a back pain stimulator implanted and has had back surgery.



Allergies:  NO KNOWN DRUG ALLERGIES.



Medications:  Aspirin 81 mg daily, duloxetine 30 mg daily, folic acid 2 mg daily, gabapentin 1500 mg 
twice daily, glimepiride 2 mg twice daily, levothyroxine daily, metformin 1000 mg daily, rosuvastatin
 daily, azathioprine daily, and sulfasalazine twice daily.



Family History:  Noncontributory.



Social History:  No alcohol, tobacco, or IV drug use.  Lives with family in single family home.



Review of Systems:

Severe right facial pain with nausea and vomiting.  She denies any blurred vision or loss of vision.



Physical Examination:

Vital Signs:  Blood pressure elevated to 182/87, pulse 92, respiratory rate 16, temperature 97.0, oxy
gen saturation 95%. 

HEENT:  Her cranial nerves show no focal deficits.  She does have some tenderness to the right temple
 region.  Otherwise, normocephalic, atraumatic.  Sclerae are anicteric.  Oropharynx pink and moist. 

Neck:  Supple. 

Chest:  Clear. 

Heart:  Regular. 

Extremities:  Show no clubbing, cyanosis, or edema. 

Neurologic:  She is alert and oriented to situation, place, and time.  She does have the lights dimme
d in the room but the blinds fold as she has light sensitivity and mild sound sensitivity.  Otherwise
, no cranial nerve deficits.  No focal motor coordination sensory deficits noted.  She did have some 
worsening pain when she tries to ambulate.



Assessment:  Ms. Hawley is a 68-year-old patient, who appears to have severe migraine involving the ri
ght side.  She also has features of trigeminal neuralgia.  However, given the very elevated C-reactiv
e protein of 65, temporal arteritis found to be ruled out.



Plan:  Agree with giving medication for migraine, which do include Nurtec and Qulipta, also may give 
a course of steroids to continue and the patient may have a temporal artery biopsy to rule out tempor
al arteritis.  At this point, continue with Zofran for nausea and continue with management of her mul
tiple comorbid conditions, which are noted above including the gabapentin is currently 100 mg 3 times
 daily.  Continue Zofran.  Continue nortriptyline and Norco as needed.





ALINA/RUSLAN

DD:  08/17/2023 21:03:15Voice ID:  [id number]

DT:  08/18/2023 00:07:59Report ID:  [id number]

## 2023-08-19 RX ADMIN — FOLIC ACID SCH MG: 1 TABLET ORAL at 08:38

## 2023-08-19 RX ADMIN — ENOXAPARIN SODIUM SCH MG: 40 INJECTION SUBCUTANEOUS at 08:41

## 2023-08-19 RX ADMIN — GABAPENTIN SCH MG: 300 CAPSULE ORAL at 08:39

## 2023-08-19 RX ADMIN — NORTRIPTYLINE HYDROCHLORIDE SCH MG: 10 CAPSULE ORAL at 21:37

## 2023-08-19 RX ADMIN — HUMAN INSULIN SCH: 100 INJECTION, SOLUTION SUBCUTANEOUS at 16:30

## 2023-08-19 RX ADMIN — GABAPENTIN SCH MG: 300 CAPSULE ORAL at 21:37

## 2023-08-19 RX ADMIN — SODIUM CHLORIDE SCH MLS: 0.9 INJECTION, SOLUTION INTRAVENOUS at 14:16

## 2023-08-19 RX ADMIN — Medication SCH: at 08:41

## 2023-08-19 RX ADMIN — ASPIRIN 81 MG SCH MG: 81 TABLET ORAL at 08:38

## 2023-08-19 RX ADMIN — METFORMIN HYDROCHLORIDE SCH MG: 500 TABLET, FILM COATED ORAL at 08:39

## 2023-08-19 RX ADMIN — SULFASALAZINE SCH MG: 500 TABLET, DELAYED RELEASE ORAL at 08:40

## 2023-08-19 RX ADMIN — HYDROXYCHLOROQUINE SULFATE SCH MG: 200 TABLET, FILM COATED ORAL at 17:25

## 2023-08-19 RX ADMIN — SODIUM CHLORIDE SCH MLS: 0.9 INJECTION, SOLUTION INTRAVENOUS at 06:32

## 2023-08-19 RX ADMIN — Medication SCH ML: at 21:38

## 2023-08-19 RX ADMIN — GLIMEPIRIDE SCH MG: 2 TABLET ORAL at 17:29

## 2023-08-19 RX ADMIN — GLIMEPIRIDE SCH MG: 2 TABLET ORAL at 08:38

## 2023-08-19 RX ADMIN — METFORMIN HYDROCHLORIDE SCH MG: 500 TABLET, FILM COATED ORAL at 17:25

## 2023-08-19 RX ADMIN — HUMAN INSULIN SCH UNIT: 100 INJECTION, SOLUTION SUBCUTANEOUS at 21:38

## 2023-08-19 RX ADMIN — HUMAN INSULIN SCH UNIT: 100 INJECTION, SOLUTION SUBCUTANEOUS at 08:42

## 2023-08-19 RX ADMIN — HYDROXYCHLOROQUINE SULFATE SCH MG: 200 TABLET, FILM COATED ORAL at 08:40

## 2023-08-19 RX ADMIN — HUMAN INSULIN SCH: 100 INJECTION, SOLUTION SUBCUTANEOUS at 11:30

## 2023-08-19 RX ADMIN — DULOXETINE HYDROCHLORIDE SCH MG: 30 CAPSULE, DELAYED RELEASE ORAL at 08:39

## 2023-08-19 RX ADMIN — LEVOTHYROXINE SODIUM SCH MG: 75 TABLET ORAL at 08:39

## 2023-08-19 RX ADMIN — AZATHIOPRINE SCH MG: 50 TABLET ORAL at 08:40

## 2023-08-19 RX ADMIN — TEMAZEPAM SCH MG: 15 CAPSULE ORAL at 21:38

## 2023-08-19 RX ADMIN — Medication SCH ML: at 08:41

## 2023-08-19 NOTE — P.PN
Subjective


Date of Service: 08/19/23


Chief Complaint: Intractable pain, trigeminal neuralgia


Subjective: No new changes, Improving (still slightly groggy.)





Physical Examination





- Vital Signs


Temperature: 98.7 F


Blood Pressure: 171/82


Pulse: 98


Respirations: 16


Pulse Ox (%): 96





- Physical Exam


General: In no apparent distress


HEENT: Atraumatic, Normocephalic


Respiratory: Normal air movement


Cardiovascular: Regular rate/rhythm, Normal S1 S2


Musculoskeletal: No swelling





Assessment And Plan





- Plan


Intractable headache: Still deemed secondary to possible trigeminal neuralgia.


IV Dilaudid as needed to be continued.


Other neuromodulators such as Tegretol, gabapentin, pregabalin to be continued


Neurology evaluating patient.


some improvement noted with present pain medication regimen.








Hypertension: We will monitor vital signs per unit protocol and continue home 

antihypertensive medications








Type 2 diabetes: We will continue sliding scale insulin for glucose control on 

oral hypoglycemic agents.  Carb restricted diet to be continued.








Hyperlipidemia: We will continue statin therapy








Prophylaxis: Lovenox VTE prophylaxis








CODE STATUS: Full code








Disposition: For discharge in next 24 to 48 hours.

## 2023-08-20 RX ADMIN — HYDROMORPHONE HYDROCHLORIDE PRN MG: 1 INJECTION, SOLUTION INTRAMUSCULAR; INTRAVENOUS; SUBCUTANEOUS at 11:56

## 2023-08-20 RX ADMIN — HYDROMORPHONE HYDROCHLORIDE PRN MG: 1 INJECTION, SOLUTION INTRAMUSCULAR; INTRAVENOUS; SUBCUTANEOUS at 05:56

## 2023-08-20 RX ADMIN — HYDROXYCHLOROQUINE SULFATE SCH MG: 200 TABLET, FILM COATED ORAL at 08:52

## 2023-08-20 RX ADMIN — HUMAN INSULIN SCH: 100 INJECTION, SOLUTION SUBCUTANEOUS at 20:59

## 2023-08-20 RX ADMIN — METFORMIN HYDROCHLORIDE SCH MG: 500 TABLET, FILM COATED ORAL at 08:52

## 2023-08-20 RX ADMIN — Medication SCH ML: at 08:54

## 2023-08-20 RX ADMIN — METFORMIN HYDROCHLORIDE SCH MG: 500 TABLET, FILM COATED ORAL at 18:16

## 2023-08-20 RX ADMIN — SODIUM CHLORIDE SCH MLS: 0.9 INJECTION, SOLUTION INTRAVENOUS at 11:44

## 2023-08-20 RX ADMIN — HYDROMORPHONE HYDROCHLORIDE PRN MG: 1 INJECTION, SOLUTION INTRAMUSCULAR; INTRAVENOUS; SUBCUTANEOUS at 21:03

## 2023-08-20 RX ADMIN — HYDROXYCHLOROQUINE SULFATE SCH MG: 200 TABLET, FILM COATED ORAL at 18:16

## 2023-08-20 RX ADMIN — HUMAN INSULIN SCH UNIT: 100 INJECTION, SOLUTION SUBCUTANEOUS at 13:39

## 2023-08-20 RX ADMIN — HYDROCODONE BITARTRATE AND ACETAMINOPHEN PRN TAB: 10; 325 TABLET ORAL at 09:06

## 2023-08-20 RX ADMIN — NORTRIPTYLINE HYDROCHLORIDE SCH MG: 10 CAPSULE ORAL at 20:58

## 2023-08-20 RX ADMIN — LEVOTHYROXINE SODIUM SCH MG: 75 TABLET ORAL at 05:55

## 2023-08-20 RX ADMIN — METHYLPREDNISOLONE SODIUM SUCCINATE SCH MG: 40 INJECTION, POWDER, FOR SOLUTION INTRAMUSCULAR; INTRAVENOUS at 18:15

## 2023-08-20 RX ADMIN — ENOXAPARIN SODIUM SCH MG: 40 INJECTION SUBCUTANEOUS at 08:50

## 2023-08-20 RX ADMIN — GLIMEPIRIDE SCH MG: 2 TABLET ORAL at 18:16

## 2023-08-20 RX ADMIN — GLIMEPIRIDE SCH MG: 2 TABLET ORAL at 08:52

## 2023-08-20 RX ADMIN — HUMAN INSULIN SCH: 100 INJECTION, SOLUTION SUBCUTANEOUS at 16:30

## 2023-08-20 RX ADMIN — SODIUM CHLORIDE SCH MLS: 0.9 INJECTION, SOLUTION INTRAVENOUS at 21:02

## 2023-08-20 RX ADMIN — ASPIRIN 81 MG SCH MG: 81 TABLET ORAL at 08:53

## 2023-08-20 RX ADMIN — SULFASALAZINE SCH MG: 500 TABLET, DELAYED RELEASE ORAL at 08:50

## 2023-08-20 RX ADMIN — HUMAN INSULIN SCH: 100 INJECTION, SOLUTION SUBCUTANEOUS at 07:30

## 2023-08-20 RX ADMIN — AZATHIOPRINE SCH MG: 50 TABLET ORAL at 08:53

## 2023-08-20 RX ADMIN — TEMAZEPAM SCH MG: 15 CAPSULE ORAL at 20:58

## 2023-08-20 RX ADMIN — FOLIC ACID SCH MG: 1 TABLET ORAL at 08:53

## 2023-08-20 RX ADMIN — SODIUM CHLORIDE SCH MLS: 0.9 INJECTION, SOLUTION INTRAVENOUS at 01:06

## 2023-08-20 RX ADMIN — HYDROMORPHONE HYDROCHLORIDE PRN MG: 1 INJECTION, SOLUTION INTRAMUSCULAR; INTRAVENOUS; SUBCUTANEOUS at 16:48

## 2023-08-20 RX ADMIN — HYDROCODONE BITARTRATE AND ACETAMINOPHEN PRN TAB: 10; 325 TABLET ORAL at 01:03

## 2023-08-20 RX ADMIN — Medication SCH: at 08:55

## 2023-08-20 RX ADMIN — Medication SCH ML: at 20:59

## 2023-08-20 RX ADMIN — GABAPENTIN SCH MG: 300 CAPSULE ORAL at 20:58

## 2023-08-20 RX ADMIN — ONDANSETRON PRN MG: 2 INJECTION INTRAMUSCULAR; INTRAVENOUS at 12:12

## 2023-08-20 RX ADMIN — DULOXETINE HYDROCHLORIDE SCH MG: 30 CAPSULE, DELAYED RELEASE ORAL at 08:51

## 2023-08-20 RX ADMIN — GABAPENTIN SCH MG: 300 CAPSULE ORAL at 08:51

## 2023-08-20 NOTE — P.PN
Date of Service: 08/20/23





Subjective:


Feels ~same as yesterday 


Headache continue 


no acute events overnight 


+nausea, no vomiting today


 states it seems she is not requiring IV dilaudid as often, so seeing 

some mild improvement





ROS: 


10 point ROS as noted above, otherwise negative





Physical Exam:


GEN: Alert, oriented, fatigued appearing, uncomfortable


HEENT: Normal conjunctiva, sclera anicteric


CV: Regular rate and rhythm, no edema


Pulm: Nonlabored respirations on 2L NC, clear bilaterally


ABD: Soft, nontender, nondistended


Integumentary: No rashes


Neuro: Normal speech, nonfocal





vitals reviewed





Problem List:


Intractable headache likely secondary to trigeminal neuralgia.


Rheumatoid arthritis


Hypertension


Hyperlipidemia


Hypothyroidism


NIDDM2








Intractable headache likely secondary to trigeminal neuralgia


Was on carbamazepine, tramadol, Norco, prednisone at home. 


has appointment with neuro/pain specialist for further evaluation


Neuro consulted 


   cont Tegretol, gabapentin, pregabalin


   cont cymbalta, nortriptyline, plaquenil





sumatriptan x1 8/20 


Started IV solumedrol 8/20 


   pt was on prednisone for at least one month, most recently taking 30mg /day 

per 


   headache/nausea may be secondary to component of adrenal insufficiency - 

however not having other signs/symptoms


PRN main medication 


IV fluids 





Rheumatoid arthritis


Hypertension


Hyperlipidemia


Hypothyroidism


continue home medications as appropriate 





NIDDM2


ACHS Accu-Chek, SSI. 


cont glimiperide, metformin 





VTE: Lovenox 


Code: Full


Dispo: Home ~2 days

## 2023-08-21 LAB
ALBUMIN SERPL BCP-MCNC: 2.5 G/DL (ref 3.4–5)
ALP SERPL-CCNC: 86 U/L (ref 45–117)
ALT SERPL W P-5'-P-CCNC: 22 U/L (ref 13–56)
AST SERPL W P-5'-P-CCNC: 13 U/L (ref 15–37)
BUN BLD-MCNC: 5 MG/DL (ref 7–18)
CRP SERPL-MCNC: 63.9 MG/L (ref ?–3)
GLUCOSE SERPLBLD-MCNC: 200 MG/DL (ref 74–106)
HCT VFR BLD CALC: 36.1 % (ref 36–45)
LYMPHOCYTES # SPEC AUTO: 0.3 K/UL (ref 0.7–4.9)
MAGNESIUM SERPL-MCNC: 1.6 MG/DL (ref 1.6–2.4)
MCV RBC: 92.8 FL (ref 80–100)
MORPHOLOGY BLD-IMP: (no result)
PMV BLD: 6.5 FL (ref 7.6–11.3)
POTASSIUM SERPL-SCNC: 2.7 MEQ/L (ref 3.5–5.1)
RBC # BLD: 3.9 M/UL (ref 3.86–4.86)
WBC # BLD AUTO: 8.7 THOU/UL (ref 4.3–10.9)

## 2023-08-21 RX ADMIN — HYDROCODONE BITARTRATE AND ACETAMINOPHEN PRN TAB: 10; 325 TABLET ORAL at 18:12

## 2023-08-21 RX ADMIN — HUMAN INSULIN SCH UNIT: 100 INJECTION, SOLUTION SUBCUTANEOUS at 17:54

## 2023-08-21 RX ADMIN — HYDROXYCHLOROQUINE SULFATE SCH MG: 200 TABLET, FILM COATED ORAL at 10:36

## 2023-08-21 RX ADMIN — POTASSIUM CHLORIDE SCH MLS: 200 INJECTION, SOLUTION INTRAVENOUS at 06:04

## 2023-08-21 RX ADMIN — METFORMIN HYDROCHLORIDE SCH MG: 500 TABLET, FILM COATED ORAL at 10:37

## 2023-08-21 RX ADMIN — POTASSIUM CHLORIDE SCH MLS: 200 INJECTION, SOLUTION INTRAVENOUS at 14:22

## 2023-08-21 RX ADMIN — FOLIC ACID SCH MG: 1 TABLET ORAL at 10:37

## 2023-08-21 RX ADMIN — HYDROMORPHONE HYDROCHLORIDE PRN MG: 1 INJECTION, SOLUTION INTRAMUSCULAR; INTRAVENOUS; SUBCUTANEOUS at 21:39

## 2023-08-21 RX ADMIN — GLIMEPIRIDE SCH MG: 2 TABLET ORAL at 17:54

## 2023-08-21 RX ADMIN — POTASSIUM CHLORIDE SCH MLS: 200 INJECTION, SOLUTION INTRAVENOUS at 10:34

## 2023-08-21 RX ADMIN — GABAPENTIN SCH MG: 300 CAPSULE ORAL at 10:36

## 2023-08-21 RX ADMIN — HYDROXYCHLOROQUINE SULFATE SCH MG: 200 TABLET, FILM COATED ORAL at 17:53

## 2023-08-21 RX ADMIN — Medication SCH: at 09:00

## 2023-08-21 RX ADMIN — LEVOTHYROXINE SODIUM SCH MG: 75 TABLET ORAL at 05:31

## 2023-08-21 RX ADMIN — HUMAN INSULIN SCH: 100 INJECTION, SOLUTION SUBCUTANEOUS at 11:30

## 2023-08-21 RX ADMIN — TEMAZEPAM SCH MG: 15 CAPSULE ORAL at 21:25

## 2023-08-21 RX ADMIN — DULOXETINE HYDROCHLORIDE SCH MG: 30 CAPSULE, DELAYED RELEASE ORAL at 10:37

## 2023-08-21 RX ADMIN — HYDROCODONE BITARTRATE AND ACETAMINOPHEN PRN TAB: 10; 325 TABLET ORAL at 11:06

## 2023-08-21 RX ADMIN — Medication SCH ML: at 21:27

## 2023-08-21 RX ADMIN — AZATHIOPRINE SCH MG: 50 TABLET ORAL at 10:36

## 2023-08-21 RX ADMIN — HUMAN INSULIN SCH: 100 INJECTION, SOLUTION SUBCUTANEOUS at 21:00

## 2023-08-21 RX ADMIN — HYDROMORPHONE HYDROCHLORIDE PRN MG: 1 INJECTION, SOLUTION INTRAMUSCULAR; INTRAVENOUS; SUBCUTANEOUS at 08:22

## 2023-08-21 RX ADMIN — NORTRIPTYLINE HYDROCHLORIDE SCH MG: 10 CAPSULE ORAL at 21:24

## 2023-08-21 RX ADMIN — SODIUM CHLORIDE SCH MLS: 0.9 INJECTION, SOLUTION INTRAVENOUS at 05:32

## 2023-08-21 RX ADMIN — POTASSIUM CHLORIDE SCH MLS: 200 INJECTION, SOLUTION INTRAVENOUS at 21:39

## 2023-08-21 RX ADMIN — GABAPENTIN SCH MG: 300 CAPSULE ORAL at 21:25

## 2023-08-21 RX ADMIN — METHYLPREDNISOLONE SODIUM SUCCINATE SCH MG: 40 INJECTION, POWDER, FOR SOLUTION INTRAMUSCULAR; INTRAVENOUS at 00:15

## 2023-08-21 RX ADMIN — ENOXAPARIN SODIUM SCH MG: 40 INJECTION SUBCUTANEOUS at 10:37

## 2023-08-21 RX ADMIN — SULFASALAZINE SCH MG: 500 TABLET, DELAYED RELEASE ORAL at 11:07

## 2023-08-21 RX ADMIN — ASPIRIN 81 MG SCH MG: 81 TABLET ORAL at 10:37

## 2023-08-21 RX ADMIN — HYDROMORPHONE HYDROCHLORIDE PRN MG: 1 INJECTION, SOLUTION INTRAMUSCULAR; INTRAVENOUS; SUBCUTANEOUS at 14:23

## 2023-08-21 RX ADMIN — METFORMIN HYDROCHLORIDE SCH MG: 500 TABLET, FILM COATED ORAL at 17:54

## 2023-08-21 RX ADMIN — METHYLPREDNISOLONE SODIUM SUCCINATE SCH MG: 40 INJECTION, POWDER, FOR SOLUTION INTRAMUSCULAR; INTRAVENOUS at 17:55

## 2023-08-21 RX ADMIN — SODIUM CHLORIDE SCH MLS: 0.9 INJECTION, SOLUTION INTRAVENOUS at 14:23

## 2023-08-21 RX ADMIN — HUMAN INSULIN SCH: 100 INJECTION, SOLUTION SUBCUTANEOUS at 07:30

## 2023-08-21 RX ADMIN — GLIMEPIRIDE SCH MG: 2 TABLET ORAL at 10:38

## 2023-08-21 RX ADMIN — METHYLPREDNISOLONE SODIUM SUCCINATE SCH MG: 40 INJECTION, POWDER, FOR SOLUTION INTRAMUSCULAR; INTRAVENOUS at 10:37

## 2023-08-21 NOTE — P.PN
Date of Service: 08/21/23





Subjective:


period of relief / improvement overnight, pain slightly worse now


patient feels pain mainly from R cheek, radiating out


overall pain slowly improving compared to admission 


no acute events overnight 


nausea improving





ROS: 


10 point ROS as noted above, otherwise negative





Physical Exam:


GEN: Alert, oriented, fatigued appearing


HEENT: Normal conjunctiva, sclera anicteric


CV: Regular rate and rhythm, no edema


Pulm: Nonlabored respirations on 2L NC, clear bilaterally


ABD: Soft, nontender, nondistended


Integumentary: No rashes


Neuro: Normal speech, nonfocal





vitals reviewed





Problem List:


Intractable headache likely secondary to trigeminal neuralgia.


Rheumatoid arthritis


Hypertension


Hyperlipidemia


Hypothyroidism


Iron Deficiency Anemia, chronic 


NIDDM2








Intractable headache likely secondary to trigeminal neuralgia


Was on carbamazepine, tramadol, Norco, prednisone at home. 


has appointment with neuro/pain specialist for further evaluation


Neuro consulted 


   cont Tegretol, gabapentin, pregabalin


   cont cymbalta, nortriptyline, plaquenil





sumatriptan x1 8/20 


Started IV solumedrol 8/20 


   pt was on prednisone for at least one month, most recently taking 30mg /day 

per 


   headache/nausea may be secondary to component of adrenal insufficiency - 

however not having other signs/symptoms


PRN main medication 


IV fluids 





Rheumatoid arthritis


Hypertension


Hyperlipidemia


Hypothyroidism


continue home medications as appropriate 





Iron Deficiency Anemia, chronic 


Given iron infusions 8/3/23 and 8/10/23





NIDDM2


[facility]HS Accu-Chek, SSI. 


cont glimiperide, metformin 





VTE: Lovenox 


Code: Full


Dispo: Home ~2 days

## 2023-08-22 LAB
BUN BLD-MCNC: 4 MG/DL (ref 7–18)
CRP SERPL-MCNC: 46.3 MG/L (ref ?–3)
GLUCOSE SERPLBLD-MCNC: 213 MG/DL (ref 74–106)
MAGNESIUM SERPL-MCNC: 1.6 MG/DL (ref 1.6–2.4)
POTASSIUM SERPL-SCNC: 3.4 MEQ/L (ref 3.5–5.1)

## 2023-08-22 RX ADMIN — POTASSIUM CHLORIDE SCH MLS: 200 INJECTION, SOLUTION INTRAVENOUS at 03:40

## 2023-08-22 RX ADMIN — SODIUM CHLORIDE SCH MLS: 0.9 INJECTION, SOLUTION INTRAVENOUS at 12:34

## 2023-08-22 RX ADMIN — ASPIRIN 81 MG SCH MG: 81 TABLET ORAL at 08:24

## 2023-08-22 RX ADMIN — HYDROCODONE BITARTRATE AND ACETAMINOPHEN PRN TAB: 10; 325 TABLET ORAL at 08:35

## 2023-08-22 RX ADMIN — HUMAN INSULIN SCH: 100 INJECTION, SOLUTION SUBCUTANEOUS at 11:30

## 2023-08-22 RX ADMIN — HUMAN INSULIN SCH: 100 INJECTION, SOLUTION SUBCUTANEOUS at 16:30

## 2023-08-22 RX ADMIN — HYDROMORPHONE HYDROCHLORIDE PRN MG: 1 INJECTION, SOLUTION INTRAMUSCULAR; INTRAVENOUS; SUBCUTANEOUS at 16:24

## 2023-08-22 RX ADMIN — GLIMEPIRIDE SCH MG: 2 TABLET ORAL at 08:22

## 2023-08-22 RX ADMIN — LEVOTHYROXINE SODIUM SCH MG: 75 TABLET ORAL at 06:21

## 2023-08-22 RX ADMIN — SODIUM CHLORIDE SCH MLS: 0.9 INJECTION, SOLUTION INTRAVENOUS at 03:39

## 2023-08-22 RX ADMIN — HYDROMORPHONE HYDROCHLORIDE PRN MG: 1 INJECTION, SOLUTION INTRAMUSCULAR; INTRAVENOUS; SUBCUTANEOUS at 04:04

## 2023-08-22 RX ADMIN — Medication SCH ML: at 20:44

## 2023-08-22 RX ADMIN — METHYLPREDNISOLONE SODIUM SUCCINATE SCH MG: 40 INJECTION, POWDER, FOR SOLUTION INTRAMUSCULAR; INTRAVENOUS at 00:34

## 2023-08-22 RX ADMIN — Medication SCH ML: at 08:27

## 2023-08-22 RX ADMIN — HUMAN INSULIN SCH UNIT: 100 INJECTION, SOLUTION SUBCUTANEOUS at 21:22

## 2023-08-22 RX ADMIN — GABAPENTIN SCH MG: 300 CAPSULE ORAL at 08:24

## 2023-08-22 RX ADMIN — TEMAZEPAM SCH MG: 15 CAPSULE ORAL at 20:37

## 2023-08-22 RX ADMIN — GLIMEPIRIDE SCH MG: 2 TABLET ORAL at 16:22

## 2023-08-22 RX ADMIN — HYDROMORPHONE HYDROCHLORIDE PRN MG: 1 INJECTION, SOLUTION INTRAMUSCULAR; INTRAVENOUS; SUBCUTANEOUS at 20:39

## 2023-08-22 RX ADMIN — SULFASALAZINE SCH MG: 500 TABLET, DELAYED RELEASE ORAL at 08:23

## 2023-08-22 RX ADMIN — METHYLPREDNISOLONE SODIUM SUCCINATE SCH MG: 40 INJECTION, POWDER, FOR SOLUTION INTRAMUSCULAR; INTRAVENOUS at 08:27

## 2023-08-22 RX ADMIN — AZATHIOPRINE SCH MG: 50 TABLET ORAL at 08:25

## 2023-08-22 RX ADMIN — HYDROCODONE BITARTRATE AND ACETAMINOPHEN PRN TAB: 10; 325 TABLET ORAL at 00:32

## 2023-08-22 RX ADMIN — HYDROCODONE BITARTRATE AND ACETAMINOPHEN PRN TAB: 10; 325 TABLET ORAL at 14:53

## 2023-08-22 RX ADMIN — NORTRIPTYLINE HYDROCHLORIDE SCH MG: 10 CAPSULE ORAL at 20:39

## 2023-08-22 RX ADMIN — HYDROXYCHLOROQUINE SULFATE SCH MG: 200 TABLET, FILM COATED ORAL at 08:26

## 2023-08-22 RX ADMIN — POTASSIUM CHLORIDE SCH MLS: 200 INJECTION, SOLUTION INTRAVENOUS at 00:34

## 2023-08-22 RX ADMIN — Medication SCH: at 08:28

## 2023-08-22 RX ADMIN — METFORMIN HYDROCHLORIDE SCH MG: 500 TABLET, FILM COATED ORAL at 08:22

## 2023-08-22 RX ADMIN — ENOXAPARIN SODIUM SCH MG: 40 INJECTION SUBCUTANEOUS at 08:26

## 2023-08-22 RX ADMIN — METFORMIN HYDROCHLORIDE SCH MG: 500 TABLET, FILM COATED ORAL at 16:22

## 2023-08-22 RX ADMIN — FOLIC ACID SCH MG: 1 TABLET ORAL at 08:24

## 2023-08-22 RX ADMIN — HYDROXYCHLOROQUINE SULFATE SCH MG: 200 TABLET, FILM COATED ORAL at 16:22

## 2023-08-22 RX ADMIN — GABAPENTIN SCH MG: 300 CAPSULE ORAL at 20:38

## 2023-08-22 RX ADMIN — DULOXETINE HYDROCHLORIDE SCH MG: 30 CAPSULE, DELAYED RELEASE ORAL at 08:23

## 2023-08-22 RX ADMIN — HUMAN INSULIN SCH: 100 INJECTION, SOLUTION SUBCUTANEOUS at 07:30

## 2023-08-22 NOTE — P.PN
Subjective


Date of Service: 08/22/23


Chief Complaint: Intractable pain, trigeminal neuralgia


Patient complaining of worsening pain from last night.


She reports no known aggravating factor.


Onset of pain is spontaneous.








Physical Examination





- Vital Signs


Temperature: 97.0 F


Blood Pressure: 169/82


Pulse: 86


Respirations: 18


Pulse Ox (%): 96





Assessment And Plan





- Plan


Physical examination


GEN: Alert, oriented, fatigued appearing


CV: Regular rate and rhythm, no edema


Pulm: Clear to auscultation bilaterally.  Adequate breath sounds bilaterally


ABD: Soft, nontender, nondistended


Integumentary: No rashes


Neuro: Normal speech, nonfocal





vitals reviewed





Problem List:


Intractable headache likely secondary to trigeminal neuralgia.


Rheumatoid arthritis


Hypertension


Hyperlipidemia


Hypothyroidism


Iron Deficiency Anemia, chronic 


NIDDM2








Intractable facial pain.


Differential diagnosis: Trigeminal neuralgia, rheumatoid arthritis flare with 

TMJ involvement, temporal arteritis.


Case discussed with neurology Dr. Castano.


Continue carbamazepine, tramadol, Norco


cont Tegretol, gabapentin, pregabalin


cont cymbalta, nortriptyline, plaquenil.


Consult Dr. Chavez for temporal artery biopsy


sumatriptan x1 8/20 


pt was on prednisone for at least one month, most recently taking 30mg /day per 




PRN pain medication 


IV fluids 





Rheumatoid arthritis


Hypertension


Hyperlipidemia


Hypothyroidism


continue home medications as appropriate 





Iron Deficiency Anemia, chronic 


Given iron infusions 8/3/23 and 8/10/23


Supplemental iron





NIDDM2


ACHS Accu-Chek, SSI. 


cont glimiperide, metformin 





VTE: Lovenox 


Code: Full

## 2023-08-23 VITALS — DIASTOLIC BLOOD PRESSURE: 86 MMHG | SYSTOLIC BLOOD PRESSURE: 164 MMHG | TEMPERATURE: 97.8 F

## 2023-08-23 VITALS — OXYGEN SATURATION: 96 %

## 2023-08-23 LAB
BUN BLD-MCNC: 8 MG/DL (ref 7–18)
GLUCOSE SERPLBLD-MCNC: 189 MG/DL (ref 74–106)
POTASSIUM SERPL-SCNC: 3.3 MEQ/L (ref 3.5–5.1)

## 2023-08-23 PROCEDURE — 03BS0ZX EXCISION OF RIGHT TEMPORAL ARTERY, OPEN APPROACH, DIAGNOSTIC: ICD-10-PCS

## 2023-08-23 RX ADMIN — GLIMEPIRIDE SCH: 2 TABLET ORAL at 08:00

## 2023-08-23 RX ADMIN — Medication SCH: at 08:41

## 2023-08-23 RX ADMIN — HUMAN INSULIN SCH: 100 INJECTION, SOLUTION SUBCUTANEOUS at 07:30

## 2023-08-23 RX ADMIN — FOLIC ACID SCH: 1 TABLET ORAL at 08:40

## 2023-08-23 RX ADMIN — HYDROMORPHONE HYDROCHLORIDE PRN MG: 1 INJECTION, SOLUTION INTRAMUSCULAR; INTRAVENOUS; SUBCUTANEOUS at 14:22

## 2023-08-23 RX ADMIN — HYDROMORPHONE HYDROCHLORIDE PRN MG: 1 INJECTION, SOLUTION INTRAMUSCULAR; INTRAVENOUS; SUBCUTANEOUS at 04:17

## 2023-08-23 RX ADMIN — HUMAN INSULIN SCH: 100 INJECTION, SOLUTION SUBCUTANEOUS at 11:30

## 2023-08-23 RX ADMIN — HYDROCODONE BITARTRATE AND ACETAMINOPHEN PRN TAB: 10; 325 TABLET ORAL at 06:32

## 2023-08-23 RX ADMIN — CEFAZOLIN ONE GM: 2 INJECTION, POWDER, FOR SOLUTION INTRAMUSCULAR; INTRAVENOUS at 09:30

## 2023-08-23 RX ADMIN — ASPIRIN 81 MG SCH: 81 TABLET ORAL at 08:40

## 2023-08-23 RX ADMIN — Medication SCH: at 09:00

## 2023-08-23 RX ADMIN — HYDROMORPHONE HYDROCHLORIDE PRN MG: 1 INJECTION, SOLUTION INTRAMUSCULAR; INTRAVENOUS; SUBCUTANEOUS at 00:36

## 2023-08-23 RX ADMIN — CEFAZOLIN ONE GM: 2 INJECTION, POWDER, FOR SOLUTION INTRAMUSCULAR; INTRAVENOUS at 09:21

## 2023-08-23 RX ADMIN — POTASSIUM CHLORIDE SCH: 200 INJECTION, SOLUTION INTRAVENOUS at 10:00

## 2023-08-23 RX ADMIN — HYDROXYCHLOROQUINE SULFATE SCH: 200 TABLET, FILM COATED ORAL at 08:00

## 2023-08-23 RX ADMIN — POTASSIUM CHLORIDE SCH MLS: 200 INJECTION, SOLUTION INTRAVENOUS at 11:43

## 2023-08-23 RX ADMIN — HYDROCODONE BITARTRATE AND ACETAMINOPHEN PRN TAB: 10; 325 TABLET ORAL at 11:56

## 2023-08-23 RX ADMIN — SODIUM CHLORIDE SCH MLS: 0.9 INJECTION, SOLUTION INTRAVENOUS at 14:23

## 2023-08-23 RX ADMIN — GABAPENTIN SCH: 300 CAPSULE ORAL at 08:40

## 2023-08-23 RX ADMIN — SODIUM CHLORIDE SCH: 0.9 INJECTION, SOLUTION INTRAVENOUS at 08:16

## 2023-08-23 RX ADMIN — DULOXETINE HYDROCHLORIDE SCH: 30 CAPSULE, DELAYED RELEASE ORAL at 08:40

## 2023-08-23 RX ADMIN — AZATHIOPRINE SCH: 50 TABLET ORAL at 08:40

## 2023-08-23 RX ADMIN — METFORMIN HYDROCHLORIDE SCH: 500 TABLET, FILM COATED ORAL at 08:00

## 2023-08-23 RX ADMIN — SULFASALAZINE SCH: 500 TABLET, DELAYED RELEASE ORAL at 08:00

## 2023-08-23 RX ADMIN — LEVOTHYROXINE SODIUM SCH: 75 TABLET ORAL at 05:08

## 2023-08-23 RX ADMIN — SODIUM CHLORIDE SCH MLS: 0.9 INJECTION, SOLUTION INTRAVENOUS at 04:21

## 2023-08-23 NOTE — P.OP
Date of Service: 08/23/23


Preop diagnosis: Right-sided headache, rule out temporal arteritis





Postop diagnosis: Same





Procedure performed: Right temporal artery biopsy





Surgeon: Sergey Chavez MD





Assistant: Diana GUERRA





Estimated blood loss: Minimal





Specimen: Right temporal artery





Findings: As above





Anesthesia: MAC





Complications: None





Drains: None





Fluids and blood products: Nonapplicable





Disposition: Recovery room





Operative note: Patient brought to the OR and placed in the supine position.  

MAC anesthesia begun.  Patient prepped and draped in the usual sterile fashion. 

Lidocaine 1% infiltrated locally in the right anterior scalp near the ear and 

forehead.  Doppler used to identify location of the temporal artery branch.  4 

cm incision made.  Subcutaneous tissue divided.  Proximal and distal part of the

temporal artery identified and clamped.  Approximately a 3 cm segment excised 

and sent to pathology.  Both ends tied off with 4-0 silk ties.  Wound irrigated 

bleeding controlled cautery.  4-0 chromic used to reapproximate subcu tissue and

close skin.  Sterile dressing applied.  Patient awakened and taken to recovery 

room in good general condition.





CC: Dr. Castano's office

## 2023-08-23 NOTE — P.CNS
Date of Consult: 08/23/23


Reason for consult: Right-sided headache and face pain





History of present illness: Patient is a 68-year-old female who presented to the

emergency room several days ago with right-sided head and face pain.  Patient 

denies any vision changes.  Patient denies any sore throat runny nose, cough, 

dizziness chest pain fever or chills.





Review of systems: Otherwise unremarkable





Past medical history: Type 2 diabetes, rheumatoid arthritis, back pain





Past surgical history: Back surgery and thyroid surgery





Allergies: None





Social history: Does not smoke or drink alcohol





Family history: Noncontributory





Vital signs: Stable, afebrile





Physical exam:





Awake, alert and oriented x3


Head and neck exam: Tender right temporal artery, no other masses, cranial 

nerves II through XII grossly within normal limits, throat clear and neck supple


Chest: Clear


Heart: S1-S2


Abdomen: Soft


Extremity: Neurovascular intact, nontender


Neuro: Nonfocal





Diagnostic data: C-reactive protein greater than 65.  Remainder of the lab study

and imaging studies reviewed.





Assessment: Right-sided headache, rule out temporal arteritis





Plan/recommendation: Right temporal artery biopsy.  Patient and  

understand risk, benefits and alternatives and agreed to procedure.





CC:

## 2023-08-23 NOTE — P.DS
Admission Date: 08/18/23


Discharge Date: 08/23/23


Disposition: ROUTINE DISCHARGE


Discharge Condition: FAIR


Reason for Admission: Intractable pain, trigeminal neuralgia


Brief History of Present Illness: 


68-year-old female history of rheumatoid arthritis, non-insulin-dependent 

diabetes presented to the emergency department for right-sided facial pain.  Her

pain began earlier this week and has been severe requiring multiple ER visits.  

At home she has been on prednisone, carbamazepine, Toradol, tramadol, as needed 

Norco without relief.  She also reported vomiting.  Patient was admitted for 

further management of intractable pain.





Hospital Course: 


Diagnosis


Intractable headache likely secondary to trigeminal neuralgia.


Rheumatoid arthritis


Hypertension


Hyperlipidemia


Hypothyroidism


Iron Deficiency Anemia, chronic 


NIDDM2








Intractable facial pain.


Differential diagnosis: Trigeminal neuralgia, rheumatoid arthritis flare with 

TMJ involvement, temporal arteritis.


Case discussed with neurology Dr. Castano.


Continue carbamazepine, tramadol, Norco


cont Tegretol, gabapentin, pregabalin


cont cymbalta, nortriptyline, plaquenil.


General surgery Dr. Chavez was consulted who performed temporal artery biopsy


sumatriptan x1 8/20 


pt was on prednisone for at least one month, most recently taking 30mg /day per 

.


Patient prescribed high-dose prednisone 60 mg daily for temporal arteritis until

temporal artery biopsy result.


She was also getting hydrocodone and hydromorphone as needed for pain


Spouse requested for discharge so she can follow-up with her neurologist for fur

ther evaluation and management.


Patient discharged per their request.





Rheumatoid arthritis


Hypertension


Hyperlipidemia


Hypothyroidism


continued home medications as appropriate 





NIDDM2


Managed with [facility] Accu-Chek, SSI. 


continued glimiperide, metformin 





Vital Signs/Physical Exam: 














Temp Pulse Resp BP Pulse Ox


 


 97.8 F   82   18   164/86 H  98 


 


 08/23/23 12:00  08/23/23 12:00  08/23/23 14:22  08/23/23 12:00  08/23/23 14:22








General: Alert, In no apparent distress, Oriented x3


HEENT: Mucous membr. moist/pink


Neck: Supple, JVD not distended


Respiratory: Clear to auscultation bilaterally, Normal air movement


Cardiovascular: No edema, Regular rate/rhythm, Normal S1 S2


Gastrointestinal: Normal bowel sounds, Soft and benign, Non-distended, No 

tenderness


Musculoskeletal: No swelling


Integumentary: No rashes


Neurological: Normal strength at 5/5 x4 extr


Laboratory Data at Discharge: 














WBC  8.70 thou/uL (4.3-10.9)   08/21/23  03:00    


 


Hgb  12.0 g/dL (12.0-15.0)   08/21/23  03:00    


 


Hct  36.1 % (36.0-45.0)   08/21/23  03:00    


 


Plt Count  232 thou/uL (152-406)   08/21/23  03:00    


 


Sodium  135 mEq/L (136-145)  L  08/23/23  02:20    


 


Potassium  3.3 mEq/L (3.5-5.1)  L  08/23/23  02:20    


 


BUN  8 mg/dL (7-18)   08/23/23  02:20    


 


Creatinine  0.45 mg/dL (0.55-1.02)  L  08/23/23  02:20    


 


Glucose  189 mg/dL ()  H  08/23/23  02:20    


 


Magnesium  1.6 mg/dL (1.6-2.4)   08/22/23  04:19    


 


Total Bilirubin  0.4 mg/dL (0.2-1.0)   08/21/23  03:00    


 


AST  13 U/L (15-37)  L  08/21/23  03:00    


 


ALT  22 U/L (13-56)   08/21/23  03:00    


 


Alkaline Phosphatase  86 U/L ()   08/21/23  03:00    








Home Medications: 








Aspirin 81 mg PO DAILY 11/09/21 


Duloxetine HCl 30 mg PO DAILY 11/09/21 


Folic Acid 2 tab PO DAILY 11/09/21 


Gabapentin 1.5 tab PO BID 11/09/21 


Glimepiride 2 mg PO BID 11/09/21 


Golimumab [Simponi Aria] 50 mg IV SEECOM 11/09/21 


Hydroxychloroquine [Plaquenil*] 1 tab PO BID 11/09/21 


Levothyroxine Sodium 1 tab PO DAILY 11/09/21 


Metformin HCl 1,000 mg PO BID 11/09/21 


Rosuvastatin Calcium 5 mg PO DAILY 11/09/21 


Temazepam 1 tab PO BEDTIME 11/09/21 


azaTHIOprine [Azathioprine] 1 tab PO DAILY 11/09/21 


sulfaSALAzine [Sulfasalazine] 2 tab PO DAILY 11/09/21 


Hydrocodone 10/APAP 325 [Norco 10/325*] 1 tab PO Q6H PRN #15 tab 08/23/23 


Nortriptyline HCl [Pamelor*] 10 mg PO BEDTIME  cap 08/23/23 


carBAMazepine [Tegretol*] 200 mg PO BID #60 tab 08/23/23 


predniSONE [Prednisone*] 60 mg PO DAILY #42 tab 08/23/23 





New Medications: 


Hydrocodone 10/APAP 325 [Norco 10/325*] 1 tab PO Q6H PRN #15 tab


 PRN Reason: Pain Scale 5-7 (Moderate)


predniSONE [Prednisone*] 60 mg PO DAILY #42 tab


carBAMazepine [Tegretol*] 200 mg PO BID #60 tab


Physician Discharge Instructions: 


May shower in a.m.


Keep Steri-Strips on at all times 


Follow-up my office 2 weeks, call for appointment


Activity as tolerated, no heavy lifting


Diet: ADA


Activity: Ad janine


Followup: 


Sergey Chavez MD [ACTIVE - CAN ADMIT] - 1-2 Weeks


Jerrod Null MD [Primary Care Provider] - 1-2 Weeks


Time spent managing pt's care (in minutes): 36

## 2023-11-24 NOTE — EDPHYS
Physician Documentation                                                                           

 [facility]                                                                 

Name: Samara Hawley                                                                               

Age: 66 yrs                                                                                       

Sex: Female                                                                                       

: 1955                                                                                   

MRN: [id number]                                                                                   

Arrival Date: 2021                                                                          

Time: 15:14                                                                                       

Account#: [id number]                                                                            

Bed 15                                                                                            

Private MD:                                                                                       

ED Physician Diana Galeano                                                                         

HPI:                                                                                              

                                                                                             

15:30 This 66 yrs old  Female presents to ER via Wheelchair with complaints of       jmm 

      Abdominal Pain.                                                                             

15:30 The patient presents with abdominal pain. Onset: The symptoms/episode began/occurred    jmm 

      acutely, today. The symptoms do not radiate. Associated signs and symptoms: Pertinent       

      negatives: nausea and vomiting, diarrhea. The symptoms are described as achy. Modifying     

      factors: The symptoms are alleviated by nothing, the symptoms are aggravated by             

      nothing. The patient has not experienced similar symptoms in the past. Patient              

      complains of acute onset abdominal pain beginning today. Patient states this occurred       

      after eating. Has not had similar pain in the past. .                                       

                                                                                                  

Historical:                                                                                       

- Allergies:                                                                                      

15:19 No Known Allergies;                                                                     aa5 

- PMHx:                                                                                           

15:19 Diabetes mellitus; Back problem; RA;                                                    aa5 

- PSHx:                                                                                           

15:19 Back Stimulator;                                                                        aa5 

                                                                                                  

- Immunization history:: Client reports receiving the 2nd dose of the Covid vaccine.              

- Social history:: Smoking status: Patient denies any tobacco usage or history of.                

                                                                                                  

                                                                                                  

ROS:                                                                                              

15:30 Constitutional: Negative for fever, chills, and weight loss, Cardiovascular: Negative   jmm 

      for chest pain, palpitations, and edema, Respiratory: Negative for shortness of breath,     

      cough, wheezing, and pleuritic chest pain.                                                  

15:30 Abdomen/GI: Positive for abdominal pain.                                                    

15:30 All other systems are negative.                                                             

                                                                                                  

Exam:                                                                                             

15:30 Constitutional:  This is a well developed, well nourished patient who is awake, alert,  jmm 

      and in no acute distress. Head/Face:  atraumatic. Eyes:  EOMI, no conjunctival erythema     

      appreciated ENT:  Moist Mucus Membranes Neck:  Trachea midline, Supple Chest/axilla:        

      Normal chest wall appearance and motion.   Cardiovascular:  Regular rate and rhythm.        

      No edema appreciated Respiratory:  Normal respirations, no respiratory distress             

      appreciated                                                                                 

15:30 Back:  Normal ROM Skin:  General appearance color normal MS/ Extremity:  Moves all          

      extremities, no obvious deformities appreciated, no edema noted to the lower                

      extremities  Neuro:  Awake and alert, normal gait Psych:  Behavior is normal, Mood is       

      normal, Patient is cooperative and pleasant                                                 

15:30 Abdomen/GI: Inspection: obese Bowel sounds: normal, Palpation: soft, mild abdominal         

      tenderness, in the right upper quadrant and right lower quadrant.                           

                                                                                                  

Vital Signs:                                                                                      

15:18  / 94; Pulse 94; Resp 18 S; Temp 98.0(TE); Pulse Ox 100% on R/A; Weight 98.88 kg  aa5 

      (R); Height 5 ft. 8 in. (172.72 cm) (R);                                                    

16:04  / 86; Pulse 79; Resp 17; Pulse Ox 98% ;                                          jt3 

18:50  / 63; Pulse 76; Resp 16; Pulse Ox 95% on R/A;                                    jt3 

19:27  / 78; Pulse 78; Resp 18; Temp 98.0; Pulse Ox 98% on R/A; Pain 4/10;              dc2 

22:00  / 69; Pulse 72; Resp 17; Pulse Ox 99% on R/A; Pain 3/10;                         dc2 

15:18 Body Mass Index 33.15 (98.88 kg, 172.72 cm)                                             aa5 

                                                                                                  

MDM:                                                                                              

15:30 Patient medically screened.                                                             [facility] 

18:58 Data reviewed:.                                                                         [facility] 

19:44 Counseling: I had a detailed discussion with the patient and/or guardian regarding: the jmm 

      historical points, exam findings, and any diagnostic results supporting the                 

      discharge/admit diagnosis, lab results, the need for outpatient follow up.                  

19:52 Counseling: I had a detailed discussion with the patient and/or guardian regarding: the jmm 

      need for further work-up and treatment in the hospital. ED course: .                        

                                                                                                  

                                                                                             

15:30 Order name: Basic Metabolic Panel; Complete Time: 16:16                                 [facility] 

                                                                                             

15:30 Order name: CBC with Diff; Complete Time: 15:59                                         [facility] 

                                                                                             

15:30 Order name: Hepatic Function; Complete Time: 16:16                                      [facility] 

                                                                                             

15:30 Order name: Lipase; Complete Time: 16:16                                                [facility] 

                                                                                             

19:56 Order name: COVID-19 SARS RT PCR (Document "Date of Onset" if Symptomatic); Complete    [facility] 

      Time: 21:31                                                                                 

                                                                                             

15:30 Order name: US Abdomen Limited; Complete Time: 16:54                                    [facility] 

                                                                                             

15:30 Order name: IV Saline Lock; Complete Time: 15:43                                        [facility] 

                                                                                             

15:30 Order name: Labs collected and sent; Complete Time: 15:43                               [facility] 

                                                                                             

15:30 Order name: CT Abd/Pelvis - IV Contrast Only; Complete Time: 16:45                      [facility] 

                                                                                             

20:04 Order name: NPO; Complete Time: 22:17                                                   [facility]

                                                                                                  

Administered Medications:                                                                         

15:42 Drug: morphine 4 mg Route: IVP; Site: right antecubital;                                jt3 

17:47 Follow up: Response: No adverse reaction; Pain is decreased                             jt3 

15:42 Drug: Zofran (Ondansetron) 4 mg Route: IVP; Site: right antecubital;                    jt3 

17:47 Follow up: Response: No adverse reaction; Pain is decreased                             jt3 

17:10 Drug: Dilaudid (HYDROmorphone) 0.5 mg Route: IVP; Site: right antecubital;              jt3 

17:48 Follow up: Response: No adverse reaction; Pain is decreased                             jt3 

17:10 Drug: Pepcid (famotidine) 10 mg Route: IVP; Site: right antecubital;                    jt3 

17:48 Follow up: Response: No adverse reaction; Pain is decreased                             jt3 

19:50 Drug: cefOXitin 2 grams Route: IVPB; Rate: 100 ml/hr; Infused Over: 30 mins; Site:      dc2 

      right antecubital; Delivery: Primary tubing;                                                

20:30 Follow up: IV Status: Completed infusion; IV Intake: 100ml                              dc2 

20:05 Drug: Dilaudid (HYDROmorphone) 0.5 mg Route: IVP; Site: right antecubital;              dc2 

20:45 Follow up: Response: Pain is decreased                                                  dc2 

                                                                                                  

                                                                                                  

Disposition:                                                                                      

                                                                                             

09:10 Co-signature as Attending Physician, Diana Galeano MD I agree with the assessment and     sp3 

      plan of care.                                                                               

                                                                                                  

Disposition Summary:                                                                              

21 19:54                                                                                    

Hospitalization Ordered                                                                           

      Hospitalization Status: Observation                                                     jm 

      Provider: Jerrod Flores 

      Location: Telemetry/MedSurg (observation)                                               jm 

      Condition: Stable                                                                       jmm 

      Problem: new                                                                            jmm 

      Symptoms: have improved                                                                 jmm 

      Bed/Room Type: Standard                                                                 [facility] 

      Room Assignment: 230(21 21:44)                                                    mw  

      Diagnosis                                                                                   

        - Acute cholecystitis                                                                 jmm 

      Forms:                                                                                      

        - Medication Reconciliation Form                                                      anthony 

        - SBAR form                                                                           anthony 

Signatures:                                                                                       

Dispatcher [employer]                           EDRobyn Lindsay RN                        RN   Garry Lugo PA PA jmm Calderon, Audri, RN                     RN   aa5                                                  

Diana Galeano MD MD   sp3                                                  

Maida Yeager RN                    RN   dc2                                                  

Bentley Sandoval RN                     RN   jt3                                                  

                                                                                                  

Corrections: (The following items were deleted from the chart)                                    

                                                                                             

21:44 19:54 anthony steiner  

                                                                                                  

************************************************************************************************** balance training/bed mobility training/gait training/transfer training